# Patient Record
Sex: FEMALE | Race: WHITE | NOT HISPANIC OR LATINO | Employment: OTHER | URBAN - METROPOLITAN AREA
[De-identification: names, ages, dates, MRNs, and addresses within clinical notes are randomized per-mention and may not be internally consistent; named-entity substitution may affect disease eponyms.]

---

## 2017-02-06 ENCOUNTER — ALLSCRIPTS OFFICE VISIT (OUTPATIENT)
Dept: OTHER | Facility: OTHER | Age: 67
End: 2017-02-06

## 2017-02-16 ENCOUNTER — ALLSCRIPTS OFFICE VISIT (OUTPATIENT)
Dept: OTHER | Facility: OTHER | Age: 67
End: 2017-02-16

## 2017-02-22 ENCOUNTER — ALLSCRIPTS OFFICE VISIT (OUTPATIENT)
Dept: OTHER | Facility: OTHER | Age: 67
End: 2017-02-22

## 2017-03-03 ENCOUNTER — ALLSCRIPTS OFFICE VISIT (OUTPATIENT)
Dept: OTHER | Facility: OTHER | Age: 67
End: 2017-03-03

## 2017-03-03 DIAGNOSIS — M79.609 PAIN IN EXTREMITY: ICD-10-CM

## 2017-03-03 DIAGNOSIS — M25.512 PAIN IN LEFT SHOULDER: ICD-10-CM

## 2017-03-11 ENCOUNTER — TRANSCRIBE ORDERS (OUTPATIENT)
Dept: ADMINISTRATIVE | Facility: HOSPITAL | Age: 67
End: 2017-03-11

## 2017-03-11 ENCOUNTER — HOSPITAL ENCOUNTER (OUTPATIENT)
Dept: RADIOLOGY | Facility: HOSPITAL | Age: 67
Discharge: HOME | End: 2017-03-11
Payer: COMMERCIAL

## 2017-03-11 DIAGNOSIS — M25.512 PAIN IN LEFT SHOULDER: ICD-10-CM

## 2017-03-11 PROCEDURE — 73030 X-RAY EXAM OF SHOULDER: CPT

## 2017-03-13 ENCOUNTER — GENERIC CONVERSION - ENCOUNTER (OUTPATIENT)
Dept: OTHER | Facility: OTHER | Age: 67
End: 2017-03-13

## 2017-03-29 ENCOUNTER — ALLSCRIPTS OFFICE VISIT (OUTPATIENT)
Dept: OTHER | Facility: OTHER | Age: 67
End: 2017-03-29

## 2017-03-31 ENCOUNTER — LAB CONVERSION - ENCOUNTER (OUTPATIENT)
Dept: OTHER | Facility: OTHER | Age: 67
End: 2017-03-31

## 2017-03-31 LAB
ANTI-NUCLEAR ANTIBODY (ANA) (HISTORICAL): NEGATIVE
CYCLIC CITRULLINATED PEPTIDE ANTIBODY (HISTORICAL): <16 UNITS
ERYTHROCYTE SEDIMENTATION RATE (HISTORICAL): 2 MM/H
INTERPRETATION (HISTORICAL): NORMAL
LYME 18 KD IGG (HISTORICAL): ABNORMAL
LYME 23 KD IGG (HISTORICAL): ABNORMAL
LYME 23 KD IGM (HISTORICAL): ABNORMAL
LYME 28 KD IGG (HISTORICAL): ABNORMAL
LYME 30 KD IGG (HISTORICAL): ABNORMAL
LYME 39 KD IGG (HISTORICAL): ABNORMAL
LYME 39 KD IGM (HISTORICAL): ABNORMAL
LYME 41 KD IGG (HISTORICAL): REACTIVE
LYME 41 KD IGM (HISTORICAL): ABNORMAL
LYME 45 KD IGG (HISTORICAL): ABNORMAL
LYME 58 KD IGG (HISTORICAL): ABNORMAL
LYME 66 KD IGG (HISTORICAL): ABNORMAL
LYME 93 KD IGG (HISTORICAL): ABNORMAL
LYME IGG (HISTORICAL): NEGATIVE
LYME IGM (HISTORICAL): NEGATIVE
RHEUMATOID FACTOR (HISTORICAL): 9 IU/ML

## 2017-04-07 ENCOUNTER — HOSPITAL ENCOUNTER (OUTPATIENT)
Dept: RADIOLOGY | Age: 67
Discharge: HOME | End: 2017-04-07
Payer: COMMERCIAL

## 2017-04-07 ENCOUNTER — GENERIC CONVERSION - ENCOUNTER (OUTPATIENT)
Dept: OTHER | Facility: OTHER | Age: 67
End: 2017-04-07

## 2017-04-07 DIAGNOSIS — N95.9 MENOPAUSAL AND PERIMENOPAUSAL DISORDER: ICD-10-CM

## 2017-04-07 PROCEDURE — 77080 DXA BONE DENSITY AXIAL: CPT

## 2017-04-12 ENCOUNTER — ALLSCRIPTS OFFICE VISIT (OUTPATIENT)
Dept: OTHER | Facility: OTHER | Age: 67
End: 2017-04-12

## 2017-04-24 ENCOUNTER — GENERIC CONVERSION - ENCOUNTER (OUTPATIENT)
Dept: OTHER | Facility: OTHER | Age: 67
End: 2017-04-24

## 2017-06-05 ENCOUNTER — ALLSCRIPTS OFFICE VISIT (OUTPATIENT)
Dept: OTHER | Facility: OTHER | Age: 67
End: 2017-06-05

## 2017-06-15 ENCOUNTER — ALLSCRIPTS OFFICE VISIT (OUTPATIENT)
Dept: OTHER | Facility: OTHER | Age: 67
End: 2017-06-15

## 2017-06-15 ENCOUNTER — HOSPITAL ENCOUNTER (OUTPATIENT)
Dept: RADIOLOGY | Facility: HOSPITAL | Age: 67
Discharge: HOME | End: 2017-06-15
Attending: ORTHOPAEDIC SURGERY
Payer: OTHER MISCELLANEOUS

## 2017-06-15 DIAGNOSIS — M25.579 PAIN IN ANKLE: ICD-10-CM

## 2017-06-15 PROCEDURE — 73610 X-RAY EXAM OF ANKLE: CPT

## 2017-06-19 ENCOUNTER — GENERIC CONVERSION - ENCOUNTER (OUTPATIENT)
Dept: OTHER | Facility: OTHER | Age: 67
End: 2017-06-19

## 2017-09-08 ENCOUNTER — APPOINTMENT (OUTPATIENT)
Dept: LAB | Facility: CLINIC | Age: 67
End: 2017-09-08
Payer: COMMERCIAL

## 2017-09-08 ENCOUNTER — TRANSCRIBE ORDERS (OUTPATIENT)
Dept: LAB | Facility: CLINIC | Age: 67
End: 2017-09-08

## 2017-09-08 ENCOUNTER — OFFICE VISIT (OUTPATIENT)
Dept: LAB | Facility: CLINIC | Age: 67
End: 2017-09-08
Payer: OTHER MISCELLANEOUS

## 2017-09-08 DIAGNOSIS — M25.572 PAIN IN JOINT, ANKLE AND FOOT, LEFT: ICD-10-CM

## 2017-09-08 DIAGNOSIS — M25.579 PAIN IN ANKLE: ICD-10-CM

## 2017-09-08 DIAGNOSIS — M25.572 PAIN IN JOINT, ANKLE AND FOOT, LEFT: Primary | ICD-10-CM

## 2017-09-08 LAB
ANION GAP SERPL CALCULATED.3IONS-SCNC: 6 MMOL/L (ref 4–13)
ATRIAL RATE: 59 BPM
BUN SERPL-MCNC: 20 MG/DL (ref 5–25)
CALCIUM SERPL-MCNC: 9.5 MG/DL (ref 8.3–10.1)
CHLORIDE SERPL-SCNC: 104 MMOL/L (ref 100–108)
CO2 SERPL-SCNC: 29 MMOL/L (ref 21–32)
CREAT SERPL-MCNC: 0.9 MG/DL (ref 0.6–1.3)
GFR SERPL CREATININE-BSD FRML MDRD: 66 ML/MIN/1.73SQ M
GLUCOSE P FAST SERPL-MCNC: 109 MG/DL (ref 65–99)
P AXIS: 68 DEGREES
POTASSIUM SERPL-SCNC: 5.1 MMOL/L (ref 3.5–5.3)
PR INTERVAL: 134 MS
QRS AXIS: 38 DEGREES
QRSD INTERVAL: 88 MS
QT INTERVAL: 406 MS
QTC INTERVAL: 401 MS
SODIUM SERPL-SCNC: 139 MMOL/L (ref 136–145)
T WAVE AXIS: 23 DEGREES
VENTRICULAR RATE: 59 BPM

## 2017-09-08 PROCEDURE — 93005 ELECTROCARDIOGRAM TRACING: CPT

## 2017-09-08 PROCEDURE — 80048 BASIC METABOLIC PNL TOTAL CA: CPT

## 2017-09-08 PROCEDURE — 36415 COLL VENOUS BLD VENIPUNCTURE: CPT

## 2017-09-11 RX ORDER — DOXYCYCLINE HYCLATE 20 MG
20 TABLET ORAL 2 TIMES DAILY
COMMUNITY

## 2017-09-11 RX ORDER — METOPROLOL TARTRATE 50 MG/1
50 TABLET, FILM COATED ORAL DAILY
COMMUNITY
End: 2018-12-02 | Stop reason: SDUPTHER

## 2017-09-11 RX ORDER — LEVOTHYROXINE SODIUM 137 UG/1
137 TABLET ORAL DAILY
COMMUNITY
End: 2018-11-20 | Stop reason: SDUPTHER

## 2017-09-11 RX ORDER — NAPROXEN 500 MG/1
500 TABLET ORAL DAILY
COMMUNITY
End: 2019-04-27 | Stop reason: SDUPTHER

## 2017-09-11 RX ORDER — IBANDRONATE SODIUM 150 MG/1
150 TABLET, FILM COATED ORAL
COMMUNITY
End: 2018-12-02 | Stop reason: SDUPTHER

## 2017-09-11 RX ORDER — LORATADINE 10 MG/1
10 TABLET ORAL DAILY PRN
COMMUNITY

## 2017-09-18 ENCOUNTER — ANESTHESIA EVENT (OUTPATIENT)
Dept: PERIOP | Facility: HOSPITAL | Age: 67
End: 2017-09-18
Payer: OTHER MISCELLANEOUS

## 2017-09-18 ENCOUNTER — HOSPITAL ENCOUNTER (OUTPATIENT)
Facility: HOSPITAL | Age: 67
Setting detail: OUTPATIENT SURGERY
Discharge: HOME | End: 2017-09-18
Attending: ORTHOPAEDIC SURGERY | Admitting: ORTHOPAEDIC SURGERY
Payer: OTHER MISCELLANEOUS

## 2017-09-18 ENCOUNTER — HOSPITAL ENCOUNTER (OUTPATIENT)
Dept: RADIOLOGY | Facility: HOSPITAL | Age: 67
Setting detail: OUTPATIENT SURGERY
Discharge: HOME | End: 2017-09-18
Payer: OTHER MISCELLANEOUS

## 2017-09-18 ENCOUNTER — ANESTHESIA (OUTPATIENT)
Dept: PERIOP | Facility: HOSPITAL | Age: 67
End: 2017-09-18
Payer: OTHER MISCELLANEOUS

## 2017-09-18 VITALS
TEMPERATURE: 97.3 F | HEIGHT: 63 IN | HEART RATE: 68 BPM | DIASTOLIC BLOOD PRESSURE: 62 MMHG | SYSTOLIC BLOOD PRESSURE: 136 MMHG | RESPIRATION RATE: 16 BRPM | OXYGEN SATURATION: 100 % | BODY MASS INDEX: 41.64 KG/M2 | WEIGHT: 235 LBS

## 2017-09-18 DIAGNOSIS — T84.84XA PAIN DUE TO INTERNAL ORTHOPEDIC PROSTHETIC DEVICES, IMPLANTS AND GRAFTS, INITIAL ENCOUNTER (HCC): ICD-10-CM

## 2017-09-18 PROCEDURE — 73600 X-RAY EXAM OF ANKLE: CPT

## 2017-09-18 RX ORDER — PROPOFOL 10 MG/ML
INJECTION, EMULSION INTRAVENOUS AS NEEDED
Status: DISCONTINUED | OUTPATIENT
Start: 2017-09-18 | End: 2017-09-18 | Stop reason: SURG

## 2017-09-18 RX ORDER — OXYCODONE HYDROCHLORIDE 5 MG/1
5 TABLET ORAL EVERY 4 HOURS PRN
Qty: 30 TABLET | Refills: 0 | Status: SHIPPED | OUTPATIENT
Start: 2017-09-18 | End: 2017-09-28

## 2017-09-18 RX ORDER — LIDOCAINE HYDROCHLORIDE 10 MG/ML
INJECTION, SOLUTION EPIDURAL; INFILTRATION; INTRACAUDAL; PERINEURAL AS NEEDED
Status: DISCONTINUED | OUTPATIENT
Start: 2017-09-18 | End: 2017-09-18 | Stop reason: SURG

## 2017-09-18 RX ORDER — SODIUM CHLORIDE, SODIUM LACTATE, POTASSIUM CHLORIDE, CALCIUM CHLORIDE 600; 310; 30; 20 MG/100ML; MG/100ML; MG/100ML; MG/100ML
20 INJECTION, SOLUTION INTRAVENOUS CONTINUOUS
Status: DISCONTINUED | OUTPATIENT
Start: 2017-09-18 | End: 2017-09-18 | Stop reason: HOSPADM

## 2017-09-18 RX ORDER — FENTANYL CITRATE/PF 50 MCG/ML
50 SYRINGE (ML) INJECTION
Status: DISCONTINUED | OUTPATIENT
Start: 2017-09-18 | End: 2017-09-18 | Stop reason: HOSPADM

## 2017-09-18 RX ORDER — MAGNESIUM HYDROXIDE 1200 MG/15ML
LIQUID ORAL AS NEEDED
Status: DISCONTINUED | OUTPATIENT
Start: 2017-09-18 | End: 2017-09-18 | Stop reason: HOSPADM

## 2017-09-18 RX ORDER — FENTANYL CITRATE 50 UG/ML
INJECTION, SOLUTION INTRAMUSCULAR; INTRAVENOUS AS NEEDED
Status: DISCONTINUED | OUTPATIENT
Start: 2017-09-18 | End: 2017-09-18 | Stop reason: SURG

## 2017-09-18 RX ORDER — TRIAMCINOLONE ACETONIDE 55 UG/1
2 SPRAY, METERED NASAL DAILY
COMMUNITY
End: 2018-05-07 | Stop reason: SDUPTHER

## 2017-09-18 RX ORDER — SODIUM CHLORIDE, SODIUM LACTATE, POTASSIUM CHLORIDE, CALCIUM CHLORIDE 600; 310; 30; 20 MG/100ML; MG/100ML; MG/100ML; MG/100ML
75 INJECTION, SOLUTION INTRAVENOUS CONTINUOUS
Status: DISCONTINUED | OUTPATIENT
Start: 2017-09-18 | End: 2017-09-18 | Stop reason: HOSPADM

## 2017-09-18 RX ORDER — BUPIVACAINE HYDROCHLORIDE 2.5 MG/ML
INJECTION, SOLUTION INFILTRATION; PERINEURAL AS NEEDED
Status: DISCONTINUED | OUTPATIENT
Start: 2017-09-18 | End: 2017-09-18 | Stop reason: HOSPADM

## 2017-09-18 RX ORDER — ONDANSETRON 2 MG/ML
4 INJECTION INTRAMUSCULAR; INTRAVENOUS ONCE AS NEEDED
Status: DISCONTINUED | OUTPATIENT
Start: 2017-09-18 | End: 2017-09-18 | Stop reason: HOSPADM

## 2017-09-18 RX ORDER — CEFAZOLIN SODIUM 1 G/3ML
INJECTION, POWDER, FOR SOLUTION INTRAMUSCULAR; INTRAVENOUS AS NEEDED
Status: DISCONTINUED | OUTPATIENT
Start: 2017-09-18 | End: 2017-09-18 | Stop reason: SURG

## 2017-09-18 RX ORDER — EPHEDRINE SULFATE 50 MG/ML
INJECTION, SOLUTION INTRAVENOUS AS NEEDED
Status: DISCONTINUED | OUTPATIENT
Start: 2017-09-18 | End: 2017-09-18 | Stop reason: SURG

## 2017-09-18 RX ORDER — ONDANSETRON 2 MG/ML
INJECTION INTRAMUSCULAR; INTRAVENOUS AS NEEDED
Status: DISCONTINUED | OUTPATIENT
Start: 2017-09-18 | End: 2017-09-18 | Stop reason: SURG

## 2017-09-18 RX ADMIN — DEXAMETHASONE SODIUM PHOSPHATE 5 MG: 10 INJECTION INTRAMUSCULAR; INTRAVENOUS at 11:20

## 2017-09-18 RX ADMIN — ONDANSETRON 4 MG: 2 INJECTION INTRAMUSCULAR; INTRAVENOUS at 11:46

## 2017-09-18 RX ADMIN — FENTANYL CITRATE 50 MCG: 50 INJECTION INTRAMUSCULAR; INTRAVENOUS at 12:24

## 2017-09-18 RX ADMIN — SODIUM CHLORIDE, SODIUM LACTATE, POTASSIUM CHLORIDE, AND CALCIUM CHLORIDE 20 ML/HR: .6; .31; .03; .02 INJECTION, SOLUTION INTRAVENOUS at 09:11

## 2017-09-18 RX ADMIN — FENTANYL CITRATE 25 MCG: 50 INJECTION, SOLUTION INTRAMUSCULAR; INTRAVENOUS at 11:47

## 2017-09-18 RX ADMIN — CEFAZOLIN SODIUM 1000 MG: 1 SOLUTION INTRAVENOUS at 11:18

## 2017-09-18 RX ADMIN — SODIUM CHLORIDE, SODIUM LACTATE, POTASSIUM CHLORIDE, AND CALCIUM CHLORIDE: .6; .31; .03; .02 INJECTION, SOLUTION INTRAVENOUS at 12:07

## 2017-09-18 RX ADMIN — LIDOCAINE HYDROCHLORIDE 50 MG: 10 INJECTION, SOLUTION EPIDURAL; INFILTRATION; INTRACAUDAL; PERINEURAL at 11:13

## 2017-09-18 RX ADMIN — FENTANYL CITRATE 25 MCG: 50 INJECTION, SOLUTION INTRAMUSCULAR; INTRAVENOUS at 11:36

## 2017-09-18 RX ADMIN — FENTANYL CITRATE 50 MCG: 50 INJECTION INTRAMUSCULAR; INTRAVENOUS at 12:17

## 2017-09-18 RX ADMIN — FENTANYL CITRATE 50 MCG: 50 INJECTION, SOLUTION INTRAMUSCULAR; INTRAVENOUS at 11:13

## 2017-09-18 RX ADMIN — EPHEDRINE SULFATE 15 MG: 50 INJECTION, SOLUTION INTRAMUSCULAR; INTRAVENOUS; SUBCUTANEOUS at 11:49

## 2017-09-18 RX ADMIN — EPHEDRINE SULFATE 10 MG: 50 INJECTION, SOLUTION INTRAMUSCULAR; INTRAVENOUS; SUBCUTANEOUS at 11:40

## 2017-09-18 RX ADMIN — EPHEDRINE SULFATE 10 MG: 50 INJECTION, SOLUTION INTRAMUSCULAR; INTRAVENOUS; SUBCUTANEOUS at 11:37

## 2017-09-18 RX ADMIN — CEFAZOLIN 1000 MG: 1 INJECTION, POWDER, FOR SOLUTION INTRAVENOUS at 11:18

## 2017-09-18 RX ADMIN — PROPOFOL 300 MG: 10 INJECTION, EMULSION INTRAVENOUS at 11:13

## 2017-09-18 RX ADMIN — HYDROMORPHONE HYDROCHLORIDE 0.5 MG: 1 INJECTION, SOLUTION INTRAMUSCULAR; INTRAVENOUS; SUBCUTANEOUS at 12:06

## 2017-10-03 ENCOUNTER — GENERIC CONVERSION - ENCOUNTER (OUTPATIENT)
Dept: OTHER | Facility: OTHER | Age: 67
End: 2017-10-03

## 2017-10-17 ENCOUNTER — ALLSCRIPTS OFFICE VISIT (OUTPATIENT)
Dept: OTHER | Facility: OTHER | Age: 67
End: 2017-10-17

## 2017-10-27 ENCOUNTER — HOSPITAL ENCOUNTER (OUTPATIENT)
Dept: MAMMOGRAPHY | Facility: CLINIC | Age: 67
Discharge: HOME | End: 2017-10-27
Payer: COMMERCIAL

## 2017-10-27 DIAGNOSIS — Z12.31 ENCOUNTER FOR SCREENING MAMMOGRAM FOR MALIGNANT NEOPLASM OF BREAST: ICD-10-CM

## 2017-10-27 PROCEDURE — G0202 SCR MAMMO BI INCL CAD: HCPCS

## 2017-11-07 ENCOUNTER — GENERIC CONVERSION - ENCOUNTER (OUTPATIENT)
Dept: OTHER | Facility: OTHER | Age: 67
End: 2017-11-07

## 2017-11-14 ENCOUNTER — GENERIC CONVERSION - ENCOUNTER (OUTPATIENT)
Dept: OTHER | Facility: OTHER | Age: 67
End: 2017-11-14

## 2017-12-07 ENCOUNTER — GENERIC CONVERSION - ENCOUNTER (OUTPATIENT)
Dept: OTHER | Facility: OTHER | Age: 67
End: 2017-12-07

## 2017-12-07 DIAGNOSIS — E03.9 HYPOTHYROIDISM: ICD-10-CM

## 2017-12-21 ENCOUNTER — GENERIC CONVERSION - ENCOUNTER (OUTPATIENT)
Dept: OTHER | Facility: OTHER | Age: 67
End: 2017-12-21

## 2017-12-21 ENCOUNTER — LAB CONVERSION - ENCOUNTER (OUTPATIENT)
Dept: OTHER | Facility: OTHER | Age: 67
End: 2017-12-21

## 2017-12-21 LAB
A/G RATIO (HISTORICAL): 1.5 (CALC) (ref 1–2.5)
ALBUMIN SERPL BCP-MCNC: 4 G/DL (ref 3.6–5.1)
ALP SERPL-CCNC: 74 U/L (ref 33–130)
ALT SERPL W P-5'-P-CCNC: 10 U/L (ref 6–29)
AST SERPL W P-5'-P-CCNC: 13 U/L (ref 10–35)
BILIRUB SERPL-MCNC: 0.6 MG/DL (ref 0.2–1.2)
BUN SERPL-MCNC: 20 MG/DL (ref 7–25)
BUN/CREA RATIO (HISTORICAL): NORMAL (CALC) (ref 6–22)
CALCIUM SERPL-MCNC: 9.6 MG/DL (ref 8.6–10.4)
CHLORIDE SERPL-SCNC: 103 MMOL/L (ref 98–110)
CHOLEST SERPL-MCNC: 229 MG/DL
CHOLEST/HDLC SERPL: 2.8 (CALC)
CO2 SERPL-SCNC: 27 MMOL/L (ref 20–31)
CREAT SERPL-MCNC: 0.91 MG/DL (ref 0.5–0.99)
DEPRECATED RDW RBC AUTO: 12.6 % (ref 11–15)
EGFR AFRICAN AMERICAN (HISTORICAL): 76 ML/MIN/1.73M2
EGFR-AMERICAN CALC (HISTORICAL): 65 ML/MIN/1.73M2
GAMMA GLOBULIN (HISTORICAL): 2.6 G/DL (CALC) (ref 1.9–3.7)
GLUCOSE (HISTORICAL): 99 MG/DL (ref 65–99)
HCT VFR BLD AUTO: 38.6 % (ref 35–45)
HDLC SERPL-MCNC: 83 MG/DL
HGB BLD-MCNC: 12.5 G/DL (ref 11.7–15.5)
LDL CHOLESTEROL (HISTORICAL): 127 MG/DL (CALC)
MCH RBC QN AUTO: 30.6 PG (ref 27–33)
MCHC RBC AUTO-ENTMCNC: 32.4 G/DL (ref 32–36)
MCV RBC AUTO: 94.4 FL (ref 80–100)
NON-HDL-CHOL (CHOL-HDL) (HISTORICAL): 146 MG/DL (CALC)
PLATELET # BLD AUTO: 235 THOUSAND/UL (ref 140–400)
PMV BLD AUTO: 10.3 FL (ref 7.5–12.5)
POTASSIUM SERPL-SCNC: 4.2 MMOL/L (ref 3.5–5.3)
RBC # BLD AUTO: 4.09 MILLION/UL (ref 3.8–5.1)
SODIUM SERPL-SCNC: 139 MMOL/L (ref 135–146)
TOTAL PROTEIN (HISTORICAL): 6.6 G/DL (ref 6.1–8.1)
TRIGL SERPL-MCNC: 86 MG/DL
TSH SERPL DL<=0.05 MIU/L-ACNC: 0.97 MIU/L (ref 0.4–4.5)
WBC # BLD AUTO: 5.6 THOUSAND/UL (ref 3.8–10.8)

## 2018-01-09 ENCOUNTER — GENERIC CONVERSION - ENCOUNTER (OUTPATIENT)
Dept: OTHER | Facility: OTHER | Age: 68
End: 2018-01-09

## 2018-01-10 NOTE — PROGRESS NOTES
Chief Complaint  Nurse visit for flu vaccine      Active Problems    1  Allergic rhinitis (477 9) (J30 9)   2  Ankle pain (719 47) (M25 579)   3  Encounter for routine gynecological examination (V72 31) (Z01 419)   4  Encounter for screening mammogram for malignant neoplasm of breast (V76 12)   (Z12 31)   5  Esophageal reflux (530 81) (K21 9)   6  Foot Pain (Soft Tissue) (729 5)   7  Heel pain, chronic, left (729 5,338 29) (M79 672,G89 29)   8  Hyperlipidemia (272 4) (E78 5)   9  Hypertension (401 9) (I10)   10  Hypothyroidism (244 9) (E03 9)   11  Insomnia (780 52) (G47 00)   12  Mammogram abnormal (793 80) (R92 8)   13  Need for prophylactic vaccination and inoculation against influenza (V04 81) (Z23)   14  Osteopenia (733 90) (M85 80)   15  Rosacea (695 3) (L71 9)   16  Screening for colorectal cancer (V76 51) (Z12 11,Z12 12)   17  Screening for HPV (human papillomavirus) (V73 81) (Z11 51)    Current Meds   1  Azelastine HCl - 0 1 % Nasal Solution; TWO SPRAY EACH NOSTRIL EVERY 12 HOURS   AS NEEDED; Therapy: 65RMJ0687 to 35 67 15)  Requested for: 52CGN2184; Last   Rx:31Jan2014 Ordered   2  Caltrate 600 TABS; Take 1 tablet daily; Therapy: (06-15990652) to Recorded   3  Claritin 10 MG Oral Tablet; take 1 tablet daily as needed; Therapy: (06-15990652) to Recorded   4  [employer] Glucosamine-Chondroitin CAPS; TAKE 1 CAPSULE Daily; Therapy: (06-15990652) to Recorded   5  Doxycycline Hyclate 20 MG Oral Tablet; Take 1 tablet twice daily Recorded   6  Finacea 15 % External Gel; APPLY SPARINGLY AND MASSAGE IN WELL TO   AFFECTED AREA(S) TWICE DAILY; Therapy: (06-15990652) to Recorded   7  Levothyroxine Sodium 150 MCG Oral Tablet; take 1 tablet every day; Therapy: 19UOJ4382 to (Marlyn Puri)  Requested for: 75DSM3912; Last   TH:25SXL3983 Ordered   8  Metamucil CAPS; Therapy: (06-15990652) to Recorded   9   Metoprolol Tartrate 50 MG Oral Tablet; take 1 tablet every day;   Therapy: 55QIU7983 to (Evaluate:17Mar2017)  Requested for: 22Mar2016; Last   Rx:22Mar2016 Ordered   10  Metrogel 1 % External Gel; Therapy: 53OYB0638 to Recorded   11  Naproxen 500 MG Oral Tablet; TAKE 1 TABLET EVERY 12 HOURS as needed Recorded   12  Naproxen 500 MG Oral Tablet; TAKE 1 TABLET Twice daily with food; Therapy: 24KZZ1928 to (Evaluate:05Jun2016)  Requested for: 71WGA2703; Last    Rx:23Ucn0155 Ordered   13  Nasacort AQ 55 MCG/ACT AERO; USE 2 SPRAYS IN EACH NOSTRIL EVERY DAY; Therapy: 59JGD4374 to (Pérez Manuel)  Requested for: 38PBL7117; Last    Rx:02Oct2014 Ordered   14  Triamcinolone Acetonide 55 MCG/ACT INHA; USE 2 SPRAYS IN EACH NOSTRIL    EVERY DAY; Therapy: 47MGO7251 to (Neoma Bandar)  Requested for: 13DAR5933; Last    Rx:21Jan2014 Ordered    Allergies    1  No Known Drug Allergies    Plan  Need for prophylactic vaccination and inoculation against influenza    · Fluzone High-Dose 0 5 ML Intramuscular Suspension Prefilled Syringe    Future Appointments    Date/Time Provider Specialty Site   36/01/3957 81:07 PM MONTANA Alvarez   Family Medicine [address] [place] [address]   10/11/2016 01:40 PM Herberth Vidal [facility] Obstetrics/Gynecology [facility] OB     Signatures   Electronically signed by : Marthe Krabbe, M D ; Oct  3 3394 12:33PM EST                       (Author)

## 2018-01-11 NOTE — RESULT NOTES
Verified Results  (1) COMPREHENSIVE METABOLIC PANEL 06VZE4181 50:53KJ Angie Ugalde     Test Name Result Flag Reference   GLUCOSE 98 mg/dL  65-99   Fasting reference interval   UREA NITROGEN (BUN) 19 mg/dL  7-25   CREATININE 0 84 mg/dL  0 50-0 99   For patients >52years of age, the reference limit  for Creatinine is approximately 13% higher for people  identified as -American  eGFR NON-AFR  AMERICAN 72 mL/min/1 73m2  > OR = 60   eGFR AFRICAN AMERICAN 84 mL/min/1 73m2  > OR = 60   BUN/CREATININE RATIO   7-30   NOT APPLICABLE (calc)   SODIUM 140 mmol/L  135-146   POTASSIUM 4 2 mmol/L  3 5-5 3   CHLORIDE 105 mmol/L     CARBON DIOXIDE 28 mmol/L  20-31   CALCIUM 9 4 mg/dL  8 6-10 4   PROTEIN, TOTAL 6 4 g/dL  6 1-8 1   ALBUMIN 3 9 g/dL  3 6-5 1   GLOBULIN 2 5 g/dL (calc)  1 9-3 7   ALBUMIN/GLOBULIN RATIO 1 6 (calc)  1 0-2 5   BILIRUBIN, TOTAL 0 5 mg/dL  0 2-1 2   ALKALINE PHOSPHATASE 78 U/L     AST 14 U/L  10-35   ALT 11 U/L  6-29     (1) LIPID PANEL, FASTING 01UNA4799 93:33HT Rosalino Purvis     Test Name Result Flag Reference   CHOLESTEROL, TOTAL 220 mg/dL H 125-200   HDL CHOLESTEROL 76 mg/dL  > OR = 46   TRIGLICERIDES 83 mg/dL  <307   LDL-CHOLESTEROL 127 mg/dL (calc)  <130   Desirable range <100 mg/dL for patients with CHD or  diabetes and <70 mg/dL for diabetic patients with  known heart disease  CHOL/HDLC RATIO 2 9 (calc)  < OR = 5 0   NON HDL CHOLESTEROL 144 mg/dL (calc)     Target for non-HDL cholesterol is 30 mg/dL higher than   LDL cholesterol target       (Q) TSH, 3RD GENERATION 95QLH4355 62:52OB Rosalino Purvis   REPORT COMMENT:  FASTING:YES     Test Name Result Flag Reference   TSH 0 19 mIU/L L 0 40-4 50

## 2018-01-11 NOTE — RESULT NOTES
Message   some small arhtritis of shoulder  call if sx's persist after 2 weeks on meds     Verified Results  * XR SHOULDER 2+ VIEW LEFT [id number] 57:26EG [facility] Order Number: [id number]     Test Name Result Flag Reference   XR SHOULDER 2+ VW LEFT (Report)     LEFT SHOULDER     INDICATION: Left shoulder pain  COMPARISON: None     VIEWS: 3     IMAGES: 3     FINDINGS:     There is no acute fracture or dislocation  There is osteoarthritis of the left acromioclavicular joint with joint space narrowing and marginal hypertrophic spurring  Glenohumeral joint is unremarkable     No lytic or blastic lesions are seen  Soft tissues are unremarkable  No radiopaque foreign bodies are seen       IMPRESSION:       1  No acute osseous abnormality of the left shoulder   2   Osteoarthritis of the left acromioclavicular joint       Workstation performed: [id number]     Signed by:   Marcus Pabon MD   3/12/17

## 2018-01-12 VITALS
HEART RATE: 86 BPM | SYSTOLIC BLOOD PRESSURE: 142 MMHG | RESPIRATION RATE: 12 BRPM | DIASTOLIC BLOOD PRESSURE: 78 MMHG | BODY MASS INDEX: 40.46 KG/M2 | TEMPERATURE: 97.3 F | HEIGHT: 64 IN | WEIGHT: 237 LBS

## 2018-01-12 VITALS
HEART RATE: 84 BPM | BODY MASS INDEX: 41.15 KG/M2 | SYSTOLIC BLOOD PRESSURE: 144 MMHG | TEMPERATURE: 97.5 F | WEIGHT: 236 LBS | RESPIRATION RATE: 14 BRPM | DIASTOLIC BLOOD PRESSURE: 74 MMHG

## 2018-01-12 VITALS
SYSTOLIC BLOOD PRESSURE: 149 MMHG | WEIGHT: 235.13 LBS | BODY MASS INDEX: 40.14 KG/M2 | HEIGHT: 64 IN | HEART RATE: 69 BPM | DIASTOLIC BLOOD PRESSURE: 82 MMHG

## 2018-01-13 VITALS
SYSTOLIC BLOOD PRESSURE: 144 MMHG | TEMPERATURE: 97.8 F | HEART RATE: 68 BPM | RESPIRATION RATE: 16 BRPM | DIASTOLIC BLOOD PRESSURE: 76 MMHG | HEIGHT: 64 IN | BODY MASS INDEX: 39.63 KG/M2 | WEIGHT: 232.13 LBS

## 2018-01-13 VITALS — DIASTOLIC BLOOD PRESSURE: 68 MMHG | BODY MASS INDEX: 40.97 KG/M2 | WEIGHT: 235 LBS | SYSTOLIC BLOOD PRESSURE: 120 MMHG

## 2018-01-13 VITALS
HEIGHT: 64 IN | SYSTOLIC BLOOD PRESSURE: 140 MMHG | TEMPERATURE: 97.4 F | BODY MASS INDEX: 40.46 KG/M2 | DIASTOLIC BLOOD PRESSURE: 82 MMHG | HEART RATE: 78 BPM | WEIGHT: 237 LBS | RESPIRATION RATE: 14 BRPM

## 2018-01-13 VITALS
WEIGHT: 237 LBS | HEIGHT: 64 IN | TEMPERATURE: 97.9 F | DIASTOLIC BLOOD PRESSURE: 92 MMHG | SYSTOLIC BLOOD PRESSURE: 142 MMHG | BODY MASS INDEX: 40.46 KG/M2 | RESPIRATION RATE: 16 BRPM | HEART RATE: 78 BPM

## 2018-01-13 VITALS
RESPIRATION RATE: 14 BRPM | HEIGHT: 64 IN | WEIGHT: 236 LBS | TEMPERATURE: 97.5 F | SYSTOLIC BLOOD PRESSURE: 142 MMHG | DIASTOLIC BLOOD PRESSURE: 76 MMHG | HEART RATE: 82 BPM | BODY MASS INDEX: 40.29 KG/M2

## 2018-01-13 NOTE — MISCELLANEOUS
Message   Recorded as Task   Date: 04/07/2017 02:24 PM, Created By: Kashif Miller   Task Name: Go to Result   Assigned To: Charisse Lopez   Regarding Patient: Vida Stover, Status: Active   Comment:    Kam Clayton - 07 Apr 2017 2:24 PM     TASK CREATED  DEXA shows slight worsening, now in the osteoporosis category  She should consider treatment for this  She can either see her PCP or me or rheumatology to discuss  Darby Kemp - 07 Apr 2017 2:28 PM     TASK EDITED   Darek Weller - 07 Apr 2017 2:33 PM     TASK EDITED  spoke with pt, she decided to have kam treat her osteoporosis    apt scheduled        Active Problems    1  Acute URI (465 9) (J06 9)   2  Allergic rhinitis (477 9) (J30 9)   3  Ankle pain (719 47) (M25 579)   4  Encounter for gynecological examination without abnormal finding (V72 31) (Z01 419)   5  Encounter for screening mammogram for malignant neoplasm of breast (V76 12)   (Z12 31)   6  Esophageal reflux (530 81) (K21 9)   7  Extremity pain (729 5) (M79 609)   8  Heel pain, chronic, left (729 5,338 29) (M79 672,G89 29)   9  Hyperlipidemia (272 4) (E78 5)   10  Hypertension (401 9) (I10)   11  Hypothyroidism (244 9) (E03 9)   12  Insomnia (780 52) (G47 00)   13  Left shoulder pain (719 41) (M25 512)   14  Mammogram abnormal (793 80) (R92 8)   15  Need for pneumococcal vaccine (V03 82) (Z23)   16  Need for prophylactic vaccination and inoculation against influenza (V04 81) (Z23)   17  Osteopenia (733 90) (M85 80)   18  Pharyngitis (462) (J02 9)   19  Postmenopausal disorder (627 9) (N95 9)   20  Rosacea (695 3) (L71 9)   21  Screening for colorectal cancer (V76 51) (Z12 11,Z12 12)   22  Screening for HPV (human papillomavirus) (V73 81) (Z11 51)   23  Sinusitis (473 9) (J32 9)    Current Meds   1  Caltrate 600+D TABS; Take 2 tablets daily; Therapy: (Recorded:28Sum8636) to Recorded   2  Claritin 10 MG Oral Tablet; take 1 tablet daily as needed;    Therapy: (06-02769997) to Recorded   3  [employer]S Glucosamine-Chondroitin CAPS; TAKE 1 CAPSULE Daily; Therapy: ([phone]) to Recorded   4  Doxycycline Hyclate 20 MG Oral Tablet; Take 1 tablet twice daily; Therapy: (Ayana Stoner) to Recorded   5  Finacea 15 % External Gel; APPLY SPARINGLY AND MASSAGE IN WELL TO   AFFECTED AREA(S) TWICE DAILY; Therapy: ([phone]) to Recorded   6  Levothyroxine Sodium 137 MCG Oral Tablet (Synthroid); TAKE ONE (1) TABLET(S)   DAILY; Therapy: 68PZD5282 to (Last Rx:80Fee2234)  Requested for: 01FRW9500 Ordered   7  Metamucil CAPS; Take 4 caps twice a day; Therapy: (Ayana Stoner) to Recorded   8  Methocarbamol 500 MG Oral Tablet; one po q hs;   Therapy: 30HDD1129 to (Last Rx:29Mar2017)  Requested for: 65DKZ4408 Ordered   9  Metoprolol Tartrate 50 MG Oral Tablet; take 1 tablet every day; Therapy: 14WWX7661 to (Yojana Crow)  Requested for: 76QQX1411; Last   Rx:13Mar2017 Ordered   10  Metrogel 1 % External Gel (MetroNIDAZOLE); Therapy: 04MKY0717 to Recorded   11  Naproxen 500 MG Oral Tablet; TAKE 1 TABLET Twice daily with food; Therapy: 25RCO1950 to (Evaluate:79Mmh7072)  Requested for: 22Nov2016; Last    Rx:22Nov2016 Ordered   12  Nasacort AQ 55 MCG/ACT AERO; USE 2 SPRAYS IN EACH NOSTRIL EVERY DAY; Therapy: 69PMW0283 to (Yojana Crow)  Requested for: 96CDS3485; Last    Rx:02Oct2014 Ordered    Allergies    1   No Known Drug Allergies    Signatures   Electronically signed by : Renae Mari, ; Apr 7 2017  2:33PM EST                       (Author)

## 2018-01-13 NOTE — PROGRESS NOTES
Preliminary Nursing Report                Patient Information    Initial Encounter Entry Date:   2017 1:02 PM EST (Automated Transmission Automated Transmission)       Last Modified:   {Renée Brooks}              Legal Name: Yeimi Segal Number:        YOB: 1950        Age (years): 79        Gender: F        Body Mass Index (BMI): 42 kg/m2        Height: 63 in  Weight: 235 lbs (107 kgs)           [facility]   [facility] [id number] [address]              Phone: [phone]   (consent to leave messages)        Email:        Ethnicity: Decline to State        Judaism:        Marital Status:        Preferred Language: English        Race: Other Race                    Patient Insurance Information        Primary Insurance Information Carrier Name: {Primary  CarrierName}           Carrier Address:   {Primary  CarrierAddress}              Carrier Phone: {Primary  CarrierPhone}          Group Number: {Primary  GroupNumber}          Policy Number: {Primary  PolicyNumber}          Insured Name: {Primary  InsuredName}          Insured : {Primary  InsuredDOB}          Relationship to Insured: {Primary  RelationshiptoInsured}           Secondary Insurance Information Carrier Name: {Secondary  CarrierName}           Carrier Address:   {Secondary  CarrierAddress}              Carrier Phone: {Secondary  CarrierPhone}          Group Number: {Secondary  GroupNumber}          Policy Number: {Secondary  PolicyNumber}          Insured Name: {Secondary  InsuredName}          Insured : {Secondary  InsuredDOB}          Relationship to Insured: {Secondary  RelationshiptoInsured}                       Health Profile   Booking #:   Jake Class #: [id number]               DOS: 2017    Surgery : REMOVAL OF SUPPORT IMPLANT      Add'l Procedures/Notes:     Surgery Risk: Intermediate          Precautions     BMI                   Allergies    No Known Drug Allergies Medications    Caltrate 600+D TABS       Claritin 10 MG Oral Tablet       CVS Glucosamine-Chondroitin CAPS       Doxycycline Hyclate 20 MG Oral Tablet       Finacea 15 % External Gel       Ibandronate Sodium 150 MG Oral Tablet       Levothyroxine Sodium 137 MCG Oral Tablet       Metamucil CAPS       Methocarbamol 500 MG Oral Tablet       Metoprolol Tartrate 50 MG Oral Tablet       Metrogel 1 % External Gel       Naproxen 500 MG Oral Tablet       Nasacort AQ 55 MCG/ACT AERO               Conditions    Allergic rhinitis       Ankle pain       Encounter for gynecological examination without abnormal finding       Encounter for screening mammogram for malignant neoplasm of breast       Esophageal reflux       Extremity pain       Heel pain, chronic, left       Hyperlipidemia       Hypertension       Hypothyroidism       Insomnia       Left shoulder pain       Need for pneumococcal vaccine       Need for prophylactic vaccination and inoculation against influenza       Osteopenia       Painful orthopaedic hardware       Postmenopausal disorder       Postmenopausal osteoporosis       Rosacea               Family History    None             Surgical History    None             Social History    Currently sexually active       Denies Never Drank Alcohol       Denies Uses drugs daily       Exercises moderately 3 or more times a week              Never A Smoker                               Patient Instructions       Medical Procedure Risk  NPO Instructions   The day before surgery it is recommended to have a light dinner at your usual time and you are allowed a light snack early in the evening  Do not eat anything heavy or eat a big meal after 7pm  Do not eat or drink anything after midnight prior to your surgery  If you are supposed to take any of your medications, do so with a sip of water   Failure to follow these instructions can lead to an increased risk of lung complications and may result in a delay or cancellation of your procedure  If you have any questions, contact your institution for further instructions  No candy, no gum, no mints, no chewing tobacco          Metoprolol Tartrate 50 MG Oral Tablet  Medication Instruction (Beta Blocker) 3, 2, c, 4, 6, 5  Please continue to take this medication on your normal schedule  If this is an oral medication and you take in the morning, you may do so with a sip of water  Naproxen 500 MG Oral Tablet  Medication Instruction (NSAID - Pain Medication) 61  Please stop ibuprofen, naproxen and other non-steroidal anti-inflammatory drugs (NSAIDS) for 24 hrs before surgery  Esophageal reflux  Medication Instruction (Reflux Disease)   Please continue to take this medication on your normal schedule  If this is an oral medication and you take in the morning, you may do so with a sip of water  Levothyroxine Sodium 137 MCG Oral Tablet  Medication Instruction (Thyroxine - Synthroid)   Please continue to take this medication on your normal schedule  If this is an oral medication and you take in the morning, you may do so with a sip of water  Testing Considerations       ? Basic Metabolic Panel (BMP) t  If test was completed and normal within last six months, repeat test is not necessary  Triggered by: Hypertension         ? Electrocardiogram (ECG) t  Patient does not need new test if normal ECG is present within the last six months and no change in clinical condition  Triggered by: Hypertension, Age or Facility Rec               Consultations       No recommendations for this classification  Miscellaneous Questions         Question: Are you able to walk up a flight of stairs, walk up a hill or do heavy housework WITHOUT having chest pain or shortness of breath? Answer: YES                   Allergies/Conditions/Medications Not Found        The following were not recognized by our system when generating the recommendations  Please consider if this would impact any preoperative protocols  ? Ankle pain       ? Currently sexually active       ? Denies Never Drank Alcohol       ? Encounter for screening mammogram for malignant neoplasm of breast       ? Exercises moderately 3 or more times a week       ?        ? Never A Smoker       ? Caltrate 600+D TABS       ? CVS Glucosamine-Chondroitin CAPS       ? Finacea 15 % External Gel       ? Ibandronate Sodium 150 MG Oral Tablet       ? Metamucil CAPS                  Appointment Information         Date:    09/18/2017        Location:    [facility]        Address:           Directions:                      Footnotes revision 14      ?? Denotes a free-text entry  Legal Disclaimer: Any and all recommendations and services provided herein are designed to assist in the preoperative care of the patient  Nothing contained herein is designed to replace, eliminate or alleviate the responsibility of the attending physician to supervise and determine the patient?s preoperative care and course of treatment  Failure to provide complete, accurate information may negatively impact the system?s ability to recommend the proper preoperative protocol  THE ATTENDING PHYSICIAN IS RESPONSIBLE TO REVIEW THE SUGGESTED PREOPERATIVE PROTOCOLS/COURSE OF TREATMENT AND PRESCRIBE THE FINAL COURSE OF PREOPERATIVE TREATMENT IN CONSULTATION WITH THE PATIENT  THE ePREOP SYSTEM AND ITS MATERIALS ARE PROVIDED ? AS IS? WITHOUT WARRANTY OF ANY KIND, EXPRESS OR IMPLIED, INCLUDING, BUT NOT LIMITED TO, WARRANTIES OF PERFORMANCE OR MERCHANTABILITY OR FITNESS FOR A PARTICULAR PURPOSE  PATIENT AND PHYSICIANS HEREBY AGREE THAT THEIR USE OF THE MATERIALS AND RESOURCES ACT AS A CONSENT TO RELEASE AND WAIVE ePREOP FROM ANY AND ALL CLAIMS OF WARRANTY, TORT OR CONTRACT LAW OF ANY KIND  Electronically signed by:Saúl MALONE    Jun 22 2017  5:05PM EST

## 2018-01-15 NOTE — PROGRESS NOTES
Chief Complaint  Patient is here High Dose Flu Vaccine      Active Problems    1  Aftercare following surgery (V58 89) (Z48 89)   2  Allergic rhinitis (477 9) (J30 9)   3  Ankle pain (719 47) (M25 579)   4  Encounter for gynecological examination without abnormal finding (V72 31) (Z01 419)   5  Encounter for screening mammogram for malignant neoplasm of breast (V76 12)   (Z12 31)   6  Esophageal reflux (530 81) (K21 9)   7  Extremity pain (729 5) (M79 609)   8  Heel pain, chronic, left (729 5,338 29) (M79 672,G89 29)   9  Hyperlipidemia (272 4) (E78 5)   10  Hypertension (401 9) (I10)   11  Hypothyroidism (244 9) (E03 9)   12  Insomnia (780 52) (G47 00)   13  Left shoulder pain (719 41) (M25 512)   14  Need for pneumococcal vaccine (V03 82) (Z23)   15  Need for prophylactic vaccination and inoculation against influenza (V04 81) (Z23)   16  Osteopenia (733 90) (M85 80)   17  Painful orthopaedic hardware (996 78) (T84 84XA)   18  Postmenopausal disorder (627 9) (N95 9)   19  Postmenopausal osteoporosis (733 01) (M81 0)   20  Rosacea (695 3) (L71 9)    Current Meds   1  Caltrate 600+D TABS; Take 2 tablets daily; Therapy: (Recorded:67Pvx2480) to Recorded   2  Claritin 10 MG Oral Tablet; take 1 tablet daily as needed; Therapy: (06-15990652) to Recorded   3  CVS Glucosamine-Chondroitin CAPS; TAKE 1 CAPSULE Daily; Therapy: (06-15990652) to Recorded   4  Doxycycline Hyclate 20 MG Oral Tablet; Take 1 tablet twice daily; Therapy: (Kimberly Carpio) to Recorded   5  Finacea 15 % External Gel; APPLY SPARINGLY AND MASSAGE IN WELL TO   AFFECTED AREA(S) TWICE DAILY; Therapy: (06-15990652) to Recorded   6  Ibandronate Sodium 150 MG Oral Tablet; TAKE 1 TABLET ONCE MONTHLY; Therapy: 24Apr2017 to (Evaluate:19Apr2018)  Requested for: 24Apr2017; Last   Rx:24Apr2017 Ordered   7  Levothyroxine Sodium 137 MCG Oral Tablet; take 1 tablet every day;    Therapy: 25MGF4087 to (Evaluate:13Nov2017)  Requested for: 48PWJ1170; Last   Rx:47Hvl2142 Ordered   8  Metamucil CAPS; Take 4 caps twice a day; Therapy: (Milly Alba) to Recorded   9  Methocarbamol 500 MG Oral Tablet; TAKE 1 TABLET AT BEDTIME; Therapy: 93JAG5344 to (Evaluate:24Zed2606)  Requested for: 01THX0423; Last   Rx:76Gme7310 Ordered   10  Metoprolol Tartrate 50 MG Oral Tablet; take 1 tablet every day; Therapy: 86UDA9510 to (Jessica Ianting)  Requested for: 00XPG7796; Last    Rx:95Hyc3416 Ordered   11  Metrogel 1 % External Gel; APPLY SPARINGLY TO AFFECTED AREA(S) ONCE DAILY; Therapy: 94MLE2206 to (Evaluate:26Ffn4987) Recorded   12  Naproxen 500 MG Oral Tablet; TAKE 1 TABLET Twice daily with food; Therapy: 66JVN3149 to (Evaluate:39Gvv4128)  Requested for: 22Nov2016; Last    Rx:22Nov2016 Ordered   13  Nasacort AQ 55 MCG/ACT AERO; USE 2 SPRAYS IN EACH NOSTRIL EVERY DAY; Therapy: 77HLM3868 to (Doe Wendy)  Requested for: 80DST9721; Last    Rx:14Csi8028 Ordered    Allergies    1  No Known Drug Allergies    Vitals  Signs    Temperature: 96 6 F    Plan  Need for prophylactic vaccination and inoculation against influenza    · Fluzone High-Dose 0 5 ML Intramuscular Suspension Prefilled Syringe    Future Appointments    Date/Time Provider Specialty Site   95/10/4766 69:53 PM MONTANA Pearson  [facility]   11/14/2017 10:15 AM MONTANA Rich   Orthopedic Surgery [facility] 1 Maya Denney   11[facility]07/2017 01:00 [facility]     Signatures   Electronically signed by : MONTANA Dorantes ; Oct 17 0158  3:06PM EST                       (Author)

## 2018-01-16 NOTE — MISCELLANEOUS
Message   Recorded as Task   Date: 04/12/2017 12:04 PM, Created By: Martin King   Task Name: Follow Up   Assigned To: Martin King   Regarding Patient: Ferdinand Daigle, Status: In Progress   Comment:    Latha Clayton - 12 Apr 2017 12:04 PM     TASK CREATED  can you please order Prolia for this pt? Thanks! Shanna Todd - 12 Apr 2017 4:31 PM     TASK REASSIGNED: Previously Assigned To Shanna Todd, please order  Thank you   Ronda Blanca - 14 Apr 2017 3:19 PM     TASK EDITED  PT needs pre cert thru [facility]  I called to get benefits pt will still be responsible for 300 00 it my go thru specialty pharmacy  LM at [address] for a phone call back [phone]    There fax # [phone]   Ronda Blanca - 18 Apr 2017 1:35 PM     TASK EDITED  Talked to [facility] nurse at medical management she gave me fax # of [phone] all clinical were faxed today  Ronda Blanca - 18 Apr 2017 1:36 PM     TASK IN PROGRESS   Ronda Blanca - 18 Apr 2017 3:40 PM     TASK REPLIED TO: Previously Assigned To Clement De La Cruz from [facility] called this pt's prolia is denied by medical Doctor and pharmacy due to she has to take oral meds and just because she is on the synthriod there is no contradiction and the pt will just have to space her meds  No prolia is allowed  Latha Clayton - 20 Apr 2017 12:23 PM     TASK REPLIED TO: Previously Assigned To Latha Clayton  I spoke to pt  She says she has a Medicare card as well as a [employer] card, which is a secondary, not a replacement plan  We do not have her Medicare card on file, which she will get to us  When we receive it, are we able to check with Medicare itself? I dont' really know how this works, but I thought that Medicare did cover Prolia  Sorry and thanks! Ronda Blanca - 21 Apr 2017 7:57 AM     TASK EDITED  medicare follows [facility] this will not be paid   Medicare is pt's secondary insurance [employer] is primary   Latha Clayton - 24 Apr 2017 12:04 PM     TASK REPLIED TO: Previously Assigned To Latha Clayton  Left message for pt TCB with choice of tx   Latha Clayton - 24 Apr 2017 1:08 PM     TASK REPLIED TO: Previously Assigned To Latha Clayton  Pt will try Boniva - once monthly - and take Synthroid on that day at bedtime  Rx [facility]         Signatures   Electronically signed by : GETACHEW Limon;  Apr 24 2017  3:15PM EST                       (Author)

## 2018-01-22 VITALS
HEIGHT: 63 IN | DIASTOLIC BLOOD PRESSURE: 78 MMHG | WEIGHT: 236 LBS | BODY MASS INDEX: 41.82 KG/M2 | SYSTOLIC BLOOD PRESSURE: 120 MMHG

## 2018-01-22 VITALS
HEIGHT: 63 IN | SYSTOLIC BLOOD PRESSURE: 145 MMHG | HEART RATE: 65 BPM | WEIGHT: 237.38 LBS | BODY MASS INDEX: 42.06 KG/M2 | DIASTOLIC BLOOD PRESSURE: 85 MMHG

## 2018-01-22 VITALS
WEIGHT: 231.25 LBS | SYSTOLIC BLOOD PRESSURE: 145 MMHG | HEIGHT: 64 IN | BODY MASS INDEX: 39.48 KG/M2 | DIASTOLIC BLOOD PRESSURE: 82 MMHG | HEART RATE: 71 BPM

## 2018-01-22 VITALS — TEMPERATURE: 96.6 F

## 2018-01-24 VITALS
HEART RATE: 68 BPM | DIASTOLIC BLOOD PRESSURE: 82 MMHG | BODY MASS INDEX: 41.33 KG/M2 | HEIGHT: 63 IN | WEIGHT: 233.25 LBS | SYSTOLIC BLOOD PRESSURE: 128 MMHG | RESPIRATION RATE: 16 BRPM | TEMPERATURE: 96.9 F

## 2018-02-12 RX ORDER — METRONIDAZOLE 10 MG/G
1 GEL TOPICAL DAILY
COMMUNITY
Start: 2014-10-27

## 2018-02-12 RX ORDER — METHOCARBAMOL 500 MG/1
1 TABLET, FILM COATED ORAL
COMMUNITY
Start: 2017-03-03 | End: 2018-05-07 | Stop reason: ALTCHOICE

## 2018-02-12 RX ORDER — ATORVASTATIN CALCIUM 10 MG/1
1 TABLET, FILM COATED ORAL DAILY
COMMUNITY
Start: 2018-01-15 | End: 2018-05-01 | Stop reason: SDUPTHER

## 2018-02-12 RX ORDER — AZELASTINE HCL 205.5 UG/1
SPRAY NASAL
COMMUNITY
Start: 2018-01-09 | End: 2018-05-07 | Stop reason: ALTCHOICE

## 2018-02-12 RX ORDER — AZELAIC ACID 0.15 G/G
GEL TOPICAL
COMMUNITY

## 2018-02-12 RX ORDER — AMOXICILLIN AND CLAVULANATE POTASSIUM 875; 125 MG/1; MG/1
TABLET, FILM COATED ORAL
Refills: 0 | COMMUNITY
Start: 2018-01-01 | End: 2018-05-07 | Stop reason: ALTCHOICE

## 2018-02-12 RX ORDER — LEVOFLOXACIN 500 MG/1
1 TABLET, FILM COATED ORAL DAILY
COMMUNITY
Start: 2018-01-09 | End: 2018-05-07 | Stop reason: ALTCHOICE

## 2018-02-12 RX ORDER — METHYLPREDNISOLONE 4 MG/1
TABLET ORAL
Refills: 0 | COMMUNITY
Start: 2018-01-01 | End: 2018-05-07 | Stop reason: ALTCHOICE

## 2018-02-13 ENCOUNTER — OFFICE VISIT (OUTPATIENT)
Dept: OBGYN CLINIC | Facility: HOSPITAL | Age: 68
End: 2018-02-13
Payer: OTHER MISCELLANEOUS

## 2018-02-13 VITALS
WEIGHT: 241.4 LBS | BODY MASS INDEX: 41.21 KG/M2 | HEART RATE: 69 BPM | HEIGHT: 64 IN | SYSTOLIC BLOOD PRESSURE: 156 MMHG | DIASTOLIC BLOOD PRESSURE: 85 MMHG

## 2018-02-13 DIAGNOSIS — M19.072 DJD (DEGENERATIVE JOINT DISEASE), ANKLE AND FOOT, LEFT: ICD-10-CM

## 2018-02-13 DIAGNOSIS — M25.572 PAIN, JOINT, ANKLE AND FOOT, LEFT: Primary | ICD-10-CM

## 2018-02-13 PROCEDURE — 20605 DRAIN/INJ JOINT/BURSA W/O US: CPT | Performed by: ORTHOPAEDIC SURGERY

## 2018-02-13 PROCEDURE — 99213 OFFICE O/P EST LOW 20 MIN: CPT | Performed by: ORTHOPAEDIC SURGERY

## 2018-02-13 RX ORDER — BETAMETHASONE SODIUM PHOSPHATE AND BETAMETHASONE ACETATE 3; 3 MG/ML; MG/ML
6 INJECTION, SUSPENSION INTRA-ARTICULAR; INTRALESIONAL; INTRAMUSCULAR; SOFT TISSUE
Status: COMPLETED | OUTPATIENT
Start: 2018-02-13 | End: 2018-02-13

## 2018-02-13 RX ORDER — BUPIVACAINE HYDROCHLORIDE 2.5 MG/ML
1 INJECTION, SOLUTION INFILTRATION; PERINEURAL
Status: COMPLETED | OUTPATIENT
Start: 2018-02-13 | End: 2018-02-13

## 2018-02-13 RX ORDER — LIDOCAINE HYDROCHLORIDE 10 MG/ML
1 INJECTION, SOLUTION INFILTRATION; PERINEURAL
Status: COMPLETED | OUTPATIENT
Start: 2018-02-13 | End: 2018-02-13

## 2018-02-13 RX ADMIN — BETAMETHASONE SODIUM PHOSPHATE AND BETAMETHASONE ACETATE 6 MG: 3; 3 INJECTION, SUSPENSION INTRA-ARTICULAR; INTRALESIONAL; INTRAMUSCULAR; SOFT TISSUE at 11:10

## 2018-02-13 RX ADMIN — BUPIVACAINE HYDROCHLORIDE 1 ML: 2.5 INJECTION, SOLUTION INFILTRATION; PERINEURAL at 11:10

## 2018-02-13 RX ADMIN — LIDOCAINE HYDROCHLORIDE 1 ML: 10 INJECTION, SOLUTION INFILTRATION; PERINEURAL at 11:10

## 2018-02-13 NOTE — PROGRESS NOTES
68 y o female presents for evaluation of pain and swelling left ankle  Several months back, she underwent hardware removal and describes very little symptoms of the lateral aspect the ankle  Now she has anterior, medial, posterior ankle pain and stiffness  She has increased pain with increased activities  She has had injection of corticosteroid to the ankle joint the past which resulted in significant relief of very similar symptoms  Review of Systems  Review of systems negative unless otherwise specified in HPI    Past Medical History  Past Medical History:   Diagnosis Date    Allergic rhinitis     Disease of thyroid gland     GERD (gastroesophageal reflux disease)     Hyperlipidemia     Hypertension     Rosacea        Past Surgical History  Past Surgical History:   Procedure Laterality Date    ANKLE SURGERY      COLONOSCOPY      CA REMOVAL DEEP IMPLANT Left 9/18/2017    Procedure: REMOVAL HARDWARE ANKLE (1 plate, 8 screws );  Surgeon: Marian Nicole MD;  [facility];  Service: Orthopedics    THYROIDECTOMY      TUBAL LIGATION         Current Medications  Current Outpatient Prescriptions on File Prior to Visit   Medication Sig Dispense Refill    doxycycline (PERIOSTAT) 20 MG tablet Take 20 mg by mouth 2 (two) times a day      GLUCOSAMINE-CHONDROITIN PO Take by mouth      ibandronate (BONIVA) 150 MG tablet Take 150 mg by mouth every 30 (thirty) days Next dose due 10  2 17      levothyroxine 137 mcg tablet Take 137 mcg by mouth daily      loratadine (CLARITIN) 10 mg tablet Take 10 mg by mouth daily      metoprolol tartrate (LOPRESSOR) 50 mg tablet Take 25 mg by mouth daily      naproxen (NAPROSYN) 500 mg tablet Take 250 mg by mouth daily      Triamcinolone Acetonide 55 MCG/ACT AERO 2 Act into each nostril daily      Calcium Carbonate-Vitamin D (CALTRATE 600+D PO) Take by mouth      psyllium (METAMUCIL) 0 52 g capsule Take 0 52 g by mouth daily       No current facility-administered medications on file prior to visit  Recent Labs [facility])  @LABALLVALUEIP(HCT,HGB,WBC,PT,INR,ESR,CRP,GLUCOSE,HGBA1C)@      Physical exam  Gait pattern has modest antalgia  Breathing is nonlabored  Left knee is not effused  Left calf is nonswollen nontender  Left ankle is a healed lateral incision  There is bony enlargement anteriorly, this bony enlargement anterolaterally, this bony enlargement along the medial ankle  There is restriction of tibiotalar, but preserved subtalar motion  There is no palpable warmth of the synovium to the ankle, there is no ankle effusion  Toes are warm, sensate, mobile    Imaging  Review of previous x-rays of the ankle show ankle arthritis in the tibiotalar articulation    Procedure  And injection of corticosteroid is performed for the left ankle and is documented below    Medium joint arthrocentesis  Date/Time: 2/13/2018 11:10 AM  Consent given by: patient  Supporting Documentation  Indications: pain   Procedure Details  Location: ankle -   Preparation: Patient was prepped and draped in the usual sterile fashion  Needle size: 22 G  Approach: anterolateral  Medications administered: 1 mL bupivacaine 0 25 %; 1 mL lidocaine 1 %; 6 mg betamethasone acetate-betamethasone sodium phosphate 6 (3-3) mg/mL    Patient tolerance: patient tolerated the procedure well with no immediate complications  Dressing:  Sterile dressing applied          Assessment/Plan:   76 y  o female who presents for evaluation of pain and dysfunction left ankle  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

## 2018-02-13 NOTE — PATIENT INSTRUCTIONS
He received an injection of corticosteroid year left ankle joint today    If there is pain later today, liberal use of an ice pack should be beneficial

## 2018-05-01 DIAGNOSIS — E78.5 HYPERLIPIDEMIA, UNSPECIFIED HYPERLIPIDEMIA TYPE: Primary | ICD-10-CM

## 2018-05-01 RX ORDER — ATORVASTATIN CALCIUM 10 MG/1
10 TABLET, FILM COATED ORAL DAILY
Qty: 90 TABLET | Refills: 1 | Status: SHIPPED | OUTPATIENT
Start: 2018-05-01 | End: 2018-12-22 | Stop reason: SDUPTHER

## 2018-05-07 ENCOUNTER — OFFICE VISIT (OUTPATIENT)
Dept: FAMILY MEDICINE CLINIC | Facility: CLINIC | Age: 68
End: 2018-05-07
Payer: COMMERCIAL

## 2018-05-07 VITALS
HEIGHT: 64 IN | WEIGHT: 239.6 LBS | RESPIRATION RATE: 16 BRPM | HEART RATE: 68 BPM | DIASTOLIC BLOOD PRESSURE: 86 MMHG | BODY MASS INDEX: 40.9 KG/M2 | SYSTOLIC BLOOD PRESSURE: 144 MMHG | TEMPERATURE: 97.1 F

## 2018-05-07 DIAGNOSIS — H61.23 BILATERAL IMPACTED CERUMEN: Primary | ICD-10-CM

## 2018-05-07 PROCEDURE — 99212 OFFICE O/P EST SF 10 MIN: CPT | Performed by: FAMILY MEDICINE

## 2018-05-07 PROCEDURE — 69210 REMOVE IMPACTED EAR WAX UNI: CPT | Performed by: FAMILY MEDICINE

## 2018-05-07 NOTE — PROGRESS NOTES
FAMILY PRACTICE OFFICE VISIT       NAME: Nakita Stephenson  AGE: 76 y o  SEX: female       : 1950        MRN: [id number]    DATE: 2018  TIME: 5:12 PM    Assessment and Plan     Problem List Items Addressed This Visit     Bilateral impacted cerumen - Primary          Patient was instructed to use over-the-counter Debrox ear wax removal   She will use 5 drops twice daily to left ear for 5 days  Patient will call if symptoms persist   There are no Patient Instructions on file for this visit  Chief Complaint     Chief Complaint   Patient presents with    Earache     Pt is here w/ c/o right eaer feeling blocked since taking a shower last night        History of Present Illness     Patient denies any Q-tip use  She denies any recent illness  Symptoms became worse after taking a shower this morning  Review of Systems   Review of Systems   Constitutional: Negative  HENT:        As per HPI   Respiratory: Negative  Cardiovascular: Negative  Active Problem List     Patient Active Problem List   Diagnosis    Pain, joint, ankle and foot, left    DJD (degenerative joint disease), ankle and foot, left    Bilateral impacted cerumen       Past Medical History:  Past Medical History:   Diagnosis Date    Allergic rhinitis     Disease of thyroid gland     GERD (gastroesophageal reflux disease)     Hyperlipidemia     Hypertension     Rosacea        Past Surgical History:  Past Surgical History:   Procedure Laterality Date    ANKLE SURGERY      COLONOSCOPY      OK REMOVAL DEEP IMPLANT Left 2017    Procedure: REMOVAL HARDWARE ANKLE (1 plate, 8 screws );  Surgeon: Christy Fairchild MD;  [facility];  Service: Orthopedics    THYROIDECTOMY      TUBAL LIGATION         Family History:  History reviewed  No pertinent family history      Social History:  Social History     Social History    Marital status: /Civil Union     Spouse name: N/A    Number of children: N/A  Years of education: N/A     Occupational History    Not on file  Social History Main Topics    Smoking status: Never Smoker    Smokeless tobacco: Never Used    Alcohol use No    Drug use: No    Sexual activity: Not on file     Other Topics Concern    Not on file     Social History Narrative    No narrative on file       Objective     Vitals:    05/07/18 1425   BP: 144/86   Pulse: 68   Resp: 16   Temp: (!) 97 1 °F (36 2 °C)     Wt Readings from Last 3 Encounters:   05/07/18 109 kg (239 lb 9 6 oz)   02/13/18 109 kg (241 lb 6 4 oz)   01/09/18 106 kg (233 lb 4 oz)         Physical Exam   Constitutional: No distress  HENT:   Patient with bilateral wax impaction of ear canals     Ear cerumen removal  Date/Time: 5/7/2018 5:10 PM  Performed by: Marga Gómez  Authorized by: Marga Gómez     Consent:     Consent obtained:  Verbal  Procedure details:     Location:  R ear and L ear    Procedure type: irrigation    Post-procedure details:     Hearing quality:  Improved    Patient tolerance of procedure: Tolerated well, no immediate complications  Comments:      Large amount of wax removed from her right ear canal   Tympanic membrane within normal limits  I was able to remove some ear wax from left canal but not completely        Pertinent Laboratory/Diagnostic Studies:  Lab Results   Component Value Date    GLUCOSE 99 10/22/2014    BUN 20 12/20/2017    CREATININE 0 91 12/20/2017    CALCIUM 9 6 12/20/2017     12/20/2017    K 4 2 12/20/2017    CO2 27 12/20/2017     12/20/2017     Lab Results   Component Value Date    ALT 10 12/20/2017    AST 13 12/20/2017    ALKPHOS 74 12/20/2017    BILITOT 0 6 12/20/2017       Lab Results   Component Value Date    WBC 5 6 12/20/2017    HGB 12 5 12/20/2017    HCT 38 6 12/20/2017    MCV 94 4 12/20/2017     12/20/2017       No results found for: TSH    Lab Results   Component Value Date    CHOL 229 (H) 12/20/2017     Lab Results   Component Value Date    TRIG 86 12/20/2017     Lab Results   Component Value Date    HDL 83 12/20/2017     Lab Results   Component Value Date    LDLCALC 123 (H) 10/22/2014     No results found for: HGBA1C    Results for orders placed or performed in visit on 12/21/17   CBC AND PLATELET (HISTORICAL)   Result Value Ref Range    WBC 5 6 3 8 - 10 8 Thousand/uL    RBC 4 09 3 80 - 5 10 Million/uL    Hemoglobin 12 5 11 7 - 15 5 g/dL    Hematocrit 38 6 35 0 - 45 0 %    MCV 94 4 80 0 - 100 0 fL    MCH 30 6 27 0 - 33 0 pg    MCHC 32 4 32 0 - 36 0 g/dL    RDW 12 6 11 0 - 15 0 %    Platelets 381 601 - 809 Thousand/uL    MPV 10 3 7 5 - 12 5 fL   TSH, 3RD GENERATION (HISTORICAL)   Result Value Ref Range    TSH 3RD GENERATON 0 97 0 40 - 4 50 mIU/L       Orders Placed This Encounter   Procedures    Ear cerumen removal       ALLERGIES:  No Known Allergies    Current Medications     Current Outpatient Prescriptions   Medication Sig Dispense Refill    atorvastatin (LIPITOR) 10 mg tablet Take 1 tablet (10 mg total) by mouth daily 90 tablet 1    Azelaic Acid (FINACEA) 15 % cream Apply topically daily at bedtime        Calcium Carb-Cholecalciferol (CALTRATE 600+D) 600-800 MG-UNIT TABS Take 2 tablets by mouth daily      doxycycline (PERIOSTAT) 20 MG tablet Take 20 mg by mouth 2 (two) times a day      GLUCOSAMINE-CHONDROITIN PO Take by mouth      ibandronate (BONIVA) 150 MG tablet Take 150 mg by mouth every 30 (thirty) days Next dose due 10  2 17      levothyroxine 137 mcg tablet Take 137 mcg by mouth daily      loratadine (CLARITIN) 10 mg tablet Take 10 mg by mouth daily      metoprolol tartrate (LOPRESSOR) 50 mg tablet Take 50 mg by mouth daily        metroNIDAZOLE (METROGEL) 1 % gel Apply topically daily      naproxen (NAPROSYN) 500 mg tablet Take 500 mg by mouth daily        psyllium (METAMUCIL) 0 52 g capsule Take 0 52 g by mouth daily      Triamcinolone Acetonide (NASACORT ALLERGY 24HR NA) 2 sprays into each nostril daily       No current facility-administered medications for this visit            Health Maintenance     Health Maintenance   Topic Date Due    Hepatitis C Screening  1950    Depression Screening PHQ-9  01/23/1962    DTaP,Tdap,and Td Vaccines (1 - Tdap) 01/23/1971    COLONOSCOPY  08/13/2014    Fall Risk  01/23/2015    Urinary Incontinence Screening  01/23/2015    PNEUMOCOCCAL POLYSACCHARIDE VACCINE AGE 65 AND OVER  01/23/2015    GLAUCOMA SCREENING 67+ YR  01/23/2017    INFLUENZA VACCINE  09/01/2018     Immunization History   Administered Date(s) Administered    Influenza Quadrivalent Preservative Free 3 years and older IM 09/26/2014    Influenza Split High Dose Preservative Free IM 10/10/2015, 10/03/2016, 10/17/2017    Influenza TIV (IM) 10/11/2012, 10/17/2013    Pneumococcal Conjugate 13-Valent 99/49/4144       Alex Cuenca MD

## 2018-05-14 ENCOUNTER — OFFICE VISIT (OUTPATIENT)
Dept: FAMILY MEDICINE CLINIC | Facility: CLINIC | Age: 68
End: 2018-05-14
Payer: COMMERCIAL

## 2018-05-14 VITALS
HEIGHT: 64 IN | DIASTOLIC BLOOD PRESSURE: 78 MMHG | RESPIRATION RATE: 16 BRPM | WEIGHT: 235.4 LBS | HEART RATE: 72 BPM | SYSTOLIC BLOOD PRESSURE: 140 MMHG | TEMPERATURE: 97.5 F | BODY MASS INDEX: 40.19 KG/M2

## 2018-05-14 DIAGNOSIS — H60.502 ACUTE OTITIS EXTERNA OF LEFT EAR, UNSPECIFIED TYPE: Primary | ICD-10-CM

## 2018-05-14 PROCEDURE — 99213 OFFICE O/P EST LOW 20 MIN: CPT | Performed by: FAMILY MEDICINE

## 2018-05-14 PROCEDURE — 69210 REMOVE IMPACTED EAR WAX UNI: CPT | Performed by: FAMILY MEDICINE

## 2018-05-14 RX ORDER — NEOMYCIN SULFATE, POLYMYXIN B SULFATE AND HYDROCORTISONE 10; 3.5; 1 MG/ML; MG/ML; [USP'U]/ML
SUSPENSION/ DROPS AURICULAR (OTIC)
Qty: 10 ML | Refills: 0 | Status: SHIPPED | OUTPATIENT
Start: 2018-05-14 | End: 2018-10-30

## 2018-05-14 NOTE — ASSESSMENT & PLAN NOTE
Otitis externa  After removal of much wax from left ear canal patient was placed on Cortisporin otic suspension drops to use 5 drops b i d  for 5 days    If hearing loss does not improve patient will follow up with [facility] for further evaluation

## 2018-05-14 NOTE — PROGRESS NOTES
FAMILY PRACTICE OFFICE VISIT       NAME: Francheska Eric  AGE: 76 y o  SEX: female       : 1950        MRN: [id number]    DATE: 2018  TIME: 2:45 PM    Assessment and Plan     Problem List Items Addressed This Visit     Acute otitis externa of left ear - Primary     Otitis externa  After removal of much wax from left ear canal patient was placed on Cortisporin otic suspension drops to use 5 drops b i d  for 5 days  If hearing loss does not improve patient will follow up with [facility] for further evaluation         Relevant Medications    neomycin-polymyxin-hydrocortisone (CORTISPORIN) 0 35%-10,000 units/mL-1% otic suspension            There are no Patient Instructions on file for this visit  Chief Complaint     Chief Complaint   Patient presents with    Cerumen Impaction     Ear [place]       History of Present Illness     Patient continued to have decrease hearing in left ear after using over-the-counter Debrox for 5 days  She denies any significant ear pain        Review of Systems   Review of Systems   Constitutional: Negative      HENT:        As per HPI       Active Problem List     Patient Active Problem List   Diagnosis    Pain, joint, ankle and foot, left    DJD (degenerative joint disease), ankle and foot, left    Bilateral impacted cerumen    Acute otitis externa of left ear       Past Medical History:  Past Medical History:   Diagnosis Date    Allergic rhinitis     Disease of thyroid gland     GERD (gastroesophageal reflux disease)     Hyperlipidemia     Hypertension     Rosacea        Past Surgical History:  Past Surgical History:   Procedure Laterality Date    ANKLE SURGERY      COLONOSCOPY      [place] REMOVAL DEEP IMPLANT Left 2017    Procedure: REMOVAL HARDWARE ANKLE (1 plate, 8 screws );  Surgeon: Robina Alamo MD;  [facility];  Service: Orthopedics    THYROIDECTOMY      TUBAL LIGATION         Family History:  No family history on file  Social History:  Social History     Social History    Marital status: /Civil Union     Spouse name: N/A    Number of children: N/A    Years of education: N/A     Occupational History    Not on file  Social History Main Topics    Smoking status: Never Smoker    Smokeless tobacco: Never Used    Alcohol use No    Drug use: No    Sexual activity: Not on file     Other Topics Concern    Not on file     Social History Narrative    No narrative on file       Objective     Vitals:    05/14/18 1337   BP: 140/78   Pulse: 72   Resp: 16   Temp: 97 5 °F (36 4 °C)     Wt Readings from Last 3 Encounters:   05/14/18 107 kg (235 lb 6 4 oz)   05/07/18 109 kg (239 lb 9 6 oz)   02/13/18 109 kg (241 lb 6 4 oz)       Physical Exam   HENT:   Patient with wax impaction left ear canal   Ear cerumen removal  Date/Time: 5/14/2018 2:42 PM  Performed by: Jackelyn Garcia  Authorized by: Jackelyn Garcia     Procedure details:     Location:  L ear    Procedure type: curette      Procedure type comment:  Curette and irrigation was used to remove wax  Post-procedure details:     Hearing quality:  Improved    Patient tolerance of procedure: Tolerated well, no immediate complications  Comments:      Left ear canal appeared to have some mild irritation against walls  Tympanic membrane appeared dull gray  She was given prescription to use Cortisporin otic drops 5 drops b i d  for 5 days    If symptoms persist patient will follow up with [facility] for further evaluation      Pertinent Laboratory/Diagnostic Studies:  Lab Results   Component Value Date    GLUCOSE 99 10/22/2014    BUN 20 12/20/2017    CREATININE 0 91 12/20/2017    CALCIUM 9 6 12/20/2017     12/20/2017    K 4 2 12/20/2017    CO2 27 12/20/2017     12/20/2017     Lab Results   Component Value Date    ALT 10 12/20/2017    AST 13 12/20/2017    ALKPHOS 74 12/20/2017    BILITOT 0 6 12/20/2017       Lab Results   Component Value Date    WBC 5 6 12/20/2017    HGB 12 5 12/20/2017    HCT 38 6 12/20/2017    MCV 94 4 12/20/2017     12/20/2017       No results found for: TSH    Lab Results   Component Value Date    CHOL 229 (H) 12/20/2017     Lab Results   Component Value Date    TRIG 86 12/20/2017     Lab Results   Component Value Date    HDL 83 12/20/2017     Lab Results   Component Value Date    LDLCALC 123 (H) 10/22/2014     No results found for: HGBA1C    Results for orders placed or performed in visit on 12/21/17   CBC AND PLATELET (HISTORICAL)   Result Value Ref Range    WBC 5 6 3 8 - 10 8 Thousand/uL    RBC 4 09 3 80 - 5 10 Million/uL    Hemoglobin 12 5 11 7 - 15 5 g/dL    Hematocrit 38 6 35 0 - 45 0 %    MCV 94 4 80 0 - 100 0 fL    MCH 30 6 27 0 - 33 0 pg    MCHC 32 4 32 0 - 36 0 g/dL    RDW 12 6 11 0 - 15 0 %    Platelets 702 272 - 825 Thousand/uL    MPV 10 3 7 5 - 12 5 fL   TSH, 3RD GENERATION (HISTORICAL)   Result Value Ref Range    TSH 3RD GENERATON 0 97 0 40 - 4 50 mIU/L       Orders Placed This Encounter   Procedures    Ear cerumen removal       ALLERGIES:  No Known Allergies    Current Medications     Current Outpatient Prescriptions   Medication Sig Dispense Refill    atorvastatin (LIPITOR) 10 mg tablet Take 1 tablet (10 mg total) by mouth daily 90 tablet 1    Azelaic Acid (FINACEA) 15 % cream Apply topically daily at bedtime        Calcium Carb-Cholecalciferol (CALTRATE 600+D) 600-800 MG-UNIT TABS Take 2 tablets by mouth daily      doxycycline (PERIOSTAT) 20 MG tablet Take 20 mg by mouth 2 (two) times a day      GLUCOSAMINE-CHONDROITIN PO Take by mouth      ibandronate (BONIVA) 150 MG tablet Take 150 mg by mouth every 30 (thirty) days Next dose due 10  2 17      levothyroxine 137 mcg tablet Take 137 mcg by mouth daily      loratadine (CLARITIN) 10 mg tablet Take 10 mg by mouth daily      metoprolol tartrate (LOPRESSOR) 50 mg tablet Take 50 mg by mouth daily        metroNIDAZOLE (METROGEL) 1 % gel Apply topically daily      naproxen (NAPROSYN) 500 mg tablet Take 500 mg by mouth daily        psyllium (METAMUCIL) 0 52 g capsule Take 0 52 g by mouth daily      Triamcinolone Acetonide (NASACORT ALLERGY 24HR NA) 2 sprays into each nostril daily      neomycin-polymyxin-hydrocortisone (CORTISPORIN) 0 35%-10,000 units/mL-1% otic suspension 5 drops to Left ear twice daily for 5 days 10 mL 0     No current facility-administered medications for this visit            Health Maintenance     Health Maintenance   Topic Date Due    Hepatitis C Screening  1950    Depression Screening PHQ-9  01/23/1962    DTaP,Tdap,and Td Vaccines (1 - Tdap) 01/23/1971    COLONOSCOPY  08/13/2014    Fall Risk  01/23/2015    Urinary Incontinence Screening  01/23/2015    PNEUMOCOCCAL POLYSACCHARIDE VACCINE AGE 65 AND OVER  01/23/2015    GLAUCOMA SCREENING 67+ YR  01/23/2017    INFLUENZA VACCINE  09/01/2018     Immunization History   Administered Date(s) Administered    Influenza 09/26/2014, 10/10/2015, 10/03/2016, 10/17/2017    Influenza Quadrivalent Preservative Free 3 years and older IM 09/26/2014    Influenza Split High Dose Preservative Free IM 10/10/2015, 10/03/2016, 10/17/2017    Influenza TIV (IM) 10/11/2012, 10/17/2013    Pneumococcal Conjugate 13-Valent 57/03/1681       Kristy Edwards MD

## 2018-05-30 ENCOUNTER — OFFICE VISIT (OUTPATIENT)
Dept: OBGYN CLINIC | Facility: HOSPITAL | Age: 68
End: 2018-05-30
Payer: OTHER MISCELLANEOUS

## 2018-05-30 VITALS
BODY MASS INDEX: 40.27 KG/M2 | DIASTOLIC BLOOD PRESSURE: 80 MMHG | HEART RATE: 61 BPM | HEIGHT: 64 IN | SYSTOLIC BLOOD PRESSURE: 133 MMHG | WEIGHT: 235.89 LBS

## 2018-05-30 DIAGNOSIS — M25.572 CHRONIC PAIN OF LEFT ANKLE: ICD-10-CM

## 2018-05-30 DIAGNOSIS — M19.172 POST-TRAUMATIC OSTEOARTHRITIS OF LEFT ANKLE: Primary | ICD-10-CM

## 2018-05-30 DIAGNOSIS — G89.29 CHRONIC PAIN OF LEFT ANKLE: ICD-10-CM

## 2018-05-30 PROCEDURE — 99213 OFFICE O/P EST LOW 20 MIN: CPT | Performed by: ORTHOPAEDIC SURGERY

## 2018-05-30 PROCEDURE — 20605 DRAIN/INJ JOINT/BURSA W/O US: CPT | Performed by: ORTHOPAEDIC SURGERY

## 2018-05-30 RX ORDER — BETAMETHASONE SODIUM PHOSPHATE AND BETAMETHASONE ACETATE 3; 3 MG/ML; MG/ML
6 INJECTION, SUSPENSION INTRA-ARTICULAR; INTRALESIONAL; INTRAMUSCULAR; SOFT TISSUE
Status: COMPLETED | OUTPATIENT
Start: 2018-05-30 | End: 2018-05-30

## 2018-05-30 RX ORDER — BUPIVACAINE HYDROCHLORIDE 2.5 MG/ML
1 INJECTION, SOLUTION EPIDURAL; INFILTRATION; INTRACAUDAL
Status: COMPLETED | OUTPATIENT
Start: 2018-05-30 | End: 2018-05-30

## 2018-05-30 RX ORDER — LIDOCAINE HYDROCHLORIDE 10 MG/ML
1 INJECTION, SOLUTION INFILTRATION; PERINEURAL
Status: COMPLETED | OUTPATIENT
Start: 2018-05-30 | End: 2018-05-30

## 2018-05-30 RX ADMIN — BUPIVACAINE HYDROCHLORIDE 1 ML: 2.5 INJECTION, SOLUTION EPIDURAL; INFILTRATION; INTRACAUDAL at 13:52

## 2018-05-30 RX ADMIN — LIDOCAINE HYDROCHLORIDE 1 ML: 10 INJECTION, SOLUTION INFILTRATION; PERINEURAL at 13:52

## 2018-05-30 RX ADMIN — BETAMETHASONE SODIUM PHOSPHATE AND BETAMETHASONE ACETATE 6 MG: 3; 3 INJECTION, SUSPENSION INTRA-ARTICULAR; INTRALESIONAL; INTRAMUSCULAR; SOFT TISSUE at 13:52

## 2018-05-30 NOTE — PROGRESS NOTES
76 y o female returns today for re-evaluation of her left ankle due to pain  She has post-traumatic OA s/p removal of hardware (Sept '17) s/p ORIF  She was getting fairly decent relief with intermittent ankle joint cortisone injections however her last injection 2/13/18 failed to provide as adequate relief as she would have liked  Her lateral pain has improved from her hardware removal and now has residual global ankle pain  She takes naprosyn daily and is unsure if it helps  Review of Systems  Review of systems negative unless otherwise specified in HPI    Past Medical History  Past Medical History:   Diagnosis Date    Allergic rhinitis     Disease of thyroid gland     Diverticulosis     GERD (gastroesophageal reflux disease)     Hyperlipidemia     Hypertension     Rosacea     Thyroid nodule     non-toxic, single        Past Surgical History  Past Surgical History:   Procedure Laterality Date    ANKLE SURGERY Left     Fracture tx    COLONOSCOPY      fiberoptic    OTHER SURGICAL HISTORY      thyroid biopsy core needle     [place] REMOVAL DEEP IMPLANT Left 9/18/2017    Procedure: REMOVAL HARDWARE ANKLE (1 plate, 8 screws );  Surgeon: Ger Butler MD;  [facility];  Service: Orthopedics    THYROIDECTOMY      Total     TUBAL LIGATION         Current Medications  Current Outpatient Prescriptions on File Prior to Visit   Medication Sig Dispense Refill    atorvastatin (LIPITOR) 10 mg tablet Take 1 tablet (10 mg total) by mouth daily 90 tablet 1    Azelaic Acid (FINACEA) 15 % cream Apply topically daily at bedtime        Calcium Carb-Cholecalciferol (CALTRATE 600+D) 600-800 MG-UNIT TABS Take 2 tablets by mouth daily      doxycycline (PERIOSTAT) 20 MG tablet Take 20 mg by mouth 2 (two) times a day      GLUCOSAMINE-CHONDROITIN PO Take by mouth      ibandronate (BONIVA) 150 MG tablet Take 150 mg by mouth every 30 (thirty) days Next dose due 10  2 17      levothyroxine 137 mcg tablet Take 137 mcg by mouth daily      loratadine (CLARITIN) 10 mg tablet Take 10 mg by mouth daily      metoprolol tartrate (LOPRESSOR) 50 mg tablet Take 50 mg by mouth daily        metroNIDAZOLE (METROGEL) 1 % gel Apply topically daily      naproxen (NAPROSYN) 500 mg tablet Take 500 mg by mouth daily        neomycin-polymyxin-hydrocortisone (CORTISPORIN) 0 35%-10,000 units/mL-1% otic suspension 5 drops to Left ear twice daily for 5 days 10 mL 0    psyllium (METAMUCIL) 0 52 g capsule Take 0 52 g by mouth daily      Triamcinolone Acetonide (NASACORT ALLERGY 24HR NA) 2 sprays into each nostril daily       No current facility-administered medications on file prior to visit  Recent Labs [facility])    0  Lab Value Date/Time   HCT 38 6 12/20/2017 0920   HGB 12 5 12/20/2017 0920   WBC 5 6 12/20/2017 0920   ESR 2 03/29/2017 1109   GLUCOSE 99 10/22/2014 0938         Physical exam  · General: Awake, Alert, Oriented  · Eyes: Pupils equal, round and reactive to light  · Heart: regular rate and rhythm  · Lungs: No audible wheezing  · Abdomen: soft    Left Ankle:  Skin intact, moderate swelling, trace effusion  Well healed incisions  Good STLT  Calf is soft and non-tender without signs of DVT  Bony enlargement medial, lateral and anteriorly  Restricted ROM at her tibiotalar joint slightly to neutral  Good subtalar motion  Good strength  NVID      Imaging  None indicated    Procedure  Medium joint arthrocentesis  Date/Time: 5/30/2018 1:52 PM  Consent given by: patient  Site marked: site marked  Supporting Documentation  Indications: pain   Procedure Details  Location: ankle - L ankle  Preparation: Patient was prepped and draped in the usual sterile fashion  Needle size: 22 G  Ultrasound guidance: no  Medications administered: 1 mL bupivacaine (PF) 0 25 %; 1 mL lidocaine 1 %; 6 mg betamethasone acetate-betamethasone sodium phosphate 6 (3-3) mg/mL              Assessment/Plan:   76 y  o female with chronic left ankle pain and post-traumatic OA  Hx of left ankle ORIF with removal of hardware     Quality of life decision to pursue further surgical intervention of possible ankle replacement  Recommend she sees Dr Jose Johnson at [place] [id number]  (please make sure she has all records available to take with her for that appointment)  PRN with Dr Samantha Russell

## 2018-05-31 ENCOUNTER — TELEPHONE (OUTPATIENT)
Dept: OBGYN CLINIC | Facility: CLINIC | Age: 68
End: 2018-05-31

## 2018-07-18 ENCOUNTER — TELEPHONE (OUTPATIENT)
Dept: OBGYN CLINIC | Facility: HOSPITAL | Age: 68
End: 2018-07-18

## 2018-07-18 NOTE — TELEPHONE ENCOUNTER
Drew Price,      Patient states that fax # on medical records request that she signed in office is incorrect  Please resend all of her records to Attn: Ginny Thomas Fax# [phone]  Thank you

## 2018-07-21 NOTE — TELEPHONE ENCOUNTER
I re-submitted her medical records request and updated the fax number on 7/21/18 - faxed to [facility] 7/21/18  Scanned confirmation sheet and updated release to DEAN, so it can be seen in patient chart  * please note: [facility]'s website shows that they have received her previous request and have processed it, so patient should contact [facility] directly for further status inquiries   Their telephone # is [phone] option 1

## 2018-09-19 ENCOUNTER — TRANSCRIBE ORDERS (OUTPATIENT)
Dept: ADMINISTRATIVE | Facility: HOSPITAL | Age: 68
End: 2018-09-19

## 2018-09-19 DIAGNOSIS — Z12.39 BREAST SCREENING: Primary | ICD-10-CM

## 2018-09-27 ENCOUNTER — OFFICE VISIT (OUTPATIENT)
Dept: FAMILY MEDICINE CLINIC | Facility: CLINIC | Age: 68
End: 2018-09-27
Payer: COMMERCIAL

## 2018-09-27 VITALS
WEIGHT: 238.6 LBS | HEART RATE: 68 BPM | BODY MASS INDEX: 42.28 KG/M2 | SYSTOLIC BLOOD PRESSURE: 132 MMHG | RESPIRATION RATE: 16 BRPM | HEIGHT: 63 IN | TEMPERATURE: 96.5 F | DIASTOLIC BLOOD PRESSURE: 80 MMHG

## 2018-09-27 DIAGNOSIS — E78.5 HYPERLIPIDEMIA, UNSPECIFIED HYPERLIPIDEMIA TYPE: ICD-10-CM

## 2018-09-27 DIAGNOSIS — I10 ESSENTIAL HYPERTENSION: ICD-10-CM

## 2018-09-27 DIAGNOSIS — E03.9 HYPOTHYROIDISM, UNSPECIFIED TYPE: ICD-10-CM

## 2018-09-27 DIAGNOSIS — Z12.31 ENCOUNTER FOR SCREENING MAMMOGRAM FOR MALIGNANT NEOPLASM OF BREAST: Primary | ICD-10-CM

## 2018-09-27 DIAGNOSIS — Z00.00 PERIODIC HEALTH ASSESSMENT, GENERAL SCREENING, ADULT: ICD-10-CM

## 2018-09-27 DIAGNOSIS — Z23 NEED FOR INFLUENZA VACCINATION: ICD-10-CM

## 2018-09-27 DIAGNOSIS — Z23 NEED FOR PNEUMOCOCCAL VACCINATION: Primary | ICD-10-CM

## 2018-09-27 PROCEDURE — G0008 ADMIN INFLUENZA VIRUS VAC: HCPCS

## 2018-09-27 PROCEDURE — G0009 ADMIN PNEUMOCOCCAL VACCINE: HCPCS

## 2018-09-27 PROCEDURE — 3075F SYST BP GE 130 - 139MM HG: CPT | Performed by: FAMILY MEDICINE

## 2018-09-27 PROCEDURE — 3079F DIAST BP 80-89 MM HG: CPT | Performed by: FAMILY MEDICINE

## 2018-09-27 PROCEDURE — 90662 IIV NO PRSV INCREASED AG IM: CPT

## 2018-09-27 PROCEDURE — 99214 OFFICE O/P EST MOD 30 MIN: CPT | Performed by: FAMILY MEDICINE

## 2018-09-27 PROCEDURE — 90732 PPSV23 VACC 2 YRS+ SUBQ/IM: CPT

## 2018-09-27 PROCEDURE — 3008F BODY MASS INDEX DOCD: CPT | Performed by: FAMILY MEDICINE

## 2018-09-27 PROCEDURE — 1101F PT FALLS ASSESS-DOCD LE1/YR: CPT | Performed by: FAMILY MEDICINE

## 2018-09-27 NOTE — ASSESSMENT & PLAN NOTE
Hypertension  Patient's blood pressure is stable at this time and she will continue with current regimen of medications    She will obtain blood work as ordered

## 2018-10-03 ENCOUNTER — TELEPHONE (OUTPATIENT)
Dept: FAMILY MEDICINE CLINIC | Facility: CLINIC | Age: 68
End: 2018-10-03

## 2018-10-03 LAB
ALBUMIN SERPL-MCNC: 3.9 G/DL (ref 3.6–5.1)
ALBUMIN/GLOB SERPL: 1.3 (CALC) (ref 1–2.5)
ALP SERPL-CCNC: 68 U/L (ref 33–130)
ALT SERPL-CCNC: 12 U/L (ref 6–29)
AST SERPL-CCNC: 15 U/L (ref 10–35)
BASOPHILS # BLD AUTO: 30 CELLS/UL (ref 0–200)
BASOPHILS NFR BLD AUTO: 0.8 %
BILIRUB SERPL-MCNC: 0.6 MG/DL (ref 0.2–1.2)
BUN SERPL-MCNC: 19 MG/DL (ref 7–25)
BUN/CREAT SERPL: NORMAL (CALC) (ref 6–22)
CALCIUM SERPL-MCNC: 9.5 MG/DL (ref 8.6–10.4)
CHLORIDE SERPL-SCNC: 102 MMOL/L (ref 98–110)
CHOLEST SERPL-MCNC: 163 MG/DL
CHOLEST/HDLC SERPL: 2 (CALC)
CO2 SERPL-SCNC: 29 MMOL/L (ref 20–32)
CREAT SERPL-MCNC: 0.93 MG/DL (ref 0.5–0.99)
EOSINOPHIL # BLD AUTO: 141 CELLS/UL (ref 15–500)
EOSINOPHIL NFR BLD AUTO: 3.8 %
ERYTHROCYTE [DISTWIDTH] IN BLOOD BY AUTOMATED COUNT: 12.3 % (ref 11–15)
GLOBULIN SER CALC-MCNC: 3 G/DL (CALC) (ref 1.9–3.7)
GLUCOSE SERPL-MCNC: 99 MG/DL (ref 65–99)
HCT VFR BLD AUTO: 37.6 % (ref 35–45)
HDLC SERPL-MCNC: 80 MG/DL
HGB BLD-MCNC: 12.3 G/DL (ref 11.7–15.5)
LDLC SERPL CALC-MCNC: 66 MG/DL (CALC)
LYMPHOCYTES # BLD AUTO: 1332 CELLS/UL (ref 850–3900)
LYMPHOCYTES NFR BLD AUTO: 36 %
MCH RBC QN AUTO: 31.5 PG (ref 27–33)
MCHC RBC AUTO-ENTMCNC: 32.7 G/DL (ref 32–36)
MCV RBC AUTO: 96.4 FL (ref 80–100)
MONOCYTES # BLD AUTO: 300 CELLS/UL (ref 200–950)
MONOCYTES NFR BLD AUTO: 8.1 %
NEUTROPHILS # BLD AUTO: 1898 CELLS/UL (ref 1500–7800)
NEUTROPHILS NFR BLD AUTO: 51.3 %
NONHDLC SERPL-MCNC: 83 MG/DL (CALC)
PLATELET # BLD AUTO: 223 THOUSAND/UL (ref 140–400)
PMV BLD REES-ECKER: 10.5 FL (ref 7.5–12.5)
POTASSIUM SERPL-SCNC: 4.2 MMOL/L (ref 3.5–5.3)
PROT SERPL-MCNC: 6.9 G/DL (ref 6.1–8.1)
RBC # BLD AUTO: 3.9 MILLION/UL (ref 3.8–5.1)
SL AMB EGFR AFRICAN AMERICAN: 73 ML/MIN/1.73M2
SL AMB EGFR NON AFRICAN AMERICAN: 63 ML/MIN/1.73M2
SODIUM SERPL-SCNC: 138 MMOL/L (ref 135–146)
TRIGL SERPL-MCNC: 83 MG/DL
TSH SERPL-ACNC: 1.82 MIU/L (ref 0.4–4.5)
WBC # BLD AUTO: 3.7 THOUSAND/UL (ref 3.8–10.8)

## 2018-10-03 NOTE — TELEPHONE ENCOUNTER
----- Message from Bryce Lin MD sent at 23/1/8568 12:48 PM EDT -----  Cholesterol much improved from 229 down to 163     Continue with current regimen of medications

## 2018-10-05 ENCOUNTER — TRANSCRIBE ORDERS (OUTPATIENT)
Dept: ADMINISTRATIVE | Facility: HOSPITAL | Age: 68
End: 2018-10-05

## 2018-10-05 DIAGNOSIS — M19.172 POST-TRAUMATIC ARTHRITIS OF ANKLE, LEFT: ICD-10-CM

## 2018-10-05 DIAGNOSIS — M25.572 LEFT ANKLE PAIN, UNSPECIFIED CHRONICITY: Primary | ICD-10-CM

## 2018-10-09 ENCOUNTER — HOSPITAL ENCOUNTER (OUTPATIENT)
Dept: RADIOLOGY | Facility: HOSPITAL | Age: 68
Discharge: HOME | End: 2018-10-09
Attending: ORTHOPAEDIC SURGERY
Payer: OTHER MISCELLANEOUS

## 2018-10-09 DIAGNOSIS — M25.572 LEFT ANKLE PAIN, UNSPECIFIED CHRONICITY: ICD-10-CM

## 2018-10-09 DIAGNOSIS — M19.172 POST-TRAUMATIC ARTHRITIS OF ANKLE, LEFT: ICD-10-CM

## 2018-10-09 PROCEDURE — 73721 MRI JNT OF LWR EXTRE W/O DYE: CPT

## 2018-10-30 ENCOUNTER — HOSPITAL ENCOUNTER (OUTPATIENT)
Dept: MAMMOGRAPHY | Facility: CLINIC | Age: 68
Discharge: HOME | End: 2018-10-30
Payer: COMMERCIAL

## 2018-10-30 ENCOUNTER — OFFICE VISIT (OUTPATIENT)
Dept: FAMILY MEDICINE CLINIC | Facility: CLINIC | Age: 68
End: 2018-10-30
Payer: COMMERCIAL

## 2018-10-30 VITALS
HEIGHT: 63 IN | DIASTOLIC BLOOD PRESSURE: 80 MMHG | SYSTOLIC BLOOD PRESSURE: 130 MMHG | BODY MASS INDEX: 42.91 KG/M2 | HEART RATE: 68 BPM | RESPIRATION RATE: 16 BRPM | TEMPERATURE: 96.8 F | WEIGHT: 242.2 LBS

## 2018-10-30 DIAGNOSIS — Z12.39 BREAST SCREENING: ICD-10-CM

## 2018-10-30 DIAGNOSIS — M79.604 PAIN OF RIGHT LOWER EXTREMITY: Primary | ICD-10-CM

## 2018-10-30 PROCEDURE — 77067 SCR MAMMO BI INCL CAD: CPT

## 2018-10-30 PROCEDURE — 99213 OFFICE O/P EST LOW 20 MIN: CPT | Performed by: FAMILY MEDICINE

## 2018-10-30 RX ORDER — METHOCARBAMOL 500 MG/1
500 TABLET, FILM COATED ORAL 4 TIMES DAILY
Qty: 30 TABLET | Refills: 3 | Status: SHIPPED | OUTPATIENT
Start: 2018-10-30 | End: 2018-11-15 | Stop reason: ALTCHOICE

## 2018-10-31 ENCOUNTER — TELEPHONE (OUTPATIENT)
Dept: FAMILY MEDICINE CLINIC | Facility: CLINIC | Age: 68
End: 2018-10-31

## 2018-10-31 DIAGNOSIS — M79.604 PAIN OF RIGHT LOWER EXTREMITY: Primary | ICD-10-CM

## 2018-10-31 RX ORDER — TIZANIDINE HYDROCHLORIDE 2 MG/1
2 CAPSULE, GELATIN COATED ORAL 2 TIMES DAILY PRN
Qty: 30 CAPSULE | Refills: 1 | Status: SHIPPED | OUTPATIENT
Start: 2018-10-31 | End: 2018-11-29 | Stop reason: SDUPTHER

## 2018-10-31 NOTE — PROGRESS NOTES
FAMILY PRACTICE OFFICE VISIT       NAME: Lisa Mendoza  AGE: 76 y o  SEX: female       : 1950        MRN: [id number]    DATE: 10/30/2018  TIME: 8:08 PM    Assessment and Plan     Problem List Items Addressed This Visit     Pain of right lower extremity - Primary    Relevant Medications    methocarbamol (ROBAXIN) 500 mg tablet          Patient given a refill on her Robaxin to use 500 mg 1 t i d  p r n  [place] She may also increase her naproxen to 500 mg 1 b i d  with food  She may place heat to the affected area of her thigh 2-3 times daily  Patient will call if symptoms persist without improvement into next week  There are no Patient Instructions on file for this visit  Chief Complaint     Chief Complaint   Patient presents with    Leg Pain     Pt is here w/ c/o right leg pain times 3 weeks        History of Present Illness     Patient states she had been going to the gym 2-3 days a week on a regular basis due for developing exacerbation of left ankle pain  She was seen by orthopedist who stated patient has significant arthritis of ankle  MRI was negative for stress fracture  Patient is awaiting a custom-made ankle brace to assist with her discomfort  Initially she was placed in a Cam walker which the patient used for over a week  Unfortunately this caused patient to have an unsteady gait the which she feels exacerbated the muscles in her right thigh area  Although the patient no longer wearing her Cam walker she has significant discomfort with certain movements of right thigh with walking or certain intern external rotations  Patient use some leftover Robaxin that she had at home which appeared to help with some of her symptoms  Review of Systems   Review of Systems   Constitutional: Negative  Respiratory: Negative  Cardiovascular: Negative  Gastrointestinal: Negative  Genitourinary: Negative      Musculoskeletal:        As per HPI       Active Problem List     Patient Active Problem List   Diagnosis    Pain, joint, ankle and foot, left    DJD (degenerative joint disease), ankle and foot, left    Bilateral impacted cerumen    Acute otitis externa of left ear    Allergic rhinitis    Esophageal reflux    Hyperlipidemia    Hypertension    Hypothyroidism    Rosacea    Pain of right lower extremity       Past Medical History:  Past Medical History:   Diagnosis Date    Allergic rhinitis     Disease of thyroid gland     Diverticulosis     GERD (gastroesophageal reflux disease)     Hyperlipidemia     Hypertension     Rosacea     Thyroid nodule     non-toxic, single        Past Surgical History:  Past Surgical History:   Procedure Laterality Date    ANKLE SURGERY Left     Fracture tx    COLONOSCOPY      fiberoptic    OTHER SURGICAL HISTORY      thyroid biopsy core needle     [place] REMOVAL DEEP IMPLANT Left 9/18/2017    Procedure: REMOVAL HARDWARE ANKLE (1 plate, 8 screws );  Surgeon: Jorge Bautista MD;  Location: [facility];  Service: Orthopedics    THYROIDECTOMY      Total     TUBAL LIGATION         Family History:  Family History   Problem Relation Age of Onset   [place] COPD Mother     Diabetes Mother     Heart disease Mother     Hypertension Mother     Bone cancer Father     Colon cancer Family        Social History:  Social History     Social History    Marital status: /Civil Union     Spouse name: N/A    Number of children: N/A    Years of education: N/A     Occupational History    Not on file       Social History Main Topics    Smoking status: Never Smoker    Smokeless tobacco: Never Used    Alcohol use No      Comment: Never drank alcohol denied    Drug use: No    Sexual activity: Yes     Other Topics Concern    Not on file     Social History Narrative    Exercies moderately 3 or more times a week        Objective     Vitals:    10/30/18 1331   BP: 130/80   Pulse: 68   Resp: 16   Temp: (!) 96 8 °F (36 °C)     Wt Readings from Last 3 Encounters: 10/30/18 110 kg (242 lb 3 2 oz)   10/09/18 108 kg (238 lb)   09/27/18 108 kg (238 lb 9 6 oz)       Physical Exam   Constitutional: No distress  Musculoskeletal:   Normal range of motion of right hip however internal rotation caused increase adductor muscle tenderness  Muscle strength 5/5  Patient also describes anterior thigh discomfort with standing     Negative straight leg raising  Skin: No rash noted         Pertinent Laboratory/Diagnostic Studies:  Lab Results   Component Value Date    GLUCOSE 99 10/22/2014    BUN 19 10/02/2018    CREATININE 0 91 12/20/2017    CALCIUM 9 5 10/02/2018     12/20/2017    K 4 2 10/02/2018    CO2 29 10/02/2018     10/02/2018     Lab Results   Component Value Date    ALT 10 12/20/2017    AST 13 12/20/2017    ALKPHOS 68 10/02/2018    BILITOT 0 6 12/20/2017       Lab Results   Component Value Date    WBC 3 7 (L) 10/02/2018    HGB 12 3 10/02/2018    HCT 37 6 10/02/2018    MCV 96 4 10/02/2018     10/02/2018       Lab Results   Component Value Date    TSH 1 82 10/02/2018       Lab Results   Component Value Date    CHOL 229 (H) 12/20/2017     Lab Results   Component Value Date    TRIG 83 10/02/2018     Lab Results   Component Value Date    HDL 80 10/02/2018     Lab Results   Component Value Date    LDLCALC 123 (H) 10/22/2014     No results found for: HGBA1C    Results for orders placed or performed in visit on 10/02/18   Lipid panel   Result Value Ref Range    Total Cholesterol 163 <200 mg/dL    HDL 80 >50 mg/dL    Triglycerides 83 <150 mg/dL    LDL Direct 66 mg/dL (calc)    Chol HDLC Ratio 2 0 <5 0 (calc)    Non-HDL Cholesterol 83 <130 mg/dL (calc)   Comprehensive metabolic panel   Result Value Ref Range    Glucose 99 65 - 99 mg/dL    BUN 19 7 - 25 mg/dL    Creatinine 0 93 0 50 - 0 99 mg/dL    eGFR Non  63 > OR = 60 mL/min/1 73m2    SL AMB EGFR  73 > OR = 60 mL/min/1 73m2    SL AMB BUN/CREATININE RATIO NOT APPLICABLE 6 - 22 (calc) Sodium 138 135 - 146 mmol/L    Potassium 4 2 3 5 - 5 3 mmol/L    Chloride 102 98 - 110 mmol/L    CO2 29 20 - 32 mmol/L    SL AMB CALCIUM 9 5 8 6 - 10 4 mg/dL    SL AMB PROTEIN, TOTAL 6 9 6 1 - 8 1 g/dL    Albumin 3 9 3 6 - 5 1 g/dL    Globulin 3 0 1 9 - 3 7 g/dL (calc)    SL AMB ALBUMIN/GLOBULIN RATIO 1 3 1 0 - 2 5 (calc)    TOTAL BILIRUBIN 0 6 0 2 - 1 2 mg/dL    Alkaline Phosphatase 68 33 - 130 U/L    SL AMB AST 15 10 - 35 U/L    SL AMB ALT 12 6 - 29 U/L   CBC and differential   Result Value Ref Range    White Blood Cell Count 3 7 (L) 3 8 - 10 8 Thousand/uL    Red Blood Cell Count 3 90 3 80 - 5 10 Million/uL    Hemoglobin 12 3 11 7 - 15 5 g/dL    HCT 37 6 35 0 - 45 0 %    MCV 96 4 80 0 - 100 0 fL    MCH 31 5 27 0 - 33 0 pg    MCHC 32 7 32 0 - 36 0 g/dL    RDW 12 3 11 0 - 15 0 %    Platelet Count 611 526 - 400 Thousand/uL    SL AMB MPV 10 5 7 5 - 12 5 fL    Neutrophils (Absolute) 1,898 1,500 - 7,800 cells/uL    Lymphocytes (Absolute) 1,332 850 - 3,900 cells/uL    Monocytes (Absolute) 300 200 - 950 cells/uL    Eosinophils (Absolute) 141 15 - 500 cells/uL    Basophils (Absolute) 30 0 - 200 cells/uL    Neutrophils 51 3 %    Lymphocytes 36 0 %    Monocytes 8 1 %    Eosinophils 3 8 %    Basophils Relative 0 8 %   TSH, 3rd generation   Result Value Ref Range    TSH 1 82 0 40 - 4 50 mIU/L       No orders of the defined types were placed in this encounter        ALLERGIES:  No Known Allergies    Current Medications     Current Outpatient Prescriptions   Medication Sig Dispense Refill    atorvastatin (LIPITOR) 10 mg tablet Take 1 tablet (10 mg total) by mouth daily 90 tablet 1    Azelaic Acid (FINACEA) 15 % cream Apply topically daily at bedtime        Calcium Carb-Cholecalciferol (CALTRATE 600+D) 600-800 MG-UNIT TABS Take 2 tablets by mouth daily      doxycycline (PERIOSTAT) 20 MG tablet Take 20 mg by mouth 2 (two) times a day      GLUCOSAMINE-CHONDROITIN PO Take by mouth      ibandronate (BONIVA) 150 MG tablet Take 150 mg by mouth every 30 (thirty) days Next dose due 10  2 17      levothyroxine 137 mcg tablet Take 137 mcg by mouth daily      loratadine (CLARITIN) 10 mg tablet Take 10 mg by mouth daily      metoprolol tartrate (LOPRESSOR) 50 mg tablet Take 50 mg by mouth daily        metroNIDAZOLE (METROGEL) 1 % gel Apply topically daily      naproxen (NAPROSYN) 500 mg tablet Take 500 mg by mouth daily        psyllium (METAMUCIL) 0 52 g capsule Take 0 52 g by mouth daily      Triamcinolone Acetonide (NASACORT ALLERGY 24HR NA) 2 sprays into each nostril as needed        methocarbamol (ROBAXIN) 500 mg tablet Take 1 tablet (500 mg total) by mouth 4 (four) times a day 30 tablet 3     No current facility-administered medications for this visit            Health Maintenance     Health Maintenance   Topic Date Due    DTaP,Tdap,and Td Vaccines (1 - Tdap) 01/23/1971    CRC Screening: Colonoscopy  08/13/2014    Fall Risk  09/27/2019    Depression Screening PHQ  09/27/2019    Urinary Incontinence Screening  09/27/2019    INFLUENZA VACCINE  Completed    Pneumococcal PPSV23/PCV13 65+ Years / Low and Medium Risk  Completed     Immunization History   Administered Date(s) Administered    Influenza 09/26/2014, 10/10/2015, 10/03/2016, 10/17/2017    Influenza Quadrivalent Preservative Free 3 years and older IM 09/26/2014    Influenza Split High Dose Preservative Free IM 10/10/2015, 10/03/2016, 10/17/2017    Influenza TIV (IM) 10/11/2012, 10/17/2013    Influenza, high dose seasonal 0 5 mL 09/27/2018    Pneumococcal Conjugate 13-Valent 12/05/2016    Pneumococcal Polysaccharide PPV23 40/68/4982       Shirley Melendrez MD

## 2018-11-08 ENCOUNTER — ANNUAL EXAM (OUTPATIENT)
Dept: OBGYN CLINIC | Facility: CLINIC | Age: 68
End: 2018-11-08
Payer: COMMERCIAL

## 2018-11-08 VITALS
HEIGHT: 63 IN | WEIGHT: 243 LBS | DIASTOLIC BLOOD PRESSURE: 82 MMHG | BODY MASS INDEX: 43.05 KG/M2 | SYSTOLIC BLOOD PRESSURE: 152 MMHG

## 2018-11-08 DIAGNOSIS — N95.1 POSTMENOPAUSAL DISORDER: Primary | ICD-10-CM

## 2018-11-08 DIAGNOSIS — Z01.419 ENCOUNTER FOR GYNECOLOGICAL EXAMINATION WITHOUT ABNORMAL FINDING: ICD-10-CM

## 2018-11-08 DIAGNOSIS — Z12.31 ENCOUNTER FOR SCREENING MAMMOGRAM FOR MALIGNANT NEOPLASM OF BREAST: ICD-10-CM

## 2018-11-08 PROCEDURE — G0101 CA SCREEN;PELVIC/BREAST EXAM: HCPCS | Performed by: PHYSICIAN ASSISTANT

## 2018-11-08 NOTE — PROGRESS NOTES
Charma Flank   1950    CC:  Yearly exam    S:  76 y o  female here for yearly exam  She is postmenopausal and has had no vaginal bleeding  She denies vaginal discharge, itching, odor or dryness  She is not sexually active  She notes worsening in her uterine prolapse and now feels her cervix at her introitus  She now requests surgical repair  She is aware that surgical repair is available via hysterectomy and likely this can be completed through laparoscopic assisted vaginal hysterectomy       Last Pap 10/10/14 neg/neg  Last Mammo 10/30/18 neg  Last DEXA 10/2015 osteoporosis  Last Colonoscopy 18 years ago       Current Outpatient Prescriptions:     atorvastatin (LIPITOR) 10 mg tablet, Take 1 tablet (10 mg total) by mouth daily, Disp: 90 tablet, Rfl: 1    Azelaic Acid (FINACEA) 15 % cream, Apply topically daily at bedtime  , Disp: , Rfl:     Calcium Carb-Cholecalciferol (CALTRATE 600+D) 600-800 MG-UNIT TABS, Take 2 tablets by mouth daily, Disp: , Rfl:     doxycycline (PERIOSTAT) 20 MG tablet, Take 20 mg by mouth 2 (two) times a day, Disp: , Rfl:     GLUCOSAMINE-CHONDROITIN PO, Take by mouth, Disp: , Rfl:     ibandronate (BONIVA) 150 MG tablet, Take 150 mg by mouth every 30 (thirty) days Next dose due 10 2 17, Disp: , Rfl:     levothyroxine 137 mcg tablet, Take 137 mcg by mouth daily, Disp: , Rfl:     loratadine (CLARITIN) 10 mg tablet, Take 10 mg by mouth daily, Disp: , Rfl:     methocarbamol (ROBAXIN) 500 mg tablet, Take 1 tablet (500 mg total) by mouth 4 (four) times a day, Disp: 30 tablet, Rfl: 3    metoprolol tartrate (LOPRESSOR) 50 mg tablet, Take 50 mg by mouth daily  , Disp: , Rfl:     metroNIDAZOLE (METROGEL) 1 % gel, Apply topically daily, Disp: , Rfl:     naproxen (NAPROSYN) 500 mg tablet, Take 500 mg by mouth daily  , Disp: , Rfl:     psyllium (METAMUCIL) 0 52 g capsule, Take 0 52 g by mouth daily, Disp: , Rfl:     TiZANidine (ZANAFLEX) 2 MG capsule, Take 1 capsule (2 mg total) by mouth 2 (two) times a day as needed for muscle spasms, Disp: 30 capsule, Rfl: 1    Triamcinolone Acetonide (NASACORT ALLERGY 24HR NA), 2 sprays into each nostril as needed  , Disp: , Rfl:   Social History     Social History    Marital status: /Civil Union     Spouse name: N/A    Number of children: N/A    Years of education: N/A     Occupational History    Not on file  Social History Main Topics    Smoking status: Never Smoker    Smokeless tobacco: Never Used    Alcohol use No      Comment: Never drank alcohol denied    Drug use: No    Sexual activity: No     Other Topics Concern    Not on file     Social History Narrative    Exercies moderately 3 or more times a week      Family History   Problem Relation Age of Onset    COPD Mother     Diabetes Mother     Heart disease Mother     Hypertension Mother     Bone cancer Father     Colon cancer Family      Past Medical History:   Diagnosis Date    Allergic rhinitis     Disease of thyroid gland     Diverticulosis     GERD (gastroesophageal reflux disease)     Hyperlipidemia     Hypertension     Rosacea     Thyroid nodule     non-toxic, single         O:  Blood pressure 152/82, height 5' 3" (1 6 m), weight 110 kg (243 lb)  Patient appears well and is not in distress  Neck is supple without masses  Breasts are symmetrical without mass, tenderness, nipple discharge, skin changes or adenopathy  Abdomen is soft and nontender without masses  External genitals are normal without lesions or rashes  Vagina is normal without discharge or bleeding  Cervix is is at the introitus  Uterus is normal, mobile, nontender without palpable mass  Adnexa are normal, nontender, without palpable mass  A:  Yearly exam  Uterine prolapse  P:   She will return for hysterectomy consult   Mammo slip, DEXA slip given   Pap no longer indicated   RTO one year for yearly exam or sooner as needed

## 2018-11-15 ENCOUNTER — OFFICE VISIT (OUTPATIENT)
Dept: FAMILY MEDICINE CLINIC | Facility: CLINIC | Age: 68
End: 2018-11-15
Payer: COMMERCIAL

## 2018-11-15 VITALS
DIASTOLIC BLOOD PRESSURE: 80 MMHG | HEIGHT: 62 IN | TEMPERATURE: 97.9 F | HEART RATE: 64 BPM | WEIGHT: 243.2 LBS | RESPIRATION RATE: 16 BRPM | SYSTOLIC BLOOD PRESSURE: 142 MMHG | BODY MASS INDEX: 44.75 KG/M2

## 2018-11-15 DIAGNOSIS — M79.604 PAIN OF RIGHT LOWER EXTREMITY: ICD-10-CM

## 2018-11-15 PROCEDURE — 99213 OFFICE O/P EST LOW 20 MIN: CPT | Performed by: FAMILY MEDICINE

## 2018-11-15 PROCEDURE — 4040F PNEUMOC VAC/ADMIN/RCVD: CPT | Performed by: FAMILY MEDICINE

## 2018-11-15 PROCEDURE — 3008F BODY MASS INDEX DOCD: CPT | Performed by: FAMILY MEDICINE

## 2018-11-15 RX ORDER — PREDNISONE 20 MG/1
TABLET ORAL
Qty: 8 TABLET | Refills: 0 | Status: SHIPPED | OUTPATIENT
Start: 2018-11-15 | End: 2018-12-20 | Stop reason: ALTCHOICE

## 2018-11-15 NOTE — ASSESSMENT & PLAN NOTE
Right leg pain  Patient was instructed to discontinue naproxen temporarily and use prednisone tapering dose consisting of 40 mg x2 days, 20 mg x2 days, 10 mg x4 days  She may continue with tizanidine muscle relaxer  If symptoms do not improve significantly by next week she was given referral to have physical therapy consultation

## 2018-11-15 NOTE — PROGRESS NOTES
FAMILY PRACTICE OFFICE VISIT       NAME: Kamran Parnell  AGE: 76 y o  SEX: female       : 1950        MRN: [id number]    DATE: 11/15/2018  TIME: 12:34 PM    Assessment and Plan     Problem List Items Addressed This Visit     Pain of right lower extremity     Right leg pain  Patient was instructed to discontinue naproxen temporarily and use prednisone tapering dose consisting of 40 mg x2 days, 20 mg x2 days, 10 mg x4 days  She may continue with tizanidine muscle relaxer  If symptoms do not improve significantly by next week she was given referral to have physical therapy consultation  Relevant Medications    predniSONE 20 mg tablet    Other Relevant Orders    Ambulatory referral to Physical Therapy            There are no Patient Instructions on file for this visit  Chief Complaint     Chief Complaint   Patient presents with    Muscle Pain     Patient is here for right upper groin area x 3-4 weeks  History of Present Illness     Patient has been on naproxen twice daily as well as her tizanidine without significant relief in symptoms of her right thigh muscles drain  Symptoms are worse with the walking or with extended periods of inactivity  Review of Systems   Review of Systems   Constitutional: Negative  Respiratory: Negative  Cardiovascular: Negative  Gastrointestinal: Negative      Musculoskeletal:        As per HPI       Active Problem List     Patient Active Problem List   Diagnosis    Pain, joint, ankle and foot, left    DJD (degenerative joint disease), ankle and foot, left    Bilateral impacted cerumen    Acute otitis externa of left ear    Allergic rhinitis    Esophageal reflux    Hyperlipidemia    Hypertension    Hypothyroidism    Rosacea    Pain of right lower extremity       Past Medical History:  Past Medical History:   Diagnosis Date    Allergic rhinitis     Disease of thyroid gland     Diverticulosis     GERD (gastroesophageal reflux disease)     Hyperlipidemia     Hypertension     Rosacea     Thyroid nodule     non-toxic, single        Past Surgical History:  Past Surgical History:   Procedure Laterality Date    ANKLE SURGERY Left     Fracture tx    COLONOSCOPY      fiberoptic    OTHER SURGICAL HISTORY      thyroid biopsy core needle     [place] REMOVAL DEEP IMPLANT Left 9/18/2017    Procedure: REMOVAL HARDWARE ANKLE (1 plate, 8 screws );  Surgeon: Aayush To MD;  Location: [facility];  Service: Orthopedics    THYROIDECTOMY      Total     TUBAL LIGATION         Family History:  Family History   Problem Relation Age of Onset    COPD Mother     Diabetes Mother     Heart disease Mother     Hypertension Mother     Bone cancer Father     Colon cancer Family        Social History:  Social History     Social History    Marital status: /Civil Union     Spouse name: N/A    Number of children: N/A    Years of education: N/A     Occupational History    Not on file  Social History Main Topics    Smoking status: Never Smoker    Smokeless tobacco: Never Used    Alcohol use No      Comment: Never drank alcohol denied    Drug use: No    Sexual activity: No     Other Topics Concern    Not on file     Social History Narrative    Exercies moderately 3 or more times a week        Objective     Vitals:    11/15/18 1001   BP: 142/80   Pulse: 64   Resp: 16   Temp: 97 9 °F (36 6 °C)     Wt Readings from Last 3 Encounters:   11/15/18 110 kg (243 lb 3 2 oz)   11/08/18 110 kg (243 lb)   10/30/18 110 kg (242 lb 3 2 oz)       Physical Exam   Constitutional: No distress  Musculoskeletal:   Patient with increase tenderness of right leg adductor muscle of her quadriceps  Symptoms are worse with internal and external rotation  Negative straight leg raising    Muscle strength 5/5 lower extremity       Pertinent Laboratory/Diagnostic Studies:  Lab Results   Component Value Date    GLUCOSE 99 10/22/2014    BUN 19 10/02/2018 CREATININE 0 91 12/20/2017    CALCIUM 9 5 10/02/2018     12/20/2017    K 4 2 10/02/2018    CO2 29 10/02/2018     10/02/2018     Lab Results   Component Value Date    ALT 10 12/20/2017    AST 13 12/20/2017    ALKPHOS 68 10/02/2018    BILITOT 0 6 12/20/2017       Lab Results   Component Value Date    WBC 3 7 (L) 10/02/2018    HGB 12 3 10/02/2018    HCT 37 6 10/02/2018    MCV 96 4 10/02/2018     10/02/2018       Lab Results   Component Value Date    TSH 1 82 10/02/2018       Lab Results   Component Value Date    CHOL 229 (H) 12/20/2017     Lab Results   Component Value Date    TRIG 83 10/02/2018     Lab Results   Component Value Date    HDL 80 10/02/2018     Lab Results   Component Value Date    LDLCALC 123 (H) 10/22/2014     No results found for: HGBA1C    Results for orders placed or performed in visit on 10/02/18   Lipid panel   Result Value Ref Range    Total Cholesterol 163 <200 mg/dL    HDL 80 >50 mg/dL    Triglycerides 83 <150 mg/dL    LDL Direct 66 mg/dL (calc)    Chol HDLC Ratio 2 0 <5 0 (calc)    Non-HDL Cholesterol 83 <130 mg/dL (calc)   Comprehensive metabolic panel   Result Value Ref Range    Glucose, Random 99 65 - 99 mg/dL    BUN 19 7 - 25 mg/dL    Creatinine 0 93 0 50 - 0 99 mg/dL    eGFR Non  63 > OR = 60 mL/min/1 73m2    SL AMB EGFR  73 > OR = 60 mL/min/1 73m2    SL AMB BUN/CREATININE RATIO NOT APPLICABLE 6 - 22 (calc)    Sodium 138 135 - 146 mmol/L    Potassium 4 2 3 5 - 5 3 mmol/L    Chloride 102 98 - 110 mmol/L    CO2 29 20 - 32 mmol/L    SL AMB CALCIUM 9 5 8 6 - 10 4 mg/dL    SL AMB PROTEIN, TOTAL 6 9 6 1 - 8 1 g/dL    Albumin 3 9 3 6 - 5 1 g/dL    Globulin 3 0 1 9 - 3 7 g/dL (calc)    Albumin/Globulin Ratio 1 3 1 0 - 2 5 (calc)    TOTAL BILIRUBIN 0 6 0 2 - 1 2 mg/dL    Alkaline Phosphatase 68 33 - 130 U/L    SL AMB AST 15 10 - 35 U/L    SL AMB ALT 12 6 - 29 U/L   CBC and differential   Result Value Ref Range    White Blood Cell Count 3 7 (L) 3 8 - 10 8 Thousand/uL    Red Blood Cell Count 3 90 3 80 - 5 10 Million/uL    Hemoglobin 12 3 11 7 - 15 5 g/dL    HCT 37 6 35 0 - 45 0 %    MCV 96 4 80 0 - 100 0 fL    MCH 31 5 27 0 - 33 0 pg    MCHC 32 7 32 0 - 36 0 g/dL    RDW 12 3 11 0 - 15 0 %    Platelet Count 676 493 - 400 Thousand/uL    SL AMB MPV 10 5 7 5 - 12 5 fL    Neutrophils (Absolute) 1,898 1,500 - 7,800 cells/uL    Lymphocytes (Absolute) 1,332 850 - 3,900 cells/uL    Monocytes (Absolute) 300 200 - 950 cells/uL    Eosinophils (Absolute) 141 15 - 500 cells/uL    Basophils ABS 30 0 - 200 cells/uL    Neutrophils 51 3 %    Lymphocytes 36 0 %    Monocytes 8 1 %    Eosinophils 3 8 %    Basophils PCT 0 8 %   TSH, 3rd generation   Result Value Ref Range    TSH 1 82 0 40 - 4 50 mIU/L       Orders Placed This Encounter   Procedures    Ambulatory referral to Physical Therapy       ALLERGIES:  No Known Allergies    Current Medications     Current Outpatient Prescriptions   Medication Sig Dispense Refill    atorvastatin (LIPITOR) 10 mg tablet Take 1 tablet (10 mg total) by mouth daily 90 tablet 1    Azelaic Acid (FINACEA) 15 % cream Apply topically daily at bedtime        Calcium Carb-Cholecalciferol (CALTRATE 600+D) 600-800 MG-UNIT TABS Take 2 tablets by mouth daily      doxycycline (PERIOSTAT) 20 MG tablet Take 20 mg by mouth 2 (two) times a day      GLUCOSAMINE-CHONDROITIN PO Take by mouth      ibandronate (BONIVA) 150 MG tablet Take 150 mg by mouth every 30 (thirty) days Next dose due 10  2 17      levothyroxine 137 mcg tablet Take 137 mcg by mouth daily      loratadine (CLARITIN) 10 mg tablet Take 10 mg by mouth daily      metoprolol tartrate (LOPRESSOR) 50 mg tablet Take 50 mg by mouth daily        metroNIDAZOLE (METROGEL) 1 % gel Apply topically daily      naproxen (NAPROSYN) 500 mg tablet Take 500 mg by mouth daily        psyllium (METAMUCIL) 0 52 g capsule Take 0 52 g by mouth daily      TiZANidine (ZANAFLEX) 2 MG capsule Take 1 capsule (2 mg total) by mouth 2 (two) times a day as needed for muscle spasms 30 capsule 1    Triamcinolone Acetonide (NASACORT ALLERGY 24HR NA) 2 sprays into each nostril as needed        predniSONE 20 mg tablet 2 tabs X 2 days, 1 tab X 2 days, 1/2 X 4 days 8 tablet 0     No current facility-administered medications for this visit            Health Maintenance     Health Maintenance   Topic Date Due    Hepatitis C Screening  1950    DTaP,Tdap,and Td Vaccines (1 - Tdap) 01/23/1971    CRC Screening: Colonoscopy  08/13/2014    Fall Risk  09/27/2019    Depression Screening PHQ  09/27/2019    Urinary Incontinence Screening  09/27/2019    INFLUENZA VACCINE  Completed    Pneumococcal PPSV23/PCV13 65+ Years / Low and Medium Risk  Completed     Immunization History   Administered Date(s) Administered    Influenza 09/26/2014, 10/10/2015, 10/03/2016, 10/17/2017    Influenza Quadrivalent Preservative Free 3 years and older IM 09/26/2014    Influenza Split High Dose Preservative Free IM 10/10/2015, 10/03/2016, 10/17/2017    Influenza TIV (IM) 10/11/2012, 10/17/2013    Influenza, high dose seasonal 0 5 mL 09/27/2018    Pneumococcal Conjugate 13-Valent 12/05/2016    Pneumococcal Polysaccharide PPV23 56/78/3789       Krysta Butcher MD

## 2018-11-20 DIAGNOSIS — E03.9 HYPOTHYROIDISM, UNSPECIFIED TYPE: Primary | ICD-10-CM

## 2018-11-20 RX ORDER — LEVOTHYROXINE SODIUM 137 UG/1
TABLET ORAL
Qty: 90 TABLET | Refills: 3 | Status: SHIPPED | OUTPATIENT
Start: 2018-11-20 | End: 2019-11-27 | Stop reason: SDUPTHER

## 2018-11-27 ENCOUNTER — EVALUATION (OUTPATIENT)
Dept: PHYSICAL THERAPY | Facility: REHABILITATION | Age: 68
End: 2018-11-27
Payer: COMMERCIAL

## 2018-11-27 DIAGNOSIS — M25.551 RIGHT HIP PAIN: Primary | ICD-10-CM

## 2018-11-27 DIAGNOSIS — M79.604 PAIN OF RIGHT LOWER EXTREMITY: ICD-10-CM

## 2018-11-27 PROCEDURE — G8982 BODY POS GOAL STATUS: HCPCS | Performed by: PHYSICAL THERAPIST

## 2018-11-27 PROCEDURE — 97110 THERAPEUTIC EXERCISES: CPT | Performed by: PHYSICAL THERAPIST

## 2018-11-27 PROCEDURE — G8981 BODY POS CURRENT STATUS: HCPCS | Performed by: PHYSICAL THERAPIST

## 2018-11-27 PROCEDURE — 97161 PT EVAL LOW COMPLEX 20 MIN: CPT | Performed by: PHYSICAL THERAPIST

## 2018-11-27 NOTE — PROGRESS NOTES
PT Evaluation     Today's date: 2018  Patient name: Janna Green  : 1950  MRN: [id number]  Referring provider: Yeison Childers MD  Dx:   Encounter Diagnosis     ICD-10-CM    1  Right hip pain M25 551    2  Pain of right lower extremity M79 604 Ambulatory referral to Physical Therapy       Start Time:   Stop Time: 09  Total time in clinic (min): 58 minutes    Assessment  Impairments: abnormal gait, abnormal or restricted ROM, abnormal movement, activity intolerance, impaired balance, impaired physical strength, lacks appropriate home exercise program, pain with function and weight-bearing intolerance    Symptom irritability: highBarriers to therapy: Janna Green is a 76 y o  female who presents with pain, decreased strength, decreased ROM, decreased joint mobility and ambulatory dysfunction  Due to these impairments, Patient has difficulty performing a/iadls, recreational activities and engaging in social activities  Patient's clinical presentation is consistent with their referring diagnosis of RLE hip pain  Patient would benefit from skilled physical therapy to address their aforementioned impairments, improve their level of function and to improve their overall quality of life  Understanding of Dx/Px/POC: excellent  Goals  Short Term:  Pt will report decreased levels of pain by at least 2 subjective ratings in 4 weeks  Pt will demonstrate increased weight bearing on RLE demonstrated through improved gait pattern  Long Term:   Pt will be independent in their HEP in 8 weeks  Pt will demonstrate improved FOTO, >60   Pt will return to recreational activities at the gym  Pt will ambulate community distances with out limitation from her RLE  Pt will perform elevation with 1 HR and normalized step over step pattern           Plan  Patient would benefit from: PT eval and skilled physical therapy  Planned modality interventions: low level laser therapy  Planned therapy interventions: therapeutic training, therapeutic exercise, therapeutic activities, stretching, strengthening, patient education, neuromuscular re-education, manual therapy, joint mobilization, home exercise program, functional ROM exercises, flexibility, balance/weight bearing training, balance and gait training  Frequency: 2x week  Duration in weeks: 12  Treatment plan discussed with: patient        Subjective Evaluation    History of Present Illness  Date of onset: 10/8/2018  Mechanism of injury: Pt started having pain in her medial thigh with activity  Her pain levels progressed and she saw her PCP a week later  Pt was put on medication for muscle spasms and inflammation  Pt states pain has improved a little but continues to have pain with movement and performing ADL'S  Not a recurrent problem   Quality of life: fair    Pain  Current pain rating: 3  At best pain ratin  At worst pain ratin  Quality: dull ache  Relieving factors: rest and change in position  Aggravating factors: walking  Progression: improved    Social Support  Steps to enter house: yes  Stairs in house: yes   Lives with: spouse    Employment status: not working    Diagnostic Tests  No diagnostic tests performed  Treatments  Previous treatment: medication  Patient Goals  Patient goals for therapy: decreased pain, improved balance, increased motion, increased strength, independence with ADLs/IADLs and return to sport/leisure activities          Objective     Palpation     Right Tenderness of the adductor longus and adductor dalton  Trigger point to adductor dalton       Neurological Testing     Sensation     Hip   Left Hip   Intact: light touch    Right Hip   Intact: light touch    Reflexes   Left   Patellar (L4): trace (1+)    Right   Patellar (L4): normal (2+)    Passive Range of Motion   Left Hip   Normal passive range of motion    Right Hip   Flexion: 67 degrees   Abduction: 52 degrees   External rotation (90/90): 24 degrees with pain  Internal rotation (90/90): 18 degrees     Strength/Myotome Testing     Left Hip   Planes of Motion   Flexion: 4+  Extension: 4  Abduction: 4+  Adduction: 5  External rotation: 4+  Internal rotation: 4    Right Hip   Planes of Motion   Flexion: 4-  Extension: 4  Abduction: 3+  Adduction: 3+  External rotation: 4-  Internal rotation: 3+    Additional Strength Details  Pain with Adduction and IR /ER     Ambulation     Ambulation: Level Surfaces   Ambulation with assistive device: independent    Additional Level Surfaces Ambulation Details  Ambulation with SPC  100' greater than that pain increases in both LLE ankle and medial thigh  Ambulation: Stairs   Ascend stairs: contact guard assist  Pattern: non-reciprocal  Railings: two rails  Descend stairs: contact guard assist  Pattern: non-reciprocal  Railings: two rails  Curbs: requires assist    Functional Assessment   Squat   Unable to perform  and pain       Single Leg Stance - Eyes Open   Left  Trial 1: 2 seconds  Trial 2: 1 seconds  Trial 3: 2 seconds  Average: 1 67 seconds     Right  Trial 1: 0 seconds  Trial 2: 1 seconds  Trial 3: 0 seconds  Average: 0 33 seconds       Flowsheet Rows      Most Recent Value   PT/OT G-Codes   Current Score  45   Projected Score  60   FOTO information reviewed  Yes   Assessment Type  Evaluation   G code set  Changing & Maintaining Body Position   Changing and Maintaining Body Position Current Status ()  CK   Changing and Maintaining Body Position Goal Status ()  CK          Precautions: HTN, fear of falls     Daily Treatment Diary     Manual                                                                                   Exercise Diary                                                                                                                                                                                                                                                                                      Modalities

## 2018-11-28 ENCOUNTER — TELEPHONE (OUTPATIENT)
Dept: FAMILY MEDICINE CLINIC | Facility: CLINIC | Age: 68
End: 2018-11-28

## 2018-11-28 NOTE — TELEPHONE ENCOUNTER
RE: TiZANidine (ZANAFLEX) 2 MG capsule     Patient started PT but will running out of medication & wants to know if you can do a refill of this medication for her  She states she should have PT for about 1 month  Please call to advise         [facility]

## 2018-11-29 ENCOUNTER — OFFICE VISIT (OUTPATIENT)
Dept: PHYSICAL THERAPY | Facility: REHABILITATION | Age: 68
End: 2018-11-29
Payer: COMMERCIAL

## 2018-11-29 DIAGNOSIS — M79.604 PAIN OF RIGHT LOWER EXTREMITY: Primary | ICD-10-CM

## 2018-11-29 DIAGNOSIS — M25.551 RIGHT HIP PAIN: ICD-10-CM

## 2018-11-29 DIAGNOSIS — M79.604 PAIN OF RIGHT LOWER EXTREMITY: ICD-10-CM

## 2018-11-29 PROCEDURE — 97140 MANUAL THERAPY 1/> REGIONS: CPT | Performed by: PHYSICAL THERAPIST

## 2018-11-29 PROCEDURE — 97110 THERAPEUTIC EXERCISES: CPT | Performed by: PHYSICAL THERAPIST

## 2018-11-29 RX ORDER — TIZANIDINE HYDROCHLORIDE 2 MG/1
2 CAPSULE, GELATIN COATED ORAL 2 TIMES DAILY PRN
Qty: 60 CAPSULE | Refills: 0 | Status: SHIPPED | OUTPATIENT
Start: 2018-11-29 | End: 2018-12-20 | Stop reason: ALTCHOICE

## 2018-11-29 NOTE — PROGRESS NOTES
Daily Note     Today's date: 2018  Patient name: Christa Tiwari  : 1950  MRN: [id number]  Referring provider: Olga Mello MD  Dx:   Encounter Diagnosis     ICD-10-CM    1  Pain of right lower extremity M79 604    2  Right hip pain M25 551        Start Time: 945  Stop Time: 1032  Total time in clinic (min): 47 minutes    Subjective: I did fine with my HEP but the standing one was hard to do with my ankle  Objective: See treatment diary below      Assessment: Tolerated treatment well  Patient would benefit from continued PT      Plan: Continue per plan of care  Progress treatment as tolerated           Precautions: HTN, fear of falls     Daily Treatment Diary     Manual              Myofascial release adductor  LB            STM adductor longus/dalton  LB            TPR adductor longus dalton  LB                         Laser adductor /hamstring 6 min ea 14 cole 6 min ea                Exercise Diary             Bike  nv 8           Hip addictor stretch long sit  4x 30" 4x30"           Standing WS adductor stretch  10x 10" 10x 10"                                                                                                                                                                                                                                            Modalities

## 2018-12-02 DIAGNOSIS — I10 ESSENTIAL HYPERTENSION: Primary | ICD-10-CM

## 2018-12-02 RX ORDER — IBANDRONATE SODIUM 150 MG/1
TABLET, FILM COATED ORAL
Qty: 3 TABLET | Refills: 3 | Status: SHIPPED | OUTPATIENT
Start: 2018-12-02 | End: 2019-06-12

## 2018-12-02 RX ORDER — METOPROLOL TARTRATE 50 MG/1
TABLET, FILM COATED ORAL
Qty: 90 TABLET | Refills: 3 | Status: SHIPPED | OUTPATIENT
Start: 2018-12-02 | End: 2019-11-30 | Stop reason: SDUPTHER

## 2018-12-03 ENCOUNTER — OFFICE VISIT (OUTPATIENT)
Dept: PHYSICAL THERAPY | Facility: REHABILITATION | Age: 68
End: 2018-12-03
Payer: COMMERCIAL

## 2018-12-03 DIAGNOSIS — M25.551 RIGHT HIP PAIN: ICD-10-CM

## 2018-12-03 DIAGNOSIS — M79.604 PAIN OF RIGHT LOWER EXTREMITY: Primary | ICD-10-CM

## 2018-12-03 PROCEDURE — 97110 THERAPEUTIC EXERCISES: CPT

## 2018-12-03 PROCEDURE — 97140 MANUAL THERAPY 1/> REGIONS: CPT

## 2018-12-03 NOTE — PROGRESS NOTES
Daily Note     Today's date: 12/3/2018  Patient name: Tiana Bui  : 1950  MRN: [id number]  Referring provider: Cali Cedillo MD  Dx:   Encounter Diagnosis     ICD-10-CM    1  Pain of right lower extremity M79 604    2  Right hip pain M25 551        Start Time: 1145  Stop Time: 1230  Total time in clinic (min): 45 minutes    Subjective: Pt reports that she had relief with manuals performed last session that lasted 24 hours  Pt notes compliance with HEP  Objective: See treatment diary below      Assessment: Tolerated treatment well  Patient presents to PT session with SPC  Patient had bruising and tenderness to touch along R ADDuctors, gentle STM performed this session  Patient would benefit from continued PT to decrease pain  Plan: Continue per plan of care  Progress treatment as tolerated           Precautions: HTN, fear of falls     Daily Treatment Diary     Manual  11/29 12/3           Myofascial release adductor  LB            STM adductor longus/dalton  LB LK           TPR adductor longus dalton  LB                         Laser adductor /hamstring 6 min ea 14 cole 6 min ea LK  12'                Exercise Diary  11/27 11/29 12/3          Bike  nv 8 8' lvl  2          Hip addictor stretch long sit  4x 30" 4x30" home          Standing WS adductor stretch  10x 10" 10x 10" home          Seated wobble board   A/p m/l   SL  20x                                                                                                                                                                                                                              Modalities

## 2018-12-06 ENCOUNTER — OFFICE VISIT (OUTPATIENT)
Dept: PHYSICAL THERAPY | Facility: REHABILITATION | Age: 68
End: 2018-12-06
Payer: COMMERCIAL

## 2018-12-06 DIAGNOSIS — M79.604 PAIN OF RIGHT LOWER EXTREMITY: Primary | ICD-10-CM

## 2018-12-06 DIAGNOSIS — M25.551 RIGHT HIP PAIN: ICD-10-CM

## 2018-12-06 PROCEDURE — G8982 BODY POS GOAL STATUS: HCPCS | Performed by: PHYSICAL THERAPIST

## 2018-12-06 PROCEDURE — G8981 BODY POS CURRENT STATUS: HCPCS | Performed by: PHYSICAL THERAPIST

## 2018-12-06 PROCEDURE — 97140 MANUAL THERAPY 1/> REGIONS: CPT | Performed by: PHYSICAL THERAPIST

## 2018-12-06 PROCEDURE — 97110 THERAPEUTIC EXERCISES: CPT | Performed by: PHYSICAL THERAPIST

## 2018-12-06 NOTE — PROGRESS NOTES
Daily Note     Today's date: 2018  Patient name: Maynor Beaver  : 1950  MRN: [id number]  Referring provider: Meghan Molina MD  Dx:   Encounter Diagnosis     ICD-10-CM    1  Pain of right lower extremity M79 604    2  Right hip pain M25 551        Start Time: 944  Stop Time: 1038  Total time in clinic (min): 54 minutes    Subjective: I continue to feel better still there but less pain  Objective: See treatment diary below      Assessment: Tolerated treatment well  Reviewed alternative way to perform adductor stretch  Patient demonstrated fatigue post treatment, exhibited good technique with therapeutic exercises and would benefit from continued PT      Plan: Continue per plan of care  Progress treatment as tolerated           Precautions: HTN, fear of falls     Daily Treatment Diary     Manual  11/29 12/3 12/06          Myofascial release adductor  LB  LB          STM adductor longus/dalton  LB LK LB          TPR adductor longus dalton  LB  LB                       Laser adductor /hamstring 6 min ea 14 cole 6 min ea LK  12'  LB 12' total 15 cole               Exercise Diary  11/27 11/29 12/3 12/06         Bike  nv 8 8' lvl  2 8 lv 2         Hip addictor stretch long sit  4x 30" 4x30" home natalya elyine WB on elbows 3x30"         Standing WS adductor stretch  10x 10" 10x 10" home          Seated wobble board   A/p m/l   SL  20x standing 10 stablization   10x A/P 50% ROM                                                                                                                                                                                                                              Modalities

## 2018-12-10 ENCOUNTER — OFFICE VISIT (OUTPATIENT)
Dept: PHYSICAL THERAPY | Facility: REHABILITATION | Age: 68
End: 2018-12-10
Payer: COMMERCIAL

## 2018-12-10 DIAGNOSIS — M79.604 PAIN OF RIGHT LOWER EXTREMITY: Primary | ICD-10-CM

## 2018-12-10 DIAGNOSIS — M25.551 RIGHT HIP PAIN: ICD-10-CM

## 2018-12-10 PROCEDURE — 97110 THERAPEUTIC EXERCISES: CPT | Performed by: PHYSICAL THERAPIST

## 2018-12-10 PROCEDURE — 97140 MANUAL THERAPY 1/> REGIONS: CPT | Performed by: PHYSICAL THERAPIST

## 2018-12-10 NOTE — PROGRESS NOTES
Daily Note     Today's date: 12/10/2018  Patient name: Tiana Bui  : 1950  MRN: [id number]  Referring provider: Cali Cedillo MD  Dx:   Encounter Diagnosis     ICD-10-CM    1  Pain of right lower extremity M79 604    2  Right hip pain M25 551        Start Time: 856  Stop Time: 09  Total time in clinic (min): 46 minutes    Subjective: It continues to do better  I am tolerating do more around the house  Objective: See treatment diary below      Assessment: Tolerated treatment well  Muscle tone improves after laser and STM  Patient exhibited good technique with therapeutic exercises and would benefit from continued PT      Plan: Continue per plan of care  Progress treatment as tolerated          Precautions: HTN, fear of falls     Daily Treatment Diary     Manual  11/29 12/3 12/06 12/10         Myofascial release adductor  LB  LB LB         STM adductor longus/dalton  LB LK LB LB         TPR adductor longus dalton  LB  LB LB                      Laser adductor /hamstring 6 min ea 14 cole 6 min ea LK  12'  LB 12' total 15 cole  LB 15 cole 12'             Exercise Diary  11/27 11/29 12/3 12/06 12/10         Bike  nv 8 8' lvl  2 8 lv 2 8 min        Hip addictor stretch long sit  4x 30" 4x30" home natalya elyine WB on elbows 3x30"         Standing WS adductor stretch  10x 10" 10x 10" home  2x10        Seated wobble board   A/p m/l   SL  20x standing 10 stablization   10x A/P 50% ROM  seated 2x10         Hip sliders     ABD 2x15  1/2 clock 2x10         Seated groin stretch      3x30"                                                                                                                                                                                                  Modalities

## 2018-12-13 ENCOUNTER — OFFICE VISIT (OUTPATIENT)
Dept: PHYSICAL THERAPY | Facility: REHABILITATION | Age: 68
End: 2018-12-13
Payer: COMMERCIAL

## 2018-12-13 DIAGNOSIS — M79.604 PAIN OF RIGHT LOWER EXTREMITY: Primary | ICD-10-CM

## 2018-12-13 DIAGNOSIS — M25.551 RIGHT HIP PAIN: ICD-10-CM

## 2018-12-13 PROCEDURE — 97140 MANUAL THERAPY 1/> REGIONS: CPT | Performed by: PHYSICAL THERAPIST

## 2018-12-13 PROCEDURE — 97112 NEUROMUSCULAR REEDUCATION: CPT | Performed by: PHYSICAL THERAPIST

## 2018-12-13 PROCEDURE — 97110 THERAPEUTIC EXERCISES: CPT | Performed by: PHYSICAL THERAPIST

## 2018-12-13 NOTE — PROGRESS NOTES
Daily Note     Today's date: 2018  Patient name: Hamzah Marin  : 1950  MRN: [id number]  Referring provider: Dinesh Macias MD  Dx:   Encounter Diagnosis     ICD-10-CM    1  Pain of right lower extremity M79 604    2  Right hip pain M25 551                   Subjective: I am continuing to do better 2-3/10 at worst       Objective: See treatment diary below      Assessment: Tolerated treatment well  Patient exhibited good technique with therapeutic exercises and would benefit from continued PT      Plan: Continue per plan of care  Progress treatment as tolerated           Precautions: HTN, fear of falls     Daily Treatment Diary     Manual  11/29 12/3 12/06 12/10 12/13        Myofascial release adductor  LB  LB LB LB        STM adductor longus/dalton  LB LK LB LB LB        TPR adductor longus dalton  LB  LB LB LB                     Laser adductor /hamstring 6 min ea 14 cole 6 min ea LK  12'  LB 12' total 15 cole  LB 15 cole 12' LB 15 cole 12 '            Exercise Diary  11/27 11/29 12/3 12/06 12/10  12/13       Bike  nv 8 8' lvl  2 8 lv 2 8 min 8 min         Hip addctor stretch long sit  4x 30" 4x30" home natalya elyine WB on elbows 3x30"         Standing WS adductor stretch  10x 10" 10x 10" home  2x10 10x 10"       Seated wobble board   A/p m/l   SL  20x standing 10 stablization   10x A/P 50% ROM  seated 2x10  [place] board 2x10   1/2 foam Df/pf  Inv/EV        Hip sliders     ABD 2x15  1/2 clock 2x10  3x15 abd  1/2 clock 3x15       Seated groin stretch      3x30"        T band abd step       3x10        T band add       3x10                                                                                                                                                                        Modalities

## 2018-12-18 ENCOUNTER — OFFICE VISIT (OUTPATIENT)
Dept: PHYSICAL THERAPY | Facility: REHABILITATION | Age: 68
End: 2018-12-18
Payer: COMMERCIAL

## 2018-12-18 DIAGNOSIS — M25.551 RIGHT HIP PAIN: ICD-10-CM

## 2018-12-18 DIAGNOSIS — M79.604 PAIN OF RIGHT LOWER EXTREMITY: Primary | ICD-10-CM

## 2018-12-18 PROCEDURE — 97140 MANUAL THERAPY 1/> REGIONS: CPT | Performed by: PHYSICAL THERAPIST

## 2018-12-18 PROCEDURE — 97110 THERAPEUTIC EXERCISES: CPT | Performed by: PHYSICAL THERAPIST

## 2018-12-18 PROCEDURE — 97112 NEUROMUSCULAR REEDUCATION: CPT | Performed by: PHYSICAL THERAPIST

## 2018-12-18 NOTE — PROGRESS NOTES
Daily Note     Today's date: 2018  Patient name: Alem Be  : 1950  MRN: [id number]  Referring provider: Candida Payton MD  Dx:   Encounter Diagnosis     ICD-10-CM    1  Pain of right lower extremity M79 604    2  Right hip pain M25 551        Start Time: 1115  Stop Time: 1158  Total time in clinic (min): 43 minutes    Subjective: I am doing well 2/10 pain levels  Objective: See treatment diary below      Assessment: Tolerated treatment well  Pt demonstrating improved ambulation with decreased pain levels  Patient demonstrated fatigue post treatment, exhibited good technique with therapeutic exercises and would benefit from continued PT      Plan: Continue per plan of care  Progress treatment as tolerated           Precautions: HTN, fear of falls     Daily Treatment Diary     Manual  11/29 12/3 12/06 12/10 12/13 12/18       Myofascial release adductor  LB  LB LB LB LB       STM adductor longus/dalton  LB LK LB LB LB LB       TPR adductor longus dalton  LB  LB LB LB                     Laser adductor /hamstring 6 min ea 14 cole 6 min ea LK  12'  LB 12' total 15 cole  LB 15 cole 12' LB 15 cole 12 ' 15 cole   8 min           Exercise Diary  11/27 11/29 12/3 12/06 12/10  12/13 12/18      Bike  nv 8 8' lvl  2 8 lv 2 8 min 8 min   8'      Hip addctor stretch long sit  4x 30" 4x30" home natalya elyine WB on elbows 3x30"         Standing WS adductor stretch  10x 10" 10x 10" home  2x10 10x 10" 5x 10"      Seated wobble board   A/p m/l   SL  20x standing 10 stablization   10x A/P 50% ROM  seated 2x10  [place] board 2x10   1/2 foam Df/pf  Inv/EV  stading 3x10 AP/EV/INV       Hip sliders     ABD 2x15  1/2 clock 2x10  3x15 abd  1/2 clock 3x15       Seated groin stretch      3x30"  3x30       T band abd step       3x10        T band add       3x10        Side lunge w push off RLE        2x10       Mod Tandem stance        5x 10" Mi A       Ballet squats        2x10 glut squeeze Modalities

## 2018-12-20 ENCOUNTER — TELEPHONE (OUTPATIENT)
Dept: OBGYN CLINIC | Facility: CLINIC | Age: 68
End: 2018-12-20

## 2018-12-20 ENCOUNTER — OFFICE VISIT (OUTPATIENT)
Dept: OBGYN CLINIC | Facility: CLINIC | Age: 68
End: 2018-12-20
Payer: COMMERCIAL

## 2018-12-20 VITALS — DIASTOLIC BLOOD PRESSURE: 72 MMHG | SYSTOLIC BLOOD PRESSURE: 126 MMHG | BODY MASS INDEX: 43.53 KG/M2 | WEIGHT: 238 LBS

## 2018-12-20 DIAGNOSIS — N81.10 VAGINAL PROLAPSE: Primary | ICD-10-CM

## 2018-12-20 DIAGNOSIS — N81.9 FEMALE GENITAL PROLAPSE, UNSPECIFIED TYPE: Primary | ICD-10-CM

## 2018-12-20 PROCEDURE — 99214 OFFICE O/P EST MOD 30 MIN: CPT | Performed by: OBSTETRICS & GYNECOLOGY

## 2018-12-20 NOTE — PROGRESS NOTES
Anibal Glass  1950    S:  76 y o  female here for a consultation regarding her uterine prolapse, and to consider hysterectomy  She has felt a "bump" when she cleans, but is otherwise not having symptoms  She has no pressure or discomfort  She has some urinary urgency - which she associates with her ankle injury  She has no other leaking, and no difficulty in emptying her bladder  She has no bowel concerns  She is not sexually active at this time, and does not have plans on becoming       x 2, largest 9 5lbs      Past Medical History:   Diagnosis Date    Allergic rhinitis     Disease of thyroid gland     Diverticulosis     GERD (gastroesophageal reflux disease)     Hyperlipidemia     Hypertension     Rosacea     Thyroid nodule     non-toxic, single      Family History   Problem Relation Age of Onset    COPD Mother     Diabetes Mother     Heart disease Mother     Hypertension Mother     Bone cancer Father     Colon cancer Family      Social History     Social History    Marital status: /Civil Union     Spouse name: N/A    Number of children: N/A    Years of education: N/A     Social History Main Topics    Smoking status: Never Smoker    Smokeless tobacco: Never Used    Alcohol use No      Comment: Never drank alcohol denied    Drug use: No    Sexual activity: No     Other Topics Concern    None     Social History Narrative    Exercies moderately 3 or more times a week        O:  /72 (BP Location: Left arm, Patient Position: Sitting, Cuff Size: Large)   Wt 108 kg (238 lb)   LMP  (LMP Unknown)   BMI 43 53 kg/m²   She appears well and is in no distress  Abdomen is soft and nontender  External genitals are normal without lesions or rashes  Vagina + uterovaginal prolapse to introitus, + grade 1-2 rectocele, mild cystocele, loss rugae/atrophy  Cervix is normal, no lesions or discharge  Uterus is nontender, no masses  Adnexa are nontender, no pelvic masses appreciated    A/P:  Pelvic Organ Prolapse/Pelvic Floor Dysfunction  - Discussed options for therapy with patient  - Discussed vaginal hysterectomy (removal of uterus & cervix) - with joshua culdoplasty  We also discussed that this would not guarantee complete resolution of her prolpase with her cystocele/rectocele, or future vaginal prolapse  - Offered consultation with urogynecology to discuss complete surgical intervention, she is NOT interested in this currently, she would not desire mesh, and she is aware that this is not a guarantee of lifetime results  - Discussed medical intervention with a pessary, use of estrace cream, with pessary cleaning q 3 months  She is most interested in a pessary at this time, but will sleep on this decision and report back to us  I spent 30 minutes with patient, >50% counseling

## 2018-12-20 NOTE — TELEPHONE ENCOUNTER
Patient saw Dr Cornel Sweeney this morning and discussed getting a pessary  The patient checked with her insurance company and was told she would need to pay a $200 co-pay every 3 months for maintaince of the pessary and the patient cannot afford this  Patient would like to know what other options she has and would also like to avoid surgery

## 2018-12-21 ENCOUNTER — OFFICE VISIT (OUTPATIENT)
Dept: PHYSICAL THERAPY | Facility: REHABILITATION | Age: 68
End: 2018-12-21
Payer: COMMERCIAL

## 2018-12-21 DIAGNOSIS — M79.604 PAIN OF RIGHT LOWER EXTREMITY: Primary | ICD-10-CM

## 2018-12-21 DIAGNOSIS — M25.551 RIGHT HIP PAIN: ICD-10-CM

## 2018-12-21 PROCEDURE — 97110 THERAPEUTIC EXERCISES: CPT | Performed by: PHYSICAL THERAPIST

## 2018-12-21 PROCEDURE — 97140 MANUAL THERAPY 1/> REGIONS: CPT | Performed by: PHYSICAL THERAPIST

## 2018-12-21 PROCEDURE — 97112 NEUROMUSCULAR REEDUCATION: CPT | Performed by: PHYSICAL THERAPIST

## 2018-12-21 NOTE — PROGRESS NOTES
Daily Note     Today's date: 2018  Patient name: Aarti Shepherd  : 1950  MRN: [id number]  Referring provider: Cristiana Guerrero MD  Dx:   Encounter Diagnosis     ICD-10-CM    1  Pain of right lower extremity M79 604    2  Right hip pain M25 551        Start Time: 1115  Stop Time: 1208  Total time in clinic (min): 53 minutes    Subjective: I continue to feel better  I have returned to the gym but just doing upper body exercises  Objective: See treatment diary below      Assessment: Tolerated treatment well  Patient demonstrated fatigue post treatment, exhibited good technique with therapeutic exercises and would benefit from continued PT      Plan: Continue per plan of care  Progress treatment as tolerated           Precautions: HTN, fear of falls     Daily Treatment Diary     Manual  11/29 12/3 12/06 12/10 12/13 12/18 12/21      Myofascial release adductor  LB  LB LB LB LB LB      STM adductor longus/dalton  LB LK LB LB LB LB LB      TPR adductor longus dalton  LB  LB LB LB  LB                   Laser adductor /hamstring 6 min ea 14 cole 6 min ea LK  12'  LB 12' total 15 cole  LB 15 cole 12' LB 15 cole 12 ' 15 cole   8 min 14 5 8 min          Exercise Diary  11/27 11/29 12/3 12/06 12/10  12/13 12/18 12/21     Bike  nv 8 8' lvl  2 8 lv 2 8 min 8 min   8' 8'     Hip addctor stretch long sit  4x 30" 4x30" home natalya elyine WB on elbows 3x30"         Standing WS adductor stretch  10x 10" 10x 10" home  2x10 10x 10" 5x 10"      Seated wobble board   A/p m/l   SL  20x standing 10 stablization   10x A/P 50% ROM  seated 2x10  [place] board 2x10   1/2 foam Df/pf  Inv/EV  stading 3x10 AP/EV/INV       Hip sliders     ABD 2x15  1/2 clock 2x10  3x15 abd  1/2 clock 3x15  2x10     Seated groin stretch      3x30"  3x30  3x30"     T band abd step       3x10   3x10      T band add       3x10        Side lunge w push off RLE        2x10       Mod Tandem stance        5x 10" Mi A  5x 10"     Ballet squats 2x10 glut squeeze       SLR adductor         3x10      Bridge with ball squeeze        3x10                                                                                                     Modalities

## 2018-12-22 DIAGNOSIS — E78.5 HYPERLIPIDEMIA, UNSPECIFIED HYPERLIPIDEMIA TYPE: ICD-10-CM

## 2018-12-24 ENCOUNTER — OFFICE VISIT (OUTPATIENT)
Dept: PHYSICAL THERAPY | Facility: REHABILITATION | Age: 68
End: 2018-12-24
Payer: COMMERCIAL

## 2018-12-24 DIAGNOSIS — M79.604 PAIN OF RIGHT LOWER EXTREMITY: Primary | ICD-10-CM

## 2018-12-24 DIAGNOSIS — M25.551 RIGHT HIP PAIN: ICD-10-CM

## 2018-12-24 PROCEDURE — 97110 THERAPEUTIC EXERCISES: CPT | Performed by: PHYSICAL THERAPIST

## 2018-12-24 PROCEDURE — 97140 MANUAL THERAPY 1/> REGIONS: CPT | Performed by: PHYSICAL THERAPIST

## 2018-12-24 PROCEDURE — 97112 NEUROMUSCULAR REEDUCATION: CPT | Performed by: PHYSICAL THERAPIST

## 2018-12-24 RX ORDER — ATORVASTATIN CALCIUM 10 MG/1
TABLET, FILM COATED ORAL
Qty: 90 TABLET | Refills: 1 | Status: SHIPPED | OUTPATIENT
Start: 2018-12-24 | End: 2019-06-04 | Stop reason: SDUPTHER

## 2018-12-24 NOTE — TELEPHONE ENCOUNTER
Spoke with patient  Prefers consult for Dr Richy Forrest  Does not feel comfortable with handling pessary  Referral placed  Phone number provided  Patient will call if she does not hear back from them in 1 week

## 2018-12-24 NOTE — PROGRESS NOTES
Daily Note     Today's date: 2018  Patient name: Christa Tiwari  : 1950  MRN: [id number]  Referring provider: Olga Mello MD  Dx:   Encounter Diagnosis     ICD-10-CM    1  Pain of right lower extremity M79 604    2  Right hip pain M25 551        Start Time: 1115  Stop Time: 1200  Total time in clinic (min): 45 minutes    Subjective: I am doing well still 2/10 pain but continues to improve  Objective: See treatment diary below      Assessment: Tolerated treatment well  Patient demonstrated fatigue post treatment, exhibited good technique with therapeutic exercises and would benefit from continued PT      Plan: Continue per plan of care  Progress treatment as tolerated           Precautions: HTN, fear of falls     Daily Treatment Diary     Manual  11/29 12/3 12/06 12/10 12/13 12/18 12/21 12/24     Myofascial release adductor  LB  LB LB LB LB LB LB     STM adductor longus/dalton  LB LK LB LB LB LB LB LB     TPR adductor longus dalton  LB  LB LB LB  LB LB                  Laser adductor /hamstring 6 min ea 14 cole 6 min ea LK  12'  LB 12' total 15 cole  LB 15 cole 12' LB 15 cole 12 ' 15 cole   8 min 14 5 8 min 14 5 cole 8min           Exercise Diary  11/27 11/29 12/3 12/06 12/10  12/13 12/18 12/21 12/24    Bike  nv 8 8' lvl  2 8 lv 2 8 min 8 min   8' 8' 8'    Hip addctor stretch long sit  4x 30" 4x30" home natalya elyine WB on elbows 3x30"     3x 20"    Standing WS adductor stretch  10x 10" 10x 10" home  2x10 10x 10" 5x 10"  5x 15"    Seated wobble board   A/p m/l   SL  20x standing 10 stablization   10x A/P 50% ROM  seated 2x10  [place] board 2x10   1/2 foam Df/pf  Inv/EV  stading 3x10 AP/EV/INV       Hip sliders     ABD 2x15  1/2 clock 2x10  3x15 abd  1/2 clock 3x15  2x10     Seated groin stretch      3x30"  3x30  3x30" 3x30"    T band abd step       3x10   3x10      T band add       3x10        Side lunge w push off RLE        2x10       Mod Tandem stance        5x 10" Mi A  5x 10" Ballet squats        2x10 glut squeeze   2x10     SLR adductor         3x10  3x10     Bridge with ball squeeze        3x10  3x15     Walking hip 1/2 clocks dynamic adductors         5 laps                                                                                       Modalities

## 2018-12-24 NOTE — TELEPHONE ENCOUNTER
She could try just using estrace cream to help keep tissue healthy, but there are not a lot of other options besides surgery if she desires intervention  Could consider referral to Dr Elizabeth Ortiz office to have consultation there if she desires  She could also try pessary to see if she could remove on her own, to skip the need for office maintenance appointments

## 2018-12-27 ENCOUNTER — APPOINTMENT (OUTPATIENT)
Dept: PHYSICAL THERAPY | Facility: REHABILITATION | Age: 68
End: 2018-12-27
Payer: COMMERCIAL

## 2019-01-02 ENCOUNTER — APPOINTMENT (OUTPATIENT)
Dept: PHYSICAL THERAPY | Facility: REHABILITATION | Age: 69
End: 2019-01-02
Payer: COMMERCIAL

## 2019-01-04 ENCOUNTER — TELEPHONE (OUTPATIENT)
Dept: FAMILY MEDICINE CLINIC | Facility: CLINIC | Age: 69
End: 2019-01-04

## 2019-01-04 NOTE — TELEPHONE ENCOUNTER
Patient called stating she has a lipoma on her R leg and would like to know who she should contact for this  She stated it is starting to bother her  Please contact patient at [phone]

## 2019-01-08 ENCOUNTER — EVALUATION (OUTPATIENT)
Dept: PHYSICAL THERAPY | Facility: REHABILITATION | Age: 69
End: 2019-01-08
Payer: COMMERCIAL

## 2019-01-08 DIAGNOSIS — M79.604 PAIN OF RIGHT LOWER EXTREMITY: Primary | ICD-10-CM

## 2019-01-08 DIAGNOSIS — M25.551 RIGHT HIP PAIN: ICD-10-CM

## 2019-01-08 PROCEDURE — 97112 NEUROMUSCULAR REEDUCATION: CPT | Performed by: PHYSICAL THERAPIST

## 2019-01-08 PROCEDURE — 97140 MANUAL THERAPY 1/> REGIONS: CPT | Performed by: PHYSICAL THERAPIST

## 2019-01-08 PROCEDURE — 97110 THERAPEUTIC EXERCISES: CPT | Performed by: PHYSICAL THERAPIST

## 2019-01-08 NOTE — PROGRESS NOTES
PT Discharge    Today's date: 2019  Patient name: Hugo Ahn  : 1950  MRN: [id number]  Referring provider: Rashaun Schaeffer MD  Dx:   Encounter Diagnosis     ICD-10-CM    1  Pain of right lower extremity M79 604    2  Right hip pain M25 551                   Assessment  Assessment details: Pt has been attending outpatient PT for __10__  Pt has made steady progress in PT and has met all impairment and functional goals at this time  Pt is independent with HEP  Pt is D/C from PT to HEP continue to progress towards personal goals  Thank you! Goals  Goals  Short Term: All achieved   Pt will report decreased levels of pain by at least 2 subjective ratings in 4 weeks  Pt will demonstrate increased weight bearing on RLE demonstrated through improved gait pattern  Long Term:  ALL achieved   Pt will be independent in their HEP in 8 weeks  Pt will demonstrate improved FOTO, >60   Pt will return to recreational activities at the gym  Pt will ambulate community distances with out limitation from her RLE  Pt will perform elevation with 1 HR and normalized step over step pattern  Plan  Plan details: DC to HEP        Subjective Evaluation    Pain  Current pain ratin  At best pain ratin  At worst pain ratin          Objective     Palpation     Right Tenderness of the adductor longus and adductor dalton  Trigger point to adductor dalton       Neurological Testing     Sensation     Hip   Left Hip   Intact: light touch    Right Hip   Intact: light touch    Reflexes   Left   Patellar (L4): trace (1+)    Right   Patellar (L4): normal (2+)    Passive Range of Motion   Left Hip   Normal passive range of motion    Right Hip   Flexion: 67 degrees   Abduction: 52 degrees   External rotation (90/90): 24 degrees with pain  Internal rotation (90/90): 18 degrees     Strength/Myotome Testing     Left Hip   Planes of Motion   Flexion: 4+  Extension: 4  Abduction: 4+  Adduction: 5  External rotation: 4+  Internal rotation: 4    Right Hip   Planes of Motion   Flexion: 4-  Extension: 4  Abduction: 3+  Adduction: 3+  External rotation: 4-  Internal rotation: 3+    Additional Strength Details  Pain with Adduction and IR /ER     Ambulation     Ambulation: Level Surfaces   Ambulation with assistive device: independent    Additional Level Surfaces Ambulation Details  Ambulation with SPC  100' greater than that pain increases in both LLE ankle and medial thigh  Ambulation: Stairs   Ascend stairs: contact guard assist  Pattern: non-reciprocal  Railings: two rails  Descend stairs: contact guard assist  Pattern: non-reciprocal  Railings: two rails  Curbs: requires assist    Functional Assessment   Squat   Unable to perform  and pain       Single Leg Stance - Eyes Open   Left  Trial 1: 2 seconds  Trial 2: 1 seconds  Trial 3: 2 seconds  Average: 1 67 seconds     Right  Trial 1: 0 seconds  Trial 2: 1 seconds  Trial 3: 0 seconds  Average: 0 33 seconds           Precautions: HTN, fear of falls     Daily Treatment Diary     Manual  11/29 12/3 12/06 12/10 12/13 12/18 12/21 12/24 1/08    Myofascial release adductor  LB  LB LB LB LB LB LB     STM adductor longus/dalton  LB LK LB LB LB LB LB LB     TPR adductor longus dalton  LB  LB LB LB  LB LB                  Laser adductor /hamstring 6 min ea 14 cole 6 min ea LK  12'  LB 12' total 15 cole  LB 15 cole 12' LB 15 cole 12 ' 15 cole   8 min 14 5 8 min 14 5 cole 8min           Exercise Diary  11/27 11/29 12/3 12/06 12/10  12/13 12/18 12/21 12/24 1/08   Bike  nv 8 8' lvl  2 8 lv 2 8 min 8 min   8' 8' 8'    Hip addctor stretch long sit  4x 30" 4x30" home natalya elyine WB on elbows 3x30"     3x 20"    Standing WS adductor stretch  10x 10" 10x 10" home  2x10 10x 10" 5x 10"  5x 15"    Seated wobble board   A/p m/l   SL  20x standing 10 stablization   10x A/P 50% ROM  seated 2x10  [place] board 2x10   1/2 foam Df/pf  Inv/EV  stading 3x10 AP/EV/INV       Hip sliders     ABD 2x15  1/2 clock 2x10  3x15 abd  1/2 clock 3x15  2x10     Seated groin stretch      3x30"  3x30  3x30" 3x30"    T band abd step       3x10   3x10      T band add       3x10        Side lunge w push off RLE        2x10       Mod Tandem stance        5x 10" Mi A  5x 10"     Ballet squats        2x10 glut squeeze   2x10     SLR adductor         3x10  3x10     Bridge with ball squeeze        3x10  3x15     Walking hip 1/2 clocks dynamic adductors         5 laps                                                                                       Modalities

## 2019-01-22 PROCEDURE — 88304 TISSUE EXAM BY PATHOLOGIST: CPT | Performed by: PATHOLOGY

## 2019-01-23 ENCOUNTER — LAB REQUISITION (OUTPATIENT)
Dept: LAB | Facility: HOSPITAL | Age: 69
End: 2019-01-23
Payer: COMMERCIAL

## 2019-01-23 DIAGNOSIS — D17.1 BENIGN LIPOMATOUS NEOPLASM OF SKIN AND SUBCUTANEOUS TISSUE OF TRUNK: ICD-10-CM

## 2019-02-25 ENCOUNTER — TELEPHONE (OUTPATIENT)
Dept: FAMILY MEDICINE CLINIC | Facility: CLINIC | Age: 69
End: 2019-02-25

## 2019-02-25 DIAGNOSIS — J30.9 ALLERGIC RHINITIS, UNSPECIFIED SEASONALITY, UNSPECIFIED TRIGGER: Primary | ICD-10-CM

## 2019-02-25 RX ORDER — AZELASTINE 1 MG/ML
1 SPRAY, METERED NASAL 2 TIMES DAILY
Qty: 1 BOTTLE | Refills: 5 | Status: SHIPPED | OUTPATIENT
Start: 2019-02-25 | End: 2020-05-04

## 2019-02-25 NOTE — TELEPHONE ENCOUNTER
Patient states she hasn't used this in a couple of years astiline? ? (nasal spray) Would like to know if Dr Carolyn Connors can call this in for her  She is suppose to use it with the Nasacort but she hasn't but she needs it now  Please call to advise           [facility]

## 2019-02-27 ENCOUNTER — OFFICE VISIT (OUTPATIENT)
Dept: FAMILY MEDICINE CLINIC | Facility: CLINIC | Age: 69
End: 2019-02-27
Payer: COMMERCIAL

## 2019-02-27 VITALS
HEIGHT: 62 IN | SYSTOLIC BLOOD PRESSURE: 140 MMHG | TEMPERATURE: 98.1 F | BODY MASS INDEX: 45.38 KG/M2 | DIASTOLIC BLOOD PRESSURE: 70 MMHG | RESPIRATION RATE: 16 BRPM | HEART RATE: 66 BPM | WEIGHT: 246.6 LBS

## 2019-02-27 DIAGNOSIS — J06.9 UPPER RESPIRATORY TRACT INFECTION, UNSPECIFIED TYPE: ICD-10-CM

## 2019-02-27 DIAGNOSIS — D72.819 LEUKOPENIA, UNSPECIFIED TYPE: ICD-10-CM

## 2019-02-27 DIAGNOSIS — J32.9 SINUSITIS, UNSPECIFIED CHRONICITY, UNSPECIFIED LOCATION: Primary | ICD-10-CM

## 2019-02-27 DIAGNOSIS — I10 ESSENTIAL HYPERTENSION: ICD-10-CM

## 2019-02-27 DIAGNOSIS — E03.9 HYPOTHYROIDISM, UNSPECIFIED TYPE: ICD-10-CM

## 2019-02-27 DIAGNOSIS — Z11.59 NEED FOR HEPATITIS C SCREENING TEST: ICD-10-CM

## 2019-02-27 DIAGNOSIS — R22.9 LUMP OF SKIN: ICD-10-CM

## 2019-02-27 PROCEDURE — 1160F RVW MEDS BY RX/DR IN RCRD: CPT | Performed by: FAMILY MEDICINE

## 2019-02-27 PROCEDURE — 3008F BODY MASS INDEX DOCD: CPT | Performed by: FAMILY MEDICINE

## 2019-02-27 PROCEDURE — 99214 OFFICE O/P EST MOD 30 MIN: CPT | Performed by: FAMILY MEDICINE

## 2019-02-27 RX ORDER — AMOXICILLIN AND CLAVULANATE POTASSIUM 875; 125 MG/1; MG/1
1 TABLET, FILM COATED ORAL EVERY 12 HOURS SCHEDULED
Qty: 14 TABLET | Refills: 0 | Status: SHIPPED | OUTPATIENT
Start: 2019-02-27 | End: 2019-03-06

## 2019-02-27 NOTE — PROGRESS NOTES
FAMILY PRACTICE OFFICE VISIT       NAME: Yana Ford  AGE: 71 y o  SEX: female       : 1950        MRN: [id number]    DATE: 3/2/2019  TIME: 5:33 PM    Assessment and Plan     Problem List Items Addressed This Visit        Endocrine    Hypothyroidism    Relevant Orders    TSH, 3rd generation with Free T4 reflex       Cardiovascular and Mediastinum    Hypertension    Relevant Orders    Comprehensive metabolic panel      Other Visit Diagnoses     Sinusitis, unspecified chronicity, unspecified location    -  Primary    Relevant Medications    amoxicillin-clavulanate (AUGMENTIN) 875-125 mg per tablet    Upper respiratory tract infection, unspecified type        Relevant Medications    amoxicillin-clavulanate (AUGMENTIN) 875-125 mg per tablet    Lump of skin        Relevant Orders    US superficial lump (non extremity)    Need for hepatitis C screening test        Relevant Orders    Hepatitis C antibody    Leukopenia, unspecified type        Relevant Orders    CBC and differential        27-year-old female presents with symptoms that appear consistent with sinusitis and upper respiratory infection  Will start patient on Augmentin  Continue with symptomatic treatment and supportive measures including adequate hydration  Return parameters discussed  Regarding skin lump over left shoulder, left neck region:  Likely lipoma  Will obtain ultrasound to characterize further  Patient is agreeable with this plan  There are no Patient Instructions on file for this visit  I have spent 25 minutes with Patient  today in which greater than 50% of this time was spent in counseling/coordination of care regarding Diagnostic results, Prognosis, Risks and benefits of tx options, Intructions for management, Patient and family education, Importance of tx compliance, Risk factor reductions and Impressions              Chief Complaint     Chief Complaint   Patient presents with    Sinus Problem     Patient is here due to sinus pressure/drainage x 1 -2 weeks  History of Present Illness     HPI  63-year-old female presents with a 1 week h/o runny nose, sinus pressure, nasal congestion, cough, PND, ear pressure  Exposed to sick contacts  Started to take mucinex dm  Has been using nasocort  Started astelin yesterday    Has a lump over left shoulder egion   non tender  H/o lipoma    Review of Systems   Review of Systems   Constitutional: Negative for chills and fever  HENT: Positive for congestion, postnasal drip, rhinorrhea and sinus pressure  Respiratory: Positive for cough      Skin:        Skin lump behind left shoulder       Active Problem List     Patient Active Problem List   Diagnosis    Pain, joint, ankle and foot, left    DJD (degenerative joint disease), ankle and foot, left    Bilateral impacted cerumen    Acute otitis externa of left ear    Allergic rhinitis    Esophageal reflux    Hyperlipidemia    Hypertension    Hypothyroidism    Rosacea    Pain of right lower extremity    Female genital prolapse       Past Medical History:  Past Medical History:   Diagnosis Date    Allergic rhinitis     Disease of thyroid gland     Diverticulosis     GERD (gastroesophageal reflux disease)     Hyperlipidemia     Hypertension     Rosacea     Thyroid nodule     non-toxic, single        Past Surgical History:  Past Surgical History:   Procedure Laterality Date    ANKLE SURGERY Left     Fracture tx    COLONOSCOPY      fiberoptic    OTHER SURGICAL HISTORY      thyroid biopsy core needle     DC REMOVAL DEEP IMPLANT Left 9/18/2017    Procedure: REMOVAL HARDWARE ANKLE (1 plate, 8 screws );  Surgeon: Ethel Vargas MD;  Location: [facility];  Service: Orthopedics    THYROIDECTOMY      Total     TUBAL LIGATION         Family History:  Family History   Problem Relation Age of Onset    COPD Mother     Diabetes Mother     Heart disease Mother     Hypertension Mother     Bone cancer Father     Colon cancer Family        Social History:  Social History     Socioeconomic History    Marital status: /Civil Union     Spouse name: Not on file    Number of children: Not on file    Years of education: Not on file    Highest education level: Not on file   Occupational History    Not on file   Social Needs    Financial resource strain: Not on file    Food insecurity:     Worry: Not on file     Inability: Not on file    Transportation needs:     Medical: Not on file     Non-medical: Not on file   Tobacco Use    Smoking status: Never Smoker    Smokeless tobacco: Never Used   Substance and Sexual Activity    Alcohol use: No     Comment: Never drank alcohol denied    Drug use: No    Sexual activity: Never   Lifestyle    Physical activity:     Days per week: Not on file     Minutes per session: Not on file    Stress: Not on file   Relationships    Social connections:     Talks on phone: Not on file     Gets together: Not on file     Attends [employer] service: Not on file     Active member of club or organization: Not on file     Attends meetings of clubs or organizations: Not on file     Relationship status: Not on file    Intimate partner violence:     Fear of current or ex partner: Not on file     Emotionally abused: Not on file     Physically abused: Not on file     Forced sexual activity: Not on file   Other Topics Concern    Not on file   Social History Narrative    Exercies moderately 3 or more times a week      I have reviewed the patient's medical history in detail; there are no changes to the history as noted in the electronic medical record  Objective     Vitals:    02/27/19 1405   BP: 140/70   Pulse: 66   Resp: 16   Temp: 98 1 °F (36 7 °C)     Wt Readings from Last 3 Encounters:   02/27/19 112 kg (246 lb 9 6 oz)   12/20/18 108 kg (238 lb)   11/15/18 110 kg (243 lb 3 2 oz)       Physical Exam   Constitutional: She appears well-developed and well-nourished     HENT:   Head: Normocephalic and atraumatic  Mouth/Throat: Oropharynx is clear and moist    TMs intact and clear  Nares with edema noted  Posterior pharynx with drainage and erythema noted  Eyes: Pupils are equal, round, and reactive to light  Conjunctivae and EOM are normal    Neck: Normal range of motion  Neck supple  Cardiovascular: Normal rate, regular rhythm and normal heart sounds  Pulmonary/Chest: Effort normal and breath sounds normal    Musculoskeletal: Normal range of motion  Lymphadenopathy:     She has no cervical adenopathy  Skin:   Small lump over left shoulder near left neck that is nontender   Nursing note and vitals reviewed        Pertinent Laboratory/Diagnostic Studies:  Lab Results   Component Value Date    GLUCOSE 99 10/22/2014    BUN 19 10/02/2018    CREATININE 0 91 12/20/2017    CALCIUM 9 5 10/02/2018     12/20/2017    K 4 2 10/02/2018    CO2 29 10/02/2018     10/02/2018     Lab Results   Component Value Date    ALT 12 10/02/2018    AST 15 10/02/2018    ALKPHOS 68 10/02/2018    BILITOT 0 6 12/20/2017       Lab Results   Component Value Date    WBC 3 7 (L) 10/02/2018    HGB 12 3 10/02/2018    HCT 37 6 10/02/2018    MCV 96 4 10/02/2018     10/02/2018       Lab Results   Component Value Date    TSH 1 82 10/02/2018       Lab Results   Component Value Date    CHOL 229 (H) 12/20/2017     Lab Results   Component Value Date    TRIG 83 10/02/2018     Lab Results   Component Value Date    HDL 80 10/02/2018     Lab Results   Component Value Date    LDLCALC 123 (H) 10/22/2014     No results found for: HGBA1C    Results for orders placed or performed in visit on 01/22/19   Tissue Exam   Result Value Ref Range    Case Report       Surgical Pathology Report                         Case: [id number]                                   Authorizing Provider:  Angelina Gaffney MD            Collected:           01/22/2019                   Pathologist:           Sharon Hays MD               Received: 01/23/2019 1111              Specimen:    Soft Tissue, Lipoma, Right thigh                                                           Final Diagnosis       A  Soft tissue, right thigh, excision:  - Benign, partially encapsulated mature adipose tissue, consistent with lipoma  Additional Information       Interpretation performed at [facility], [address] [facility] [address] [facility]    All controls performed with the immunohistochemical stains reported above reacted appropriately  These tests were developed and their performance characteristics determined by [facility] or [facility]  They may not be cleared or approved by the [place]  Food and Drug Administration  The FDA has determined that such clearance or approval is not necessary  These tests are used for clinical purposes  They should not be regarded as investigational or for research  This laboratory has been approved by [facility], designated as a high-complexity laboratory and is qualified to perform these tests  Gross Description        A  The specimen is received in formalin, labeled with the patient's name and hospital number, and is designated "right thigh "  The specimen consists of a single rubbery roughly ovoid fibrofatty tissue fragment measuring 4 2 x 3 2 x 2 8 cm in greatest dimension  The tissue fragment is entirely inked blue  Cut sections reveals grossly unremarkable fibrofatty tissue  Representative sections  Four cassettes  Note: The estimated total formalin fixation time based upon information provided by the submitting clinician and the standard processing schedule is less than 72 hours         Liao Gallop         Orders Placed This Encounter   Procedures    US superficial lump (non extremity)    CBC and differential    Comprehensive metabolic panel    TSH, 3rd generation with Free T4 reflex    Hepatitis C antibody       ALLERGIES:  No Known Allergies    Current Medications     Current Outpatient Medications   Medication Sig Dispense Refill    atorvastatin (LIPITOR) 10 mg tablet TAKE ONE TABLET BY MOUTH EVERY DAY 90 tablet 1    Azelaic Acid (FINACEA) 15 % cream Apply topically daily at bedtime        azelastine (ASTELIN) 0 1 % nasal spray 1 spray into each nostril 2 (two) times a day Use in each nostril as directed 1 Bottle 5    Calcium Carb-Cholecalciferol (CALTRATE 600+D) 600-800 MG-UNIT TABS Take 2 tablets by mouth daily      doxycycline (PERIOSTAT) 20 MG tablet Take 20 mg by mouth 2 (two) times a day      GLUCOSAMINE-CHONDROITIN PO Take by mouth      ibandronate (BONIVA) 150 MG tablet TAKE ONE TABLET BY MOUTH ONCE MONTHLY 3 tablet 3    levothyroxine 137 mcg tablet TAKE ONE TABLET BY MOUTH EVERY DAY 90 tablet 3    loratadine (CLARITIN) 10 mg tablet Take 10 mg by mouth daily      metoprolol tartrate (LOPRESSOR) 50 mg tablet TAKE ONE TABLET BY MOUTH EVERY DAY 90 tablet 3    metroNIDAZOLE (METROGEL) 1 % gel Apply topically daily      naproxen (NAPROSYN) 500 mg tablet Take 500 mg by mouth daily        psyllium (METAMUCIL) 0 52 g capsule Take 0 52 g by mouth daily      Triamcinolone Acetonide (NASACORT ALLERGY 24HR NA) 2 sprays into each nostril as needed        amoxicillin-clavulanate (AUGMENTIN) 875-125 mg per tablet Take 1 tablet by mouth every 12 (twelve) hours for 7 days 14 tablet 0     No current facility-administered medications for this visit            Health Maintenance     Health Maintenance   Topic Date Due    Hepatitis C Screening  1950    BMI: Followup Plan  01/23/1968    DTaP,Tdap,and Td Vaccines (1 - Tdap) 01/23/1971    CRC Screening: Colonoscopy  08/13/2014    PT PLAN OF CARE  01/05/2019    Urinary Incontinence Screening  09/27/2019    Fall Risk  11/27/2019    Depression Screening PHQ  11/27/2019    BMI: Adult  02/27/2020    INFLUENZA VACCINE  Completed    Pneumococcal PPSV23/PCV13 65+ Years / Low and Medium Risk  Completed    HEPATITIS B VACCINES Aged [employer] History   Administered Date(s) Administered    INFLUENZA 09/26/2014, 10/10/2015, 10/03/2016, 10/17/2017    Influenza Quadrivalent Preservative Free 3 years and older IM 09/26/2014    Influenza Split High Dose Preservative Free IM 10/10/2015, 10/03/2016, 10/17/2017    Influenza TIV (IM) 10/11/2012, 10/17/2013    Influenza, high dose seasonal 0 5 mL 09/27/2018    Pneumococcal Conjugate 13-Valent 12/05/2016    Pneumococcal Polysaccharide PPV23 09/27/2018       Norris Escudero MD

## 2019-03-13 ENCOUNTER — HOSPITAL ENCOUNTER (OUTPATIENT)
Dept: ULTRASOUND IMAGING | Facility: HOSPITAL | Age: 69
Discharge: HOME | End: 2019-03-13
Payer: COMMERCIAL

## 2019-03-13 DIAGNOSIS — R22.9 LUMP OF SKIN: ICD-10-CM

## 2019-03-13 PROCEDURE — 76705 ECHO EXAM OF ABDOMEN: CPT

## 2019-03-15 ENCOUNTER — TELEPHONE (OUTPATIENT)
Dept: FAMILY MEDICINE CLINIC | Facility: CLINIC | Age: 69
End: 2019-03-15

## 2019-03-15 NOTE — TELEPHONE ENCOUNTER
----- Message from Martina Gray MD sent at 3/14/2019 11:01 PM EDT -----  Can you please let patient know that her ultrasound did not show any abnormalities including masses or cyst or enlarged lymph nodes  If she should feel a lump that is enlarging or anything else that is concerning, she should let us know    Thank you

## 2019-03-15 NOTE — RESULT ENCOUNTER NOTE
Can you please let patient know that her ultrasound did not show any abnormalities including masses or cyst or enlarged lymph nodes  If she should feel a lump that is enlarging or anything else that is concerning, she should let us know    Thank you

## 2019-03-27 ENCOUNTER — OFFICE VISIT (OUTPATIENT)
Dept: FAMILY MEDICINE CLINIC | Facility: CLINIC | Age: 69
End: 2019-03-27
Payer: COMMERCIAL

## 2019-03-27 VITALS
HEIGHT: 62 IN | DIASTOLIC BLOOD PRESSURE: 88 MMHG | RESPIRATION RATE: 16 BRPM | BODY MASS INDEX: 44.94 KG/M2 | WEIGHT: 244.2 LBS | HEART RATE: 68 BPM | TEMPERATURE: 98.3 F | SYSTOLIC BLOOD PRESSURE: 146 MMHG

## 2019-03-27 DIAGNOSIS — I10 ESSENTIAL HYPERTENSION: ICD-10-CM

## 2019-03-27 DIAGNOSIS — E78.5 HYPERLIPIDEMIA, UNSPECIFIED HYPERLIPIDEMIA TYPE: Primary | ICD-10-CM

## 2019-03-27 DIAGNOSIS — E03.9 HYPOTHYROIDISM, UNSPECIFIED TYPE: ICD-10-CM

## 2019-03-27 PROCEDURE — 1170F FXNL STATUS ASSESSED: CPT | Performed by: FAMILY MEDICINE

## 2019-03-27 PROCEDURE — 99214 OFFICE O/P EST MOD 30 MIN: CPT | Performed by: FAMILY MEDICINE

## 2019-03-27 PROCEDURE — 3008F BODY MASS INDEX DOCD: CPT | Performed by: FAMILY MEDICINE

## 2019-03-27 PROCEDURE — 1125F AMNT PAIN NOTED PAIN PRSNT: CPT | Performed by: FAMILY MEDICINE

## 2019-03-27 PROCEDURE — G0439 PPPS, SUBSEQ VISIT: HCPCS | Performed by: FAMILY MEDICINE

## 2019-03-27 PROCEDURE — 1036F TOBACCO NON-USER: CPT | Performed by: FAMILY MEDICINE

## 2019-03-27 PROCEDURE — 1160F RVW MEDS BY RX/DR IN RCRD: CPT | Performed by: FAMILY MEDICINE

## 2019-03-27 NOTE — ASSESSMENT & PLAN NOTE
Hypertension  Patient blood pressure is stable at this time she will continue with current regimen of medications    She will continue check blood pressures on her home blood pressure device and call if it maintains above 150/90

## 2019-03-27 NOTE — PROGRESS NOTES
FAMILY PRACTICE OFFICE VISIT       NAME: Vidya Wallace  AGE: 71 y o  SEX: female       : 1950        MRN: [id number]    DATE: 3/27/2019  TIME: 11:53 AM    Assessment and Plan     Problem List Items Addressed This Visit        Endocrine    Hypothyroidism     Hypothyroidism  Patient will have TSH blood work and continue with current dose of levothyroxine            Cardiovascular and Mediastinum    Hypertension     Hypertension  Patient blood pressure is stable at this time she will continue with current regimen of medications  She will continue check blood pressures on her home blood pressure device and call if it maintains above 150/90            Other    Hyperlipidemia - Primary     Hyperlipidemia  Patient will check lipid panel blood work and continue with current dose of statin therapy         Relevant Orders    Lipid panel            There are no Patient Instructions on file for this visit  Chief Complaint     Chief Complaint   Patient presents with    Medicare Wellness Visit    Follow-up       History of Present Illness     Patient denies any recent illness  She is due for her routine blood test   She has been seen her gynecologist for uterine prolapse and more than likely will be having hysterectomy with pelvic lift surgery in the next few months  Patient will be performing Cologuard testing next week      Review of Systems   Review of Systems   Constitutional: Negative  HENT: Negative  Respiratory: Negative  Cardiovascular: Negative  Gastrointestinal: Negative  Genitourinary:        As per HPI  Patient does have some urinary incontinence   Musculoskeletal:        Chronic left ankle pain from prior fracture   Skin: Negative  Neurological: Negative  Psychiatric/Behavioral: Negative          Active Problem List     Patient Active Problem List   Diagnosis    Pain, joint, ankle and foot, left    DJD (degenerative joint disease), ankle and foot, left    Bilateral impacted cerumen    Acute otitis externa of left ear    Allergic rhinitis    Esophageal reflux    Hyperlipidemia    Hypertension    Hypothyroidism    Rosacea    Pain of right lower extremity    Female genital prolapse       Past Medical History:  Past Medical History:   Diagnosis Date    Allergic rhinitis     Disease of thyroid gland     Diverticulosis     GERD (gastroesophageal reflux disease)     Hyperlipidemia     Hypertension     Rosacea     Thyroid nodule     non-toxic, single        Past Surgical History:  Past Surgical History:   Procedure Laterality Date    ANKLE SURGERY Left     Fracture tx    COLONOSCOPY      fiberoptic    OTHER SURGICAL HISTORY      thyroid biopsy core needle     MA REMOVAL DEEP IMPLANT Left 9/18/2017    Procedure: REMOVAL HARDWARE ANKLE (1 plate, 8 screws );  Surgeon: Johanna Elliott MD;  Location: [facility];  Service: Orthopedics    THYROIDECTOMY      Total     TUBAL LIGATION         Family History:  Family History   Problem Relation Age of Onset   Jennifer Dai COPD Mother     Diabetes Mother     Heart disease Mother     Hypertension Mother     Bone cancer Father     Colon cancer Family        Social History:  Social History     Socioeconomic History    Marital status: /Civil Union     Spouse name: Not on file    Number of children: Not on file    Years of education: Not on file    Highest education level: Not on file   Occupational History    Not on file   Social Needs    Financial resource strain: Not on file    Food insecurity:     Worry: Not on file     Inability: Not on file    Transportation needs:     Medical: Not on file     Non-medical: Not on file   Tobacco Use    Smoking status: Never Smoker    Smokeless tobacco: Never Used   Substance and Sexual Activity    Alcohol use: No     Comment: Never drank alcohol denied    Drug use: No    Sexual activity: Never   Lifestyle    Physical activity:     Days per week: Not on file Minutes per session: Not on file    Stress: Not on file   Relationships    Social connections:     Talks on phone: Not on file     Gets together: Not on file     Attends [employer] service: Not on file     Active member of club or organization: Not on file     Attends meetings of clubs or organizations: Not on file     Relationship status: Not on file    Intimate partner violence:     Fear of current or ex partner: Not on file     Emotionally abused: Not on file     Physically abused: Not on file     Forced sexual activity: Not on file   Other Topics Concern    Not on file   Social History Narrative    Exercies moderately 3 or more times a week        Objective     Vitals:    03/27/19 1103   BP: 146/88   Pulse: 68   Resp: 16   Temp: 98 3 °F (36 8 °C)     Wt Readings from Last 3 Encounters:   03/27/19 111 kg (244 lb 3 2 oz)   02/27/19 112 kg (246 lb 9 6 oz)   12/20/18 108 kg (238 lb)       Physical Exam   Constitutional: She is oriented to person, place, and time  No distress  HENT:   Right Ear: External ear normal    Left Ear: External ear normal    Mouth/Throat: Oropharynx is clear and moist  No oropharyngeal exudate  Tympanic membranes within normal limits bilaterally   Eyes: Pupils are equal, round, and reactive to light  Conjunctivae and EOM are normal  No scleral icterus  Neck:   No carotid bruits   Cardiovascular: Normal heart sounds  Regular rate and rhythm with no murmurs   Pulmonary/Chest: Breath sounds normal    Lungs are clear to auscultation without wheezes,rales, or rhonchi   Abdominal:   Abdomen is soft, nontender with positive bowel sounds  There is no rebound or guarding  No masses palpated   Musculoskeletal: She exhibits no edema  Lymphadenopathy:     She has no cervical adenopathy  Neurological: She is alert and oriented to person, place, and time  No cranial nerve deficit  Psychiatric: She has a normal mood and affect   Her behavior is normal  Judgment and thought content normal  Pertinent Laboratory/Diagnostic Studies:  Lab Results   Component Value Date    GLUCOSE 99 10/22/2014    BUN 19 10/02/2018    CREATININE 0 91 12/20/2017    CALCIUM 9 5 10/02/2018     12/20/2017    K 4 2 10/02/2018    CO2 29 10/02/2018     10/02/2018     Lab Results   Component Value Date    ALT 12 10/02/2018    AST 15 10/02/2018    ALKPHOS 68 10/02/2018    BILITOT 0 6 12/20/2017       Lab Results   Component Value Date    WBC 3 7 (L) 10/02/2018    HGB 12 3 10/02/2018    HCT 37 6 10/02/2018    MCV 96 4 10/02/2018     10/02/2018       Lab Results   Component Value Date    TSH 1 82 10/02/2018       Lab Results   Component Value Date    CHOL 229 (H) 12/20/2017     Lab Results   Component Value Date    TRIG 83 10/02/2018     Lab Results   Component Value Date    HDL 80 10/02/2018     Lab Results   Component Value Date    LDLCALC 123 (H) 10/22/2014     No results found for: HGBA1C    Results for orders placed or performed in visit on 01/22/19   Tissue Exam   Result Value Ref Range    Case Report       Surgical Pathology Report                         Case: [id number]                                   Authorizing Provider:  Zachery Camacho MD            Collected:           01/22/2019                   Pathologist:           Porsha Tavarez MD               Received:            01/23/2019 1111              Specimen:    Soft Tissue, Lipoma, Right thigh                                                           Final Diagnosis       A  Soft tissue, right thigh, excision:  - Benign, partially encapsulated mature adipose tissue, consistent with lipoma  Additional Information       Interpretation performed at [facility], [address] [place] [address]    All controls performed with the immunohistochemical stains reported above reacted appropriately  These tests were developed and their performance characteristics determined by [facility] or [address] [place] [address]   They may not be cleared or approved by the [place]  Food and Drug Administration  The FDA has determined that such clearance or approval is not necessary  These tests are used for clinical purposes  They should not be regarded as investigational or for research  This laboratory has been approved by [place] 88, designated as a high-complexity laboratory and is qualified to perform these tests  Gross Description        A  The specimen is received in formalin, labeled with the patient's name and hospital number, and is designated "right thigh "  The specimen consists of a single rubbery roughly ovoid fibrofatty tissue fragment measuring 4 2 x 3 2 x 2 8 cm in greatest dimension  The tissue fragment is entirely inked blue  Cut sections reveals grossly unremarkable fibrofatty tissue  Representative sections  Four cassettes  Note: The estimated total formalin fixation time based upon information provided by the submitting clinician and the standard processing schedule is less than 72 hours         Yuliya Chol         Orders Placed This Encounter   Procedures    Lipid panel       ALLERGIES:  No Known Allergies    Current Medications     Current Outpatient Medications   Medication Sig Dispense Refill    atorvastatin (LIPITOR) 10 mg tablet TAKE ONE TABLET BY MOUTH EVERY DAY 90 tablet 1    Azelaic Acid (FINACEA) 15 % cream Apply topically daily at bedtime        azelastine (ASTELIN) 0 1 % nasal spray 1 spray into each nostril 2 (two) times a day Use in each nostril as directed 1 Bottle 5    Calcium Carb-Cholecalciferol (CALTRATE 600+D) 600-800 MG-UNIT TABS Take 2 tablets by mouth daily      doxycycline (PERIOSTAT) 20 MG tablet Take 20 mg by mouth 2 (two) times a day      GLUCOSAMINE-CHONDROITIN PO Take by mouth      ibandronate (BONIVA) 150 MG tablet TAKE ONE TABLET BY MOUTH ONCE MONTHLY 3 tablet 3    levothyroxine 137 mcg tablet TAKE ONE TABLET BY MOUTH EVERY DAY 90 tablet 3    loratadine (CLARITIN) 10 mg tablet Take 10 mg by mouth daily      metoprolol tartrate (LOPRESSOR) 50 mg tablet TAKE ONE TABLET BY MOUTH EVERY DAY 90 tablet 3    metroNIDAZOLE (METROGEL) 1 % gel Apply topically daily      naproxen (NAPROSYN) 500 mg tablet Take 500 mg by mouth daily        psyllium (METAMUCIL) 0 52 g capsule Take 0 52 g by mouth daily      Triamcinolone Acetonide (NASACORT ALLERGY 24HR NA) 2 sprays into each nostril as needed         No current facility-administered medications for this visit            Health Maintenance     Health Maintenance   Topic Date Due    Hepatitis C Screening  1950    BMI: Followup Plan  01/23/1968    CRC Screening: Colonoscopy  08/13/2014    PT PLAN OF CARE  01/05/2019    DTaP,Tdap,and Td Vaccines (1 - Tdap) 03/26/2020 (Originally 1/23/1971)    Urinary Incontinence Screening  09/27/2019    MAMMOGRAM  10/30/2019    Fall Risk  11/27/2019    Depression Screening PHQ  11/27/2019    BMI: Adult  02/27/2020    INFLUENZA VACCINE  Completed    Pneumococcal PPSV23/PCV13 65+ Years / Low and Medium Risk  Completed    HEPATITIS B VACCINES  Aged Out     Immunization History   Administered Date(s) Administered    INFLUENZA 09/26/2014, 10/10/2015, 10/03/2016, 10/17/2017    Influenza Quadrivalent Preservative Free 3 years and older IM 09/26/2014    Influenza Split High Dose Preservative Free IM 10/10/2015, 10/03/2016, 10/17/2017    Influenza TIV (IM) 10/11/2012, 10/17/2013    Influenza, high dose seasonal 0 5 mL 09/27/2018    Pneumococcal Conjugate 13-Valent 12/05/2016    Pneumococcal Polysaccharide PPV23 95/76/6284       Bladimir King MD

## 2019-03-27 NOTE — PROGRESS NOTES
Assessment and Plan:    Problem List Items Addressed This Visit        Endocrine    Hypothyroidism     Hypothyroidism  Patient will have TSH blood work and continue with current dose of levothyroxine            Cardiovascular and Mediastinum    Hypertension     Hypertension  Patient blood pressure is stable at this time she will continue with current regimen of medications  She will continue check blood pressures on her home blood pressure device and call if it maintains above 150/90            Other    Hyperlipidemia - Primary     Hyperlipidemia  Patient will check lipid panel blood work and continue with current dose of statin therapy         Relevant Orders    Lipid panel        Health Maintenance Due   Topic Date Due    Hepatitis C Screening  1950    BMI: Followup Plan  01/23/1968    CRC Screening: Colonoscopy  08/13/2014    PT PLAN OF CARE  01/05/2019         HPI:  Huog Ahn is a 71 y o  female here for her Subsequent Wellness Visit      Patient Active Problem List   Diagnosis    Pain, joint, ankle and foot, left    DJD (degenerative joint disease), ankle and foot, left    Bilateral impacted cerumen    Acute otitis externa of left ear    Allergic rhinitis    Esophageal reflux    Hyperlipidemia    Hypertension    Hypothyroidism    Rosacea    Pain of right lower extremity    Female genital prolapse     Past Medical History:   Diagnosis Date    Allergic rhinitis     Disease of thyroid gland     Diverticulosis     GERD (gastroesophageal reflux disease)     Hyperlipidemia     Hypertension     Rosacea     Thyroid nodule     non-toxic, single      Past Surgical History:   Procedure Laterality Date    ANKLE SURGERY Left     Fracture tx    COLONOSCOPY      fiberoptic    OTHER SURGICAL HISTORY      thyroid biopsy core needle     MA REMOVAL DEEP IMPLANT Left 9/18/2017    Procedure: REMOVAL HARDWARE ANKLE (1 plate, 8 screws );  Surgeon: Ethel Vargas MD;  Location: [facility] OR;  Service: Orthopedics    THYROIDECTOMY      Total     TUBAL LIGATION       Family History   Problem Relation Age of Onset    COPD Mother     Diabetes Mother     Heart disease Mother     Hypertension Mother     Bone cancer Father     Colon cancer Family      Social History     Tobacco Use   Smoking Status Never Smoker   Smokeless Tobacco Never Used     Social History     Substance and Sexual Activity   Alcohol Use No    Comment: Never drank alcohol denied      Social History     Substance and Sexual Activity   Drug Use No       Current Outpatient Medications   Medication Sig Dispense Refill    atorvastatin (LIPITOR) 10 mg tablet TAKE ONE TABLET BY MOUTH EVERY DAY 90 tablet 1    Azelaic Acid (FINACEA) 15 % cream Apply topically daily at bedtime        azelastine (ASTELIN) 0 1 % nasal spray 1 spray into each nostril 2 (two) times a day Use in each nostril as directed 1 Bottle 5    Calcium Carb-Cholecalciferol (CALTRATE 600+D) 600-800 MG-UNIT TABS Take 2 tablets by mouth daily      doxycycline (PERIOSTAT) 20 MG tablet Take 20 mg by mouth 2 (two) times a day      GLUCOSAMINE-CHONDROITIN PO Take by mouth      ibandronate (BONIVA) 150 MG tablet TAKE ONE TABLET BY MOUTH ONCE MONTHLY 3 tablet 3    levothyroxine 137 mcg tablet TAKE ONE TABLET BY MOUTH EVERY DAY 90 tablet 3    loratadine (CLARITIN) 10 mg tablet Take 10 mg by mouth daily      metoprolol tartrate (LOPRESSOR) 50 mg tablet TAKE ONE TABLET BY MOUTH EVERY DAY 90 tablet 3    metroNIDAZOLE (METROGEL) 1 % gel Apply topically daily      naproxen (NAPROSYN) 500 mg tablet Take 500 mg by mouth daily        psyllium (METAMUCIL) 0 52 g capsule Take 0 52 g by mouth daily      Triamcinolone Acetonide (NASACORT ALLERGY 24HR NA) 2 sprays into each nostril as needed         No current facility-administered medications for this visit        No Known Allergies  Immunization History   Administered Date(s) Administered    INFLUENZA 09/26/2014, 10/10/2015, 10/03/2016, 10/17/2017    Influenza Quadrivalent Preservative Free 3 years and older IM 09/26/2014    Influenza Split High Dose Preservative Free IM 10/10/2015, 10/03/2016, 10/17/2017    Influenza TIV (IM) 10/11/2012, 10/17/2013    Influenza, high dose seasonal 0 5 mL 09/27/2018    Pneumococcal Conjugate 13-Valent 12/05/2016    Pneumococcal Polysaccharide PPV23 09/27/2018       Patient Care Team:  Omar Bradford MD as PCP - General  Omar Bradford MD    Medicare Screening Tests and Risk Assessments:  Valentino Sloan is here for her Subsequent Wellness visit  Last Medicare Wellness visit information reviewed, patient interviewed and updates made to the record today  Health Risk Assessment:  Patient rates overall health as good  Patient feels that their physical health rating is Same  Eyesight was rated as Slightly worse  Hearing was rated as Same  Patient feels that their emotional and mental health rating is Same  Pain experienced by patient in the last 7 days has been Some  Patient's pain rating has been 5/10  Emotional/Mental Health:  Patient has been feeling nervous/anxious  PHQ-9 Depression Screening:    Frequency of the following problems over the past two weeks:      1  Little interest or pleasure in doing things: 0 - not at all      2  Feeling down, depressed, or hopeless: 0 - not at all  PHQ-2 Score: 0          Broken Bones/Falls: Fall Risk Assessment:    In the past year, patient has experienced: No history of falling in past year          Bladder/Bowel:  Patient has leaked urine accidently in the last six months  Patient reports no loss of bowel control  Immunizations:  Patient has had a flu vaccination within the last year  Patient has received a pneumonia shot  Patient has not received a shingles shot  Patient has not received tetanus/diphtheria shot  Home Safety:  Patient has trouble with stairs inside or outside of their home     Patient currently reports that there are no safety hazards present in home, working smoke alarms, working carbon monoxide detectors  Preventative Screenings:   Breast cancer screening performed, 10/30/2018  colon cancer screen completed, 8/13/2004  cholesterol screen completed, 10/2/2018  glaucoma eye exam completed, (Additional Comments: Pt reports having an eye exam 2010)    Nutrition:  Current diet: Regular and No Added Salt with servings of the following:    Medications:  Patient is currently taking over-the-counter supplements  List of OTC medications includes: See updated med list  Patient is able to manage medications  Lifestyle Choices:  Patient reports no tobacco use  Patient has not smoked or used tobacco in the past   Patient reports no alcohol use  Patient drives a vehicle  Patient wears seat belt  Current level of exercise of physical activity described by patient as: Gym 2x a week  Activities of Daily Living:  Can get out of bed by his or her self, able to dress self, able to make own meals, able to do own shopping, able to bathe self, can do own laundry/housekeeping, can manage own money, pay bills and track expenses    Previous Hospitalizations:  No hospitalization or ED visit in past 12 months        Advanced Directives:  Patient has decided on a power of   Patient has spoken to designated power of   Patient has completed advanced directive          Preventative Screening/Counseling:      Cardiovascular:      General: Screening Current          Diabetes:      General: Screening Current          Colorectal Cancer:      Due for studies: Colonoscopy - Low Risk      Comments: Patient will be performing Cologuard testing next week        Breast Cancer:      General: Screening Current          Cervical Cancer:      General: Screening Current          Advanced Directives:   Patient has living will for healthcare,     Immunizations:      Influenza: Risks & Benefits Discussed and Influenza UTD This Year Pneumococcal: Risks & Benefits Discussed and Lifetime Vaccine Completed      Shingrix: Risks & Benefits Discussed  Additional Comments: Patient will inquire as to insurance coverage for Shingrix

## 2019-04-23 ENCOUNTER — HOSPITAL ENCOUNTER (OUTPATIENT)
Dept: RADIOLOGY | Age: 69
Discharge: HOME | End: 2019-04-23
Payer: COMMERCIAL

## 2019-04-23 DIAGNOSIS — N95.1 POSTMENOPAUSAL DISORDER: ICD-10-CM

## 2019-04-23 PROCEDURE — 77080 DXA BONE DENSITY AXIAL: CPT

## 2019-04-24 ENCOUNTER — TELEPHONE (OUTPATIENT)
Dept: OBGYN CLINIC | Facility: CLINIC | Age: 69
End: 2019-04-24

## 2019-04-24 DIAGNOSIS — M81.0 OSTEOPOROSIS, UNSPECIFIED OSTEOPOROSIS TYPE, UNSPECIFIED PATHOLOGICAL FRACTURE PRESENCE: Primary | ICD-10-CM

## 2019-04-27 DIAGNOSIS — M25.572 CHRONIC PAIN OF LEFT ANKLE: Primary | ICD-10-CM

## 2019-04-27 DIAGNOSIS — G89.29 CHRONIC PAIN OF LEFT ANKLE: Primary | ICD-10-CM

## 2019-04-29 RX ORDER — NAPROXEN 500 MG/1
TABLET ORAL
Qty: 180 TABLET | Refills: 3 | Status: SHIPPED | OUTPATIENT
Start: 2019-04-29 | End: 2020-07-06

## 2019-05-08 ENCOUNTER — TELEPHONE (OUTPATIENT)
Dept: FAMILY MEDICINE CLINIC | Facility: CLINIC | Age: 69
End: 2019-05-08

## 2019-05-08 LAB
ALBUMIN SERPL-MCNC: 4 G/DL (ref 3.6–5.1)
ALBUMIN/GLOB SERPL: 1.5 (CALC) (ref 1–2.5)
ALP SERPL-CCNC: 71 U/L (ref 33–130)
ALT SERPL-CCNC: 13 U/L (ref 6–29)
AST SERPL-CCNC: 15 U/L (ref 10–35)
BASOPHILS # BLD AUTO: 39 CELLS/UL (ref 0–200)
BASOPHILS NFR BLD AUTO: 1 %
BILIRUB SERPL-MCNC: 0.6 MG/DL (ref 0.2–1.2)
BUN SERPL-MCNC: 20 MG/DL (ref 7–25)
BUN/CREAT SERPL: NORMAL (CALC) (ref 6–22)
CALCIUM SERPL-MCNC: 9.7 MG/DL (ref 8.6–10.4)
CHLORIDE SERPL-SCNC: 104 MMOL/L (ref 98–110)
CHOLEST SERPL-MCNC: 153 MG/DL
CHOLEST/HDLC SERPL: 2.1 (CALC)
CO2 SERPL-SCNC: 28 MMOL/L (ref 20–32)
CREAT SERPL-MCNC: 0.95 MG/DL (ref 0.5–0.99)
EOSINOPHIL # BLD AUTO: 140 CELLS/UL (ref 15–500)
EOSINOPHIL NFR BLD AUTO: 3.6 %
ERYTHROCYTE [DISTWIDTH] IN BLOOD BY AUTOMATED COUNT: 12.8 % (ref 11–15)
GLOBULIN SER CALC-MCNC: 2.6 G/DL (CALC) (ref 1.9–3.7)
GLUCOSE SERPL-MCNC: 97 MG/DL (ref 65–99)
HCT VFR BLD AUTO: 37.6 % (ref 35–45)
HCV AB S/CO SERPL IA: 0.01
HCV AB SERPL QL IA: NORMAL
HDLC SERPL-MCNC: 72 MG/DL
HGB BLD-MCNC: 12.4 G/DL (ref 11.7–15.5)
LDLC SERPL CALC-MCNC: 65 MG/DL (CALC)
LYMPHOCYTES # BLD AUTO: 1123 CELLS/UL (ref 850–3900)
LYMPHOCYTES NFR BLD AUTO: 28.8 %
MCH RBC QN AUTO: 31.1 PG (ref 27–33)
MCHC RBC AUTO-ENTMCNC: 33 G/DL (ref 32–36)
MCV RBC AUTO: 94.2 FL (ref 80–100)
MONOCYTES # BLD AUTO: 367 CELLS/UL (ref 200–950)
MONOCYTES NFR BLD AUTO: 9.4 %
NEUTROPHILS # BLD AUTO: 2231 CELLS/UL (ref 1500–7800)
NEUTROPHILS NFR BLD AUTO: 57.2 %
NONHDLC SERPL-MCNC: 81 MG/DL (CALC)
PLATELET # BLD AUTO: 234 THOUSAND/UL (ref 140–400)
PMV BLD REES-ECKER: 10.5 FL (ref 7.5–12.5)
POTASSIUM SERPL-SCNC: 4.2 MMOL/L (ref 3.5–5.3)
PROT SERPL-MCNC: 6.6 G/DL (ref 6.1–8.1)
RBC # BLD AUTO: 3.99 MILLION/UL (ref 3.8–5.1)
SL AMB EGFR AFRICAN AMERICAN: 71 ML/MIN/1.73M2
SL AMB EGFR NON AFRICAN AMERICAN: 61 ML/MIN/1.73M2
SODIUM SERPL-SCNC: 139 MMOL/L (ref 135–146)
TRIGL SERPL-MCNC: 75 MG/DL
TSH SERPL-ACNC: 1.22 MIU/L (ref 0.4–4.5)
WBC # BLD AUTO: 3.9 THOUSAND/UL (ref 3.8–10.8)

## 2019-05-28 ENCOUNTER — TELEPHONE (OUTPATIENT)
Dept: FAMILY MEDICINE CLINIC | Facility: CLINIC | Age: 69
End: 2019-05-28

## 2019-05-30 ENCOUNTER — CLINICAL SUPPORT (OUTPATIENT)
Dept: FAMILY MEDICINE CLINIC | Facility: CLINIC | Age: 69
End: 2019-05-30
Payer: COMMERCIAL

## 2019-05-30 DIAGNOSIS — M81.0 OSTEOPOROSIS, UNSPECIFIED OSTEOPOROSIS TYPE, UNSPECIFIED PATHOLOGICAL FRACTURE PRESENCE: Primary | ICD-10-CM

## 2019-05-30 PROCEDURE — 96372 THER/PROPH/DIAG INJ SC/IM: CPT | Performed by: FAMILY MEDICINE

## 2019-06-04 ENCOUNTER — OFFICE VISIT (OUTPATIENT)
Dept: FAMILY MEDICINE CLINIC | Facility: CLINIC | Age: 69
End: 2019-06-04
Payer: COMMERCIAL

## 2019-06-04 VITALS
SYSTOLIC BLOOD PRESSURE: 142 MMHG | RESPIRATION RATE: 16 BRPM | TEMPERATURE: 97.7 F | OXYGEN SATURATION: 99 % | HEIGHT: 62 IN | BODY MASS INDEX: 44.21 KG/M2 | HEART RATE: 61 BPM | WEIGHT: 240.25 LBS | DIASTOLIC BLOOD PRESSURE: 78 MMHG

## 2019-06-04 DIAGNOSIS — E78.5 HYPERLIPIDEMIA, UNSPECIFIED HYPERLIPIDEMIA TYPE: ICD-10-CM

## 2019-06-04 DIAGNOSIS — Z01.818 PREOPERATIVE EVALUATION OF A MEDICAL CONDITION TO RULE OUT SURGICAL CONTRAINDICATIONS (TAR REQUIRED): Primary | ICD-10-CM

## 2019-06-04 LAB
SL AMB  POCT GLUCOSE, UA: NORMAL
SL AMB LEUKOCYTE ESTERASE,UA: NORMAL
SL AMB POCT BILIRUBIN,UA: NORMAL
SL AMB POCT BLOOD,UA: NORMAL
SL AMB POCT CLARITY,UA: CLEAR
SL AMB POCT COLOR,UA: YELLOW
SL AMB POCT KETONES,UA: NORMAL
SL AMB POCT NITRITE,UA: NORMAL
SL AMB POCT PH,UA: 5
SL AMB POCT SPECIFIC GRAVITY,UA: 1010
SL AMB POCT URINE PROTEIN: NORMAL
SL AMB POCT UROBILINOGEN: 0.2

## 2019-06-04 PROCEDURE — 99213 OFFICE O/P EST LOW 20 MIN: CPT | Performed by: FAMILY MEDICINE

## 2019-06-04 PROCEDURE — 1160F RVW MEDS BY RX/DR IN RCRD: CPT | Performed by: FAMILY MEDICINE

## 2019-06-04 PROCEDURE — 3008F BODY MASS INDEX DOCD: CPT | Performed by: FAMILY MEDICINE

## 2019-06-04 PROCEDURE — 81003 URINALYSIS AUTO W/O SCOPE: CPT | Performed by: FAMILY MEDICINE

## 2019-06-04 RX ORDER — ATORVASTATIN CALCIUM 10 MG/1
TABLET, FILM COATED ORAL
Qty: 90 TABLET | Refills: 1 | Status: SHIPPED | OUTPATIENT
Start: 2019-06-04 | End: 2019-11-30 | Stop reason: SDUPTHER

## 2019-06-14 ENCOUNTER — ANESTHESIA EVENT (OUTPATIENT)
Dept: PERIOP | Facility: HOSPITAL | Age: 69
End: 2019-06-14
Payer: COMMERCIAL

## 2019-06-17 ENCOUNTER — HOSPITAL ENCOUNTER (OUTPATIENT)
Facility: HOSPITAL | Age: 69
Setting detail: OUTPATIENT SURGERY
Discharge: HOME | End: 2019-06-17
Attending: OBSTETRICS & GYNECOLOGY | Admitting: OBSTETRICS & GYNECOLOGY
Payer: COMMERCIAL

## 2019-06-17 ENCOUNTER — ANESTHESIA (OUTPATIENT)
Dept: PERIOP | Facility: HOSPITAL | Age: 69
End: 2019-06-17
Payer: COMMERCIAL

## 2019-06-17 VITALS
WEIGHT: 240 LBS | BODY MASS INDEX: 40.97 KG/M2 | HEART RATE: 77 BPM | DIASTOLIC BLOOD PRESSURE: 60 MMHG | SYSTOLIC BLOOD PRESSURE: 130 MMHG | OXYGEN SATURATION: 94 % | TEMPERATURE: 98.2 F | RESPIRATION RATE: 14 BRPM | HEIGHT: 64 IN

## 2019-06-17 DIAGNOSIS — N81.9 FEMALE GENITAL PROLAPSE, UNSPECIFIED TYPE: Primary | ICD-10-CM

## 2019-06-17 DEVICE — THE NEUGUIDE™ IS A SINGLE USE TRANS-VAGINAL DEVICE FOR THE REPAIR OF PELVIC ORGAN PROLAPSE (POP) BY ANCHORING SUTURES TO LIGAMENTS OF THE PELVIC FLOOR.
Type: IMPLANTABLE DEVICE | Site: VAGINA | Status: FUNCTIONAL
Brand: NEUGUIDE™

## 2019-06-17 RX ORDER — FENTANYL CITRATE/PF 50 MCG/ML
50 SYRINGE (ML) INJECTION
Status: DISCONTINUED | OUTPATIENT
Start: 2019-06-17 | End: 2019-06-17 | Stop reason: HOSPADM

## 2019-06-17 RX ORDER — SODIUM CHLORIDE 9 MG/ML
125 INJECTION, SOLUTION INTRAVENOUS CONTINUOUS
Status: DISCONTINUED | OUTPATIENT
Start: 2019-06-17 | End: 2019-06-17 | Stop reason: HOSPADM

## 2019-06-17 RX ORDER — TRAMADOL HYDROCHLORIDE 50 MG/1
50 TABLET ORAL EVERY 6 HOURS PRN
Status: DISCONTINUED | OUTPATIENT
Start: 2019-06-17 | End: 2019-06-17 | Stop reason: HOSPADM

## 2019-06-17 RX ORDER — ONDANSETRON 2 MG/ML
4 INJECTION INTRAMUSCULAR; INTRAVENOUS ONCE AS NEEDED
Status: DISCONTINUED | OUTPATIENT
Start: 2019-06-17 | End: 2019-06-17 | Stop reason: HOSPADM

## 2019-06-17 RX ORDER — HYDROMORPHONE HCL/PF 1 MG/ML
0.5 SYRINGE (ML) INJECTION
Status: DISCONTINUED | OUTPATIENT
Start: 2019-06-17 | End: 2019-06-17 | Stop reason: HOSPADM

## 2019-06-17 RX ORDER — MAGNESIUM HYDROXIDE 1200 MG/15ML
LIQUID ORAL AS NEEDED
Status: DISCONTINUED | OUTPATIENT
Start: 2019-06-17 | End: 2019-06-17 | Stop reason: HOSPADM

## 2019-06-17 RX ORDER — ONDANSETRON 2 MG/ML
INJECTION INTRAMUSCULAR; INTRAVENOUS AS NEEDED
Status: DISCONTINUED | OUTPATIENT
Start: 2019-06-17 | End: 2019-06-17 | Stop reason: SURG

## 2019-06-17 RX ORDER — OXYCODONE HYDROCHLORIDE AND ACETAMINOPHEN 5; 325 MG/1; MG/1
1 TABLET ORAL EVERY 4 HOURS PRN
Status: DISCONTINUED | OUTPATIENT
Start: 2019-06-17 | End: 2019-06-17 | Stop reason: HOSPADM

## 2019-06-17 RX ORDER — MIDAZOLAM HYDROCHLORIDE 1 MG/ML
INJECTION INTRAMUSCULAR; INTRAVENOUS AS NEEDED
Status: DISCONTINUED | OUTPATIENT
Start: 2019-06-17 | End: 2019-06-17 | Stop reason: SURG

## 2019-06-17 RX ORDER — KETOROLAC TROMETHAMINE 30 MG/ML
INJECTION, SOLUTION INTRAMUSCULAR; INTRAVENOUS AS NEEDED
Status: DISCONTINUED | OUTPATIENT
Start: 2019-06-17 | End: 2019-06-17 | Stop reason: SURG

## 2019-06-17 RX ORDER — PROPOFOL 10 MG/ML
INJECTION, EMULSION INTRAVENOUS AS NEEDED
Status: DISCONTINUED | OUTPATIENT
Start: 2019-06-17 | End: 2019-06-17 | Stop reason: SURG

## 2019-06-17 RX ORDER — ONDANSETRON 2 MG/ML
4 INJECTION INTRAMUSCULAR; INTRAVENOUS EVERY 6 HOURS PRN
Status: DISCONTINUED | OUTPATIENT
Start: 2019-06-17 | End: 2019-06-17 | Stop reason: HOSPADM

## 2019-06-17 RX ORDER — MEPERIDINE HYDROCHLORIDE 50 MG/ML
12.5 INJECTION INTRAMUSCULAR; INTRAVENOUS; SUBCUTANEOUS ONCE AS NEEDED
Status: DISCONTINUED | OUTPATIENT
Start: 2019-06-17 | End: 2019-06-17 | Stop reason: HOSPADM

## 2019-06-17 RX ORDER — FENTANYL CITRATE 50 UG/ML
INJECTION, SOLUTION INTRAMUSCULAR; INTRAVENOUS AS NEEDED
Status: DISCONTINUED | OUTPATIENT
Start: 2019-06-17 | End: 2019-06-17 | Stop reason: SURG

## 2019-06-17 RX ORDER — ACETAMINOPHEN 325 MG/1
650 TABLET ORAL EVERY 6 HOURS PRN
Status: DISCONTINUED | OUTPATIENT
Start: 2019-06-17 | End: 2019-06-17 | Stop reason: HOSPADM

## 2019-06-17 RX ORDER — CEFAZOLIN SODIUM 2 G/50ML
2000 SOLUTION INTRAVENOUS
Status: DISCONTINUED | OUTPATIENT
Start: 2019-06-17 | End: 2019-06-17 | Stop reason: HOSPADM

## 2019-06-17 RX ORDER — DEXAMETHASONE SODIUM PHOSPHATE 4 MG/ML
INJECTION, SOLUTION INTRA-ARTICULAR; INTRALESIONAL; INTRAMUSCULAR; INTRAVENOUS; SOFT TISSUE AS NEEDED
Status: DISCONTINUED | OUTPATIENT
Start: 2019-06-17 | End: 2019-06-17 | Stop reason: SURG

## 2019-06-17 RX ORDER — FUROSEMIDE 10 MG/ML
INJECTION INTRAMUSCULAR; INTRAVENOUS AS NEEDED
Status: DISCONTINUED | OUTPATIENT
Start: 2019-06-17 | End: 2019-06-17 | Stop reason: SURG

## 2019-06-17 RX ORDER — ACETAMINOPHEN 325 MG/1
650 TABLET ORAL EVERY 6 HOURS PRN
Qty: 30 TABLET | Refills: 0 | Status: CANCELLED
Start: 2019-06-17

## 2019-06-17 RX ADMIN — FENTANYL CITRATE 25 MCG: 50 INJECTION, SOLUTION INTRAMUSCULAR; INTRAVENOUS at 10:19

## 2019-06-17 RX ADMIN — DEXAMETHASONE SODIUM PHOSPHATE 8 MG: 4 INJECTION, SOLUTION INTRAMUSCULAR; INTRAVENOUS at 10:05

## 2019-06-17 RX ADMIN — ONDANSETRON HYDROCHLORIDE 4 MG: 2 INJECTION, SOLUTION INTRAVENOUS at 10:05

## 2019-06-17 RX ADMIN — SODIUM CHLORIDE 125 ML/HR: 0.9 INJECTION, SOLUTION INTRAVENOUS at 08:35

## 2019-06-17 RX ADMIN — LIDOCAINE HYDROCHLORIDE 100 MG: 20 INJECTION, SOLUTION INTRAVENOUS at 09:32

## 2019-06-17 RX ADMIN — PROPOFOL 200 MG: 10 INJECTION, EMULSION INTRAVENOUS at 09:32

## 2019-06-17 RX ADMIN — KETOROLAC TROMETHAMINE 15 MG: 30 INJECTION, SOLUTION INTRAMUSCULAR at 10:51

## 2019-06-17 RX ADMIN — PROPOFOL 50 MG: 10 INJECTION, EMULSION INTRAVENOUS at 10:18

## 2019-06-17 RX ADMIN — FENTANYL CITRATE 50 MCG: 50 INJECTION, SOLUTION INTRAMUSCULAR; INTRAVENOUS at 10:41

## 2019-06-17 RX ADMIN — FUROSEMIDE 10 MG: 10 INJECTION, SOLUTION INTRAMUSCULAR; INTRAVENOUS at 10:47

## 2019-06-17 RX ADMIN — CEFAZOLIN SODIUM 2000 MG: 2 SOLUTION INTRAVENOUS at 09:35

## 2019-06-17 RX ADMIN — FENTANYL CITRATE 25 MCG: 50 INJECTION, SOLUTION INTRAMUSCULAR; INTRAVENOUS at 10:59

## 2019-06-17 RX ADMIN — MIDAZOLAM 2 MG: 1 INJECTION INTRAMUSCULAR; INTRAVENOUS at 09:22

## 2019-06-17 RX ADMIN — TRAMADOL HYDROCHLORIDE 50 MG: 50 TABLET, COATED ORAL at 13:06

## 2019-06-17 RX ADMIN — FENTANYL CITRATE 100 MCG: 50 INJECTION, SOLUTION INTRAMUSCULAR; INTRAVENOUS at 09:25

## 2019-06-17 RX ADMIN — SODIUM CHLORIDE: 0.9 INJECTION, SOLUTION INTRAVENOUS at 10:40

## 2019-09-30 ENCOUNTER — OFFICE VISIT (OUTPATIENT)
Dept: FAMILY MEDICINE CLINIC | Facility: CLINIC | Age: 69
End: 2019-09-30
Payer: COMMERCIAL

## 2019-09-30 VITALS
DIASTOLIC BLOOD PRESSURE: 80 MMHG | OXYGEN SATURATION: 97 % | WEIGHT: 243 LBS | TEMPERATURE: 97.7 F | HEART RATE: 60 BPM | HEIGHT: 64 IN | RESPIRATION RATE: 16 BRPM | SYSTOLIC BLOOD PRESSURE: 130 MMHG | BODY MASS INDEX: 41.48 KG/M2

## 2019-09-30 DIAGNOSIS — E78.5 HYPERLIPIDEMIA, UNSPECIFIED HYPERLIPIDEMIA TYPE: ICD-10-CM

## 2019-09-30 DIAGNOSIS — I10 ESSENTIAL HYPERTENSION: ICD-10-CM

## 2019-09-30 DIAGNOSIS — Z23 FLU VACCINE NEED: Primary | ICD-10-CM

## 2019-09-30 DIAGNOSIS — E03.9 HYPOTHYROIDISM, UNSPECIFIED TYPE: ICD-10-CM

## 2019-09-30 PROBLEM — Z01.818 PREOPERATIVE EVALUATION OF A MEDICAL CONDITION TO RULE OUT SURGICAL CONTRAINDICATIONS (TAR REQUIRED): Status: RESOLVED | Noted: 2019-06-04 | Resolved: 2019-09-30

## 2019-09-30 PROBLEM — M85.80 OSTEOPENIA: Status: ACTIVE | Noted: 2019-05-21

## 2019-09-30 PROBLEM — S82.899A FRACTURE OF ANKLE: Status: ACTIVE | Noted: 2019-05-21

## 2019-09-30 PROBLEM — H61.23 BILATERAL IMPACTED CERUMEN: Status: RESOLVED | Noted: 2018-05-07 | Resolved: 2019-09-30

## 2019-09-30 PROBLEM — H60.502 ACUTE OTITIS EXTERNA OF LEFT EAR: Status: RESOLVED | Noted: 2018-05-14 | Resolved: 2019-09-30

## 2019-09-30 PROBLEM — I83.90 VARICOSE VEINS OF LOWER EXTREMITY: Status: ACTIVE | Noted: 2019-05-21

## 2019-09-30 PROCEDURE — 90662 IIV NO PRSV INCREASED AG IM: CPT | Performed by: FAMILY MEDICINE

## 2019-09-30 PROCEDURE — G0008 ADMIN INFLUENZA VIRUS VAC: HCPCS | Performed by: FAMILY MEDICINE

## 2019-09-30 PROCEDURE — 3075F SYST BP GE 130 - 139MM HG: CPT | Performed by: FAMILY MEDICINE

## 2019-09-30 PROCEDURE — 99214 OFFICE O/P EST MOD 30 MIN: CPT | Performed by: FAMILY MEDICINE

## 2019-09-30 PROCEDURE — 3008F BODY MASS INDEX DOCD: CPT | Performed by: FAMILY MEDICINE

## 2019-09-30 PROCEDURE — 3079F DIAST BP 80-89 MM HG: CPT | Performed by: FAMILY MEDICINE

## 2019-09-30 NOTE — ASSESSMENT & PLAN NOTE
Hypertension  Patient blood pressure is stable at this time she will continue with current regimen of medications

## 2019-09-30 NOTE — ASSESSMENT & PLAN NOTE
Hypothyroidism    TSH blood work is stable at this time she will continue with current dose of levothyroxine

## 2019-09-30 NOTE — PROGRESS NOTES
FAMILY PRACTICE OFFICE VISIT       NAME: Krystal Nelson  AGE: 71 y o  SEX: female       : 1950        MRN: [id number]    DATE: 2019  TIME: 12:14 PM    Assessment and Plan     Problem List Items Addressed This Visit        Endocrine    Hypothyroidism - Primary     Hypothyroidism  TSH blood work is stable at this time she will continue with current dose of levothyroxine            Cardiovascular and Mediastinum    Hypertension     Hypertension  Patient blood pressure is stable at this time she will continue with current regimen of medications  Other    Hyperlipidemia     Hyperlipidemia  Patient lipid panel stable on current dose of statin therapy             Patient received her annual flu vaccine today  Chief Complaint     Chief Complaint   Patient presents with    Follow-up     6 month  requesting flu shot        History of Present Illness     Patient denies any recent illness  She has recovered well from her previous pelvic floor surgery by her urogynecologist in 2019  She is starting to go back to the gym to do some light weightlifting and walking on a treadmill  She has been unable to lose weight however she is limited to walking slowly on a treadmill for approximately 10 minute intervals due to her chronic left ankle pain after suffering a fracture in   I reviewed her most recent blood test results  Review of Systems   Review of Systems   Constitutional: Negative  HENT: Negative  Respiratory: Negative  Cardiovascular: Negative  Gastrointestinal: Negative  Genitourinary: Negative  Musculoskeletal:        As per HPI   Neurological: Negative  Psychiatric/Behavioral: Negative          Active Problem List     Patient Active Problem List   Diagnosis    Pain, joint, ankle and foot, left    DJD (degenerative joint disease), ankle and foot, left    Allergic rhinitis    Esophageal reflux    Hyperlipidemia    Hypertension    Hypothyroidism  Rosacea    Pain of right lower extremity    Female genital prolapse    Fracture of ankle    Osteopenia    Varicose veins of lower extremity       Past Medical History:  Past Medical History:   Diagnosis Date    Allergic rhinitis     Arthritis     Cataract     brock    Disease of thyroid gland     had total thyroidectomy    Diverticulosis     Hyperlipidemia     Hypertension     Mild acid reflux     Osteoporosis     Plantar fasciitis     b/l  l worse uses orthotics in shoes    Rosacea     Use of cane as ambulatory aid     Uterovaginal prolapse        Past Surgical History:  Past Surgical History:   Procedure Laterality Date    ANKLE SURGERY Left     Fracture / hardware removed    COLONOSCOPY      fiberoptic    COLPORRHAPHY N/A 6/17/2019    Procedure: POSTERIOR COLPORRHAPHY;  Surgeon: Annmarie Valdez MD;  [facility];  Service: UroGynecology           OTHER SURGICAL HISTORY      thyroid biopsy core needle     AL REMOVAL DEEP IMPLANT Left 9/18/2017    Procedure: REMOVAL HARDWARE ANKLE (1 plate, 8 screws );  Surgeon: Marva Allen MD;  [facility];  Service: Orthopedics    AL REPAIR OF PERINEUM,NON OBSTETRICAL N/A 6/17/2019    Procedure: Ricka Province;  Surgeon: Annmarie Valdez MD;  [facility];  Service: UroGynecology           AL REVAGINAL PROLAPSE,SACROSP LIG N/A 6/17/2019    Procedure: VE COLPOSUSPENSION (neugide);   Surgeon: Annmarie Valdez MD;  [facility];  Service: UroGynecology           THYROIDECTOMY      Total     TUBAL LIGATION         Family History:  Family History   Problem Relation Age of Onset    COPD Mother     Diabetes Mother     Heart disease Mother     Hypertension Mother     Bone cancer Father     Colon cancer Family        Social History:  Social History     Socioeconomic History    Marital status: /Civil Union     Spouse name: Not on file    Number of children: Not on file    Years of education: Not on file   Seng Highest education level: Not on file   Occupational History    Not on file   Social Needs    Financial resource strain: Not on file    Food insecurity:     Worry: Not on file     Inability: Not on file    Transportation needs:     Medical: Not on file     Non-medical: Not on file   Tobacco Use    Smoking status: Never Smoker    Smokeless tobacco: Never Used   Substance and Sexual Activity    Alcohol use: No     Comment: Never drank alcohol denied    Drug use: No    Sexual activity: Never   Lifestyle    Physical activity:     Days per week: Not on file     Minutes per session: Not on file    Stress: Not on file   Relationships    Social connections:     Talks on phone: Not on file     Gets together: Not on file     Attends Christianity service: Not on file     Active member of club or organization: Not on file     Attends meetings of clubs or organizations: Not on file     Relationship status: Not on file    Intimate partner violence:     Fear of current or ex partner: Not on file     Emotionally abused: Not on file     Physically abused: Not on file     Forced sexual activity: Not on file   Other Topics Concern    Not on file   Social History Narrative    Exercies moderately 3 or more times a week        Objective     Vitals:    09/30/19 1143   BP: 130/80   Pulse: 60   Resp: 16   Temp: 97 7 °F (36 5 °C)   SpO2: 97%     Wt Readings from Last 3 Encounters:   09/30/19 110 kg (243 lb)   06/17/19 109 kg (240 lb)   06/04/19 109 kg (240 lb 4 oz)       Physical Exam   Constitutional: She is oriented to person, place, and time  No distress  HENT:   Right Ear: External ear normal    Left Ear: External ear normal    Tympanic membranes within normal limits bilaterally   Cardiovascular: Normal heart sounds     Regular rate and rhythm with no murmurs   Pulmonary/Chest: Breath sounds normal    Lungs are clear to auscultation without wheezes,rales, or rhonchi   Abdominal:   Abdomen is soft, nontender with positive bowel sounds  There is no rebound or guarding  No masses palpated   Musculoskeletal: She exhibits no edema  Lymphadenopathy:     She has no cervical adenopathy  Neurological: She is alert and oriented to person, place, and time  Psychiatric: She has a normal mood and affect  Her behavior is normal  Judgment and thought content normal    BMI Counseling: Body mass index is 41 71 kg/m²  The BMI is above normal  Nutrition recommendations include reducing portion sizes and decreasing overall calorie intake  Exercise recommendations include exercising 3-5 times per week      Pertinent Laboratory/Diagnostic Studies:  Lab Results   Component Value Date    GLUCOSE 99 10/22/2014    BUN 20 05/07/2019    CREATININE 0 95 05/07/2019    CALCIUM 9 7 05/07/2019     12/20/2017    K 4 2 05/07/2019    CO2 28 05/07/2019     05/07/2019     Lab Results   Component Value Date    ALT 13 05/07/2019    AST 15 05/07/2019    ALKPHOS 71 05/07/2019    BILITOT 0 6 12/20/2017       Lab Results   Component Value Date    WBC 3 9 05/07/2019    HGB 12 4 05/07/2019    HCT 37 6 05/07/2019    MCV 94 2 05/07/2019     05/07/2019       Lab Results   Component Value Date    TSH 1 82 10/02/2018       Lab Results   Component Value Date    CHOL 229 (H) 12/20/2017     Lab Results   Component Value Date    TRIG 75 05/07/2019     Lab Results   Component Value Date    HDL 72 05/07/2019     Lab Results   Component Value Date    LDLCALC 123 (H) 10/22/2014     No results found for: HGBA1C    Results for orders placed or performed in visit on 06/04/19   POCT urine dip auto non-scope   Result Value Ref Range     COLOR,UA yellow     CLARITY,UA clear     SPECIFIC GRAVITY,UA 1,010      PH,UA 5 0     LEUKOCYTE ESTERASE,UA neg     NITRITE,UA neg     GLUCOSE, UA neg     KETONES,UA neg     BILIRUBIN,UA neg     BLOOD,UA neg     POCT URINE PROTEIN neg     SL AMB POCT UROBILINOGEN 0 2        No orders of the defined types were placed in this encounter  ALLERGIES:  No Known Allergies    Current Medications     Current Outpatient Medications   Medication Sig Dispense Refill    atorvastatin (LIPITOR) 10 mg tablet TAKE ONE TABLET BY MOUTH EVERY DAY 90 tablet 1    Azelaic Acid (FINACEA) 15 % cream Apply topically daily at bedtime        azelastine (ASTELIN) 0 1 % nasal spray 1 spray into each nostril 2 (two) times a day Use in each nostril as directed 1 Bottle 5    Calcium Carb-Cholecalciferol (CALTRATE 600+D) 600-800 MG-UNIT TABS Take 2 tablets by mouth daily      Denosumab (PROLIA SC) Inject under the skin 2x year       doxycycline (PERIOSTAT) 20 MG tablet Take 20 mg by mouth 2 (two) times a day      GLUCOSAMINE-CHONDROITIN PO Take by mouth 2 tabs am / 1 tab pm      levothyroxine 137 mcg tablet TAKE ONE TABLET BY MOUTH EVERY DAY 90 tablet 3    loratadine (CLARITIN) 10 mg tablet Take 10 mg by mouth daily as needed       metoprolol tartrate (LOPRESSOR) 50 mg tablet TAKE ONE TABLET BY MOUTH EVERY DAY 90 tablet 3    metroNIDAZOLE (METROGEL) 1 % gel Apply 1 application topically daily       naproxen (NAPROSYN) 500 mg tablet TAKE ONE TABLET BY MOUTH TWICE A DAY WITH FOOD (Patient taking differently: TAKE ONE TABLET BY MOUTH daily WITH FOOD- pt stopped 6/10) 180 tablet 3    psyllium (METAMUCIL) 0 52 g capsule Take 0 52 g by mouth 2 (two) times a day       Triamcinolone Acetonide (NASACORT ALLERGY 24HR NA) 2 sprays into each nostril as needed         No current facility-administered medications for this visit            Health Maintenance     Health Maintenance   Topic Date Due    BMI: Followup Plan  01/23/1968    CRC Screening: Colonoscopy  08/13/2014    PT PLAN OF CARE  01/05/2019    INFLUENZA VACCINE  07/01/2019    DTaP,Tdap,and Td Vaccines (1 - Tdap) 03/26/2020 (Originally 1/23/1971)    Fall Risk  03/27/2020    Depression Screening PHQ  03/27/2020    Urinary Incontinence Screening  03/27/2020    Medicare Annual Wellness Visit (AWV) 03/27/2020    BMI: Adult  06/17/2020    MAMMOGRAM  10/30/2020    Hepatitis C Screening  Completed    Pneumococcal Vaccine: 65+ Years  Completed    Pneumococcal Vaccine: Pediatrics (0 to 5 Years) and At-Risk Patients (6 to 59 Years)  Aged Out    HEPATITIS B VACCINES  Aged [employer] History   Administered Date(s) Administered    INFLUENZA 09/26/2014, 10/10/2015, 10/03/2016, 10/17/2017    Influenza Quadrivalent Preservative Free 3 years and older IM 09/26/2014    Influenza Split High Dose Preservative Free IM 10/10/2015, 10/03/2016, 10/17/2017    Influenza TIV (IM) 10/11/2012, 10/17/2013    Influenza, high dose seasonal 0 5 mL 09/27/2018    Pneumococcal Conjugate 13-Valent 12/05/2016    Pneumococcal Polysaccharide PPV23 51/65/8909       Juliane Epps MD

## 2019-10-31 NOTE — TELEPHONE ENCOUNTER
Patient called and stated that [facility] does not have Robaxin in stock right now can you please send something else over to the pharmacy?   Please call to advise no

## 2019-11-27 DIAGNOSIS — E03.9 HYPOTHYROIDISM, UNSPECIFIED TYPE: ICD-10-CM

## 2019-11-27 RX ORDER — LEVOTHYROXINE SODIUM 137 UG/1
TABLET ORAL
Qty: 90 TABLET | Refills: 3 | Status: SHIPPED | OUTPATIENT
Start: 2019-11-27 | End: 2020-10-24

## 2019-11-30 DIAGNOSIS — E78.5 HYPERLIPIDEMIA, UNSPECIFIED HYPERLIPIDEMIA TYPE: ICD-10-CM

## 2019-11-30 DIAGNOSIS — I10 ESSENTIAL HYPERTENSION: ICD-10-CM

## 2019-12-01 RX ORDER — ATORVASTATIN CALCIUM 10 MG/1
TABLET, FILM COATED ORAL
Qty: 90 TABLET | Refills: 1 | Status: SHIPPED | OUTPATIENT
Start: 2019-12-01 | End: 2020-05-08

## 2019-12-01 RX ORDER — METOPROLOL TARTRATE 50 MG/1
TABLET, FILM COATED ORAL
Qty: 90 TABLET | Refills: 3 | Status: SHIPPED | OUTPATIENT
Start: 2019-12-01 | End: 2020-11-24

## 2019-12-02 ENCOUNTER — CLINICAL SUPPORT (OUTPATIENT)
Dept: FAMILY MEDICINE CLINIC | Facility: CLINIC | Age: 69
End: 2019-12-02
Payer: COMMERCIAL

## 2019-12-02 DIAGNOSIS — M81.0 OSTEOPOROSIS, UNSPECIFIED OSTEOPOROSIS TYPE, UNSPECIFIED PATHOLOGICAL FRACTURE PRESENCE: Primary | ICD-10-CM

## 2019-12-02 PROCEDURE — 96372 THER/PROPH/DIAG INJ SC/IM: CPT | Performed by: FAMILY MEDICINE

## 2020-01-02 DIAGNOSIS — J06.9 UPPER RESPIRATORY TRACT INFECTION, UNSPECIFIED TYPE: Primary | ICD-10-CM

## 2020-01-02 RX ORDER — AZITHROMYCIN 250 MG/1
TABLET, FILM COATED ORAL
Qty: 6 TABLET | Refills: 0 | Status: SHIPPED | OUTPATIENT
Start: 2020-01-02 | End: 2020-01-07

## 2020-01-10 ENCOUNTER — TELEPHONE (OUTPATIENT)
Dept: FAMILY MEDICINE CLINIC | Facility: CLINIC | Age: 70
End: 2020-01-10

## 2020-01-10 NOTE — TELEPHONE ENCOUNTER
Patient called and stated that they are done with the antibiotic and they are taking mucinex every day  Nose is blocked, nose runs a lot and pressure in head and sinuses  Feels weird and a little foggy  Is there something else that she can take to take this away?   Please call to advise

## 2020-03-06 ENCOUNTER — TRANSCRIBE ORDERS (OUTPATIENT)
Dept: ADMINISTRATIVE | Facility: HOSPITAL | Age: 70
End: 2020-03-06

## 2020-03-06 DIAGNOSIS — Z12.31 VISIT FOR SCREENING MAMMOGRAM: Primary | ICD-10-CM

## 2020-03-30 ENCOUNTER — OFFICE VISIT (OUTPATIENT)
Dept: FAMILY MEDICINE CLINIC | Facility: CLINIC | Age: 70
End: 2020-03-30
Payer: COMMERCIAL

## 2020-03-30 DIAGNOSIS — E78.5 HYPERLIPIDEMIA, UNSPECIFIED HYPERLIPIDEMIA TYPE: Primary | ICD-10-CM

## 2020-03-30 DIAGNOSIS — E03.9 HYPOTHYROIDISM, UNSPECIFIED TYPE: ICD-10-CM

## 2020-03-30 DIAGNOSIS — I10 ESSENTIAL HYPERTENSION: ICD-10-CM

## 2020-03-30 PROCEDURE — G2012 BRIEF CHECK IN BY MD/QHP: HCPCS | Performed by: FAMILY MEDICINE

## 2020-03-30 NOTE — ASSESSMENT & PLAN NOTE
Hypertension  Patient blood pressure is stable at this time she will continue with current regimen of medications  She will obtain blood work as ordered

## 2020-03-30 NOTE — PROGRESS NOTES
Virtual Brief Visit    Problem List Items Addressed This Visit        Endocrine    Hypothyroidism     Hypothyroidism  Patient will have TSH blood work and continue with current dose of levothyroxine            Cardiovascular and Mediastinum    Hypertension     Hypertension  Patient blood pressure is stable at this time she will continue with current regimen of medications  She will obtain blood work as ordered  Relevant Orders    CBC    Comprehensive metabolic panel    Lipid panel    TSH, 3rd generation       Other    Hyperlipidemia - Primary     Hyperlipidemia  Patient will have lipid panel blood work and continue with current dose of statin therapy         Relevant Orders    CBC    Comprehensive metabolic panel    Lipid panel    TSH, 3rd generation        I discussed with patient need for colon cancer screening test   Patient is reluctant to have colonoscopy due to poor painful prior experience with colonoscopy  She will consider other possible choices for colon cancer screening later this year        Reason for visit is for follow-up of chronic medical conditions    Encounter provider Guera Travis MD    Provider located at [address] [place] [address]      Recent Visits  No visits were found meeting these conditions  Showing recent visits within past 7 days and meeting all other requirements     Today's Visits  Date Type Provider Dept   03/30/20 Office Visit Guera Travis MD [id number] today's visits and meeting all other requirements     Future Appointments  No visits were found meeting these conditions  Showing future appointments within next 150 days and meeting all other requirements        After connecting through telephone, the patient was identified by name and date of birth  Judylucas Lundy was informed that this is a telemedicine visit and that the visit is being conducted through telephone    My office door was closed  No one else was in the room  She acknowledged consent and understanding of privacy and security of the video platform  The patient has agreed to participate and understands they can discontinue the visit at any time  Patient is aware this is a billable service  Michelle Burk is a 79 y o  female who states she has been in her usual state of health  She denies any recent illness  She does try to continue walking as a form of exercise  Prior to gymnasium being closed she had been going 3 times a week to walk on a treadmill for 1/2 hour  She does take her medicines on a regular basis and currently does not see any other specialist   Her last blood work was approximately 1 year ago  Past Medical History:   Diagnosis Date    Allergic rhinitis     Arthritis     Cataract     brock    Disease of thyroid gland     had total thyroidectomy    Diverticulosis     Hyperlipidemia     Hypertension     Mild acid reflux     Osteoporosis     Plantar fasciitis     b/l  l worse uses orthotics in shoes    Rosacea     Use of cane as ambulatory aid     Uterovaginal prolapse        Past Surgical History:   Procedure Laterality Date    ANKLE SURGERY Left     Fracture / hardware removed    COLONOSCOPY      fiberoptic    COLPORRHAPHY N/A 6/17/2019    Procedure: POSTERIOR COLPORRHAPHY;  Surgeon: Meggan Fitzgerald MD;  [facility];  Service: UroGynecology           OTHER SURGICAL HISTORY      thyroid biopsy core needle     PA REMOVAL DEEP IMPLANT Left 9/18/2017    Procedure: REMOVAL HARDWARE ANKLE (1 plate, 8 screws );  Surgeon: Melissa Denise MD;  [facility];  Service: Orthopedics    PA REPAIR OF PERINEUM,NON OBSTETRICAL N/A 6/17/2019    Procedure: Corie Robertu;  Surgeon: Meggan Fitzgerald MD;  [facility];  Service: UroGynecology           PA Fredy Cullen LIG N/A 6/17/2019    Procedure: VE COLPOSUSPENSION (neugide);   Surgeon: Linda Armijo Bobbi Sanchez MD;  Location: [facility];  Service: UroGynecology           THYROIDECTOMY      Total     TUBAL LIGATION         Current Outpatient Medications   Medication Sig Dispense Refill    atorvastatin (LIPITOR) 10 mg tablet TAKE ONE TABLET BY MOUTH EVERY DAY 90 tablet 1    Azelaic Acid (FINACEA) 15 % cream Apply topically daily at bedtime        azelastine (ASTELIN) 0 1 % nasal spray 1 spray into each nostril 2 (two) times a day Use in each nostril as directed 1 Bottle 5    Calcium Carb-Cholecalciferol (CALTRATE 600+D) 600-800 MG-UNIT TABS Take 2 tablets by mouth daily      Denosumab (PROLIA SC) Inject under the skin 2x year       doxycycline (PERIOSTAT) 20 MG tablet Take 20 mg by mouth 2 (two) times a day      GLUCOSAMINE-CHONDROITIN PO Take by mouth 2 tabs am / 1 tab pm      levothyroxine 137 mcg tablet TAKE ONE TABLET BY MOUTH EVERY DAY 90 tablet 3    loratadine (CLARITIN) 10 mg tablet Take 10 mg by mouth daily as needed       metoprolol tartrate (LOPRESSOR) 50 mg tablet TAKE ONE TABLET BY MOUTH EVERY DAY 90 tablet 3    metroNIDAZOLE (METROGEL) 1 % gel Apply 1 application topically daily       naproxen (NAPROSYN) 500 mg tablet TAKE ONE TABLET BY MOUTH TWICE A DAY WITH FOOD (Patient taking differently: TAKE ONE TABLET BY MOUTH daily WITH FOOD- pt stopped 6/10) 180 tablet 3    psyllium (METAMUCIL) 0 52 g capsule Take 0 52 g by mouth 2 (two) times a day       Triamcinolone Acetonide (NASACORT ALLERGY 24HR NA) 2 sprays into each nostril as needed         No current facility-administered medications for this visit  No Known Allergies    Review of Systems   Constitutional: Negative  HENT: Negative  Respiratory: Negative  Cardiovascular: Negative  Gastrointestinal: Negative  Genitourinary: Negative  Musculoskeletal:        Chronic intermittent left ankle pain from prior fracture  Neurological: Negative  Psychiatric/Behavioral: Negative        Home /78  Weight: 238 johnathan   Temp: 97 8    I spent 15 minutes with the patient during this visit

## 2020-05-04 ENCOUNTER — TELEMEDICINE (OUTPATIENT)
Dept: FAMILY MEDICINE CLINIC | Facility: CLINIC | Age: 70
End: 2020-05-04
Payer: COMMERCIAL

## 2020-05-04 VITALS
HEIGHT: 64 IN | SYSTOLIC BLOOD PRESSURE: 133 MMHG | WEIGHT: 243 LBS | DIASTOLIC BLOOD PRESSURE: 72 MMHG | TEMPERATURE: 97.6 F | BODY MASS INDEX: 41.48 KG/M2

## 2020-05-04 DIAGNOSIS — M79.672 PAIN OF LEFT HEEL: Primary | ICD-10-CM

## 2020-05-04 PROCEDURE — 3078F DIAST BP <80 MM HG: CPT | Performed by: FAMILY MEDICINE

## 2020-05-04 PROCEDURE — 1160F RVW MEDS BY RX/DR IN RCRD: CPT | Performed by: FAMILY MEDICINE

## 2020-05-04 PROCEDURE — 99214 OFFICE O/P EST MOD 30 MIN: CPT | Performed by: FAMILY MEDICINE

## 2020-05-04 PROCEDURE — 3008F BODY MASS INDEX DOCD: CPT | Performed by: FAMILY MEDICINE

## 2020-05-04 PROCEDURE — 3075F SYST BP GE 130 - 139MM HG: CPT | Performed by: FAMILY MEDICINE

## 2020-05-05 ENCOUNTER — HOSPITAL ENCOUNTER (OUTPATIENT)
Dept: RADIOLOGY | Facility: HOSPITAL | Age: 70
Discharge: HOME | End: 2020-05-05
Payer: COMMERCIAL

## 2020-05-05 DIAGNOSIS — M79.672 PAIN OF LEFT HEEL: ICD-10-CM

## 2020-05-05 DIAGNOSIS — M72.2 PLANTAR FASCIITIS: Primary | ICD-10-CM

## 2020-05-05 PROCEDURE — 73630 X-RAY EXAM OF FOOT: CPT

## 2020-05-08 DIAGNOSIS — E78.5 HYPERLIPIDEMIA, UNSPECIFIED HYPERLIPIDEMIA TYPE: ICD-10-CM

## 2020-05-08 RX ORDER — ATORVASTATIN CALCIUM 10 MG/1
TABLET, FILM COATED ORAL
Qty: 90 TABLET | Refills: 1 | Status: SHIPPED | OUTPATIENT
Start: 2020-05-08 | End: 2020-06-05

## 2020-05-11 ENCOUNTER — EVALUATION (OUTPATIENT)
Dept: PHYSICAL THERAPY | Facility: REHABILITATION | Age: 70
End: 2020-05-11
Payer: COMMERCIAL

## 2020-05-11 DIAGNOSIS — M79.672 CHRONIC FOOT PAIN, LEFT: Primary | ICD-10-CM

## 2020-05-11 DIAGNOSIS — M72.2 PLANTAR FASCIITIS: ICD-10-CM

## 2020-05-11 DIAGNOSIS — G89.29 CHRONIC FOOT PAIN, LEFT: Primary | ICD-10-CM

## 2020-05-11 PROCEDURE — 97162 PT EVAL MOD COMPLEX 30 MIN: CPT | Performed by: PHYSICAL THERAPIST

## 2020-05-11 PROCEDURE — 97140 MANUAL THERAPY 1/> REGIONS: CPT | Performed by: PHYSICAL THERAPIST

## 2020-05-13 ENCOUNTER — OFFICE VISIT (OUTPATIENT)
Dept: PHYSICAL THERAPY | Facility: REHABILITATION | Age: 70
End: 2020-05-13
Payer: COMMERCIAL

## 2020-05-13 DIAGNOSIS — G89.29 CHRONIC FOOT PAIN, LEFT: Primary | ICD-10-CM

## 2020-05-13 DIAGNOSIS — M79.672 CHRONIC FOOT PAIN, LEFT: Primary | ICD-10-CM

## 2020-05-13 DIAGNOSIS — M72.2 PLANTAR FASCIITIS: ICD-10-CM

## 2020-05-13 PROCEDURE — 97110 THERAPEUTIC EXERCISES: CPT | Performed by: PHYSICAL THERAPIST

## 2020-05-13 PROCEDURE — 97140 MANUAL THERAPY 1/> REGIONS: CPT | Performed by: PHYSICAL THERAPIST

## 2020-05-18 ENCOUNTER — OFFICE VISIT (OUTPATIENT)
Dept: PHYSICAL THERAPY | Facility: REHABILITATION | Age: 70
End: 2020-05-18
Payer: COMMERCIAL

## 2020-05-18 DIAGNOSIS — M79.672 CHRONIC FOOT PAIN, LEFT: Primary | ICD-10-CM

## 2020-05-18 DIAGNOSIS — G89.29 CHRONIC FOOT PAIN, LEFT: Primary | ICD-10-CM

## 2020-05-18 DIAGNOSIS — M72.2 PLANTAR FASCIITIS: ICD-10-CM

## 2020-05-18 PROCEDURE — 97110 THERAPEUTIC EXERCISES: CPT | Performed by: PHYSICAL THERAPIST

## 2020-05-18 PROCEDURE — 97140 MANUAL THERAPY 1/> REGIONS: CPT | Performed by: PHYSICAL THERAPIST

## 2020-05-20 ENCOUNTER — OFFICE VISIT (OUTPATIENT)
Dept: PHYSICAL THERAPY | Facility: REHABILITATION | Age: 70
End: 2020-05-20
Payer: COMMERCIAL

## 2020-05-20 DIAGNOSIS — M79.672 CHRONIC FOOT PAIN, LEFT: Primary | ICD-10-CM

## 2020-05-20 DIAGNOSIS — M72.2 PLANTAR FASCIITIS: ICD-10-CM

## 2020-05-20 DIAGNOSIS — G89.29 CHRONIC FOOT PAIN, LEFT: Primary | ICD-10-CM

## 2020-05-20 PROCEDURE — 97112 NEUROMUSCULAR REEDUCATION: CPT | Performed by: PHYSICAL THERAPIST

## 2020-05-20 PROCEDURE — 97110 THERAPEUTIC EXERCISES: CPT | Performed by: PHYSICAL THERAPIST

## 2020-05-20 PROCEDURE — 97140 MANUAL THERAPY 1/> REGIONS: CPT | Performed by: PHYSICAL THERAPIST

## 2020-05-27 ENCOUNTER — OFFICE VISIT (OUTPATIENT)
Dept: PHYSICAL THERAPY | Facility: REHABILITATION | Age: 70
End: 2020-05-27
Payer: COMMERCIAL

## 2020-05-27 DIAGNOSIS — G89.29 CHRONIC FOOT PAIN, LEFT: Primary | ICD-10-CM

## 2020-05-27 DIAGNOSIS — M72.2 PLANTAR FASCIITIS: ICD-10-CM

## 2020-05-27 DIAGNOSIS — M79.672 CHRONIC FOOT PAIN, LEFT: Primary | ICD-10-CM

## 2020-05-27 PROCEDURE — 97140 MANUAL THERAPY 1/> REGIONS: CPT | Performed by: PHYSICAL THERAPIST

## 2020-05-27 PROCEDURE — 97110 THERAPEUTIC EXERCISES: CPT | Performed by: PHYSICAL THERAPIST

## 2020-05-29 ENCOUNTER — OFFICE VISIT (OUTPATIENT)
Dept: PHYSICAL THERAPY | Facility: REHABILITATION | Age: 70
End: 2020-05-29
Payer: COMMERCIAL

## 2020-05-29 DIAGNOSIS — G89.29 CHRONIC FOOT PAIN, LEFT: Primary | ICD-10-CM

## 2020-05-29 DIAGNOSIS — M79.672 CHRONIC FOOT PAIN, LEFT: Primary | ICD-10-CM

## 2020-05-29 DIAGNOSIS — M72.2 PLANTAR FASCIITIS: ICD-10-CM

## 2020-05-29 PROCEDURE — 97112 NEUROMUSCULAR REEDUCATION: CPT | Performed by: PHYSICAL THERAPIST

## 2020-05-29 PROCEDURE — 97110 THERAPEUTIC EXERCISES: CPT | Performed by: PHYSICAL THERAPIST

## 2020-05-29 PROCEDURE — 97140 MANUAL THERAPY 1/> REGIONS: CPT | Performed by: PHYSICAL THERAPIST

## 2020-06-01 ENCOUNTER — OFFICE VISIT (OUTPATIENT)
Dept: PHYSICAL THERAPY | Facility: REHABILITATION | Age: 70
End: 2020-06-01
Payer: COMMERCIAL

## 2020-06-01 DIAGNOSIS — M72.2 PLANTAR FASCIITIS: ICD-10-CM

## 2020-06-01 DIAGNOSIS — G89.29 CHRONIC FOOT PAIN, LEFT: Primary | ICD-10-CM

## 2020-06-01 DIAGNOSIS — M79.672 CHRONIC FOOT PAIN, LEFT: Primary | ICD-10-CM

## 2020-06-01 PROCEDURE — 97140 MANUAL THERAPY 1/> REGIONS: CPT | Performed by: PHYSICAL THERAPIST

## 2020-06-01 PROCEDURE — 97110 THERAPEUTIC EXERCISES: CPT | Performed by: PHYSICAL THERAPIST

## 2020-06-01 PROCEDURE — 97112 NEUROMUSCULAR REEDUCATION: CPT | Performed by: PHYSICAL THERAPIST

## 2020-06-03 ENCOUNTER — OFFICE VISIT (OUTPATIENT)
Dept: PHYSICAL THERAPY | Facility: REHABILITATION | Age: 70
End: 2020-06-03
Payer: COMMERCIAL

## 2020-06-03 DIAGNOSIS — M72.2 PLANTAR FASCIITIS: ICD-10-CM

## 2020-06-03 DIAGNOSIS — G89.29 CHRONIC FOOT PAIN, LEFT: Primary | ICD-10-CM

## 2020-06-03 DIAGNOSIS — M79.672 CHRONIC FOOT PAIN, LEFT: Primary | ICD-10-CM

## 2020-06-03 PROCEDURE — 97110 THERAPEUTIC EXERCISES: CPT | Performed by: PHYSICAL THERAPIST

## 2020-06-03 PROCEDURE — 97140 MANUAL THERAPY 1/> REGIONS: CPT | Performed by: PHYSICAL THERAPIST

## 2020-06-03 PROCEDURE — 97112 NEUROMUSCULAR REEDUCATION: CPT | Performed by: PHYSICAL THERAPIST

## 2020-06-05 DIAGNOSIS — E78.5 HYPERLIPIDEMIA, UNSPECIFIED HYPERLIPIDEMIA TYPE: ICD-10-CM

## 2020-06-05 RX ORDER — ATORVASTATIN CALCIUM 10 MG/1
TABLET, FILM COATED ORAL
Qty: 90 TABLET | Refills: 1 | Status: SHIPPED | OUTPATIENT
Start: 2020-06-05 | End: 2021-06-07

## 2020-06-08 ENCOUNTER — OFFICE VISIT (OUTPATIENT)
Dept: PHYSICAL THERAPY | Facility: REHABILITATION | Age: 70
End: 2020-06-08
Payer: COMMERCIAL

## 2020-06-08 DIAGNOSIS — G89.29 CHRONIC FOOT PAIN, LEFT: Primary | ICD-10-CM

## 2020-06-08 DIAGNOSIS — M72.2 PLANTAR FASCIITIS: ICD-10-CM

## 2020-06-08 DIAGNOSIS — M79.672 CHRONIC FOOT PAIN, LEFT: Primary | ICD-10-CM

## 2020-06-08 PROCEDURE — 97140 MANUAL THERAPY 1/> REGIONS: CPT | Performed by: PHYSICAL THERAPIST

## 2020-06-08 PROCEDURE — 97110 THERAPEUTIC EXERCISES: CPT | Performed by: PHYSICAL THERAPIST

## 2020-06-08 PROCEDURE — 97112 NEUROMUSCULAR REEDUCATION: CPT | Performed by: PHYSICAL THERAPIST

## 2020-06-10 ENCOUNTER — OFFICE VISIT (OUTPATIENT)
Dept: PHYSICAL THERAPY | Facility: REHABILITATION | Age: 70
End: 2020-06-10
Payer: COMMERCIAL

## 2020-06-10 DIAGNOSIS — M72.2 PLANTAR FASCIITIS: ICD-10-CM

## 2020-06-10 DIAGNOSIS — G89.29 CHRONIC FOOT PAIN, LEFT: Primary | ICD-10-CM

## 2020-06-10 DIAGNOSIS — M79.672 CHRONIC FOOT PAIN, LEFT: Primary | ICD-10-CM

## 2020-06-10 PROCEDURE — 97140 MANUAL THERAPY 1/> REGIONS: CPT | Performed by: PHYSICAL THERAPIST

## 2020-06-10 PROCEDURE — 97110 THERAPEUTIC EXERCISES: CPT | Performed by: PHYSICAL THERAPIST

## 2020-06-10 PROCEDURE — 97112 NEUROMUSCULAR REEDUCATION: CPT | Performed by: PHYSICAL THERAPIST

## 2020-06-15 ENCOUNTER — OFFICE VISIT (OUTPATIENT)
Dept: PHYSICAL THERAPY | Facility: REHABILITATION | Age: 70
End: 2020-06-15
Payer: COMMERCIAL

## 2020-06-15 DIAGNOSIS — M79.672 CHRONIC FOOT PAIN, LEFT: Primary | ICD-10-CM

## 2020-06-15 DIAGNOSIS — M72.2 PLANTAR FASCIITIS: ICD-10-CM

## 2020-06-15 DIAGNOSIS — G89.29 CHRONIC FOOT PAIN, LEFT: Primary | ICD-10-CM

## 2020-06-15 PROCEDURE — 97110 THERAPEUTIC EXERCISES: CPT | Performed by: PHYSICAL THERAPIST

## 2020-06-15 PROCEDURE — 97112 NEUROMUSCULAR REEDUCATION: CPT | Performed by: PHYSICAL THERAPIST

## 2020-06-15 PROCEDURE — 97140 MANUAL THERAPY 1/> REGIONS: CPT | Performed by: PHYSICAL THERAPIST

## 2020-06-17 ENCOUNTER — OFFICE VISIT (OUTPATIENT)
Dept: PHYSICAL THERAPY | Facility: REHABILITATION | Age: 70
End: 2020-06-17
Payer: COMMERCIAL

## 2020-06-17 DIAGNOSIS — G89.29 CHRONIC FOOT PAIN, LEFT: Primary | ICD-10-CM

## 2020-06-17 DIAGNOSIS — M79.672 CHRONIC FOOT PAIN, LEFT: Primary | ICD-10-CM

## 2020-06-17 DIAGNOSIS — M72.2 PLANTAR FASCIITIS: ICD-10-CM

## 2020-06-17 PROCEDURE — 97140 MANUAL THERAPY 1/> REGIONS: CPT | Performed by: PHYSICAL THERAPIST

## 2020-06-17 PROCEDURE — 97530 THERAPEUTIC ACTIVITIES: CPT | Performed by: PHYSICAL THERAPIST

## 2020-07-06 DIAGNOSIS — M25.572 CHRONIC PAIN OF LEFT ANKLE: ICD-10-CM

## 2020-07-06 DIAGNOSIS — G89.29 CHRONIC PAIN OF LEFT ANKLE: ICD-10-CM

## 2020-07-06 RX ORDER — NAPROXEN 500 MG/1
TABLET ORAL
Qty: 180 TABLET | Refills: 3 | Status: SHIPPED | OUTPATIENT
Start: 2020-07-06 | End: 2021-07-08

## 2020-09-18 RX ORDER — IBANDRONATE SODIUM 150 MG/1
TABLET, FILM COATED ORAL
COMMUNITY
Start: 2017-04-24 | End: 2021-01-04 | Stop reason: ALTCHOICE

## 2020-09-18 RX ORDER — TRIAMCINOLONE ACETONIDE 55 UG/1
SPRAY, METERED NASAL
COMMUNITY
Start: 2014-10-02 | End: 2021-04-01

## 2020-09-18 RX ORDER — METHOCARBAMOL 500 MG/1
TABLET, FILM COATED ORAL
COMMUNITY
Start: 2017-05-23 | End: 2020-09-21 | Stop reason: SDUPTHER

## 2020-09-18 RX ORDER — SODIUM PHOSPHATE,MONO-DIBASIC 19G-7G/118
ENEMA (ML) RECTAL
COMMUNITY

## 2020-09-21 ENCOUNTER — OFFICE VISIT (OUTPATIENT)
Dept: FAMILY MEDICINE CLINIC | Facility: CLINIC | Age: 70
End: 2020-09-21
Payer: COMMERCIAL

## 2020-09-21 VITALS
TEMPERATURE: 97.6 F | BODY MASS INDEX: 42.47 KG/M2 | HEIGHT: 64 IN | DIASTOLIC BLOOD PRESSURE: 90 MMHG | WEIGHT: 248.8 LBS | HEART RATE: 72 BPM | SYSTOLIC BLOOD PRESSURE: 140 MMHG | RESPIRATION RATE: 16 BRPM | OXYGEN SATURATION: 97 %

## 2020-09-21 DIAGNOSIS — M54.50 ACUTE MIDLINE LOW BACK PAIN WITHOUT SCIATICA: ICD-10-CM

## 2020-09-21 DIAGNOSIS — E78.5 HYPERLIPIDEMIA, UNSPECIFIED HYPERLIPIDEMIA TYPE: ICD-10-CM

## 2020-09-21 DIAGNOSIS — I10 ESSENTIAL HYPERTENSION: ICD-10-CM

## 2020-09-21 DIAGNOSIS — Z23 NEED FOR INFLUENZA VACCINATION: Primary | ICD-10-CM

## 2020-09-21 DIAGNOSIS — E03.9 HYPOTHYROIDISM, UNSPECIFIED TYPE: ICD-10-CM

## 2020-09-21 PROCEDURE — 99214 OFFICE O/P EST MOD 30 MIN: CPT | Performed by: FAMILY MEDICINE

## 2020-09-21 PROCEDURE — 3725F SCREEN DEPRESSION PERFORMED: CPT | Performed by: FAMILY MEDICINE

## 2020-09-21 PROCEDURE — G0439 PPPS, SUBSEQ VISIT: HCPCS | Performed by: FAMILY MEDICINE

## 2020-09-21 PROCEDURE — 1170F FXNL STATUS ASSESSED: CPT | Performed by: FAMILY MEDICINE

## 2020-09-21 PROCEDURE — G0008 ADMIN INFLUENZA VIRUS VAC: HCPCS

## 2020-09-21 PROCEDURE — 1160F RVW MEDS BY RX/DR IN RCRD: CPT | Performed by: FAMILY MEDICINE

## 2020-09-21 PROCEDURE — 90662 IIV NO PRSV INCREASED AG IM: CPT

## 2020-09-21 PROCEDURE — 3080F DIAST BP >= 90 MM HG: CPT | Performed by: FAMILY MEDICINE

## 2020-09-21 PROCEDURE — 3077F SYST BP >= 140 MM HG: CPT | Performed by: FAMILY MEDICINE

## 2020-09-21 PROCEDURE — 1125F AMNT PAIN NOTED PAIN PRSNT: CPT | Performed by: FAMILY MEDICINE

## 2020-09-21 PROCEDURE — 1036F TOBACCO NON-USER: CPT | Performed by: FAMILY MEDICINE

## 2020-09-21 RX ORDER — METHOCARBAMOL 500 MG/1
500 TABLET, FILM COATED ORAL 3 TIMES DAILY
Qty: 30 TABLET | Refills: 1 | Status: SHIPPED | OUTPATIENT
Start: 2020-09-21 | End: 2020-10-13

## 2020-09-21 NOTE — PROGRESS NOTES
Assessment and Plan:     Problem List Items Addressed This Visit     None           Preventive health issues were discussed with patient, and age appropriate screening tests were ordered as noted in patient's After Visit Summary  Personalized health advice and appropriate referrals for health education or preventive services given if needed, as noted in patient's After Visit Summary       History of Present Illness:     Patient presents for Medicare Annual Wellness visit    Patient Care Team:  Judy Tran MD as PCP - Amie Nance MD as PCP - PCP-[facility] ([facility])  Mable Ragland PA-C (Obstetrics and Gynecology)     Problem List:     Patient Active Problem List   Diagnosis    Pain, joint, ankle and foot, left    DJD (degenerative joint disease), ankle and foot, left    Allergic rhinitis    Esophageal reflux    Hyperlipidemia    Hypertension    Hypothyroidism    Rosacea    Pain of right lower extremity    Female genital prolapse    Fracture of ankle    Osteopenia    Varicose veins of lower extremity    Pain of left heel      Past Medical and Surgical History:     Past Medical History:   Diagnosis Date    Allergic rhinitis     Arthritis     Cataract     brock    Disease of thyroid gland     had total thyroidectomy    Diverticulosis     Hyperlipidemia     Hypertension     Mild acid reflux     Osteoporosis     Plantar fasciitis     b/l  l worse uses orthotics in shoes    Rosacea     Use of cane as ambulatory aid     Uterovaginal prolapse      Past Surgical History:   Procedure Laterality Date    ANKLE SURGERY Left     Fracture / hardware removed    COLONOSCOPY      fiberoptic    COLPORRHAPHY N/A 6/17/2019    Procedure: POSTERIOR COLPORRHAPHY;  Surgeon: Elham Santana MD;  [facility];  Service: UroGynecology           OTHER SURGICAL HISTORY      thyroid biopsy core needle     OH REMOVAL DEEP IMPLANT Left 9/18/2017    Procedure: REMOVAL HARDWARE ANKLE (1 plate, 8 screws );  Surgeon: Anais Arevalo MD;  [facility];  Service: Orthopedics    [address] N/A 6/17/2019    Procedure: Kavin Crowe;  Surgeon: Cari Valerio MD;  [facility];  Service: UroGynecology           [place] J[facility] Enciso SHERRI N/A 6/17/2019    Procedure: VE COLPOSUSPENSION (neugide);   Surgeon: Cari Valerio MD;  [facility];  Service: UroGynecology           THYROIDECTOMY      Total     TUBAL LIGATION        Family History:     Family History   Problem Relation Age of Onset    COPD Mother     Diabetes Mother     Heart disease Mother     Hypertension Mother     Bone cancer Father     Colon cancer Family       Social History:     E-Cigarette/Vaping    E-Cigarette Use Never User      E-Cigarette/Vaping Substances    Nicotine No     THC No     CBD No     Flavoring No     Other No     Unknown No      Social History     Socioeconomic History    Marital status: /Civil Union     Spouse name: None    Number of children: None    Years of education: None    Highest education level: None   Occupational History    None   Social Needs    Financial resource strain: None    Food insecurity     Worry: None     Inability: None    Transportation needs     Medical: None     Non-medical: None   Tobacco Use    Smoking status: Never Smoker    Smokeless tobacco: Never Used   Substance and Sexual Activity    Alcohol use: No     Comment: Never drank alcohol denied    Drug use: No    Sexual activity: Never   Lifestyle    Physical activity     Days per week: None     Minutes per session: None    Stress: None   Relationships    Social connections     Talks on phone: None     Gets together: None     Attends [employer] service: None     Active member of club or organization: None     Attends meetings of clubs or organizations: None     Relationship status: None    Intimate partner violence     Fear of current or ex partner: None Emotionally abused: None     Physically abused: None     Forced sexual activity: None   Other Topics Concern    None   Social History Narrative    Exercies moderately 3 or more times a week       Medications and Allergies:     Current Outpatient Medications   Medication Sig Dispense Refill    atorvastatin (LIPITOR) 10 mg tablet TAKE ONE TABLET BY MOUTH EVERY DAY 90 tablet 1    Azelaic Acid (FINACEA) 15 % cream Apply topically daily at bedtime        Calcium Carb-Cholecalciferol (CALTRATE 600+D) 600-800 MG-UNIT TABS Take 2 tablets by mouth daily      Denosumab (PROLIA SC) Inject under the skin 2x year       doxycycline (PERIOSTAT) 20 MG tablet Take 20 mg by mouth 2 (two) times a day      Glucosamine-Chondroitin 500-400 MG CAPS Take by mouth      levothyroxine 137 mcg tablet TAKE ONE TABLET BY MOUTH EVERY DAY 90 tablet 3    metoprolol tartrate (LOPRESSOR) 50 mg tablet TAKE ONE TABLET BY MOUTH EVERY DAY 90 tablet 3    metroNIDAZOLE (METROGEL) 1 % gel Apply 1 application topically daily       naproxen (NAPROSYN) 500 mg tablet TAKE ONE TABLET BY MOUTH TWICE A DAY WITH FOOD 180 tablet 3    psyllium (METAMUCIL) 0 52 g capsule Take 0 52 g by mouth 2 (two) times a day       Triamcinolone Acetonide (NASACORT ALLERGY 24HR NA) 2 sprays into each nostril as needed        ibandronate (BONIVA) 150 MG tablet Take by mouth      loratadine (CLARITIN) 10 mg tablet Take 10 mg by mouth daily as needed       methocarbamol (ROBAXIN) 500 mg tablet Take by mouth      Triamcinolone Acetonide 55 MCG/ACT AERO into each nostril       No current facility-administered medications for this visit        No Known Allergies   Immunizations:     Immunization History   Administered Date(s) Administered    Fluzone Split Quad 0 5 mL 09/26/2014    INFLUENZA 09/26/2014, 10/10/2015, 10/03/2016, 10/17/2017    Influenza Quadrivalent Preservative Free 3 years and older IM 09/26/2014    Influenza Split High Dose Preservative Free IM 10/10/2015, 10/03/2016, 10/17/2017    Influenza TIV (IM) 10/11/2012, 10/17/2013    Influenza, high dose seasonal 0 7 mL 09/27/2018, 09/30/2019    Pneumococcal Conjugate 13-Valent 12/05/2016    Pneumococcal Polysaccharide PPV23 09/27/2018      Health Maintenance:         Topic Date Due    MAMMOGRAM  10/30/2020    Hepatitis C Screening  Completed         Topic Date Due    DTaP,Tdap,and Td Vaccines (1 - Tdap) 01/23/1971    Influenza Vaccine  07/01/2020      Medicare Health Risk Assessment:     /90 (BP Location: Left arm, Patient Position: Sitting, Cuff Size: Large)   Pulse 72   Temp 97 6 °F (36 4 °C) (Temporal)   Resp 16   Ht 5' 4" (1 626 m)   Wt 113 kg (248 lb 12 8 oz)   LMP  (LMP Unknown)   SpO2 97%   BMI 42 71 kg/m²          Health Risk Assessment:   Patient rates overall health as good  Patient feels that their physical health rating is same  Eyesight was rated as same  Hearing was rated as same  Patient feels that their emotional and mental health rating is same  Pain experienced in the last 7 days has been some  Patient's pain rating has been 6/10  Depression Screening:   PHQ-2 Score: 0      Fall Risk Screening: In the past year, patient has experienced: no history of falling in past year      Urinary Incontinence Screening:   Patient has not leaked urine accidently in the last six months  Home Safety:  Patient does not have trouble with stairs inside or outside of their home  Patient has working smoke alarms and has working carbon monoxide detector  Home safety hazards include: none  Nutrition:   Current diet is Regular  Medications:   Patient is currently taking over-the-counter supplements  OTC medications include: see medication list  Patient is able to manage medications       Activities of Daily Living (ADLs)/Instrumental Activities of Daily Living (IADLs):   Walk and transfer into and out of bed and chair?: Yes  Dress and groom yourself?: Yes    Bathe or shower yourself?: Yes    Feed yourself? Yes  Do your laundry/housekeeping?: Yes  Manage your money, pay your bills and track your expenses?: Yes  Make your own meals?: Yes    Do your own shopping?: Yes    Previous Hospitalizations:   Any hospitalizations or ED visits within the last 12 months?: No      Advance Care Planning:   Living will: Yes    Durable POA for healthcare:  Yes    Advanced directive: Yes      PREVENTIVE SCREENINGS      Cardiovascular Screening:    General: Screening Not Indicated, History Lipid Disorder and Risks and Benefits Discussed    Due for: Lipid Panel      Diabetes Screening:     General: Risks and Benefits Discussed    Due for: Blood Glucose      Colorectal Cancer Screening:     General: Patient Declines      Breast Cancer Screening:     General: Screening Current      Cervical Cancer Screening:    General: Screening Not Indicated      Osteoporosis Screening:    General: Screening Not Indicated and History Osteoporosis      Lung Cancer Screening:     General: Screening Not Indicated      Hepatitis C Screening:    General: Screening Current      Preventive Screening Comments: Patient received her annual flu vaccine today      Mary Carmen Leggett MD

## 2020-09-21 NOTE — PATIENT INSTRUCTIONS
Medicare Preventive Visit Patient Instructions  Thank you for completing your Welcome to Medicare Visit or Medicare Annual Wellness Visit today  Your next wellness visit will be due in one year (9/21/2021)  The screening/preventive services that you may require over the next 5-10 years are detailed below  Some tests may not apply to you based off risk factors and/or age  Screening tests ordered at today's visit but not completed yet may show as past due  Also, please note that scanned in results may not display below  Preventive Screenings:  Service Recommendations Previous Testing/Comments   Colorectal Cancer Screening  * Colonoscopy    * Fecal Occult Blood Test (FOBT)/Fecal Immunochemical Test (FIT)  * Fecal DNA/Cologuard Test  * Flexible Sigmoidoscopy Age: 54-65 years old   Colonoscopy: every 10 years (may be performed more frequently if at higher risk)  OR  FOBT/FIT: every 1 year  OR  Cologuard: every 3 years  OR  Sigmoidoscopy: every 5 years  Screening may be recommended earlier than age 48 if at higher risk for colorectal cancer  Also, an individualized decision between you and your healthcare provider will decide whether screening between the ages of 74-80 would be appropriate  Colonoscopy: 08/13/2004  FOBT/FIT: Not on file  Cologuard: Not on file  Sigmoidoscopy: Not on file         Breast Cancer Screening Age: 36 years old  Frequency: every 1-2 years  Not required if history of left and right mastectomy Mammogram: 10/30/2018    Screening Current   Cervical Cancer Screening Between the ages of 21-29, pap smear recommended once every 3 years  Between the ages of 33-67, can perform pap smear with HPV co-testing every 5 years     Recommendations may differ for women with a history of total hysterectomy, cervical cancer, or abnormal pap smears in past  Pap Smear: 11/08/2018    Screening Not Indicated   Hepatitis C Screening Once for adults born between 1945 and 1965  More frequently in patients at high risk for Hepatitis C Hep C Antibody: 05/07/2019    Screening Current   Diabetes Screening 1-2 times per year if you're at risk for diabetes or have pre-diabetes Fasting glucose: 109 mg/dL   A1C: No results in last 5 years       Cholesterol Screening Once every 5 years if you don't have a lipid disorder  May order more often based on risk factors  Lipid panel: 05/07/2019    Screening Not Indicated  History Lipid Disorder     Other Preventive Screenings Covered by Medicare:  1  Abdominal Aortic Aneurysm (AAA) Screening: covered once if your at risk  You're considered to be at risk if you have a family history of AAA  2  Lung Cancer Screening: covers low dose CT scan once per year if you meet all of the following conditions: (1) Age 50-69; (2) No signs or symptoms of lung cancer; (3) Current smoker or have quit smoking within the last 15 years; (4) You have a tobacco smoking history of at least 30 pack years (packs per day multiplied by number of years you smoked); (5) You get a written order from a healthcare provider  3  Glaucoma Screening: covered annually if you're considered high risk: (1) You have diabetes OR (2) Family history of glaucoma OR (3)  aged 48 and older OR (3)  American aged 72 and older  3  Osteoporosis Screening: covered every 2 years if you meet one of the following conditions: (1) You're estrogen deficient and at risk for osteoporosis based off medical history and other findings; (2) Have a vertebral abnormality; (3) On glucocorticoid therapy for more than 3 months; (4) Have primary hyperparathyroidism; (5) On osteoporosis medications and need to assess response to drug therapy  · Last bone density test (DXA Scan): 04/23/2019   5  HIV Screening: covered annually if you're between the age of 15-65  Also covered annually if you are younger than 13 and older than 72 with risk factors for HIV infection   For pregnant patients, it is covered up to 3 times per pregnancy  Immunizations:  Immunization Recommendations   Influenza Vaccine Annual influenza vaccination during flu season is recommended for all persons aged >= 6 months who do not have contraindications   Pneumococcal Vaccine (Prevnar and Pneumovax)  * Prevnar = PCV13  * Pneumovax = PPSV23   Adults 25-60 years old: 1-3 doses may be recommended based on certain risk factors  Adults 72 years old: Prevnar (PCV13) vaccine recommended followed by Pneumovax (PPSV23) vaccine  If already received PPSV23 since turning 65, then PCV13 recommended at least one year after PPSV23 dose  Hepatitis B Vaccine 3 dose series if at intermediate or high risk (ex: diabetes, end stage renal disease, liver disease)   Tetanus (Td) Vaccine - COST NOT COVERED BY MEDICARE PART B Following completion of primary series, a booster dose should be given every 10 years to maintain immunity against tetanus  Td may also be given as tetanus wound prophylaxis  Tdap Vaccine - COST NOT COVERED BY MEDICARE PART B Recommended at least once for all adults  For pregnant patients, recommended with each pregnancy  Shingles Vaccine (Shingrix) - COST NOT COVERED BY MEDICARE PART B  2 shot series recommended in those aged 48 and above     Health Maintenance Due:      Topic Date Due    MAMMOGRAM  10/30/2020    Hepatitis C Screening  Completed     Immunizations Due:      Topic Date Due    DTaP,Tdap,and Td Vaccines (1 - Tdap) 01/23/1971    Influenza Vaccine  07/01/2020     Advance Directives   What are advance directives? Advance directives are legal documents that state your wishes and plans for medical care  These plans are made ahead of time in case you lose your ability to make decisions for yourself  Advance directives can apply to any medical decision, such as the treatments you want, and if you want to donate organs  What are the types of advance directives?   There are many types of advance directives, and each state has rules about how to use them  You may choose a combination of any of the following:  · Living will: This is a written record of the treatment you want  You can also choose which treatments you do not want, which to limit, and which to stop at a certain time  This includes surgery, medicine, IV fluid, and tube feedings  · Durable power of  for healthcare [facility]): This is a written record that states who you want to make healthcare choices for you when you are unable to make them for yourself  This person, called a proxy, is usually a family member or a friend  You may choose more than 1 proxy  · Do not resuscitate (DNR) order:  A DNR order is used in case your heart stops beating or you stop breathing  It is a request not to have certain forms of treatment, such as CPR  A DNR order may be included in other types of advance directives  · Medical directive: This covers the care that you want if you are in a coma, near death, or unable to make decisions for yourself  You can list the treatments you want for each condition  Treatment may include pain medicine, surgery, blood transfusions, dialysis, IV or tube feedings, and a ventilator (breathing machine)  · Values history: This document has questions about your views, beliefs, and how you feel and think about life  This information can help others choose the care that you would choose  Why are advance directives important? An advance directive helps you control your care  Although spoken wishes may be used, it is better to have your wishes written down  Spoken wishes can be misunderstood, or not followed  Treatments may be given even if you do not want them  An advance directive may make it easier for your family to make difficult choices about your care  Weight Management   Why it is important to manage your weight:  Being overweight increases your risk of health conditions such as heart disease, high blood pressure, type 2 diabetes, and certain types of cancer   It can also increase your risk for osteoarthritis, sleep apnea, and other respiratory problems  Aim for a slow, steady weight loss  Even a small amount of weight loss can lower your risk of health problems  How to lose weight safely:  A safe and healthy way to lose weight is to eat fewer calories and get regular exercise  You can lose up about 1 pound a week by decreasing the number of calories you eat by 500 calories each day  Healthy meal plan for weight management:  A healthy meal plan includes a variety of foods, contains fewer calories, and helps you stay healthy  A healthy meal plan includes the following:  · Eat whole-grain foods more often  A healthy meal plan should contain fiber  Fiber is the part of grains, fruits, and vegetables that is not broken down by your body  Whole-grain foods are healthy and provide extra fiber in your diet  Some examples of whole-grain foods are whole-wheat breads and pastas, oatmeal, brown rice, and bulgur  · Eat a variety of vegetables every day  Include dark, leafy greens such as spinach, kale, jenae greens, and mustard greens  Eat yellow and orange vegetables such as carrots, sweet potatoes, and winter squash  · Eat a variety of fruits every day  Choose fresh or canned fruit (canned in its own juice or light syrup) instead of juice  Fruit juice has very little or no fiber  · Eat low-fat dairy foods  Drink fat-free (skim) milk or 1% milk  Eat fat-free yogurt and low-fat cottage cheese  Try low-fat cheeses such as mozzarella and other reduced-fat cheeses  · Choose meat and other protein foods that are low in fat  Choose beans or other legumes such as split peas or lentils  Choose fish, skinless poultry (chicken or turkey), or lean cuts of red meat (beef or pork)  Before you cook meat or poultry, cut off any visible fat  · Use less fat and oil  Try baking foods instead of frying them   Add less fat, such as margarine, sour cream, regular salad dressing and mayonnaise to foods  Eat fewer high-fat foods  Some examples of high-fat foods include french fries, doughnuts, ice cream, and cakes  · Eat fewer sweets  Limit foods and drinks that are high in sugar  This includes candy, cookies, regular soda, and sweetened drinks  Exercise:  Exercise at least 30 minutes per day on most days of the week  Some examples of exercise include walking, biking, dancing, and swimming  You can also fit in more physical activity by taking the stairs instead of the elevator or parking farther away from stores  Ask your healthcare provider about the best exercise plan for you  © Copyright [employer] 2018 Information is for End User's use only and may not be sold, redistributed or otherwise used for commercial purposes   All illustrations and images included in CareNotes® are the copyrighted property of [employer]  or [address] [place] [address]

## 2020-09-21 NOTE — PROGRESS NOTES
FAMILY PRACTICE OFFICE VISIT       NAME: Kamran Parnell  AGE: 79 y o  SEX: female       : 1950        MRN: [id number]    DATE: 2020  TIME: 12:26 PM    Assessment and Plan     Problem List Items Addressed This Visit        Endocrine    Hypothyroidism     Hypothyroidism  Patient will check TSH blood work and continue with current dose of levothyroxine         Relevant Orders    CBC    Comprehensive metabolic panel    Lipid panel    TSH, 3rd generation       Cardiovascular and Mediastinum    Hypertension     Hypertension  Patient's blood pressure is borderline however she will continue to check her home blood pressures on her own device and call if blood pressures average above 140/90         Relevant Orders    CBC    Comprehensive metabolic panel    Lipid panel    TSH, 3rd generation       Other    Hyperlipidemia     Hyperlipidemia  Patient will obtain lipid panel blood work and continue with current dose of statin therapy         Relevant Orders    CBC    Comprehensive metabolic panel    Lipid panel    TSH, 3rd generation    Body mass index (BMI) 40 0-44 9, adult (HCC)    Acute midline low back pain without sciatica     Back pain  Patient was given a refill on her methocarbamol 500 mg to use 1 tablet t i d  P r n  Geovany Nelson Patient will call if she has no symptom improvement after 1st 1-2 weeks  She was advised to not perform any significant bending or heavy lifting that might exacerbate her current symptoms  Relevant Medications    methocarbamol (ROBAXIN) 500 mg tablet      Other Visit Diagnoses     Need for influenza vaccination    -  Primary    Relevant Orders    FLUZONE HIGH-DOSE: influenza vaccine, high-dose, preservative-free 0 7 mL (Completed)              Chief Complaint     Chief Complaint   Patient presents with    Medicare Wellness Visit       History of Present Illness     Patient in the office to review chronic medical conditions  She she is due for her routine blood work  She denies any recent illness  She has been unable to go to the gym and has gained a few lb during pandemic  She tries to stay active at home and will be obtaining a new exercise bicycle to start using  Patient does check her blood pressures on her own blood pressure device and states readings are usually in the 101H systolic over 51P diastolic    Patient reports unrelated episode of a lower back pain that she has experienced in the past   She requested a refill on her methocarbamol due to muscle spasms  She denies any radicular type of pain at this time      Review of Systems   Review of Systems   Constitutional: Negative  HENT: Negative  Respiratory: Negative  Cardiovascular: Negative  Gastrointestinal: Negative  Musculoskeletal:        Chronic arthralgias of degenerative joint disease as well as specifically her of left foot status post fracture several years ago   Neurological: Negative  Psychiatric/Behavioral: Negative          Active Problem List     Patient Active Problem List   Diagnosis    Pain, joint, ankle and foot, left    DJD (degenerative joint disease), ankle and foot, left    Allergic rhinitis    Esophageal reflux    Hyperlipidemia    Hypertension    Hypothyroidism    Rosacea    Pain of right lower extremity    Female genital prolapse    Fracture of ankle    Osteopenia    Varicose veins of lower extremity    Pain of left heel    Body mass index (BMI) 40 0-44 9, adult (HCC)    Acute midline low back pain without sciatica       Past Medical History:  Past Medical History:   Diagnosis Date    Allergic rhinitis     Arthritis     Cataract     brock    Disease of thyroid gland     had total thyroidectomy    Diverticulosis     Hyperlipidemia     Hypertension     Mild acid reflux     Osteoporosis     Plantar fasciitis     b/l  l worse uses orthotics in shoes    Rosacea     Use of cane as ambulatory aid     Uterovaginal prolapse        Past Surgical History:  Past Surgical History:   Procedure Laterality Date    ANKLE SURGERY Left     Fracture / hardware removed    COLONOSCOPY      fiberoptic    COLPORRHAPHY N/A 6/17/2019    Procedure: POSTERIOR COLPORRHAPHY;  Surgeon: Ezekiel Francis MD;  [facility];  Service: UroGynecology           OTHER SURGICAL HISTORY      thyroid biopsy core needle     SD REMOVAL DEEP IMPLANT Left 9/18/2017    Procedure: REMOVAL HARDWARE ANKLE (1 plate, 8 screws );  Surgeon: Caroline Snyder MD;  Location:  MAIN OR;  Service: Orthopedics    [address] [place] [address] N/A 6/17/2019    Procedure: Helyn Arm;  Surgeon: Ezekiel Francis MD;  [facility];  Service: UroGynecology           SD Silvino Bowels LIG N/A 6/17/2019    Procedure: VE COLPOSUSPENSION (neugide);   Surgeon: Ezekiel Francis MD;  [facility];  Service: UroGynecology           THYROIDECTOMY      Total     TUBAL LIGATION         Family History:  Family History   Problem Relation Age of Onset    COPD Mother     Diabetes Mother     Heart disease Mother     Hypertension Mother     Bone cancer Father     Colon cancer Family        Social History:  Social History     Socioeconomic History    Marital status: /Civil Union     Spouse name: Not on file    Number of children: Not on file    Years of education: Not on file    Highest education level: Not on file   Occupational History    Not on file   Social Needs    Financial resource strain: Not on file    Food insecurity     Worry: Not on file     Inability: Not on file   [employer] needs     Medical: Not on file     Non-medical: Not on file   Tobacco Use    Smoking status: Never Smoker    Smokeless tobacco: Never Used   Substance and Sexual Activity    Alcohol use: No     Comment: Never drank alcohol denied    Drug use: No    Sexual activity: Never   Lifestyle    Physical activity     Days per week: Not on file     Minutes per session: Not on file    Stress: Not on file   Relationships    Social connections     Talks on phone: Not on file     Gets together: Not on file     Attends Denominational service: Not on file     Active member of club or organization: Not on file     Attends meetings of clubs or organizations: Not on file     Relationship status: Not on file    Intimate partner violence     Fear of current or ex partner: Not on file     Emotionally abused: Not on file     Physically abused: Not on file     Forced sexual activity: Not on file   Other Topics Concern    Not on file   Social History Narrative    Exercies moderately 3 or more times a week        Objective     Vitals:    09/21/20 1157   BP: 140/90   Pulse:    Resp:    Temp:    SpO2:      Wt Readings from Last 3 Encounters:   09/21/20 113 kg (248 lb 12 8 oz)   05/04/20 110 kg (243 lb)   09/30/19 110 kg (243 lb)       Physical Exam  Constitutional:       General: She is not in acute distress  Appearance: Normal appearance  She is not ill-appearing  Cardiovascular:      Heart sounds: Normal heart sounds  Comments: Regular rate and rhythm with no murmurs  Pulmonary:      Effort: Pulmonary effort is normal       Comments: Lungs are clear to auscultation without wheezes,rales, or rhonchi  Musculoskeletal:      Comments: Chronic trace bilateral lower extremity edema    Muscle strength 5/5 lower extremities  Negative straight leg raising bilaterally  Neurological:      General: No focal deficit present  Mental Status: She is alert and oriented to person, place, and time  Mental status is at baseline  Psychiatric:         Mood and Affect: Mood normal          Behavior: Behavior normal          Thought Content:  Thought content normal          Judgment: Judgment normal          Pertinent Laboratory/Diagnostic Studies:  Lab Results   Component Value Date    GLUCOSE 99 10/22/2014    BUN 20 05/07/2019    CREATININE 0 95 05/07/2019    CALCIUM 9 7 05/07/2019     12/20/2017    K 4 2 05/07/2019    CO2 28 05/07/2019     05/07/2019     Lab Results   Component Value Date    ALT 13 05/07/2019    AST 15 05/07/2019    ALKPHOS 71 05/07/2019    BILITOT 0 6 12/20/2017       Lab Results   Component Value Date    WBC 3 9 05/07/2019    HGB 12 4 05/07/2019    HCT 37 6 05/07/2019    MCV 94 2 05/07/2019     05/07/2019       Lab Results   Component Value Date    TSH 1 82 10/02/2018       Lab Results   Component Value Date    CHOL 229 (H) 12/20/2017     Lab Results   Component Value Date    TRIG 75 05/07/2019     Lab Results   Component Value Date    HDL 72 05/07/2019     Lab Results   Component Value Date    LDLCALC 65 05/07/2019     No results found for: HGBA1C    Results for orders placed or performed in visit on 06/04/19   POCT urine dip auto non-scope   Result Value Ref Range     COLOR,UA yellow     CLARITY,UA clear     SPECIFIC GRAVITY,UA 1,010      PH,UA 5 0     LEUKOCYTE ESTERASE,UA neg     NITRITE,UA neg     GLUCOSE, UA neg     KETONES,UA neg     BILIRUBIN,UA neg     BLOOD,UA neg     POCT URINE PROTEIN neg     SL AMB POCT UROBILINOGEN 0 2        Orders Placed This Encounter   Procedures    FLUZONE HIGH-DOSE: influenza vaccine, high-dose, preservative-free 0 7 mL    CBC    Comprehensive metabolic panel    Lipid panel    TSH, 3rd generation       ALLERGIES:  No Known Allergies    Current Medications     Current Outpatient Medications   Medication Sig Dispense Refill    atorvastatin (LIPITOR) 10 mg tablet TAKE ONE TABLET BY MOUTH EVERY DAY 90 tablet 1    Azelaic Acid (FINACEA) 15 % cream Apply topically daily at bedtime        Calcium Carb-Cholecalciferol (CALTRATE 600+D) 600-800 MG-UNIT TABS Take 2 tablets by mouth daily      Denosumab (PROLIA SC) Inject under the skin 2x year       doxycycline (PERIOSTAT) 20 MG tablet Take 20 mg by mouth 2 (two) times a day      Glucosamine-Chondroitin 500-400 MG CAPS Take by mouth      levothyroxine 137 mcg tablet TAKE ONE TABLET BY MOUTH EVERY DAY 90 tablet 3    metoprolol tartrate (LOPRESSOR) 50 mg tablet TAKE ONE TABLET BY MOUTH EVERY DAY 90 tablet 3    metroNIDAZOLE (METROGEL) 1 % gel Apply 1 application topically daily       naproxen (NAPROSYN) 500 mg tablet TAKE ONE TABLET BY MOUTH TWICE A DAY WITH FOOD 180 tablet 3    psyllium (METAMUCIL) 0 52 g capsule Take 0 52 g by mouth 2 (two) times a day       Triamcinolone Acetonide (NASACORT ALLERGY 24HR NA) 2 sprays into each nostril as needed        ibandronate (BONIVA) 150 MG tablet Take by mouth      loratadine (CLARITIN) 10 mg tablet Take 10 mg by mouth daily as needed       methocarbamol (ROBAXIN) 500 mg tablet Take 1 tablet (500 mg total) by mouth 3 (three) times a day 30 tablet 1    Triamcinolone Acetonide 55 MCG/ACT AERO into each nostril       No current facility-administered medications for this visit            Health Maintenance     Health Maintenance   Topic Date Due    DTaP,Tdap,and Td Vaccines (1 - Tdap) 01/23/1971    Colorectal Cancer Screening  08/13/2014    Medicare Annual Wellness Visit (AWV)  03/27/2020    PT PLAN OF CARE  06/10/2020    BMI: Followup Plan  09/30/2020    MAMMOGRAM  10/30/2020    Fall Risk  09/21/2021    Depression Screening PHQ  09/21/2021    BMI: Adult  09/21/2021    Hepatitis C Screening  Completed    Influenza Vaccine  Completed    Pneumococcal Vaccine: 65+ Years  Completed    HIB Vaccine  Aged Out    Hepatitis B Vaccine  Aged Out    IPV Vaccine  Aged Out    Hepatitis A Vaccine  Aged Out    Meningococcal ACWY Vaccine  Aged Out    HPV Vaccine  Aged Out     Immunization History   Administered Date(s) Administered    Fluzone Split Quad 0 5 mL 09/26/2014    INFLUENZA 09/26/2014, 10/10/2015, 10/03/2016, 10/17/2017    Influenza Quadrivalent Preservative Free 3 years and older IM 09/26/2014    Influenza Split High Dose Preservative Free IM 10/10/2015, 10/03/2016, 10/17/2017    Influenza TIV (IM) 10/11/2012, 10/17/2013    Influenza, high dose seasonal 0 7 mL 09/27/2018, 09/30/2019, 09/21/2020    Pneumococcal Conjugate 13-Valent 12/05/2016    Pneumococcal Polysaccharide PPV23 81/49/1921       Annalisa Mendoza MD    I spent 25 minutes with this patient which greater than 50% was spent counseling

## 2020-09-21 NOTE — ASSESSMENT & PLAN NOTE
Hypertension    Patient's blood pressure is borderline however she will continue to check her home blood pressures on her own device and call if blood pressures average above 140/90

## 2020-09-21 NOTE — ASSESSMENT & PLAN NOTE
Back pain  Patient was given a refill on her methocarbamol 500 mg to use 1 tablet t i d  P r n  [place] [facility] Patient will call if she has no symptom improvement after 1st 1-2 weeks  She was advised to not perform any significant bending or heavy lifting that might exacerbate her current symptoms

## 2020-09-28 ENCOUNTER — LAB (OUTPATIENT)
Dept: LAB | Facility: CLINIC | Age: 70
End: 2020-09-28
Payer: COMMERCIAL

## 2020-09-28 DIAGNOSIS — I10 ESSENTIAL HYPERTENSION: ICD-10-CM

## 2020-09-28 DIAGNOSIS — E78.5 HYPERLIPIDEMIA, UNSPECIFIED HYPERLIPIDEMIA TYPE: ICD-10-CM

## 2020-09-28 DIAGNOSIS — E03.9 HYPOTHYROIDISM, UNSPECIFIED TYPE: ICD-10-CM

## 2020-09-28 LAB
ALBUMIN SERPL BCP-MCNC: 4 G/DL (ref 3.5–5)
ALP SERPL-CCNC: 58 U/L (ref 46–116)
ALT SERPL W P-5'-P-CCNC: 19 U/L (ref 12–78)
ANION GAP SERPL CALCULATED.3IONS-SCNC: 6 MMOL/L (ref 4–13)
AST SERPL W P-5'-P-CCNC: 14 U/L (ref 5–45)
BILIRUB SERPL-MCNC: 0.6 MG/DL (ref 0.2–1)
BUN SERPL-MCNC: 21 MG/DL (ref 5–25)
CALCIUM SERPL-MCNC: 10.1 MG/DL (ref 8.3–10.1)
CHLORIDE SERPL-SCNC: 106 MMOL/L (ref 100–108)
CHOLEST SERPL-MCNC: 189 MG/DL (ref 50–200)
CO2 SERPL-SCNC: 29 MMOL/L (ref 21–32)
CREAT SERPL-MCNC: 0.92 MG/DL (ref 0.6–1.3)
ERYTHROCYTE [DISTWIDTH] IN BLOOD BY AUTOMATED COUNT: 12.8 % (ref 11.6–15.1)
GFR SERPL CREATININE-BSD FRML MDRD: 63 ML/MIN/1.73SQ M
GLUCOSE P FAST SERPL-MCNC: 97 MG/DL (ref 65–99)
HCT VFR BLD AUTO: 40.2 % (ref 34.8–46.1)
HDLC SERPL-MCNC: 78 MG/DL
HGB BLD-MCNC: 12.6 G/DL (ref 11.5–15.4)
LDLC SERPL CALC-MCNC: 92 MG/DL (ref 0–100)
MCH RBC QN AUTO: 31.3 PG (ref 26.8–34.3)
MCHC RBC AUTO-ENTMCNC: 31.3 G/DL (ref 31.4–37.4)
MCV RBC AUTO: 100 FL (ref 82–98)
NONHDLC SERPL-MCNC: 111 MG/DL
PLATELET # BLD AUTO: 204 THOUSANDS/UL (ref 149–390)
PMV BLD AUTO: 9.7 FL (ref 8.9–12.7)
POTASSIUM SERPL-SCNC: 4.4 MMOL/L (ref 3.5–5.3)
PROT SERPL-MCNC: 6.9 G/DL (ref 6.4–8.2)
RBC # BLD AUTO: 4.02 MILLION/UL (ref 3.81–5.12)
SODIUM SERPL-SCNC: 141 MMOL/L (ref 136–145)
TRIGL SERPL-MCNC: 94 MG/DL
TSH SERPL DL<=0.05 MIU/L-ACNC: 1.13 UIU/ML (ref 0.36–3.74)
WBC # BLD AUTO: 4.41 THOUSAND/UL (ref 4.31–10.16)

## 2020-09-28 PROCEDURE — 36415 COLL VENOUS BLD VENIPUNCTURE: CPT

## 2020-09-28 PROCEDURE — 80061 LIPID PANEL: CPT

## 2020-09-28 PROCEDURE — 84443 ASSAY THYROID STIM HORMONE: CPT

## 2020-09-28 PROCEDURE — 80053 COMPREHEN METABOLIC PANEL: CPT

## 2020-09-28 PROCEDURE — 85027 COMPLETE CBC AUTOMATED: CPT

## 2020-09-29 ENCOUNTER — TELEPHONE (OUTPATIENT)
Dept: FAMILY MEDICINE CLINIC | Facility: CLINIC | Age: 70
End: 2020-09-29

## 2020-09-29 NOTE — TELEPHONE ENCOUNTER
----- Message from Annalisa Mendoza MD sent at 0/12/7409  6:57 AM EDT -----  All recent blood work stable for patient    Continue current regimen of medications

## 2020-09-30 ENCOUNTER — TELEPHONE (OUTPATIENT)
Dept: FAMILY MEDICINE CLINIC | Facility: CLINIC | Age: 70
End: 2020-09-30

## 2020-09-30 NOTE — TELEPHONE ENCOUNTER
Patient called stating she is having back pain and she is taking the Methocarbamol and it is not working for her  She is having a hard time sleeping due to the pain  She was wondering if you would prescribe something else for the pain  Patient uses [facility] in [facility], please advise patient at [phone]

## 2020-10-01 DIAGNOSIS — M54.50 ACUTE MIDLINE LOW BACK PAIN WITHOUT SCIATICA: Primary | ICD-10-CM

## 2020-10-01 RX ORDER — HYDROCODONE BITARTRATE AND ACETAMINOPHEN 5; 325 MG/1; MG/1
1 TABLET ORAL EVERY 8 HOURS PRN
Qty: 30 TABLET | Refills: 0 | Status: SHIPPED | OUTPATIENT
Start: 2020-10-01 | End: 2020-10-26 | Stop reason: SDUPTHER

## 2020-10-01 NOTE — TELEPHONE ENCOUNTER
Spoke with patient and she will try one of her husbands vicodin and get back to you to see if it works or not  No other questions at this time

## 2020-10-01 NOTE — TELEPHONE ENCOUNTER
Could she try one of her 's hydrocodone to see if it works and she can tolerate  If so I can send in a script for her

## 2020-10-09 ENCOUNTER — HOSPITAL ENCOUNTER (OUTPATIENT)
Dept: MAMMOGRAPHY | Facility: CLINIC | Age: 70
Discharge: HOME | End: 2020-10-09
Payer: COMMERCIAL

## 2020-10-09 VITALS — WEIGHT: 248 LBS | TEMPERATURE: 96.3 F | HEIGHT: 64 IN | BODY MASS INDEX: 42.34 KG/M2

## 2020-10-09 DIAGNOSIS — Z12.31 VISIT FOR SCREENING MAMMOGRAM: ICD-10-CM

## 2020-10-09 PROCEDURE — 77067 SCR MAMMO BI INCL CAD: CPT

## 2020-10-09 PROCEDURE — 77063 BREAST TOMOSYNTHESIS BI: CPT

## 2020-10-12 ENCOUNTER — TELEPHONE (OUTPATIENT)
Dept: OBGYN CLINIC | Facility: CLINIC | Age: 70
End: 2020-10-12

## 2020-10-13 DIAGNOSIS — M54.50 ACUTE MIDLINE LOW BACK PAIN WITHOUT SCIATICA: ICD-10-CM

## 2020-10-13 RX ORDER — METHOCARBAMOL 500 MG/1
TABLET, FILM COATED ORAL
Qty: 30 TABLET | Refills: 1 | Status: SHIPPED | OUTPATIENT
Start: 2020-10-13 | End: 2020-10-26 | Stop reason: SDUPTHER

## 2020-10-15 ENCOUNTER — TELEPHONE (OUTPATIENT)
Dept: OBGYN CLINIC | Facility: CLINIC | Age: 70
End: 2020-10-15

## 2020-10-16 ENCOUNTER — HOSPITAL ENCOUNTER (OUTPATIENT)
Dept: ULTRASOUND IMAGING | Facility: CLINIC | Age: 70
Discharge: HOME | End: 2020-10-16
Payer: COMMERCIAL

## 2020-10-16 ENCOUNTER — HOSPITAL ENCOUNTER (OUTPATIENT)
Dept: MAMMOGRAPHY | Facility: CLINIC | Age: 70
Discharge: HOME | End: 2020-10-16
Payer: COMMERCIAL

## 2020-10-16 VITALS — BODY MASS INDEX: 42.34 KG/M2 | TEMPERATURE: 94.7 F | HEIGHT: 64 IN | WEIGHT: 248 LBS

## 2020-10-16 DIAGNOSIS — R92.8 ABNORMAL MAMMOGRAM: ICD-10-CM

## 2020-10-16 PROCEDURE — 77065 DX MAMMO INCL CAD UNI: CPT

## 2020-10-16 PROCEDURE — G0279 TOMOSYNTHESIS, MAMMO: HCPCS

## 2020-10-19 ENCOUNTER — TELEPHONE (OUTPATIENT)
Dept: FAMILY MEDICINE CLINIC | Facility: CLINIC | Age: 70
End: 2020-10-19

## 2020-10-19 DIAGNOSIS — M54.50 ACUTE MIDLINE LOW BACK PAIN WITHOUT SCIATICA: Primary | ICD-10-CM

## 2020-10-20 ENCOUNTER — HOSPITAL ENCOUNTER (OUTPATIENT)
Dept: RADIOLOGY | Facility: HOSPITAL | Age: 70
Discharge: HOME | End: 2020-10-20
Payer: COMMERCIAL

## 2020-10-20 DIAGNOSIS — M54.50 ACUTE MIDLINE LOW BACK PAIN WITHOUT SCIATICA: ICD-10-CM

## 2020-10-20 PROCEDURE — 72110 X-RAY EXAM L-2 SPINE 4/>VWS: CPT

## 2020-10-24 DIAGNOSIS — E03.9 HYPOTHYROIDISM, UNSPECIFIED TYPE: ICD-10-CM

## 2020-10-24 RX ORDER — LEVOTHYROXINE SODIUM 137 UG/1
TABLET ORAL
Qty: 90 TABLET | Refills: 3 | Status: SHIPPED | OUTPATIENT
Start: 2020-10-24 | End: 2021-11-08

## 2020-10-26 ENCOUNTER — TELEPHONE (OUTPATIENT)
Dept: FAMILY MEDICINE CLINIC | Facility: CLINIC | Age: 70
End: 2020-10-26

## 2020-10-26 DIAGNOSIS — M54.50 ACUTE MIDLINE LOW BACK PAIN WITHOUT SCIATICA: ICD-10-CM

## 2020-10-26 RX ORDER — METHOCARBAMOL 500 MG/1
500 TABLET, FILM COATED ORAL 3 TIMES DAILY
Qty: 30 TABLET | Refills: 1 | Status: SHIPPED | OUTPATIENT
Start: 2020-10-26 | End: 2021-08-24 | Stop reason: SDUPTHER

## 2020-10-26 RX ORDER — METHOCARBAMOL 500 MG/1
500 TABLET, FILM COATED ORAL 3 TIMES DAILY
Qty: 90 TABLET | Refills: 0 | Status: CANCELLED | OUTPATIENT
Start: 2020-10-26

## 2020-10-26 RX ORDER — HYDROCODONE BITARTRATE AND ACETAMINOPHEN 5; 325 MG/1; MG/1
1 TABLET ORAL EVERY 8 HOURS PRN
Qty: 30 TABLET | Refills: 1 | Status: CANCELLED | OUTPATIENT
Start: 2020-10-26

## 2020-10-26 RX ORDER — HYDROCODONE BITARTRATE AND ACETAMINOPHEN 5; 325 MG/1; MG/1
1 TABLET ORAL EVERY 8 HOURS PRN
Qty: 30 TABLET | Refills: 0 | Status: SHIPPED | OUTPATIENT
Start: 2020-10-26 | End: 2021-01-04 | Stop reason: ALTCHOICE

## 2020-11-02 ENCOUNTER — OFFICE VISIT (OUTPATIENT)
Dept: OBGYN CLINIC | Facility: OTHER | Age: 70
End: 2020-11-02
Payer: COMMERCIAL

## 2020-11-02 VITALS
BODY MASS INDEX: 42.34 KG/M2 | WEIGHT: 248 LBS | TEMPERATURE: 97.4 F | DIASTOLIC BLOOD PRESSURE: 75 MMHG | SYSTOLIC BLOOD PRESSURE: 149 MMHG | HEART RATE: 60 BPM | HEIGHT: 64 IN

## 2020-11-02 DIAGNOSIS — M54.16 RADICULOPATHY, LUMBAR REGION: Primary | ICD-10-CM

## 2020-11-02 PROCEDURE — 99214 OFFICE O/P EST MOD 30 MIN: CPT | Performed by: ORTHOPAEDIC SURGERY

## 2020-11-02 PROCEDURE — 1036F TOBACCO NON-USER: CPT | Performed by: ORTHOPAEDIC SURGERY

## 2020-11-02 PROCEDURE — 1160F RVW MEDS BY RX/DR IN RCRD: CPT | Performed by: ORTHOPAEDIC SURGERY

## 2020-11-02 PROCEDURE — 3077F SYST BP >= 140 MM HG: CPT | Performed by: ORTHOPAEDIC SURGERY

## 2020-11-02 PROCEDURE — 3078F DIAST BP <80 MM HG: CPT | Performed by: ORTHOPAEDIC SURGERY

## 2020-11-02 RX ORDER — GABAPENTIN 300 MG/1
300 CAPSULE ORAL
Qty: 30 CAPSULE | Refills: 1 | Status: SHIPPED | OUTPATIENT
Start: 2020-11-02 | End: 2021-01-04

## 2020-11-04 ENCOUNTER — TELEPHONE (OUTPATIENT)
Dept: OBGYN CLINIC | Facility: MEDICAL CENTER | Age: 70
End: 2020-11-04

## 2020-11-11 ENCOUNTER — ANNUAL EXAM (OUTPATIENT)
Dept: OBGYN CLINIC | Facility: CLINIC | Age: 70
End: 2020-11-11
Payer: COMMERCIAL

## 2020-11-11 VITALS — WEIGHT: 254 LBS | BODY MASS INDEX: 43.36 KG/M2 | HEIGHT: 64 IN

## 2020-11-11 DIAGNOSIS — Z78.0 ASYMPTOMATIC POSTMENOPAUSAL ESTROGEN DEFICIENCY: ICD-10-CM

## 2020-11-11 DIAGNOSIS — M81.0 AGE-RELATED OSTEOPOROSIS WITHOUT CURRENT PATHOLOGICAL FRACTURE: ICD-10-CM

## 2020-11-11 DIAGNOSIS — Z87.39 HISTORY OF OSTEOPOROSIS: ICD-10-CM

## 2020-11-11 DIAGNOSIS — Z12.31 ENCOUNTER FOR SCREENING MAMMOGRAM FOR MALIGNANT NEOPLASM OF BREAST: ICD-10-CM

## 2020-11-11 DIAGNOSIS — Z01.419 ENCOUNTER FOR GYNECOLOGICAL EXAMINATION WITHOUT ABNORMAL FINDING: Primary | ICD-10-CM

## 2020-11-11 PROCEDURE — 3008F BODY MASS INDEX DOCD: CPT | Performed by: ORTHOPAEDIC SURGERY

## 2020-11-11 PROCEDURE — G0101 CA SCREEN;PELVIC/BREAST EXAM: HCPCS | Performed by: PHYSICIAN ASSISTANT

## 2020-11-24 DIAGNOSIS — I10 ESSENTIAL HYPERTENSION: ICD-10-CM

## 2020-11-24 RX ORDER — METOPROLOL TARTRATE 50 MG/1
TABLET, FILM COATED ORAL
Qty: 90 TABLET | Refills: 3 | Status: SHIPPED | OUTPATIENT
Start: 2020-11-24 | End: 2020-11-29

## 2020-11-25 ENCOUNTER — VBI (OUTPATIENT)
Dept: ADMINISTRATIVE | Facility: OTHER | Age: 70
End: 2020-11-25

## 2020-11-28 DIAGNOSIS — I10 ESSENTIAL HYPERTENSION: ICD-10-CM

## 2020-11-29 RX ORDER — METOPROLOL TARTRATE 50 MG/1
TABLET, FILM COATED ORAL
Qty: 90 TABLET | Refills: 3 | Status: SHIPPED | OUTPATIENT
Start: 2020-11-29 | End: 2020-12-03

## 2020-12-02 DIAGNOSIS — I10 ESSENTIAL HYPERTENSION: ICD-10-CM

## 2020-12-03 RX ORDER — METOPROLOL TARTRATE 50 MG/1
TABLET, FILM COATED ORAL
Qty: 90 TABLET | Refills: 3 | Status: SHIPPED | OUTPATIENT
Start: 2020-12-03 | End: 2021-11-12

## 2020-12-15 ENCOUNTER — CLINICAL SUPPORT (OUTPATIENT)
Dept: FAMILY MEDICINE CLINIC | Facility: CLINIC | Age: 70
End: 2020-12-15
Payer: COMMERCIAL

## 2020-12-15 VITALS — TEMPERATURE: 97 F

## 2020-12-15 DIAGNOSIS — M81.0 OSTEOPOROSIS, UNSPECIFIED OSTEOPOROSIS TYPE, UNSPECIFIED PATHOLOGICAL FRACTURE PRESENCE: Primary | ICD-10-CM

## 2020-12-15 PROCEDURE — 96372 THER/PROPH/DIAG INJ SC/IM: CPT | Performed by: FAMILY MEDICINE

## 2020-12-21 ENCOUNTER — TELEPHONE (OUTPATIENT)
Dept: OBGYN CLINIC | Facility: HOSPITAL | Age: 70
End: 2020-12-21

## 2021-01-04 ENCOUNTER — TELEMEDICINE (OUTPATIENT)
Dept: OBGYN CLINIC | Facility: OTHER | Age: 71
End: 2021-01-04
Payer: COMMERCIAL

## 2021-01-04 DIAGNOSIS — M54.16 RADICULOPATHY, LUMBAR REGION: Primary | ICD-10-CM

## 2021-01-04 PROCEDURE — 99213 OFFICE O/P EST LOW 20 MIN: CPT | Performed by: ORTHOPAEDIC SURGERY

## 2021-01-04 PROCEDURE — 1160F RVW MEDS BY RX/DR IN RCRD: CPT | Performed by: ORTHOPAEDIC SURGERY

## 2021-01-04 RX ORDER — GABAPENTIN 300 MG/1
300 CAPSULE ORAL
Qty: 90 CAPSULE | Refills: 0 | Status: SHIPPED | OUTPATIENT
Start: 2021-01-04 | End: 2021-04-01 | Stop reason: SDUPTHER

## 2021-01-04 NOTE — PROGRESS NOTES
Virtual Brief Visit    Assessment/Plan:    Problem List Items Addressed This Visit     None      Visit Diagnoses     Radiculopathy, lumbar region    -  Primary    Relevant Medications    gabapentin (NEURONTIN) 300 mg capsule        I explained my current clinical impression to Murali Ramsay  She has had good improvement for lower back pain with oral gabapentin  We discussed the importance of both aerobic as well as strength training exercises for general health and explained to her the importance of strength training for bone health  Since she has had good results with oral gabapentin, I will provide her a refill in this regard  Did explain to her that she may experience periodic flare up of her back pain in which case she could consider following up with her primary care physician in this regard or myself  Reason for visit is   Chief Complaint   Patient presents with    Virtual Brief Visit        Encounter provider Jose Emmanuel MD    Provider located at [address] [place] [address]  [address] [address]    Recent Visits  No visits were found meeting these conditions  Showing recent visits within past 7 days and meeting all other requirements     Today's Visits  Date Type Provider Dept   01/04/21 Telemedicine Jose Emmanuel  [facility] today's visits and meeting all other requirements     Future Appointments  No visits were found meeting these conditions  Showing future appointments within next 150 days and meeting all other requirements        After connecting through telephone, the patient was identified by name and date of birth  Claytonsally Maddie was informed that this is a telemedicine visit and that the visit is being conducted through telephone  My office door was closed  No one else was in the room  She acknowledged consent and understanding of privacy and security of the platform   The patient has agreed to participate and understands she can discontinue the visit at any time  Patient is aware this is a billable service  Michelle Llamas is a 79 y o  female for back pain follow-up  I spoke to Desiree Bui today for a follow-up of her low back pain  She was seen nearly 2 months ago for low back pain with radiation to the right gluteal area  Lumbar spine radiographs had revealed multilevel degenerative changes  She was suggested to do physical therapy as well as started on nighttime dose of oral gabapentin  Desiree Bui does mention not having done physical therapy  However, she reports that her low back pain has significantly improved after starting oral gabapentin and she denies any current radicular pain to her buttocks or the lower extremity  She also denied any tingling or numbness of her lower extremities and denies any new onset weakness of the lower extremities  She denies any systemic symptoms like fever or chills or new onset bowel or bladder control problems  She denies any new trauma         Past Medical History:   Diagnosis Date    Allergic rhinitis     Arthritis     Cataract     brock    Disease of thyroid gland     had total thyroidectomy    Diverticulosis     Hyperlipidemia     Hypertension     Mild acid reflux     Osteoporosis     Plantar fasciitis     b/l  l worse uses orthotics in shoes    Rosacea     Use of cane as ambulatory aid     Uterovaginal prolapse        Past Surgical History:   Procedure Laterality Date    ANKLE SURGERY Left     Fracture / hardware removed    COLONOSCOPY      fiberoptic    COLPORRHAPHY N/A 6/17/2019    Procedure: POSTERIOR COLPORRHAPHY;  Surgeon: Golden Goodell, MD;  Location[facility];  Service: UroGynecology           OTHER SURGICAL HISTORY      thyroid biopsy core needle     DE REMOVAL DEEP IMPLANT Left 9/18/2017    Procedure: REMOVAL HARDWARE ANKLE (1 plate, 8 screws );  Surgeon: You Melo MD;  Location: [facility] OR;  Service: Orthopedics    ND REPAIR OF PERINEUM,NON OBSTETRICAL N/A 6/17/2019    Procedure: Theo Meth;  Surgeon: Konrad Mena MD;  [facility];  Service: UroGynecology           ND Price Tanya LIG N/A 6/17/2019    Procedure: VE COLPOSUSPENSION (neugide);   Surgeon: Konrad Mena MD;  [facility];  Service: UroGynecology           THYROIDECTOMY      Total     TUBAL LIGATION         Current Outpatient Medications   Medication Sig Dispense Refill    atorvastatin (LIPITOR) 10 mg tablet TAKE ONE TABLET BY MOUTH EVERY DAY 90 tablet 1    Azelaic Acid (FINACEA) 15 % cream Apply topically daily at bedtime        Calcium Carb-Cholecalciferol (CALTRATE 600+D) 600-800 MG-UNIT TABS Take 2 tablets by mouth daily      Denosumab (PROLIA SC) Inject under the skin 2x year       doxycycline (PERIOSTAT) 20 MG tablet Take 20 mg by mouth 2 (two) times a day      gabapentin (NEURONTIN) 300 mg capsule Take 1 capsule (300 mg total) by mouth daily at bedtime 30 capsule 1    Glucosamine-Chondroitin 500-400 MG CAPS Take by mouth      levothyroxine 137 mcg tablet TAKE ONE TABLET BY MOUTH EVERY DAY 90 tablet 3    loratadine (CLARITIN) 10 mg tablet Take 10 mg by mouth daily as needed       methocarbamol (ROBAXIN) 500 mg tablet Take 1 tablet (500 mg total) by mouth 3 (three) times a day 30 tablet 1    metoprolol tartrate (LOPRESSOR) 50 mg tablet TAKE ONE TABLET BY MOUTH EVERY DAY 90 tablet 3    metroNIDAZOLE (METROGEL) 1 % gel Apply 1 application topically daily       naproxen (NAPROSYN) 500 mg tablet TAKE ONE TABLET BY MOUTH TWICE A DAY WITH FOOD 180 tablet 3    Nerve Stimulator (TENS Therapy Pain Relief) KEISHA 1 application by Does not apply route 2 (two) times a day as needed (For right-sided lower back pain) 1 Device 0    psyllium (METAMUCIL) 0 52 g capsule Take 0 52 g by mouth 2 (two) times a day       Triamcinolone Acetonide (NASACORT ALLERGY 24HR NA) 2 sprays into each nostril as needed        Triamcinolone Acetonide 55 MCG/ACT AERO into each nostril       No current facility-administered medications for this visit  No Known Allergies    Review of Systems   Constitutional: Negative  HENT: Negative  Eyes: Negative  Respiratory: Negative  Cardiovascular: Negative  Gastrointestinal: Negative  Endocrine: Negative  Genitourinary: Negative  Skin: Negative  Allergic/Immunologic: Negative  Neurological: Negative  Hematological: Negative  Psychiatric/Behavioral: Negative  There were no vitals filed for this visit  I spent 15 minutes directly with the patient during this visit    [address] [facility] acknowledges that she has consented to an online visit or consultation  She understands that the online visit is based solely on information provided by her, and that, in the absence of a face-to-face physical evaluation by the physician, the diagnosis she receives is both limited and provisional in terms of accuracy and completeness  This is not intended to replace a full medical face-to-face evaluation by the physician  Mohit Ordoñez understands and accepts these terms

## 2021-02-13 DIAGNOSIS — Z23 ENCOUNTER FOR IMMUNIZATION: ICD-10-CM

## 2021-02-24 ENCOUNTER — IMMUNIZATIONS (OUTPATIENT)
Dept: FAMILY MEDICINE CLINIC | Facility: HOSPITAL | Age: 71
End: 2021-02-24

## 2021-02-24 DIAGNOSIS — Z23 ENCOUNTER FOR IMMUNIZATION: Primary | ICD-10-CM

## 2021-02-24 PROCEDURE — 91300 SARS-COV-2 / COVID-19 MRNA VACCINE (PFIZER-BIONTECH) 30 MCG: CPT

## 2021-02-24 PROCEDURE — 0001A SARS-COV-2 / COVID-19 MRNA VACCINE (PFIZER-BIONTECH) 30 MCG: CPT

## 2021-03-15 ENCOUNTER — IMMUNIZATIONS (OUTPATIENT)
Dept: FAMILY MEDICINE CLINIC | Facility: HOSPITAL | Age: 71
End: 2021-03-15

## 2021-03-15 DIAGNOSIS — Z23 ENCOUNTER FOR IMMUNIZATION: Primary | ICD-10-CM

## 2021-03-15 PROCEDURE — 91300 SARS-COV-2 / COVID-19 MRNA VACCINE (PFIZER-BIONTECH) 30 MCG: CPT

## 2021-03-15 PROCEDURE — 0002A SARS-COV-2 / COVID-19 MRNA VACCINE (PFIZER-BIONTECH) 30 MCG: CPT

## 2021-04-01 ENCOUNTER — OFFICE VISIT (OUTPATIENT)
Dept: FAMILY MEDICINE CLINIC | Facility: CLINIC | Age: 71
End: 2021-04-01
Payer: COMMERCIAL

## 2021-04-01 VITALS
BODY MASS INDEX: 40.63 KG/M2 | RESPIRATION RATE: 16 BRPM | WEIGHT: 238 LBS | SYSTOLIC BLOOD PRESSURE: 140 MMHG | DIASTOLIC BLOOD PRESSURE: 70 MMHG | OXYGEN SATURATION: 100 % | TEMPERATURE: 98.4 F | HEIGHT: 64 IN | HEART RATE: 87 BPM

## 2021-04-01 DIAGNOSIS — E03.9 HYPOTHYROIDISM, UNSPECIFIED TYPE: Primary | ICD-10-CM

## 2021-04-01 DIAGNOSIS — M79.672 PAIN OF LEFT HEEL: ICD-10-CM

## 2021-04-01 DIAGNOSIS — M85.80 OSTEOPENIA, UNSPECIFIED LOCATION: ICD-10-CM

## 2021-04-01 DIAGNOSIS — M54.16 RADICULOPATHY, LUMBAR REGION: ICD-10-CM

## 2021-04-01 DIAGNOSIS — E78.5 HYPERLIPIDEMIA, UNSPECIFIED HYPERLIPIDEMIA TYPE: ICD-10-CM

## 2021-04-01 DIAGNOSIS — I10 ESSENTIAL HYPERTENSION: ICD-10-CM

## 2021-04-01 PROCEDURE — 99214 OFFICE O/P EST MOD 30 MIN: CPT | Performed by: FAMILY MEDICINE

## 2021-04-01 RX ORDER — GABAPENTIN 300 MG/1
300 CAPSULE ORAL 2 TIMES DAILY
Qty: 180 CAPSULE | Refills: 1 | Status: SHIPPED | OUTPATIENT
Start: 2021-04-01 | End: 2021-09-21

## 2021-04-01 NOTE — ASSESSMENT & PLAN NOTE
Left ankle pain  Patient was given prescription to increase her gabapentin to 300 milligrams 1 b i d   Patient will report progress

## 2021-04-01 NOTE — PROGRESS NOTES
FAMILY PRACTICE OFFICE VISIT       NAME: Yajaira Robb  AGE: 70 y o  SEX: female       : 1950        MRN: [id number]    DATE: 2021  TIME: 1:56 PM    Assessment and Plan     Problem List Items Addressed This Visit        Endocrine    Hypothyroidism - Primary       Hypothyroidism  Patient will check TSH blood work and continue with current dose of levothyroxine         Relevant Orders    CBC    Comprehensive metabolic panel       Cardiovascular and Mediastinum    Hypertension      Hypertension  Patient blood pressure is stable at this time she will continue with current regimen of medications         Relevant Orders    CBC    Comprehensive metabolic panel    Lipid panel       Musculoskeletal and Integument    Osteopenia      Osteopenia  Patient continues to use Prolia injections every 6 months  She is due for repeat DEXA scan at the end of April  Other    Hyperlipidemia      Hyperlipidemia  Patient will check lipid panel and continue with current dose of statin therapy         Pain of left heel       Left ankle pain  Patient was given prescription to increase her gabapentin to 300 milligrams 1 b i d   Patient will report progress  Other Visit Diagnoses     Radiculopathy, lumbar region        Relevant Medications    gabapentin (NEURONTIN) 300 mg capsule    Body mass index (BMI)40 0-44 9, adult [facility])                  Chief Complaint     Chief Complaint   Patient presents with    Follow-up     6 months follow        History of Present Illness      Patient in the office to review chronic medical condition  She denies any recent illness  She has received her COVID vaccination  She continues to do exercises on a stationary bicycle for all 1/2 hour daily as well as her stretching exercises for her left ankle  She was seen by Pain Management who placed her on gabapentin 300 milligrams at bedtime which she states has helped her back    Patient would like to increase medication to see if it helps her left ankle  She denies any side effects from medication  Review of Systems   Review of Systems   Constitutional: Negative  HENT: Negative  Respiratory: Negative  Cardiovascular: Negative  Gastrointestinal: Negative  Genitourinary: Negative  Musculoskeletal: Positive for arthralgias and gait problem  Psychiatric/Behavioral: Negative          Active Problem List     Patient Active Problem List   Diagnosis    Pain, joint, ankle and foot, left    DJD (degenerative joint disease), ankle and foot, left    Allergic rhinitis    Esophageal reflux    Hyperlipidemia    Hypertension    Hypothyroidism    Rosacea    Pain of right lower extremity    Female genital prolapse    Fracture of ankle    Osteopenia    Varicose veins of lower extremity    Pain of left heel    Body mass index (BMI) 40 0-44 9, adult    Acute midline low back pain without sciatica       Past Medical History:  Past Medical History:   Diagnosis Date    Allergic rhinitis     Arthritis     Cataract     brock    Disease of thyroid gland     had total thyroidectomy    Diverticulosis     Hyperlipidemia     Hypertension     Mild acid reflux     Osteoporosis     Plantar fasciitis     b/l  l worse uses orthotics in shoes    Rosacea     Use of cane as ambulatory aid     Uterovaginal prolapse        Past Surgical History:  Past Surgical History:   Procedure Laterality Date    ANKLE SURGERY Left     Fracture / hardware removed    COLONOSCOPY      fiberoptic    COLPORRHAPHY N/A 6/17/2019    Procedure: POSTERIOR COLPORRHAPHY;  Surgeon: Hazeline Jeans, MD;  Location: [facility];  Service: UroGynecology           OTHER SURGICAL HISTORY      thyroid biopsy core needle     OK REMOVAL DEEP IMPLANT Left 9/18/2017    Procedure: REMOVAL HARDWARE ANKLE (1 plate, 8 screws );  Surgeon: Pushpa Brown MD;  [facility];  Service: Orthopedics    OK REPAIR OF PERINEUM,NON OBSTETRICAL N/A 6/17/2019    Procedure: Jewel Rumble;  Surgeon: Elham Santana MD;  Location[facility];  Service: UroGynecology           [place] REVAGINAL PROLAPSE,SACROSP LIG N/A 6/17/2019    Procedure: VE COLPOSUSPENSION (neugide);   Surgeon: Elham Santana MD;  [facility];  Service: UroGynecology           THYROIDECTOMY      Total     TUBAL LIGATION         Family History:  Family History   Problem Relation Age of Onset    COPD Mother     Diabetes Mother     Heart disease Mother     Hypertension Mother     Bone cancer Father     Colon cancer Family     No Known Problems Daughter     No Known Problems Maternal Grandmother     No Known Problems Maternal Grandfather     No Known Problems Paternal Grandmother     No Known Problems Paternal Grandfather     No Known Problems Maternal Aunt     No Known Problems Maternal Aunt     No Known Problems Paternal Aunt        Social History:  Social History     Socioeconomic History    Marital status: /Civil Union     Spouse name: Not on file    Number of children: Not on file    Years of education: Not on file    Highest education level: Not on file   Occupational History    Not on file   Social Needs    Financial resource strain: Not on file    Food insecurity     Worry: Not on file     Inability: Not on file   [employer] needs     Medical: Not on file     Non-medical: Not on file   Tobacco Use    Smoking status: Never Smoker    Smokeless tobacco: Never Used   Substance and Sexual Activity    Alcohol use: No     Comment: Never drank alcohol denied    Drug use: No    Sexual activity: Never   Lifestyle    Physical activity     Days per week: Not on file     Minutes per session: Not on file    Stress: Not on file   Relationships    Social connections     Talks on phone: Not on file     Gets together: Not on file     Attends Christian service: Not on file     Active member of club or organization: Not on file     Attends meetings of clubs or organizations: Not on file     Relationship status: Not on file    Intimate partner violence     Fear of current or ex partner: Not on file     Emotionally abused: Not on file     Physically abused: Not on file     Forced sexual activity: Not on file   Other Topics Concern    Not on file   Social History Narrative    Exercies moderately 3 or more times a week        Objective     Vitals:    04/01/21 1255   BP: 140/70   Pulse: 87   Resp: 16   Temp: 98 4 °F (36 9 °C)   SpO2: 100%     Wt Readings from Last 3 Encounters:   04/01/21 108 kg (238 lb)   11/11/20 115 kg (254 lb)   11/02/20 112 kg (248 lb)       Physical Exam  Constitutional:       General: She is not in acute distress  Appearance: Normal appearance  She is not ill-appearing  HENT:      Head: Normocephalic and atraumatic  Eyes:      General:         Right eye: No discharge  Left eye: No discharge  Extraocular Movements: Extraocular movements intact  Conjunctiva/sclera: Conjunctivae normal       Pupils: Pupils are equal, round, and reactive to light  Cardiovascular:      Rate and Rhythm: Normal rate and regular rhythm  Heart sounds: Normal heart sounds  No murmur  Pulmonary:      Effort: Pulmonary effort is normal  No respiratory distress  Breath sounds: Normal breath sounds  No wheezing or rhonchi  Abdominal:      Comments: Abdomen is soft, nontender with positive bowel sounds  There is no rebound or guarding  No masses palpated   Musculoskeletal:      Right lower leg: No edema  Left lower leg: Edema present  Neurological:      General: No focal deficit present  Mental Status: She is alert and oriented to person, place, and time  Mental status is at baseline  Psychiatric:         Mood and Affect: Mood normal          Behavior: Behavior normal          Thought Content:  Thought content normal          Judgment: Judgment normal          Pertinent Laboratory/Diagnostic Studies:  Lab Results   Component Value Date    GLUCOSE 99 10/22/2014    BUN 21 09/28/2020    CREATININE 0 92 09/28/2020    CALCIUM 10 1 09/28/2020     12/20/2017    K 4 4 09/28/2020    CO2 29 09/28/2020     09/28/2020     Lab Results   Component Value Date    ALT 19 09/28/2020    AST 14 09/28/2020    ALKPHOS 58 09/28/2020    BILITOT 0 6 12/20/2017       Lab Results   Component Value Date    WBC 4 41 09/28/2020    HGB 12 6 09/28/2020    HCT 40 2 09/28/2020     (H) 09/28/2020     09/28/2020       Lab Results   Component Value Date    TSH 1 82 10/02/2018       Lab Results   Component Value Date    CHOL 229 (H) 12/20/2017     Lab Results   Component Value Date    TRIG 94 09/28/2020     Lab Results   Component Value Date    HDL 78 09/28/2020     Lab Results   Component Value Date    LDLCALC 92 09/28/2020     No results found for: HGBA1C    Results for orders placed or performed in visit on 09/28/20   CBC   Result Value Ref Range    WBC 4 41 4 31 - 10 16 Thousand/uL    RBC 4 02 3 81 - 5 12 Million/uL    Hemoglobin 12 6 11 5 - 15 4 g/dL    Hematocrit 40 2 34 8 - 46 1 %     (H) 82 - 98 fL    MCH 31 3 26 8 - 34 3 pg    MCHC 31 3 (L) 31 4 - 37 4 g/dL    RDW 12 8 11 6 - 15 1 %    Platelets 665 887 - 933 Thousands/uL    MPV 9 7 8 9 - 12 7 fL   Comprehensive metabolic panel   Result Value Ref Range    Sodium 141 136 - 145 mmol/L    Potassium 4 4 3 5 - 5 3 mmol/L    Chloride 106 100 - 108 mmol/L    CO2 29 21 - 32 mmol/L    ANION GAP 6 4 - 13 mmol/L    BUN 21 5 - 25 mg/dL    Creatinine 0 92 0 60 - 1 30 mg/dL    Glucose, Fasting 97 65 - 99 mg/dL    Calcium 10 1 8 3 - 10 1 mg/dL    AST 14 5 - 45 U/L    ALT 19 12 - 78 U/L    Alkaline Phosphatase 58 46 - 116 U/L    Total Protein 6 9 6 4 - 8 2 g/dL    Albumin 4 0 3 5 - 5 0 g/dL    Total Bilirubin 0 60 0 20 - 1 00 mg/dL    eGFR 63 ml/min/1 73sq m   Lipid panel   Result Value Ref Range    Cholesterol 189 50 - 200 mg/dL    Triglycerides 94 <=150 mg/dL    HDL, Direct 78 >=40 mg/dL    LDL Calculated 92 0 - 100 mg/dL    Non-HDL-Chol (CHOL-HDL) 111 mg/dl   TSH, 3rd generation   Result Value Ref Range    TSH 3RD GENERATON 1 130 0 358 - 3 740 uIU/mL       Orders Placed This Encounter   Procedures    CBC    Comprehensive metabolic panel    Lipid panel       ALLERGIES:  No Known Allergies    Current Medications     Current Outpatient Medications   Medication Sig Dispense Refill    atorvastatin (LIPITOR) 10 mg tablet TAKE ONE TABLET BY MOUTH EVERY DAY 90 tablet 1    Azelaic Acid (FINACEA) 15 % cream Apply topically daily at bedtime        Calcium Carb-Cholecalciferol (CALTRATE 600+D) 600-800 MG-UNIT TABS Take 2 tablets by mouth daily      Denosumab (PROLIA SC) Inject under the skin 2x year       doxycycline (PERIOSTAT) 20 MG tablet Take 20 mg by mouth 2 (two) times a day      gabapentin (NEURONTIN) 300 mg capsule Take 1 capsule (300 mg total) by mouth 2 (two) times a day 180 capsule 1    Glucosamine-Chondroitin 500-400 MG CAPS Take by mouth      levothyroxine 137 mcg tablet TAKE ONE TABLET BY MOUTH EVERY DAY 90 tablet 3    loratadine (CLARITIN) 10 mg tablet Take 10 mg by mouth daily as needed       methocarbamol (ROBAXIN) 500 mg tablet Take 1 tablet (500 mg total) by mouth 3 (three) times a day 30 tablet 1    metoprolol tartrate (LOPRESSOR) 50 mg tablet TAKE ONE TABLET BY MOUTH EVERY DAY 90 tablet 3    metroNIDAZOLE (METROGEL) 1 % gel Apply 1 application topically daily       naproxen (NAPROSYN) 500 mg tablet TAKE ONE TABLET BY MOUTH TWICE A DAY WITH FOOD 180 tablet 3    psyllium (METAMUCIL) 0 52 g capsule Take 0 52 g by mouth 2 (two) times a day       Triamcinolone Acetonide (NASACORT ALLERGY 24HR NA) 2 sprays into each nostril as needed         No current facility-administered medications for this visit            Health Maintenance     Health Maintenance   Topic Date Due    DTaP,Tdap,and Td Vaccines (1 - Tdap) Never done    Colorectal Cancer Screening  08/13/2014    PT PLAN OF CARE 06/10/2020    BMI: Followup Plan  09/30/2020    Depression Screening PHQ  09/21/2021    Fall Risk  09/21/2021    Medicare Annual Wellness Visit (AWV)  09/21/2021    BMI: Adult  11/11/2021    MAMMOGRAM  10/16/2022    Hepatitis C Screening  Completed    Pneumococcal Vaccine: 65+ Years  Completed    Influenza Vaccine  Completed    COVID-19 Vaccine  Completed    HIB Vaccine  Aged Out    Hepatitis B Vaccine  Aged Out    IPV Vaccine  Aged Out    Hepatitis A Vaccine  Aged Out    Meningococcal ACWY Vaccine  Aged Out    HPV Vaccine  Aged Out     Immunization History   Administered Date(s) Administered    Fluzone Split Quad 0 5 mL 09/26/2014    INFLUENZA 09/26/2014, 10/10/2015, 10/03/2016, 10/17/2017    Influenza Quadrivalent Preservative Free 3 years and older IM 09/26/2014    Influenza Split High Dose Preservative Free IM 10/10/2015, 10/03/2016, 10/17/2017    Influenza, high dose seasonal 0 7 mL 09/27/2018, 09/30/2019, 09/21/2020    Influenza, seasonal, injectable 10/11/2012, 10/17/2013    Pneumococcal Conjugate 13-Valent 12/05/2016    Pneumococcal Polysaccharide PPV23 09/27/2018    SARS-CoV-2 / COVID-19 mRNA IM ([employer]) 02/24/2021, 40/81/0315       Kaya Chavez MD      I spent 25 minutes with this patient which greater than 50 percent was spent counseling

## 2021-04-01 NOTE — ASSESSMENT & PLAN NOTE
Osteopenia  Patient continues to use Prolia injections every 6 months  She is due for repeat DEXA scan at the end of April

## 2021-04-27 ENCOUNTER — HOSPITAL ENCOUNTER (OUTPATIENT)
Dept: RADIOLOGY | Age: 71
Discharge: HOME | End: 2021-04-27
Payer: COMMERCIAL

## 2021-04-27 DIAGNOSIS — M81.0 AGE-RELATED OSTEOPOROSIS WITHOUT CURRENT PATHOLOGICAL FRACTURE: ICD-10-CM

## 2021-04-27 PROCEDURE — 77080 DXA BONE DENSITY AXIAL: CPT

## 2021-04-28 ENCOUNTER — APPOINTMENT (OUTPATIENT)
Dept: LAB | Facility: CLINIC | Age: 71
End: 2021-04-28
Payer: COMMERCIAL

## 2021-04-28 ENCOUNTER — TELEPHONE (OUTPATIENT)
Dept: OBGYN CLINIC | Facility: CLINIC | Age: 71
End: 2021-04-28

## 2021-04-28 DIAGNOSIS — I10 ESSENTIAL HYPERTENSION: ICD-10-CM

## 2021-04-28 DIAGNOSIS — E03.9 HYPOTHYROIDISM, UNSPECIFIED TYPE: ICD-10-CM

## 2021-04-28 LAB
ALBUMIN SERPL BCP-MCNC: 3.7 G/DL (ref 3.5–5)
ALP SERPL-CCNC: 57 U/L (ref 46–116)
ALT SERPL W P-5'-P-CCNC: 22 U/L (ref 12–78)
ANION GAP SERPL CALCULATED.3IONS-SCNC: 3 MMOL/L (ref 4–13)
AST SERPL W P-5'-P-CCNC: 14 U/L (ref 5–45)
BILIRUB SERPL-MCNC: 0.65 MG/DL (ref 0.2–1)
BUN SERPL-MCNC: 17 MG/DL (ref 5–25)
CALCIUM SERPL-MCNC: 9 MG/DL (ref 8.3–10.1)
CHLORIDE SERPL-SCNC: 109 MMOL/L (ref 100–108)
CHOLEST SERPL-MCNC: 159 MG/DL (ref 50–200)
CO2 SERPL-SCNC: 29 MMOL/L (ref 21–32)
CREAT SERPL-MCNC: 0.89 MG/DL (ref 0.6–1.3)
ERYTHROCYTE [DISTWIDTH] IN BLOOD BY AUTOMATED COUNT: 14.6 % (ref 11.6–15.1)
GFR SERPL CREATININE-BSD FRML MDRD: 65 ML/MIN/1.73SQ M
GLUCOSE P FAST SERPL-MCNC: 103 MG/DL (ref 65–99)
HCT VFR BLD AUTO: 38.5 % (ref 34.8–46.1)
HDLC SERPL-MCNC: 92 MG/DL
HGB BLD-MCNC: 12 G/DL (ref 11.5–15.4)
LDLC SERPL CALC-MCNC: 55 MG/DL (ref 0–100)
MCH RBC QN AUTO: 30.9 PG (ref 26.8–34.3)
MCHC RBC AUTO-ENTMCNC: 31.2 G/DL (ref 31.4–37.4)
MCV RBC AUTO: 99 FL (ref 82–98)
NONHDLC SERPL-MCNC: 67 MG/DL
PLATELET # BLD AUTO: 210 THOUSANDS/UL (ref 149–390)
PMV BLD AUTO: 10.6 FL (ref 8.9–12.7)
POTASSIUM SERPL-SCNC: 4.4 MMOL/L (ref 3.5–5.3)
PROT SERPL-MCNC: 7 G/DL (ref 6.4–8.2)
RBC # BLD AUTO: 3.88 MILLION/UL (ref 3.81–5.12)
SODIUM SERPL-SCNC: 141 MMOL/L (ref 136–145)
TRIGL SERPL-MCNC: 61 MG/DL
WBC # BLD AUTO: 3.75 THOUSAND/UL (ref 4.31–10.16)

## 2021-04-28 PROCEDURE — 36415 COLL VENOUS BLD VENIPUNCTURE: CPT

## 2021-04-28 PROCEDURE — 80061 LIPID PANEL: CPT

## 2021-04-28 PROCEDURE — 85027 COMPLETE CBC AUTOMATED: CPT

## 2021-04-28 PROCEDURE — 80053 COMPREHEN METABOLIC PANEL: CPT

## 2021-04-28 NOTE — TELEPHONE ENCOUNTER
Tried to call pt, vm box not set up yet    ----- Message from Elaine Soares PA-C sent at 4/28/2021 10:17 AM EDT -----  Please let Daynajuan Kacy know that her bone density test continues to show osteoporosis, however, there is good increase in her values  She should continue her Prolia, calcium, vitamin D, and weight bearing exercise

## 2021-06-06 DIAGNOSIS — E78.5 HYPERLIPIDEMIA, UNSPECIFIED HYPERLIPIDEMIA TYPE: ICD-10-CM

## 2021-06-07 RX ORDER — ATORVASTATIN CALCIUM 10 MG/1
TABLET, FILM COATED ORAL
Qty: 90 TABLET | Refills: 1 | Status: SHIPPED | OUTPATIENT
Start: 2021-06-07 | End: 2021-11-12

## 2021-06-16 ENCOUNTER — CLINICAL SUPPORT (OUTPATIENT)
Dept: FAMILY MEDICINE CLINIC | Facility: CLINIC | Age: 71
End: 2021-06-16
Payer: COMMERCIAL

## 2021-06-16 VITALS — TEMPERATURE: 98 F

## 2021-06-16 DIAGNOSIS — M81.0 OSTEOPOROSIS, UNSPECIFIED OSTEOPOROSIS TYPE, UNSPECIFIED PATHOLOGICAL FRACTURE PRESENCE: Primary | ICD-10-CM

## 2021-06-16 PROCEDURE — 96372 THER/PROPH/DIAG INJ SC/IM: CPT

## 2021-06-23 ENCOUNTER — VBI (OUTPATIENT)
Dept: ADMINISTRATIVE | Facility: OTHER | Age: 71
End: 2021-06-23

## 2021-07-08 DIAGNOSIS — M25.572 CHRONIC PAIN OF LEFT ANKLE: ICD-10-CM

## 2021-07-08 DIAGNOSIS — G89.29 CHRONIC PAIN OF LEFT ANKLE: ICD-10-CM

## 2021-07-08 RX ORDER — NAPROXEN 500 MG/1
TABLET ORAL
Qty: 180 TABLET | Refills: 3 | Status: SHIPPED | OUTPATIENT
Start: 2021-07-08

## 2021-07-27 ENCOUNTER — VBI (OUTPATIENT)
Dept: ADMINISTRATIVE | Facility: OTHER | Age: 71
End: 2021-07-27

## 2021-08-24 DIAGNOSIS — M54.50 ACUTE MIDLINE LOW BACK PAIN WITHOUT SCIATICA: ICD-10-CM

## 2021-08-24 RX ORDER — METHOCARBAMOL 500 MG/1
500 TABLET, FILM COATED ORAL 3 TIMES DAILY
Qty: 30 TABLET | Refills: 1 | Status: SHIPPED | OUTPATIENT
Start: 2021-08-24 | End: 2022-01-27

## 2021-08-24 NOTE — TELEPHONE ENCOUNTER
Requested Prescriptions     Pending Prescriptions Disp Refills    methocarbamol (ROBAXIN) 500 mg tablet 30 tablet 1     Sig: Take 1 tablet (500 mg total) by mouth 3 (three) times a day       Please review and advise

## 2021-09-15 ENCOUNTER — IMMUNIZATIONS (OUTPATIENT)
Dept: FAMILY MEDICINE CLINIC | Facility: CLINIC | Age: 71
End: 2021-09-15
Payer: COMMERCIAL

## 2021-09-15 VITALS — TEMPERATURE: 97.5 F

## 2021-09-15 DIAGNOSIS — Z23 INFLUENZA VACCINE NEEDED: Primary | ICD-10-CM

## 2021-09-15 PROCEDURE — 90662 IIV NO PRSV INCREASED AG IM: CPT | Performed by: FAMILY MEDICINE

## 2021-09-15 PROCEDURE — G0008 ADMIN INFLUENZA VIRUS VAC: HCPCS | Performed by: FAMILY MEDICINE

## 2021-09-21 DIAGNOSIS — M54.16 RADICULOPATHY, LUMBAR REGION: ICD-10-CM

## 2021-09-21 RX ORDER — GABAPENTIN 300 MG/1
CAPSULE ORAL
Qty: 180 CAPSULE | Refills: 1 | Status: SHIPPED | OUTPATIENT
Start: 2021-09-21 | End: 2022-04-03

## 2021-09-27 ENCOUNTER — TELEPHONE (OUTPATIENT)
Dept: MAMMOGRAPHY | Facility: CLINIC | Age: 71
End: 2021-09-27

## 2021-10-09 ENCOUNTER — IMMUNIZATIONS (OUTPATIENT)
Dept: FAMILY MEDICINE CLINIC | Facility: HOSPITAL | Age: 71
End: 2021-10-09

## 2021-10-09 DIAGNOSIS — Z23 ENCOUNTER FOR IMMUNIZATION: Primary | ICD-10-CM

## 2021-10-09 PROCEDURE — 0001A SARS-COV-2 / COVID-19 MRNA VACCINE (PFIZER-BIONTECH) 30 MCG: CPT

## 2021-10-09 PROCEDURE — 91300 SARS-COV-2 / COVID-19 MRNA VACCINE (PFIZER-BIONTECH) 30 MCG: CPT

## 2021-10-12 ENCOUNTER — OFFICE VISIT (OUTPATIENT)
Dept: FAMILY MEDICINE CLINIC | Facility: CLINIC | Age: 71
End: 2021-10-12
Payer: COMMERCIAL

## 2021-10-12 VITALS
BODY MASS INDEX: 42.72 KG/M2 | DIASTOLIC BLOOD PRESSURE: 60 MMHG | HEART RATE: 62 BPM | HEIGHT: 64 IN | OXYGEN SATURATION: 96 % | TEMPERATURE: 98.4 F | RESPIRATION RATE: 16 BRPM | SYSTOLIC BLOOD PRESSURE: 128 MMHG | WEIGHT: 250.25 LBS

## 2021-10-12 DIAGNOSIS — E03.9 HYPOTHYROIDISM, UNSPECIFIED TYPE: Primary | ICD-10-CM

## 2021-10-12 DIAGNOSIS — E78.5 HYPERLIPIDEMIA, UNSPECIFIED HYPERLIPIDEMIA TYPE: ICD-10-CM

## 2021-10-12 DIAGNOSIS — I10 PRIMARY HYPERTENSION: ICD-10-CM

## 2021-10-12 DIAGNOSIS — M54.50 ACUTE MIDLINE LOW BACK PAIN WITHOUT SCIATICA: ICD-10-CM

## 2021-10-12 PROCEDURE — G0439 PPPS, SUBSEQ VISIT: HCPCS | Performed by: FAMILY MEDICINE

## 2021-10-12 PROCEDURE — 3074F SYST BP LT 130 MM HG: CPT | Performed by: FAMILY MEDICINE

## 2021-10-12 PROCEDURE — 1125F AMNT PAIN NOTED PAIN PRSNT: CPT | Performed by: FAMILY MEDICINE

## 2021-10-12 PROCEDURE — 3008F BODY MASS INDEX DOCD: CPT | Performed by: FAMILY MEDICINE

## 2021-10-12 PROCEDURE — 1160F RVW MEDS BY RX/DR IN RCRD: CPT | Performed by: FAMILY MEDICINE

## 2021-10-12 PROCEDURE — 99214 OFFICE O/P EST MOD 30 MIN: CPT | Performed by: FAMILY MEDICINE

## 2021-10-12 PROCEDURE — 3288F FALL RISK ASSESSMENT DOCD: CPT | Performed by: FAMILY MEDICINE

## 2021-10-12 PROCEDURE — 3078F DIAST BP <80 MM HG: CPT | Performed by: FAMILY MEDICINE

## 2021-10-12 PROCEDURE — 1036F TOBACCO NON-USER: CPT | Performed by: FAMILY MEDICINE

## 2021-10-12 PROCEDURE — 3725F SCREEN DEPRESSION PERFORMED: CPT | Performed by: FAMILY MEDICINE

## 2021-10-12 PROCEDURE — 1170F FXNL STATUS ASSESSED: CPT | Performed by: FAMILY MEDICINE

## 2021-10-13 ENCOUNTER — HOSPITAL ENCOUNTER (OUTPATIENT)
Dept: MAMMOGRAPHY | Facility: CLINIC | Age: 71
Discharge: HOME | End: 2021-10-13
Payer: COMMERCIAL

## 2021-10-13 VITALS — WEIGHT: 250 LBS | HEIGHT: 64 IN | BODY MASS INDEX: 42.68 KG/M2

## 2021-10-13 DIAGNOSIS — Z12.31 ENCOUNTER FOR SCREENING MAMMOGRAM FOR MALIGNANT NEOPLASM OF BREAST: ICD-10-CM

## 2021-10-13 PROCEDURE — 77063 BREAST TOMOSYNTHESIS BI: CPT

## 2021-10-13 PROCEDURE — 77067 SCR MAMMO BI INCL CAD: CPT

## 2021-11-06 DIAGNOSIS — E03.9 HYPOTHYROIDISM, UNSPECIFIED TYPE: ICD-10-CM

## 2021-11-08 ENCOUNTER — APPOINTMENT (OUTPATIENT)
Dept: LAB | Facility: CLINIC | Age: 71
End: 2021-11-08
Payer: COMMERCIAL

## 2021-11-08 ENCOUNTER — TELEPHONE (OUTPATIENT)
Dept: FAMILY MEDICINE CLINIC | Facility: CLINIC | Age: 71
End: 2021-11-08

## 2021-11-08 DIAGNOSIS — I10 PRIMARY HYPERTENSION: ICD-10-CM

## 2021-11-08 DIAGNOSIS — E78.5 HYPERLIPIDEMIA, UNSPECIFIED HYPERLIPIDEMIA TYPE: ICD-10-CM

## 2021-11-08 DIAGNOSIS — E03.9 HYPOTHYROIDISM, UNSPECIFIED TYPE: ICD-10-CM

## 2021-11-08 LAB
ALBUMIN SERPL BCP-MCNC: 3.9 G/DL (ref 3.5–5)
ALP SERPL-CCNC: 61 U/L (ref 46–116)
ALT SERPL W P-5'-P-CCNC: 23 U/L (ref 12–78)
ANION GAP SERPL CALCULATED.3IONS-SCNC: 0 MMOL/L (ref 4–13)
AST SERPL W P-5'-P-CCNC: 17 U/L (ref 5–45)
BILIRUB SERPL-MCNC: 0.7 MG/DL (ref 0.2–1)
BUN SERPL-MCNC: 17 MG/DL (ref 5–25)
CALCIUM SERPL-MCNC: 9.7 MG/DL (ref 8.3–10.1)
CHLORIDE SERPL-SCNC: 107 MMOL/L (ref 100–108)
CO2 SERPL-SCNC: 30 MMOL/L (ref 21–32)
CREAT SERPL-MCNC: 0.93 MG/DL (ref 0.6–1.3)
ERYTHROCYTE [DISTWIDTH] IN BLOOD BY AUTOMATED COUNT: 14.1 % (ref 11.6–15.1)
GFR SERPL CREATININE-BSD FRML MDRD: 62 ML/MIN/1.73SQ M
GLUCOSE P FAST SERPL-MCNC: 104 MG/DL (ref 65–99)
HCT VFR BLD AUTO: 41.1 % (ref 34.8–46.1)
HGB BLD-MCNC: 13.1 G/DL (ref 11.5–15.4)
MCH RBC QN AUTO: 31.6 PG (ref 26.8–34.3)
MCHC RBC AUTO-ENTMCNC: 31.9 G/DL (ref 31.4–37.4)
MCV RBC AUTO: 99 FL (ref 82–98)
PLATELET # BLD AUTO: 215 THOUSANDS/UL (ref 149–390)
PMV BLD AUTO: 10.4 FL (ref 8.9–12.7)
POTASSIUM SERPL-SCNC: 4.1 MMOL/L (ref 3.5–5.3)
PROT SERPL-MCNC: 7.7 G/DL (ref 6.4–8.2)
RBC # BLD AUTO: 4.15 MILLION/UL (ref 3.81–5.12)
SODIUM SERPL-SCNC: 137 MMOL/L (ref 136–145)
TSH SERPL DL<=0.05 MIU/L-ACNC: 12.3 UIU/ML (ref 0.36–3.74)
WBC # BLD AUTO: 4.08 THOUSAND/UL (ref 4.31–10.16)

## 2021-11-08 PROCEDURE — 84443 ASSAY THYROID STIM HORMONE: CPT

## 2021-11-08 PROCEDURE — 36415 COLL VENOUS BLD VENIPUNCTURE: CPT

## 2021-11-08 PROCEDURE — 85027 COMPLETE CBC AUTOMATED: CPT

## 2021-11-08 PROCEDURE — 80053 COMPREHEN METABOLIC PANEL: CPT

## 2021-11-08 RX ORDER — LEVOTHYROXINE SODIUM 137 UG/1
TABLET ORAL
Qty: 90 TABLET | Refills: 3 | Status: SHIPPED | OUTPATIENT
Start: 2021-11-08 | End: 2021-11-08 | Stop reason: SDUPTHER

## 2021-11-12 DIAGNOSIS — I10 ESSENTIAL HYPERTENSION: ICD-10-CM

## 2021-11-12 DIAGNOSIS — E78.5 HYPERLIPIDEMIA, UNSPECIFIED HYPERLIPIDEMIA TYPE: ICD-10-CM

## 2021-11-12 RX ORDER — METOPROLOL TARTRATE 50 MG/1
TABLET, FILM COATED ORAL
Qty: 90 TABLET | Refills: 3 | Status: SHIPPED | OUTPATIENT
Start: 2021-11-12

## 2021-11-12 RX ORDER — ATORVASTATIN CALCIUM 10 MG/1
TABLET, FILM COATED ORAL
Qty: 90 TABLET | Refills: 1 | Status: SHIPPED | OUTPATIENT
Start: 2021-11-12 | End: 2022-04-29

## 2021-12-17 ENCOUNTER — CLINICAL SUPPORT (OUTPATIENT)
Dept: FAMILY MEDICINE CLINIC | Facility: CLINIC | Age: 71
End: 2021-12-17
Payer: COMMERCIAL

## 2021-12-17 VITALS — TEMPERATURE: 97.5 F

## 2021-12-17 DIAGNOSIS — M81.0 OSTEOPOROSIS, UNSPECIFIED OSTEOPOROSIS TYPE, UNSPECIFIED PATHOLOGICAL FRACTURE PRESENCE: Primary | ICD-10-CM

## 2021-12-17 PROCEDURE — 96372 THER/PROPH/DIAG INJ SC/IM: CPT

## 2021-12-22 ENCOUNTER — VBI (OUTPATIENT)
Dept: ADMINISTRATIVE | Facility: OTHER | Age: 71
End: 2021-12-22

## 2022-04-02 DIAGNOSIS — M54.16 RADICULOPATHY, LUMBAR REGION: ICD-10-CM

## 2022-04-02 DIAGNOSIS — E03.9 HYPOTHYROIDISM, UNSPECIFIED TYPE: ICD-10-CM

## 2022-04-03 RX ORDER — LEVOTHYROXINE SODIUM 0.15 MG/1
TABLET ORAL
Qty: 90 TABLET | Refills: 0 | Status: SHIPPED | OUTPATIENT
Start: 2022-04-03 | End: 2022-07-11

## 2022-04-03 RX ORDER — GABAPENTIN 300 MG/1
CAPSULE ORAL
Qty: 180 CAPSULE | Refills: 1 | Status: SHIPPED | OUTPATIENT
Start: 2022-04-03 | End: 2022-04-07

## 2022-04-06 ENCOUNTER — RA CDI HCC (OUTPATIENT)
Dept: OTHER | Facility: HOSPITAL | Age: 72
End: 2022-04-06

## 2022-04-06 NOTE — PROGRESS NOTES
[facility]  coding opportunities    [id number]      Chart Reviewed number of suggestions sent to Provider: 1     Patients Insurance     Medicare Insurance: [address] [place] [id number]

## 2022-04-07 DIAGNOSIS — M54.16 RADICULOPATHY, LUMBAR REGION: ICD-10-CM

## 2022-04-07 RX ORDER — GABAPENTIN 300 MG/1
CAPSULE ORAL
Qty: 180 CAPSULE | Refills: 1 | Status: SHIPPED | OUTPATIENT
Start: 2022-04-07

## 2022-04-12 ENCOUNTER — OFFICE VISIT (OUTPATIENT)
Dept: FAMILY MEDICINE CLINIC | Facility: CLINIC | Age: 72
End: 2022-04-12
Payer: COMMERCIAL

## 2022-04-12 VITALS
BODY MASS INDEX: 43.19 KG/M2 | HEIGHT: 64 IN | WEIGHT: 253 LBS | OXYGEN SATURATION: 95 % | HEART RATE: 80 BPM | RESPIRATION RATE: 18 BRPM | DIASTOLIC BLOOD PRESSURE: 80 MMHG | TEMPERATURE: 97.3 F | SYSTOLIC BLOOD PRESSURE: 134 MMHG

## 2022-04-12 DIAGNOSIS — I10 PRIMARY HYPERTENSION: Primary | ICD-10-CM

## 2022-04-12 DIAGNOSIS — E78.5 HYPERLIPIDEMIA, UNSPECIFIED HYPERLIPIDEMIA TYPE: ICD-10-CM

## 2022-04-12 DIAGNOSIS — E03.9 HYPOTHYROIDISM, UNSPECIFIED TYPE: ICD-10-CM

## 2022-04-12 PROCEDURE — 3725F SCREEN DEPRESSION PERFORMED: CPT | Performed by: FAMILY MEDICINE

## 2022-04-12 PROCEDURE — 1101F PT FALLS ASSESS-DOCD LE1/YR: CPT | Performed by: FAMILY MEDICINE

## 2022-04-12 PROCEDURE — 3075F SYST BP GE 130 - 139MM HG: CPT | Performed by: FAMILY MEDICINE

## 2022-04-12 PROCEDURE — 99214 OFFICE O/P EST MOD 30 MIN: CPT | Performed by: FAMILY MEDICINE

## 2022-04-12 PROCEDURE — 3079F DIAST BP 80-89 MM HG: CPT | Performed by: FAMILY MEDICINE

## 2022-04-12 PROCEDURE — 3008F BODY MASS INDEX DOCD: CPT | Performed by: FAMILY MEDICINE

## 2022-04-12 PROCEDURE — 1036F TOBACCO NON-USER: CPT | Performed by: FAMILY MEDICINE

## 2022-04-12 PROCEDURE — 3288F FALL RISK ASSESSMENT DOCD: CPT | Performed by: FAMILY MEDICINE

## 2022-04-12 PROCEDURE — 1160F RVW MEDS BY RX/DR IN RCRD: CPT | Performed by: FAMILY MEDICINE

## 2022-04-12 NOTE — PROGRESS NOTES
FAMILY PRACTICE OFFICE VISIT       NAME: Isa Padron  AGE: 67 y o  SEX: female       : 1950        MRN: [id number]    DATE: 2022  TIME: 1:14 PM    Assessment and Plan     Problem List Items Addressed This Visit        Endocrine    Hypothyroidism     Hypothyroidism  Patient will check TSH blood work and continue with current dose of levothyroxine         Relevant Orders    CBC    Comprehensive metabolic panel    Lipid panel    TSH, 3rd generation       Cardiovascular and Mediastinum    Hypertension - Primary     Hypertension  Patient blood pressure is stable at this time she will continue current regimen of medications         Relevant Orders    CBC    Comprehensive metabolic panel    Lipid panel    TSH, 3rd generation       Other    Hyperlipidemia     Hyperlipidemia  Patient will check lipid panel blood work and continue with current dose of statin therapy           Other Visit Diagnoses     Body mass index (BMI) 40 0-44 9, adult [facility])            Patient refused colon cancer screening test       Chief Complaint     Chief Complaint   Patient presents with    Follow-up      6 month        History of Present Illness     Patient in the office to monitor chronic medical condition  She denies any recent illness  She has had difficulties with weight loss due to her inability to ambulate very long due to chronic arthralgias from degenerative joint disease  She does try to ride a stationary bicycle on most days for short periods of time  Review of Systems   Review of Systems   Constitutional: Negative  HENT: Negative  Eyes: Negative  Respiratory: Negative  Cardiovascular: Negative  Gastrointestinal: Negative  Endocrine: Negative  Genitourinary: Negative  Musculoskeletal: Positive for arthralgias and gait problem  Skin: Negative  Allergic/Immunologic: Negative  Hematological: Negative  Psychiatric/Behavioral: Negative          Active Problem List Patient Active Problem List   Diagnosis    Pain, joint, ankle and foot, left    DJD (degenerative joint disease), ankle and foot, left    Allergic rhinitis    Esophageal reflux    Hyperlipidemia    Hypertension    Hypothyroidism    Rosacea    Pain of right lower extremity    Female genital prolapse    Fracture of ankle    Osteopenia    Varicose veins of lower extremity    Pain of left heel    Body mass index (BMI) 40 0-44 9, adult    Acute midline low back pain without sciatica       Past Medical History:  Past Medical History:   Diagnosis Date    Allergic rhinitis     Arthritis     Cataract     brock    Disease of thyroid gland     had total thyroidectomy    Diverticulosis     Hyperlipidemia     Hypertension     Mild acid reflux     Osteoporosis     Plantar fasciitis     b/l  l worse uses orthotics in shoes    Rosacea     Use of cane as ambulatory aid     Uterovaginal prolapse        Past Surgical History:  Past Surgical History:   Procedure Laterality Date    ANKLE SURGERY Left     Fracture / hardware removed    COLONOSCOPY      fiberoptic    COLPORRHAPHY N/A 6/17/2019    Procedure: POSTERIOR COLPORRHAPHY;  Surgeon: Meggan Fitzgerald MD;  [facility];  Service: UroGynecology           OTHER SURGICAL HISTORY      thyroid biopsy core needle     IN REMOVAL DEEP IMPLANT Left 9/18/2017    Procedure: REMOVAL HARDWARE ANKLE (1 plate, 8 screws );  Surgeon: Melissa Denise MD;  [facility];  Service: Orthopedics    IN REPAIR OF PERINEUM,NON OBSTETRICAL N/A 6/17/2019    Procedure: Corie Bijou;  Surgeon: Meggan Fitzgerald MD;  [facility];  Service: UroGynecology           IN REVAGINAL PROLAPSE,SACROSP LIG N/A 6/17/2019    Procedure: VE COLPOSUSPENSION (neugide);   Surgeon: Meggan Fitzgerald MD;  [facility];  Service: UroGynecology           SKIN CANCER EXCISION Left     THYROIDECTOMY      Total     TUBAL LIGATION         Family History:  Family History   Problem Relation Age of Onset    COPD Mother     Diabetes Mother     Heart disease Mother    Julieann Frankel Hypertension Mother     Bone cancer Father     No Known Problems Daughter     No Known Problems Maternal Grandmother     No Known Problems Maternal Grandfather     No Known Problems Paternal Grandmother     No Known Problems Paternal Grandfather     No Known Problems Maternal Aunt     No Known Problems Maternal Aunt     No Known Problems Paternal Aunt        Social History:  Social History     Socioeconomic History    Marital status: /Civil Union     Spouse name: Not on file    Number of children: Not on file    Years of education: Not on file    Highest education level: Not on file   Occupational History    Not on file   Tobacco Use    Smoking status: Never Smoker    Smokeless tobacco: Never Used   Vaping Use    Vaping Use: Never used   Substance and Sexual Activity    Alcohol use: No     Comment: Never drank alcohol denied    Drug use: No    Sexual activity: Never   Other Topics Concern    Not on file   Social History Narrative    Exercies moderately 3 or more times a week      Social Determinants of Health     Financial Resource Strain: Not on file   Food Insecurity: Not on file   Transportation Needs: Not on file   Physical Activity: Not on file   Stress: Not on file   Social Connections: Not on file   Intimate Partner Violence: Not on file   Housing Stability: Not on file       Objective     Vitals:    04/12/22 1159   BP: 134/80   Pulse: 80   Resp: 18   Temp: (!) 97 3 °F (36 3 °C)   SpO2: 95%     Wt Readings from Last 3 Encounters:   04/12/22 115 kg (253 lb)   10/13/21 113 kg (250 lb)   10/12/21 114 kg (250 lb 4 oz)       Physical Exam  Constitutional:       General: She is not in acute distress  Appearance: Normal appearance  She is not ill-appearing  HENT:      Head: Normocephalic and atraumatic  Eyes:      General:         Right eye: No discharge           Left eye: No discharge  Extraocular Movements: Extraocular movements intact  Conjunctiva/sclera: Conjunctivae normal       Pupils: Pupils are equal, round, and reactive to light  Neck:      Vascular: No carotid bruit  Cardiovascular:      Rate and Rhythm: Normal rate and regular rhythm  Heart sounds: Normal heart sounds  No murmur heard  Pulmonary:      Effort: Pulmonary effort is normal       Breath sounds: Normal breath sounds  No wheezing, rhonchi or rales  Abdominal:      General: Abdomen is flat  Bowel sounds are normal  There is no distension  Palpations: Abdomen is soft  Tenderness: There is no abdominal tenderness  There is no guarding or rebound  Musculoskeletal:      Right lower leg: No edema  Left lower leg: No edema  Lymphadenopathy:      Cervical: No cervical adenopathy  Skin:     Findings: No rash  Neurological:      General: No focal deficit present  Mental Status: She is alert and oriented to person, place, and time  Cranial Nerves: No cranial nerve deficit  Psychiatric:         Mood and Affect: Mood normal          Behavior: Behavior normal          Thought Content:  Thought content normal          Judgment: Judgment normal          Pertinent Laboratory/Diagnostic Studies:  Lab Results   Component Value Date    GLUCOSE 99 10/22/2014    BUN 17 11/08/2021    CREATININE 0 93 11/08/2021    CALCIUM 9 7 11/08/2021     12/20/2017    K 4 1 11/08/2021    CO2 30 11/08/2021     11/08/2021     Lab Results   Component Value Date    ALT 23 11/08/2021    AST 17 11/08/2021    ALKPHOS 61 11/08/2021    BILITOT 0 6 12/20/2017       Lab Results   Component Value Date    WBC 4 08 (L) 11/08/2021    HGB 13 1 11/08/2021    HCT 41 1 11/08/2021    MCV 99 (H) 11/08/2021     11/08/2021       Lab Results   Component Value Date    TSH 1 82 10/02/2018       Lab Results   Component Value Date    CHOL 229 (H) 12/20/2017     Lab Results   Component Value Date TRIG 61 04/28/2021     Lab Results   Component Value Date    HDL 92 04/28/2021     Lab Results   Component Value Date    LDLCALC 55 04/28/2021     No results found for: HGBA1C    Results for orders placed or performed in visit on 11/08/21   TSH, 3rd generation   Result Value Ref Range    TSH 3RD GENERATON 12 300 (H) 0 358 - 3 740 uIU/mL   CBC   Result Value Ref Range    WBC 4 08 (L) 4 31 - 10 16 Thousand/uL    RBC 4 15 3 81 - 5 12 Million/uL    Hemoglobin 13 1 11 5 - 15 4 g/dL    Hematocrit 41 1 34 8 - 46 1 %    MCV 99 (H) 82 - 98 fL    MCH 31 6 26 8 - 34 3 pg    MCHC 31 9 31 4 - 37 4 g/dL    RDW 14 1 11 6 - 15 1 %    Platelets 067 587 - 805 Thousands/uL    MPV 10 4 8 9 - 12 7 fL   Comprehensive metabolic panel   Result Value Ref Range    Sodium 137 136 - 145 mmol/L    Potassium 4 1 3 5 - 5 3 mmol/L    Chloride 107 100 - 108 mmol/L    CO2 30 21 - 32 mmol/L    ANION GAP 0 (L) 4 - 13 mmol/L    BUN 17 5 - 25 mg/dL    Creatinine 0 93 0 60 - 1 30 mg/dL    Glucose, Fasting 104 (H) 65 - 99 mg/dL    Calcium 9 7 8 3 - 10 1 mg/dL    AST 17 5 - 45 U/L    ALT 23 12 - 78 U/L    Alkaline Phosphatase 61 46 - 116 U/L    Total Protein 7 7 6 4 - 8 2 g/dL    Albumin 3 9 3 5 - 5 0 g/dL    Total Bilirubin 0 70 0 20 - 1 00 mg/dL    eGFR 62 ml/min/1 73sq m       Orders Placed This Encounter   Procedures    CBC    Comprehensive metabolic panel    Lipid panel    TSH, 3rd generation       ALLERGIES:  No Known Allergies    Current Medications     Current Outpatient Medications   Medication Sig Dispense Refill    atorvastatin (LIPITOR) 10 mg tablet TAKE ONE TABLET BY MOUTH EVERY DAY 90 tablet 1    Azelaic Acid (FINACEA) 15 % cream Apply topically daily at bedtime        Calcium Carb-Cholecalciferol (CALTRATE 600+D) 600-800 MG-UNIT TABS Take 2 tablets by mouth daily      Denosumab (PROLIA SC) Inject under the skin 2x year       doxycycline (PERIOSTAT) 20 MG tablet Take 20 mg by mouth 2 (two) times a day      gabapentin (NEURONTIN) 300 mg capsule TAKE ONE CAPSULE BY MOUTH TWICE A  capsule 1    Glucosamine-Chondroitin 500-400 MG CAPS Take by mouth      levothyroxine 150 mcg tablet TAKE ONE TABLET BY MOUTH ONCE DAILY 90 tablet 0    loratadine (CLARITIN) 10 mg tablet Take 10 mg by mouth daily as needed       metoprolol tartrate (LOPRESSOR) 50 mg tablet TAKE ONE TABLET BY MOUTH EVERY DAY 90 tablet 3    metroNIDAZOLE (METROGEL) 1 % gel Apply 1 application topically daily       Multiple Vitamins-Minerals (Centrum Silver 50+Women) TABS       naproxen (NAPROSYN) 500 mg tablet TAKE ONE TABLET BY MOUTH TWICE A DAY WITH FOOD 180 tablet 3    Probiotic Product (PROBIOTIC-10 PO)       Triamcinolone Acetonide (NASACORT ALLERGY 24HR NA) 2 sprays into each nostril as needed         No current facility-administered medications for this visit           Health Maintenance     Health Maintenance   Topic Date Due    DTaP,Tdap,and Td Vaccines (1 - Tdap) Never done    Colorectal Cancer Screening  08/13/2014    PT PLAN OF CARE  06/10/2020    BMI: Followup Plan  10/12/2022    Medicare Annual Wellness Visit (AWV)  10/12/2022    Fall Risk  04/12/2023    Depression Screening  04/12/2023    BMI: Adult  04/12/2023    Breast Cancer Screening: Mammogram  10/13/2023    Hepatitis C Screening  Completed    Osteoporosis Screening  Completed    Pneumococcal Vaccine: 65+ Years  Completed    Influenza Vaccine  Completed    COVID-19 Vaccine  Completed    HIB Vaccine  Aged Out    Hepatitis B Vaccine  Aged Out    IPV Vaccine  Aged Out    Hepatitis A Vaccine  Aged Out    Meningococcal ACWY Vaccine  Aged Out    HPV Vaccine  Aged Out     Immunization History   Administered Date(s) Administered    COVID-19 PFIZER VACCINE 0 3 ML IM 02/24/2021, 03/15/2021, 10/09/2021    Fluzone Split Quad 0 5 mL 09/26/2014    INFLUENZA 09/26/2014, 10/10/2015, 10/03/2016, 10/17/2017    Influenza Quadrivalent Preservative Free 3 years and older IM 09/26/2014    Influenza Split High Dose Preservative Free IM 10/10/2015, 10/03/2016, 10/17/2017    Influenza, high dose seasonal 0 7 mL 09/27/2018, 09/30/2019, 09/21/2020, 09/15/2021    Influenza, seasonal, injectable 10/11/2012, 10/17/2013    Pneumococcal Conjugate 13-Valent 12/05/2016    Pneumococcal Polysaccharide PPV23 12/66/9364       Polo Sams MD  I spent 25 minutes with this patient which greater than 50 percent was spent counseling or reviewing chart

## 2022-04-22 ENCOUNTER — APPOINTMENT (OUTPATIENT)
Dept: LAB | Facility: CLINIC | Age: 72
End: 2022-04-22
Payer: COMMERCIAL

## 2022-04-22 DIAGNOSIS — I10 PRIMARY HYPERTENSION: ICD-10-CM

## 2022-04-22 DIAGNOSIS — E03.9 HYPOTHYROIDISM, UNSPECIFIED TYPE: ICD-10-CM

## 2022-04-22 LAB
ALBUMIN SERPL BCP-MCNC: 3.7 G/DL (ref 3.5–5)
ALP SERPL-CCNC: 56 U/L (ref 46–116)
ALT SERPL W P-5'-P-CCNC: 23 U/L (ref 12–78)
ANION GAP SERPL CALCULATED.3IONS-SCNC: 0 MMOL/L (ref 4–13)
AST SERPL W P-5'-P-CCNC: 20 U/L (ref 5–45)
BILIRUB SERPL-MCNC: 0.62 MG/DL (ref 0.2–1)
BUN SERPL-MCNC: 20 MG/DL (ref 5–25)
CALCIUM SERPL-MCNC: 9.5 MG/DL (ref 8.3–10.1)
CHLORIDE SERPL-SCNC: 107 MMOL/L (ref 100–108)
CHOLEST SERPL-MCNC: 168 MG/DL
CO2 SERPL-SCNC: 30 MMOL/L (ref 21–32)
CREAT SERPL-MCNC: 0.98 MG/DL (ref 0.6–1.3)
ERYTHROCYTE [DISTWIDTH] IN BLOOD BY AUTOMATED COUNT: 13.7 % (ref 11.6–15.1)
GFR SERPL CREATININE-BSD FRML MDRD: 57 ML/MIN/1.73SQ M
GLUCOSE P FAST SERPL-MCNC: 103 MG/DL (ref 65–99)
HCT VFR BLD AUTO: 39.8 % (ref 34.8–46.1)
HDLC SERPL-MCNC: 90 MG/DL
HGB BLD-MCNC: 12.8 G/DL (ref 11.5–15.4)
LDLC SERPL CALC-MCNC: 65 MG/DL (ref 0–100)
MCH RBC QN AUTO: 31.8 PG (ref 26.8–34.3)
MCHC RBC AUTO-ENTMCNC: 32.2 G/DL (ref 31.4–37.4)
MCV RBC AUTO: 99 FL (ref 82–98)
NONHDLC SERPL-MCNC: 78 MG/DL
PLATELET # BLD AUTO: 209 THOUSANDS/UL (ref 149–390)
PMV BLD AUTO: 10.7 FL (ref 8.9–12.7)
POTASSIUM SERPL-SCNC: 4.3 MMOL/L (ref 3.5–5.3)
PROT SERPL-MCNC: 7.3 G/DL (ref 6.4–8.2)
RBC # BLD AUTO: 4.03 MILLION/UL (ref 3.81–5.12)
SODIUM SERPL-SCNC: 137 MMOL/L (ref 136–145)
TRIGL SERPL-MCNC: 64 MG/DL
TSH SERPL DL<=0.05 MIU/L-ACNC: 3.49 UIU/ML (ref 0.45–4.5)
WBC # BLD AUTO: 3.56 THOUSAND/UL (ref 4.31–10.16)

## 2022-04-22 PROCEDURE — 80053 COMPREHEN METABOLIC PANEL: CPT

## 2022-04-22 PROCEDURE — 80061 LIPID PANEL: CPT

## 2022-04-22 PROCEDURE — 85027 COMPLETE CBC AUTOMATED: CPT

## 2022-04-22 PROCEDURE — 36415 COLL VENOUS BLD VENIPUNCTURE: CPT

## 2022-04-22 PROCEDURE — 84443 ASSAY THYROID STIM HORMONE: CPT

## 2022-04-23 ENCOUNTER — IMMUNIZATIONS (OUTPATIENT)
Dept: FAMILY MEDICINE CLINIC | Facility: HOSPITAL | Age: 72
End: 2022-04-23

## 2022-04-23 PROCEDURE — 0054A COVID-19 PFIZER VACC TRIS-SUCROSE GRAY CAP 0.3 ML: CPT

## 2022-04-23 PROCEDURE — 91305 COVID-19 PFIZER VACC TRIS-SUCROSE GRAY CAP 0.3 ML: CPT

## 2022-04-29 DIAGNOSIS — E78.5 HYPERLIPIDEMIA, UNSPECIFIED HYPERLIPIDEMIA TYPE: ICD-10-CM

## 2022-04-29 RX ORDER — ATORVASTATIN CALCIUM 10 MG/1
TABLET, FILM COATED ORAL
Qty: 90 TABLET | Refills: 1 | Status: SHIPPED | OUTPATIENT
Start: 2022-04-29

## 2022-05-11 DIAGNOSIS — M54.50 ACUTE MIDLINE LOW BACK PAIN WITHOUT SCIATICA: ICD-10-CM

## 2022-05-12 RX ORDER — METHOCARBAMOL 500 MG/1
500 TABLET, FILM COATED ORAL 3 TIMES DAILY
Qty: 30 TABLET | Refills: 3 | Status: SHIPPED | OUTPATIENT
Start: 2022-05-12

## 2022-06-22 ENCOUNTER — CLINICAL SUPPORT (OUTPATIENT)
Dept: FAMILY MEDICINE CLINIC | Facility: CLINIC | Age: 72
End: 2022-06-22
Payer: COMMERCIAL

## 2022-06-22 VITALS — TEMPERATURE: 98 F

## 2022-06-22 DIAGNOSIS — M85.80 OSTEOPENIA, UNSPECIFIED LOCATION: ICD-10-CM

## 2022-06-22 DIAGNOSIS — M81.0 OSTEOPOROSIS, UNSPECIFIED OSTEOPOROSIS TYPE, UNSPECIFIED PATHOLOGICAL FRACTURE PRESENCE: Primary | ICD-10-CM

## 2022-06-22 PROCEDURE — 96372 THER/PROPH/DIAG INJ SC/IM: CPT

## 2022-07-11 DIAGNOSIS — E03.9 HYPOTHYROIDISM, UNSPECIFIED TYPE: ICD-10-CM

## 2022-07-11 RX ORDER — LEVOTHYROXINE SODIUM 0.15 MG/1
TABLET ORAL
Qty: 90 TABLET | Refills: 0 | Status: SHIPPED | OUTPATIENT
Start: 2022-07-11 | End: 2022-09-26 | Stop reason: SDUPTHER

## 2022-07-14 ENCOUNTER — VBI (OUTPATIENT)
Dept: ADMINISTRATIVE | Facility: OTHER | Age: 72
End: 2022-07-14

## 2022-08-12 DIAGNOSIS — G89.29 CHRONIC PAIN OF LEFT ANKLE: ICD-10-CM

## 2022-08-12 DIAGNOSIS — M25.572 CHRONIC PAIN OF LEFT ANKLE: ICD-10-CM

## 2022-08-12 RX ORDER — NAPROXEN 500 MG/1
TABLET ORAL
Qty: 180 TABLET | Refills: 3 | Status: SHIPPED | OUTPATIENT
Start: 2022-08-12

## 2022-08-26 ENCOUNTER — RA CDI HCC (OUTPATIENT)
Dept: OTHER | Facility: HOSPITAL | Age: 72
End: 2022-08-26

## 2022-08-26 NOTE — PROGRESS NOTES
[facility]feanyi [facility]  coding opportunities     [id number]     Chart Reviewed number of suggestions sent to Provider: 1     Patients Insurance     Medicare Insurance: [address] [place] [address]

## 2022-08-31 ENCOUNTER — OFFICE VISIT (OUTPATIENT)
Dept: FAMILY MEDICINE CLINIC | Facility: CLINIC | Age: 72
End: 2022-08-31
Payer: COMMERCIAL

## 2022-08-31 VITALS
DIASTOLIC BLOOD PRESSURE: 82 MMHG | HEART RATE: 65 BPM | BODY MASS INDEX: 41.85 KG/M2 | SYSTOLIC BLOOD PRESSURE: 140 MMHG | HEIGHT: 64 IN | WEIGHT: 245.13 LBS | OXYGEN SATURATION: 98 % | RESPIRATION RATE: 18 BRPM | TEMPERATURE: 98 F

## 2022-08-31 DIAGNOSIS — M79.2 NEURALGIA: Primary | ICD-10-CM

## 2022-08-31 PROCEDURE — 1160F RVW MEDS BY RX/DR IN RCRD: CPT | Performed by: FAMILY MEDICINE

## 2022-08-31 PROCEDURE — 3077F SYST BP >= 140 MM HG: CPT | Performed by: FAMILY MEDICINE

## 2022-08-31 PROCEDURE — 3079F DIAST BP 80-89 MM HG: CPT | Performed by: FAMILY MEDICINE

## 2022-08-31 PROCEDURE — 99213 OFFICE O/P EST LOW 20 MIN: CPT | Performed by: FAMILY MEDICINE

## 2022-08-31 NOTE — PROGRESS NOTES
FAMILY PRACTICE OFFICE VISIT       NAME: Lisa Mendoza  AGE: 67 y o  SEX: female       : 1950        MRN: [id number]    DATE: 2022  TIME: 11:31 AM    Assessment and Plan     Problem List Items Addressed This Visit        Other    Neuralgia - Primary     Neuralgia  Patient was given instructions to increase her gabapentin 300 mg to 3 times daily  If symptoms do not improve within the next week patient will call for further recommendations  We may consider continue titrating medication or trial of different medications such as Tegretol                   Chief Complaint     Chief Complaint   Patient presents with    Nerve Pain       History of Present Illness     Patient in the office with 2 week history of sharp shooting pains into her left cheek area of her face  Patient had dermatologic surgery last year for removal of suspicious skin lesion on left cheek  Initially after surgery patient felt mild tingling sensations which ventrally resolved  More recently patient had follow-up visit with Dermatology as well as her dentist who did not find etiology to her symptoms  Patient was suspected of having trigeminal neuralgia  She denies any discomfort in her ears or throat  Review of Systems   Review of Systems   Constitutional: Negative  HENT: Negative      Neurological:        As per HPI       Active Problem List     Patient Active Problem List   Diagnosis    Pain, joint, ankle and foot, left    DJD (degenerative joint disease), ankle and foot, left    Allergic rhinitis    Esophageal reflux    Hyperlipidemia    Hypertension    Hypothyroidism    Rosacea    Pain of right lower extremity    Female genital prolapse    Fracture of ankle    Osteopenia    Varicose veins of lower extremity    Pain of left heel    Body mass index (BMI) 40 0-44 9, adult    Acute midline low back pain without sciatica    Neuralgia       Past Medical History:  Past Medical History:   Diagnosis Date    Allergic rhinitis     Arthritis     Cataract     brock    Disease of thyroid gland     had total thyroidectomy    Diverticulosis     Hyperlipidemia     Hypertension     Mild acid reflux     Osteoporosis     Plantar fasciitis     b/l  l worse uses orthotics in shoes    Rosacea     Use of cane as ambulatory aid     Uterovaginal prolapse        Past Surgical History:  Past Surgical History:   Procedure Laterality Date    ANKLE SURGERY Left     Fracture / hardware removed    COLONOSCOPY      fiberoptic    COLPORRHAPHY N/A 6/17/2019    Procedure: POSTERIOR COLPORRHAPHY;  Surgeon: Segundo Jackson MD;  [facility];  Service: UroGynecology           OTHER SURGICAL HISTORY      thyroid biopsy core needle     PA REMOVAL DEEP IMPLANT Left 9/18/2017    Procedure: REMOVAL HARDWARE ANKLE (1 plate, 8 screws );  Surgeon: Marci Gaona MD;  [facility];  Service: Orthopedics    PA REPAIR OF PERINEUM,NON OBSTETRICAL N/A 6/17/2019    Procedure: [place];  Surgeon: Segundo Jackson MD;  [facility];  Service: UroGynecology           PA Denny Francis LIG N/A 6/17/2019    Procedure: VE COLPOSUSPENSION (neugide);   Surgeon: Segundo Jackson MD;  [facility];  Service: UroGynecology           SKIN CANCER EXCISION Left     THYROIDECTOMY      Total     TUBAL LIGATION         Family History:  Family History   Problem Relation Age of Onset    COPD Mother     Diabetes Mother     Heart disease Mother     Hypertension Mother     Bone cancer Father     No Known Problems Daughter     No Known Problems Maternal Grandmother     No Known Problems Maternal Grandfather     No Known Problems Paternal Grandmother     No Known Problems Paternal Grandfather     No Known Problems Maternal Aunt     No Known Problems Maternal Aunt     No Known Problems Paternal Aunt        Social History:  Social History     Socioeconomic History    Marital status: /Civil Union     Spouse name: Not on file    Number of children: Not on file    Years of education: Not on file    Highest education level: Not on file   Occupational History    Not on file   Tobacco Use    Smoking status: Never Smoker    Smokeless tobacco: Never Used   Vaping Use    Vaping Use: Never used   Substance and Sexual Activity    Alcohol use: No     Comment: Never drank alcohol denied    Drug use: No    Sexual activity: Never   Other Topics Concern    Not on file   Social History Narrative    Exercies moderately 3 or more times a week      Social Determinants of Health     Financial Resource Strain: Not on file   Food Insecurity: Not on file   Transportation Needs: Not on file   Physical Activity: Not on file   Stress: Not on file   Social Connections: Not on file   Intimate Partner Violence: Not on file   Housing Stability: Not on file       Objective     Vitals:    08/31/22 1100   BP: 140/82   Pulse: 65   Resp: 18   Temp: 98 °F (36 7 °C)   SpO2: 98%     Wt Readings from Last 3 Encounters:   08/31/22 111 kg (245 lb 2 oz)   04/12/22 115 kg (253 lb)   10/13/21 113 kg (250 lb)       Physical Exam  Constitutional:       General: She is not in acute distress  Appearance: Normal appearance  She is not ill-appearing  HENT:      Mouth/Throat:      Mouth: Mucous membranes are moist       Pharynx: Oropharynx is clear  No oropharyngeal exudate or posterior oropharyngeal erythema  Eyes:      General:         Right eye: No discharge  Left eye: No discharge  Extraocular Movements: Extraocular movements intact  Conjunctiva/sclera: Conjunctivae normal       Pupils: Pupils are equal, round, and reactive to light  Lymphadenopathy:      Cervical: No cervical adenopathy  Neurological:      General: No focal deficit present  Mental Status: She is alert and oriented to person, place, and time  Mental status is at baseline  Cranial Nerves: No cranial nerve deficit     Psychiatric: Mood and Affect: Mood normal          Behavior: Behavior normal          Thought Content:  Thought content normal          Judgment: Judgment normal          Pertinent Laboratory/Diagnostic Studies:  Lab Results   Component Value Date    GLUCOSE 99 10/22/2014    BUN 20 04/22/2022    CREATININE 0 98 04/22/2022    CALCIUM 9 5 04/22/2022     12/20/2017    K 4 3 04/22/2022    CO2 30 04/22/2022     04/22/2022     Lab Results   Component Value Date    ALT 23 04/22/2022    AST 20 04/22/2022    ALKPHOS 56 04/22/2022    BILITOT 0 6 12/20/2017       Lab Results   Component Value Date    WBC 3 56 (L) 04/22/2022    HGB 12 8 04/22/2022    HCT 39 8 04/22/2022    MCV 99 (H) 04/22/2022     04/22/2022       Lab Results   Component Value Date    TSH 1 82 10/02/2018       Lab Results   Component Value Date    CHOL 229 (H) 12/20/2017     Lab Results   Component Value Date    TRIG 64 04/22/2022     Lab Results   Component Value Date    HDL 90 04/22/2022     Lab Results   Component Value Date    LDLCALC 65 04/22/2022     No results found for: HGBA1C    Results for orders placed or performed in visit on 04/22/22   CBC   Result Value Ref Range    WBC 3 56 (L) 4 31 - 10 16 Thousand/uL    RBC 4 03 3 81 - 5 12 Million/uL    Hemoglobin 12 8 11 5 - 15 4 g/dL    Hematocrit 39 8 34 8 - 46 1 %    MCV 99 (H) 82 - 98 fL    MCH 31 8 26 8 - 34 3 pg    MCHC 32 2 31 4 - 37 4 g/dL    RDW 13 7 11 6 - 15 1 %    Platelets 224 994 - 121 Thousands/uL    MPV 10 7 8 9 - 12 7 fL   Comprehensive metabolic panel   Result Value Ref Range    Sodium 137 136 - 145 mmol/L    Potassium 4 3 3 5 - 5 3 mmol/L    Chloride 107 100 - 108 mmol/L    CO2 30 21 - 32 mmol/L    ANION GAP 0 (L) 4 - 13 mmol/L    BUN 20 5 - 25 mg/dL    Creatinine 0 98 0 60 - 1 30 mg/dL    Glucose, Fasting 103 (H) 65 - 99 mg/dL    Calcium 9 5 8 3 - 10 1 mg/dL    AST 20 5 - 45 U/L    ALT 23 12 - 78 U/L    Alkaline Phosphatase 56 46 - 116 U/L    Total Protein 7 3 6 4 - 8 2 g/dL Albumin 3 7 3 5 - 5 0 g/dL    Total Bilirubin 0 62 0 20 - 1 00 mg/dL    eGFR 57 ml/min/1 73sq m   Lipid panel   Result Value Ref Range    Cholesterol 168 See Comment mg/dL    Triglycerides 64 See Comment mg/dL    HDL, Direct 90 >=50 mg/dL    LDL Calculated 65 0 - 100 mg/dL    Non-HDL-Chol (CHOL-HDL) 78 mg/dl   TSH, 3rd generation   Result Value Ref Range    TSH 3RD GENERATON 3 490 0 450 - 4 500 uIU/mL       No orders of the defined types were placed in this encounter  ALLERGIES:  No Known Allergies    Current Medications     Current Outpatient Medications   Medication Sig Dispense Refill    atorvastatin (LIPITOR) 10 mg tablet TAKE ONE TABLET BY MOUTH EVERY DAY 90 tablet 1    Azelaic Acid 15 % cream Apply topically daily at bedtime        Calcium Carb-Cholecalciferol 600-800 MG-UNIT TABS Take 2 tablets by mouth daily      Denosumab (PROLIA SC) Inject under the skin 2x year       doxycycline (PERIOSTAT) 20 MG tablet Take 20 mg by mouth 2 (two) times a day      gabapentin (NEURONTIN) 300 mg capsule TAKE ONE CAPSULE BY MOUTH TWICE A  capsule 1    Glucosamine-Chondroitin 500-400 MG CAPS Take by mouth      levothyroxine 150 mcg tablet TAKE ONE TABLET BY MOUTH ONCE DAILY 90 tablet 0    loratadine (CLARITIN) 10 mg tablet Take 10 mg by mouth daily as needed       methocarbamol (ROBAXIN) 500 mg tablet Take 1 tablet (500 mg total) by mouth in the morning and 1 tablet (500 mg total) in the evening and 1 tablet (500 mg total) before bedtime   30 tablet 3    metoprolol tartrate (LOPRESSOR) 50 mg tablet TAKE ONE TABLET BY MOUTH EVERY DAY 90 tablet 3    metroNIDAZOLE (METROGEL) 1 % gel Apply 1 application topically daily       Multiple Vitamins-Minerals (Centrum Silver 50+Women) TABS       naproxen (NAPROSYN) 500 mg tablet TAKE ONE TABLET BY MOUTH TWICE A DAY WITH FOOD 180 tablet 3    Probiotic Product (PROBIOTIC-10 PO)       Triamcinolone Acetonide (NASACORT ALLERGY 24HR NA) 2 sprays into each nostril as needed         No current facility-administered medications for this visit           Health Maintenance     Health Maintenance   Topic Date Due    Colorectal Cancer Screening  08/13/2014    PT PLAN OF CARE  06/10/2020    Influenza Vaccine (1) 09/01/2022    Medicare Annual Wellness Visit (AWV)  10/12/2022    BMI: Followup Plan  10/12/2022    Urinary Incontinence Screening  10/12/2022    Fall Risk  04/12/2023    Depression Screening  04/12/2023    BMI: Adult  08/31/2023    Breast Cancer Screening: Mammogram  10/13/2023    Hepatitis C Screening  Completed    Osteoporosis Screening  Completed    Pneumococcal Vaccine: 65+ Years  Completed    COVID-19 Vaccine  Completed    HIB Vaccine  Aged Out    Hepatitis B Vaccine  Aged Out    IPV Vaccine  Aged Out    Hepatitis A Vaccine  Aged Out    Meningococcal ACWY Vaccine  Aged Out    HPV Vaccine  Aged Out     Immunization History   Administered Date(s) Administered    COVID-19 PFIZER VACCINE 0 3 ML IM 02/24/2021, 03/15/2021, 10/09/2021    COVID-19 Pfizer vac (Ilir-sucrose, gray cap) 12 yr+ IM 04/23/2022    Fluzone Split Quad 0 5 mL 09/26/2014    INFLUENZA 09/26/2014, 10/10/2015, 10/03/2016, 10/17/2017    Influenza Quadrivalent Preservative Free 3 years and older IM 09/26/2014    Influenza Split High Dose Preservative Free IM 10/10/2015, 10/03/2016, 10/17/2017    Influenza, high dose seasonal 0 7 mL 09/27/2018, 09/30/2019, 09/21/2020, 09/15/2021    Influenza, seasonal, injectable 10/11/2012, 10/17/2013    Pneumococcal Conjugate 13-Valent 12/05/2016    Pneumococcal Polysaccharide PPV23 73/52/1510       Jony Be MD

## 2022-08-31 NOTE — ASSESSMENT & PLAN NOTE
Neuralgia  Patient was given instructions to increase her gabapentin 300 mg to 3 times daily  If symptoms do not improve within the next week patient will call for further recommendations    We may consider continue titrating medication or trial of different medications such as Tegretol

## 2022-09-06 ENCOUNTER — TELEPHONE (OUTPATIENT)
Dept: FAMILY MEDICINE CLINIC | Facility: CLINIC | Age: 72
End: 2022-09-06

## 2022-09-06 NOTE — TELEPHONE ENCOUNTER
Patient called and stated that she is really not feeling any better since the increase of gabapentin  She stated that its a tiny bit better but nothing significant  What should she do at this time?

## 2022-09-08 DIAGNOSIS — M54.16 RADICULOPATHY, LUMBAR REGION: ICD-10-CM

## 2022-09-08 RX ORDER — GABAPENTIN 300 MG/1
CAPSULE ORAL
Qty: 120 CAPSULE | Refills: 1 | Status: SHIPPED | OUTPATIENT
Start: 2022-09-08 | End: 2022-10-21

## 2022-09-26 DIAGNOSIS — E03.9 HYPOTHYROIDISM, UNSPECIFIED TYPE: ICD-10-CM

## 2022-09-26 RX ORDER — LEVOTHYROXINE SODIUM 0.15 MG/1
150 TABLET ORAL DAILY
Qty: 90 TABLET | Refills: 0 | Status: SHIPPED | OUTPATIENT
Start: 2022-09-26

## 2022-10-10 ENCOUNTER — VBI (OUTPATIENT)
Dept: ADMINISTRATIVE | Facility: OTHER | Age: 72
End: 2022-10-10

## 2022-10-21 DIAGNOSIS — M54.16 RADICULOPATHY, LUMBAR REGION: ICD-10-CM

## 2022-10-21 RX ORDER — GABAPENTIN 300 MG/1
CAPSULE ORAL
Qty: 180 CAPSULE | Refills: 1 | Status: SHIPPED | OUTPATIENT
Start: 2022-10-21

## 2022-10-31 RX ORDER — DOCUSATE SODIUM 100 MG/1
CAPSULE, LIQUID FILLED ORAL
COMMUNITY
Start: 2021-10-01

## 2022-11-03 ENCOUNTER — OFFICE VISIT (OUTPATIENT)
Dept: FAMILY MEDICINE CLINIC | Facility: CLINIC | Age: 72
End: 2022-11-03

## 2022-11-03 VITALS
RESPIRATION RATE: 18 BRPM | DIASTOLIC BLOOD PRESSURE: 90 MMHG | HEIGHT: 64 IN | SYSTOLIC BLOOD PRESSURE: 140 MMHG | HEART RATE: 61 BPM | WEIGHT: 249.13 LBS | TEMPERATURE: 97.8 F | BODY MASS INDEX: 42.53 KG/M2 | OXYGEN SATURATION: 98 %

## 2022-11-03 DIAGNOSIS — M54.16 RADICULOPATHY, LUMBAR REGION: ICD-10-CM

## 2022-11-03 DIAGNOSIS — M79.2 NEURALGIA: ICD-10-CM

## 2022-11-03 DIAGNOSIS — I10 PRIMARY HYPERTENSION: ICD-10-CM

## 2022-11-03 DIAGNOSIS — E78.5 HYPERLIPIDEMIA, UNSPECIFIED HYPERLIPIDEMIA TYPE: ICD-10-CM

## 2022-11-03 DIAGNOSIS — E03.9 HYPOTHYROIDISM, UNSPECIFIED TYPE: ICD-10-CM

## 2022-11-03 DIAGNOSIS — Z59.9 FINANCIAL DIFFICULTIES: Primary | ICD-10-CM

## 2022-11-03 RX ORDER — GABAPENTIN 300 MG/1
CAPSULE ORAL
Qty: 360 CAPSULE | Refills: 1 | Status: SHIPPED | OUTPATIENT
Start: 2022-11-03

## 2022-11-03 SDOH — ECONOMIC STABILITY - INCOME SECURITY: PROBLEM RELATED TO HOUSING AND ECONOMIC CIRCUMSTANCES, UNSPECIFIED: Z59.9

## 2022-11-03 NOTE — PROGRESS NOTES
Assessment and Plan:     Problem List Items Addressed This Visit        Endocrine    Hypothyroidism     Hypothyroidism  Patient will check TSH blood work and continue with current dose of levothyroxine         Relevant Orders    CBC    Comprehensive metabolic panel    Lipid panel    TSH, 3rd generation       Cardiovascular and Mediastinum    Hypertension     Hypertension  Patient blood pressure is borderline today however she has been under more stress recently  She will check home blood pressures and call if they maintain above her 140/90         Relevant Orders    CBC    Comprehensive metabolic panel    Lipid panel    TSH, 3rd generation       Other    Hyperlipidemia     Hyperlipidemia  Patient will check lipid panel blood work and continue with current dose statin therapy         Neuralgia     Neuralgia  Patient was given a refill on current dose of gabapentin  If patient feels comfortable that symptoms have resolved she may try starting to wean down on her use of gabapentin to 1 capsule t i d            Other Visit Diagnoses     Financial difficulties    -  Primary    Radiculopathy, lumbar region        Relevant Medications    gabapentin (NEURONTIN) 300 mg capsule           Preventive health issues were discussed with patient, and age appropriate screening tests were ordered as noted in patient's After Visit Summary  Personalized health advice and appropriate referrals for health education or preventive services given if needed, as noted in patient's After Visit Summary  History of Present Illness:     Patient presents for a Medicare Wellness Visit    Patient the office to review chronic medical condition  She denies any recent illness  She has been busy helping her aunt moved back home after being hospitalized  Patient reports previous facial neuralgia of left face has resolved by using gabapentin 300 mg and taking 1 tablet in the morning, 1 at supper time, and 2 at bedtime       Patient Care Team:  Enid Wright MD as PCP - Barbara Light MD as PCP - [address] [place] [address] (RTE)  Enid Wright MD as PCP - PCP-[facility] (RTE)  Sulma Jameson PA-C (Obstetrics and Gynecology)     Review of Systems:     Review of Systems   Constitutional: Negative  HENT: Negative  Respiratory: Negative  Cardiovascular: Negative  Gastrointestinal: Negative  Genitourinary: Negative  Musculoskeletal: Positive for arthralgias and gait problem  Chronic intermittent left ankle pain from previous fracture  Psychiatric/Behavioral: Negative           Problem List:     Patient Active Problem List   Diagnosis   • Pain, joint, ankle and foot, left   • DJD (degenerative joint disease), ankle and foot, left   • Allergic rhinitis   • Esophageal reflux   • Hyperlipidemia   • Hypertension   • Hypothyroidism   • Rosacea   • Pain of right lower extremity   • Female genital prolapse   • Fracture of ankle   • Osteopenia   • Varicose veins of lower extremity   • Pain of left heel   • Body mass index (BMI) 40 0-44 9, adult   • Acute midline low back pain without sciatica   • Neuralgia      Past Medical and Surgical History:     Past Medical History:   Diagnosis Date   • Allergic rhinitis    • Arthritis    • Cataract     brock   • Disease of thyroid gland     had total thyroidectomy   • Diverticulosis    • Hyperlipidemia    • Hypertension    • Mild acid reflux    • Osteoporosis    • Plantar fasciitis     b/l  l worse uses orthotics in shoes   • Rosacea    • Use of cane as ambulatory aid    • Uterovaginal prolapse      Past Surgical History:   Procedure Laterality Date   • ANKLE SURGERY Left     Fracture / hardware removed   • COLONOSCOPY      fiberoptic   • COLPORRHAPHY N/A 6/17/2019    Procedure: POSTERIOR COLPORRHAPHY;  Surgeon: Alirio Odell MD;  Location: [facility];  Service: UroGynecology          • OTHER SURGICAL HISTORY      thyroid biopsy core needle    • ME REMOVAL DEEP IMPLANT Left 9/18/2017 Procedure: REMOVAL HARDWARE ANKLE (1 plate, 8 screws );  Surgeon: Ke Mart MD;  Location: [facility];  Service: Orthopedics   • OK REPAIR OF PERINEUM,NON OBSTETRICAL N/A 6/17/2019    Procedure: Sylwia Romero;  Surgeon: Maury Fuentes MD;  Location: [facility];  Service: UroGynecology          • OK REVAGINAL PROLAPSE,SACROSP LIG N/A 6/17/2019    Procedure: VE COLPOSUSPENSION (neugide); Surgeon: Maury Fuentes MD;  Location: [facility];  Service: UroGynecology          • SKIN CANCER EXCISION Left    • THYROIDECTOMY      Total    • TUBAL LIGATION        Family History:     Family History   Problem Relation Age of Onset   • COPD Mother    • Diabetes Mother    • Heart disease Mother    • Hypertension Mother    • Bone cancer Father    • No Known Problems Daughter    • No Known Problems Maternal Grandmother    • No Known Problems Maternal Grandfather    • No Known Problems Paternal Grandmother    • No Known Problems Paternal Grandfather    • No Known Problems Maternal Aunt    • No Known Problems Maternal Aunt    • No Known Problems Paternal Aunt       Social History:     Social History     Socioeconomic History   • Marital status: /Civil Union     Spouse name: None   • Number of children: None   • Years of education: None   • Highest education level: None   Occupational History   • None   Tobacco Use   • Smoking status: Never Smoker   • Smokeless tobacco: Never Used   Vaping Use   • Vaping Use: Never used   Substance and Sexual Activity   • Alcohol use: No     Comment: Never drank alcohol denied   • Drug use: No   • Sexual activity: Never   Other Topics Concern   • None   Social History Narrative    Exercies moderately 3 or more times a week      Social Determinants of Health     Financial Resource Strain: Medium Risk   • Difficulty of Paying Living Expenses: Somewhat hard   Food Insecurity: Not on file   Transportation Needs: No Transportation Needs   • Lack of Transportation (Medical):  No   • Lack of Transportation (Non-Medical): No   Physical Activity: Not on file   Stress: Not on file   Social Connections: Not on file   Intimate Partner Violence: Not on file   Housing Stability: Not on file      Medications and Allergies:     Current Outpatient Medications   Medication Sig Dispense Refill   • atorvastatin (LIPITOR) 10 mg tablet TAKE ONE TABLET BY MOUTH EVERY DAY 90 tablet 1   • Azelaic Acid 15 % cream Apply topically daily at bedtime       • Calcium Carb-Cholecalciferol 600-800 MG-UNIT TABS Take 2 tablets by mouth daily     • Denosumab (PROLIA SC) Inject under the skin 2x year      • docusate sodium (COLACE) 100 mg capsule      • doxycycline (PERIOSTAT) 20 MG tablet Take 20 mg by mouth 2 (two) times a day     • gabapentin (NEURONTIN) 300 mg capsule TAKE ONE CAPSULE IN AM, ONE CAPSULE IN AFT  , 2 CAPSULES AT BEDTIME 360 capsule 1   • Glucosamine-Chondroitin 500-400 MG CAPS Take by mouth     • levothyroxine 150 mcg tablet Take 1 tablet (150 mcg total) by mouth daily 90 tablet 0   • loratadine (CLARITIN) 10 mg tablet Take 10 mg by mouth daily as needed      • methocarbamol (ROBAXIN) 500 mg tablet Take 1 tablet (500 mg total) by mouth in the morning and 1 tablet (500 mg total) in the evening and 1 tablet (500 mg total) before bedtime  (Patient taking differently: Take 500 mg by mouth as needed) 30 tablet 3   • metoprolol tartrate (LOPRESSOR) 50 mg tablet TAKE ONE TABLET BY MOUTH EVERY DAY 90 tablet 3   • metroNIDAZOLE (METROGEL) 1 % gel Apply 1 application topically daily      • Multiple Vitamins-Minerals (Centrum Silver 50+Women) TABS      • naproxen (NAPROSYN) 500 mg tablet TAKE ONE TABLET BY MOUTH TWICE A DAY WITH FOOD (Patient taking differently: as needed) 180 tablet 3   • Probiotic Product (PROBIOTIC-10 PO)      • Triamcinolone Acetonide (NASACORT ALLERGY 24HR NA) 2 sprays into each nostril as needed         No current facility-administered medications for this visit       No Known Allergies   Immunizations: Immunization History   Administered Date(s) Administered   • COVID-19 PFIZER VACCINE 0 3 ML IM 02/24/2021, 03/15/2021, 10/09/2021   • COVID-19 Pfizer vac (Ilir-sucrose, gray cap) 12 yr+ IM 04/23/2022   • Fluzone Split Quad 0 5 mL 09/26/2014   • INFLUENZA 09/26/2014, 10/10/2015, 10/03/2016, 10/17/2017   • Influenza Quadrivalent Preservative Free 3 years and older IM 09/26/2014   • Influenza Split High Dose Preservative Free IM 10/10/2015, 10/03/2016, 10/17/2017   • Influenza, high dose seasonal 0 7 mL 09/27/2018, 09/30/2019, 09/21/2020, 09/15/2021   • Influenza, seasonal, injectable 10/11/2012, 10/17/2013   • Pneumococcal Conjugate 13-Valent 12/05/2016   • Pneumococcal Polysaccharide PPV23 09/27/2018      Health Maintenance:         Topic Date Due   • Colorectal Cancer Screening  08/13/2014   • Breast Cancer Screening: Mammogram  10/13/2023   • Hepatitis C Screening  Completed         Topic Date Due   • Influenza Vaccine (1) 09/01/2022      Medicare Screening Tests and Risk Assessments:         Health Risk Assessment:   Patient rates overall health as fair  Patient feels that their physical health rating is slightly worse  Patient is satisfied with their life  Eyesight was rated as slightly worse  Hearing was rated as same  Patient feels that their emotional and mental health rating is slightly worse  Patients states they are sometimes angry  Patient states they are sometimes unusually tired/fatigued  Pain experienced in the last 7 days has been some  Patient's pain rating has been 8/10  Patient states that she has experienced no weight loss or gain in last 6 months  Depression Screening:   PHQ-2 Score: 0      Fall Risk Screening: In the past year, patient has experienced: history of falling in past year      Urinary Incontinence Screening:   Patient has not leaked urine accidently in the last six months  Home Safety:  Patient has trouble with stairs inside or outside of their home   Patient has working smoke alarms and has working carbon monoxide detector  Home safety hazards include: none  Nutrition:   Current diet is Regular, No Added Salt and Limited junk food  More than an average meal portion    Medications:   Patient is currently taking over-the-counter supplements  OTC medications include: See med list  Patient is able to manage medications  Activities of Daily Living (ADLs)/Instrumental Activities of Daily Living (IADLs):   Walk and transfer into and out of bed and chair?: Yes  Dress and groom yourself?: Yes    Bathe or shower yourself?: Yes    Feed yourself? Yes  Do your laundry/housekeeping?: Yes  Manage your money, pay your bills and track your expenses?: Yes  Make your own meals?: Yes    Do your own shopping?: Yes    Durable Medical Equipment Suppliers  None    Previous Hospitalizations:   Any hospitalizations or ED visits within the last 12 months?: No      Advance Care Planning:   Living will: Yes    Durable POA for healthcare: Yes    Advanced directive: Yes      PREVENTIVE SCREENINGS      Cardiovascular Screening:    General: History Lipid Disorder and Risks and Benefits Discussed    Due for: Lipid Panel      Diabetes Screening:     General: Screening Current      Colorectal Cancer Screening:     General: Patient Declines      Breast Cancer Screening:     General: Screening Current      Cervical Cancer Screening:    General: Screening Not Indicated      Osteoporosis Screening:    General: Screening Not Indicated and History Osteoporosis      Lung Cancer Screening:     General: Screening Not Indicated      Hepatitis C Screening:    General: Screening Current      Preventive Screening Comments: Patient refused colon cancer screening  She is aware of risks involved with her decision  Screening, Brief Intervention, and Referral to Treatment (SBIRT)    Screening  Typical number of drinks in a day: 0  Typical number of drinks in a week: 0  Interpretation: Low risk drinking behavior      AUDIT-C Screenin) How often did you have a drink containing alcohol in the past year? never  2) How many drinks did you have on a typical day when you were drinking in the past year? 0  3) How often did you have 6 or more drinks on one occasion in the past year? never    AUDIT-C Score: 0  Interpretation: Score 0-2 (female): Negative screen for alcohol misuse    Single Item Drug Screening:  How often have you used an illegal drug (including marijuana) or a prescription medication for non-medical reasons in the past year? never    Single Item Drug Screen Score: 0  Interpretation: Negative screen for possible drug use disorder    No exam data present     Physical Exam:     /90   Pulse 61   Temp 97 8 °F (36 6 °C)   Resp 18   Ht 5' 4" (1 626 m)   Wt 113 kg (249 lb 2 oz)   LMP  (LMP Unknown)   SpO2 98%   BMI 42 76 kg/m²     Physical Exam  Vitals and nursing note reviewed  Constitutional:       General: She is not in acute distress  Appearance: She is well-developed  HENT:      Head: Normocephalic and atraumatic  Eyes:      Extraocular Movements: Extraocular movements intact  Conjunctiva/sclera: Conjunctivae normal    Cardiovascular:      Rate and Rhythm: Normal rate and regular rhythm  Heart sounds: No murmur heard  Pulmonary:      Effort: Pulmonary effort is normal  No respiratory distress  Breath sounds: Normal breath sounds  No wheezing or rhonchi  Musculoskeletal:      Cervical back: Neck supple  Right lower leg: No edema  Left lower leg: Edema present  Comments: Patient with chronic trace peripheral edema of left ankle status post fracture years ago   Skin:     General: Skin is warm and dry  Neurological:      Mental Status: She is alert  Mental status is at baseline  Psychiatric:         Mood and Affect: Mood normal          Behavior: Behavior normal          Thought Content:  Thought content normal          Judgment: Judgment normal       I spent 25 minutes with this patient which greater than 50% was spent counseling or reviewing chart    Donna Naik MD

## 2022-11-03 NOTE — PATIENT INSTRUCTIONS
Medicare Preventive Visit Patient Instructions  Thank you for completing your Welcome to Medicare Visit or Medicare Annual Wellness Visit today  Your next wellness visit will be due in one year (11/4/2023)  The screening/preventive services that you may require over the next 5-10 years are detailed below  Some tests may not apply to you based off risk factors and/or age  Screening tests ordered at today's visit but not completed yet may show as past due  Also, please note that scanned in results may not display below  Preventive Screenings:  Service Recommendations Previous Testing/Comments   Colorectal Cancer Screening  * Colonoscopy    * Fecal Occult Blood Test (FOBT)/Fecal Immunochemical Test (FIT)  * Fecal DNA/Cologuard Test  * Flexible Sigmoidoscopy Age: 39-70 years old   Colonoscopy: every 10 years (may be performed more frequently if at higher risk)  OR  FOBT/FIT: every 1 year  OR  Cologuard: every 3 years  OR  Sigmoidoscopy: every 5 years  Screening may be recommended earlier than age 39 if at higher risk for colorectal cancer  Also, an individualized decision between you and your healthcare provider will decide whether screening between the ages of 74-80 would be appropriate  Colonoscopy: 08/13/2004  FOBT/FIT: Not on file  Cologuard: Not on file  Sigmoidoscopy: Not on file          Breast Cancer Screening Age: 36 years old  Frequency: every 1-2 years  Not required if history of left and right mastectomy Mammogram: 10/13/2021    Screening Current   Cervical Cancer Screening Between the ages of 21-29, pap smear recommended once every 3 years  Between the ages of 33-67, can perform pap smear with HPV co-testing every 5 years     Recommendations may differ for women with a history of total hysterectomy, cervical cancer, or abnormal pap smears in past  Pap Smear: 11/11/2020    Screening Not Indicated   Hepatitis C Screening Once for adults born between 1945 and 1965  More frequently in patients at high risk for Hepatitis C Hep C Antibody: 05/07/2019    Screening Current   Diabetes Screening 1-2 times per year if you're at risk for diabetes or have pre-diabetes Fasting glucose: 103 mg/dL (4/22/2022)  A1C: No results in last 5 years (No results in last 5 years)  Screening Current   Cholesterol Screening Once every 5 years if you don't have a lipid disorder  May order more often based on risk factors  Lipid panel: 04/22/2022    Screening Not Indicated  History Lipid Disorder     Other Preventive Screenings Covered by Medicare:  1  Abdominal Aortic Aneurysm (AAA) Screening: covered once if your at risk  You're considered to be at risk if you have a family history of AAA  2  Lung Cancer Screening: covers low dose CT scan once per year if you meet all of the following conditions: (1) Age 50-69; (2) No signs or symptoms of lung cancer; (3) Current smoker or have quit smoking within the last 15 years; (4) You have a tobacco smoking history of at least 20 pack years (packs per day multiplied by number of years you smoked); (5) You get a written order from a healthcare provider  3  Glaucoma Screening: covered annually if you're considered high risk: (1) You have diabetes OR (2) Family history of glaucoma OR (3)  aged 48 and older OR (3)  American aged 72 and older  3  Osteoporosis Screening: covered every 2 years if you meet one of the following conditions: (1) You're estrogen deficient and at risk for osteoporosis based off medical history and other findings; (2) Have a vertebral abnormality; (3) On glucocorticoid therapy for more than 3 months; (4) Have primary hyperparathyroidism; (5) On osteoporosis medications and need to assess response to drug therapy  · Last bone density test (DXA Scan): 04/27/2021   5  HIV Screening: covered annually if you're between the age of 15-65  Also covered annually if you are younger than 13 and older than 72 with risk factors for HIV infection   For pregnant patients, it is covered up to 3 times per pregnancy  Immunizations:  Immunization Recommendations   Influenza Vaccine Annual influenza vaccination during flu season is recommended for all persons aged >= 6 months who do not have contraindications   Pneumococcal Vaccine   * Pneumococcal conjugate vaccine = PCV13 (Prevnar 13), PCV15 (Vaxneuvance), PCV20 (Prevnar 20)  * Pneumococcal polysaccharide vaccine = PPSV23 (Pneumovax) Adults 25-60 years old: 1-3 doses may be recommended based on certain risk factors  Adults 72 years old: 1-2 doses may be recommended based off what pneumonia vaccine you previously received   Hepatitis B Vaccine 3 dose series if at intermediate or high risk (ex: diabetes, end stage renal disease, liver disease)   Tetanus (Td) Vaccine - COST NOT COVERED BY MEDICARE PART B Following completion of primary series, a booster dose should be given every 10 years to maintain immunity against tetanus  Td may also be given as tetanus wound prophylaxis  Tdap Vaccine - COST NOT COVERED BY MEDICARE PART B Recommended at least once for all adults  For pregnant patients, recommended with each pregnancy  Shingles Vaccine (Shingrix) - COST NOT COVERED BY MEDICARE PART B  2 shot series recommended in those aged 48 and above     Health Maintenance Due:      Topic Date Due   • Colorectal Cancer Screening  08/13/2014   • Breast Cancer Screening: Mammogram  10/13/2023   • Hepatitis C Screening  Completed     Immunizations Due:      Topic Date Due   • Influenza Vaccine (1) 09/01/2022     Advance Directives   What are advance directives? Advance directives are legal documents that state your wishes and plans for medical care  These plans are made ahead of time in case you lose your ability to make decisions for yourself  Advance directives can apply to any medical decision, such as the treatments you want, and if you want to donate organs  What are the types of advance directives?   There are many types of advance directives, and each state has rules about how to use them  You may choose a combination of any of the following:  · Living will: This is a written record of the treatment you want  You can also choose which treatments you do not want, which to limit, and which to stop at a certain time  This includes surgery, medicine, IV fluid, and tube feedings  · Durable power of  for healthcare [facility]): This is a written record that states who you want to make healthcare choices for you when you are unable to make them for yourself  This person, called a proxy, is usually a family member or a friend  You may choose more than 1 proxy  · Do not resuscitate (DNR) order:  A DNR order is used in case your heart stops beating or you stop breathing  It is a request not to have certain forms of treatment, such as CPR  A DNR order may be included in other types of advance directives  · Medical directive: This covers the care that you want if you are in a coma, near death, or unable to make decisions for yourself  You can list the treatments you want for each condition  Treatment may include pain medicine, surgery, blood transfusions, dialysis, IV or tube feedings, and a ventilator (breathing machine)  · Values history: This document has questions about your views, beliefs, and how you feel and think about life  This information can help others choose the care that you would choose  Why are advance directives important? An advance directive helps you control your care  Although spoken wishes may be used, it is better to have your wishes written down  Spoken wishes can be misunderstood, or not followed  Treatments may be given even if you do not want them  An advance directive may make it easier for your family to make difficult choices about your care  Fall Prevention    Fall prevention  includes ways to make your home and other areas safer  It also includes ways you can move more carefully to prevent a fall  Health conditions that cause changes in your blood pressure, vision, or muscle strength and coordination may increase your risk for falls  Medicines may also increase your risk for falls if they make you dizzy, weak, or sleepy  Fall prevention tips:   · Stand or sit up slowly  · Use assistive devices as directed  · Wear shoes that fit well and have soles that   · Wear a personal alarm  · Stay active  · Manage your medical conditions  Home Safety Tips:  · Add items to prevent falls in the bathroom  · Keep paths clear  · Install bright lights in your home  · Keep items you use often on shelves within reach  · Paint or place reflective tape on the edges of your stairs  Weight Management   Why it is important to manage your weight:  Being overweight increases your risk of health conditions such as heart disease, high blood pressure, type 2 diabetes, and certain types of cancer  It can also increase your risk for osteoarthritis, sleep apnea, and other respiratory problems  Aim for a slow, steady weight loss  Even a small amount of weight loss can lower your risk of health problems  How to lose weight safely:  A safe and healthy way to lose weight is to eat fewer calories and get regular exercise  You can lose up about 1 pound a week by decreasing the number of calories you eat by 500 calories each day  Healthy meal plan for weight management:  A healthy meal plan includes a variety of foods, contains fewer calories, and helps you stay healthy  A healthy meal plan includes the following:  · Eat whole-grain foods more often  A healthy meal plan should contain fiber  Fiber is the part of grains, fruits, and vegetables that is not broken down by your body  Whole-grain foods are healthy and provide extra fiber in your diet  Some examples of whole-grain foods are whole-wheat breads and pastas, oatmeal, brown rice, and bulgur  · Eat a variety of vegetables every day    Include dark, leafy greens such as spinach, kale, jenae greens, and mustard greens  Eat yellow and orange vegetables such as carrots, sweet potatoes, and winter squash  · Eat a variety of fruits every day  Choose fresh or canned fruit (canned in its own juice or light syrup) instead of juice  Fruit juice has very little or no fiber  · Eat low-fat dairy foods  Drink fat-free (skim) milk or 1% milk  Eat fat-free yogurt and low-fat cottage cheese  Try low-fat cheeses such as mozzarella and other reduced-fat cheeses  · Choose meat and other protein foods that are low in fat  Choose beans or other legumes such as split peas or lentils  Choose fish, skinless poultry (chicken or turkey), or lean cuts of red meat (beef or pork)  Before you cook meat or poultry, cut off any visible fat  · Use less fat and oil  Try baking foods instead of frying them  Add less fat, such as margarine, sour cream, regular salad dressing and mayonnaise to foods  Eat fewer high-fat foods  Some examples of high-fat foods include french fries, doughnuts, ice cream, and cakes  · Eat fewer sweets  Limit foods and drinks that are high in sugar  This includes candy, cookies, regular soda, and sweetened drinks  Exercise:  Exercise at least 30 minutes per day on most days of the week  Some examples of exercise include walking, biking, dancing, and swimming  You can also fit in more physical activity by taking the stairs instead of the elevator or parking farther away from stores  Ask your healthcare provider about the best exercise plan for you  © Copyright [employer] 2018 Information is for End User's use only and may not be sold, redistributed or otherwise used for commercial purposes   All illustrations and images included in CareNotes® are the copyrighted property of [employer]  or [address] [place] [address]

## 2022-11-03 NOTE — ASSESSMENT & PLAN NOTE
Hypertension  Patient blood pressure is borderline today however she has been under more stress recently    She will check home blood pressures and call if they maintain above her 140/90

## 2022-11-03 NOTE — ASSESSMENT & PLAN NOTE
Neuralgia  Patient was given a refill on current dose of gabapentin    If patient feels comfortable that symptoms have resolved she may try starting to wean down on her use of gabapentin to 1 capsule t i d

## 2022-11-03 NOTE — ASSESSMENT & PLAN NOTE
Hyperlipidemia    Patient will check lipid panel blood work and continue with current dose statin therapy

## 2022-11-14 ENCOUNTER — OFFICE VISIT (OUTPATIENT)
Dept: FAMILY MEDICINE CLINIC | Facility: CLINIC | Age: 72
End: 2022-11-14

## 2022-11-14 VITALS
OXYGEN SATURATION: 96 % | BODY MASS INDEX: 41.74 KG/M2 | SYSTOLIC BLOOD PRESSURE: 150 MMHG | TEMPERATURE: 98.2 F | HEIGHT: 64 IN | WEIGHT: 244.5 LBS | DIASTOLIC BLOOD PRESSURE: 82 MMHG | HEART RATE: 62 BPM | RESPIRATION RATE: 18 BRPM

## 2022-11-14 DIAGNOSIS — H60.92 OTITIS EXTERNA OF LEFT EAR, UNSPECIFIED CHRONICITY, UNSPECIFIED TYPE: Primary | ICD-10-CM

## 2022-11-14 DIAGNOSIS — H61.22 IMPACTED CERUMEN OF LEFT EAR: ICD-10-CM

## 2022-11-14 RX ORDER — NEOMYCIN SULFATE, POLYMYXIN B SULFATE AND HYDROCORTISONE 10; 3.5; 1 MG/ML; MG/ML; [USP'U]/ML
4 SUSPENSION/ DROPS AURICULAR (OTIC) 3 TIMES DAILY
Qty: 10 ML | Refills: 0 | Status: SHIPPED | OUTPATIENT
Start: 2022-11-14

## 2022-11-14 NOTE — ASSESSMENT & PLAN NOTE
Cerumen impaction    Using irrigation with peroxide and water large amounts of wax were removed from left ear canal

## 2022-11-14 NOTE — ASSESSMENT & PLAN NOTE
Otitis externa  Patient given prescription for Cortisporin otic suspension to use 4 drops t i d  to left ear for 5 days    Patient will call if symptoms persist after medication completed

## 2022-11-14 NOTE — PROGRESS NOTES
FAMILY PRACTICE OFFICE VISIT       NAME: Neeta Luna  AGE: 67 y o  SEX: female       : 1950        MRN: [id number]    DATE: 2022  TIME: 11:02 AM    Assessment and Plan     Problem List Items Addressed This Visit        Nervous and Auditory    Otitis externa of left ear - Primary     Otitis externa  Patient given prescription for Cortisporin otic suspension to use 4 drops t i d  to left ear for 5 days  Patient will call if symptoms persist after medication completed         Relevant Medications    neomycin-polymyxin-hydrocortisone (CORTISPORIN) 0 35%-10,000 units/mL-1% otic suspension    Impacted cerumen of left ear     Cerumen impaction  Using irrigation with peroxide and water large amounts of wax were removed from left ear canal                    Chief Complaint     Chief Complaint   Patient presents with   • L ear discomfort        History of Present Illness     Patient with a one-week history of left ear discomfort with decreased hearing acuity  She denies any recent illness  She did try over-the-counter Debrox earwax removal drops without relief in symptoms  Review of Systems   Review of Systems   Constitutional: Negative  HENT: Positive for ear pain and hearing loss  Respiratory: Negative  Cardiovascular: Negative          Active Problem List     Patient Active Problem List   Diagnosis   • Pain, joint, ankle and foot, left   • DJD (degenerative joint disease), ankle and foot, left   • Allergic rhinitis   • Esophageal reflux   • Hyperlipidemia   • Hypertension   • Hypothyroidism   • Rosacea   • Pain of right lower extremity   • Female genital prolapse   • Fracture of ankle   • Osteopenia   • Varicose veins of lower extremity   • Pain of left heel   • Body mass index (BMI) 40 0-44 9, adult   • Acute midline low back pain without sciatica   • Neuralgia   • Otitis externa of left ear   • Impacted cerumen of left ear       Past Medical History:  Past Medical History: Diagnosis Date   • Allergic rhinitis    • Arthritis    • Cataract     brock   • Disease of thyroid gland     had total thyroidectomy   • Diverticulosis    • Hyperlipidemia    • Hypertension    • Mild acid reflux    • Osteoporosis    • Plantar fasciitis     b/l  l worse uses orthotics in shoes   • Rosacea    • Use of cane as ambulatory aid    • Uterovaginal prolapse        Past Surgical History:  Past Surgical History:   Procedure Laterality Date   • ANKLE SURGERY Left     Fracture / hardware removed   • COLONOSCOPY      fiberoptic   • COLPORRHAPHY N/A 6/17/2019    Procedure: POSTERIOR COLPORRHAPHY;  Surgeon: Kalyn Alvarenga MD;  Location: [facility];  Service: UroGynecology          • OTHER SURGICAL HISTORY      thyroid biopsy core needle    • [place] REMOVAL DEEP IMPLANT Left 9/18/2017    Procedure: REMOVAL HARDWARE ANKLE (1 plate, 8 screws );  Surgeon: Venkat Santizo MD;  [facility]: [facility];  Service: Orthopedics   • NY REPAIR OF PERINEUM,NON OBSTETRICAL N/A 6/17/2019    Procedure: Cherilynathaly Pen;  Surgeon: Kalyn Alvarenga MD;  [facility];  Service: UroGynecology          • NY REVAGINAL PROLAPSE,SACROSP LIG N/A 6/17/2019    Procedure: VE COLPOSUSPENSION (neugide);   Surgeon: Kalyn Alvarenga MD;  [facility];  Service: UroGynecology          • SKIN CANCER EXCISION Left    • THYROIDECTOMY      Total    • TUBAL LIGATION         Family History:  Family History   Problem Relation Age of Onset   • COPD Mother    • Diabetes Mother    • Heart disease Mother    • Hypertension Mother    • Bone cancer Father    • No Known Problems Daughter    • No Known Problems Maternal Grandmother    • No Known Problems Maternal Grandfather    • No Known Problems Paternal Grandmother    • No Known Problems Paternal Grandfather    • No Known Problems Maternal Aunt    • No Known Problems Maternal Aunt    • No Known Problems Paternal Aunt        Social History:  Social History     Socioeconomic History   • Marital status: /Civil [employer]     Spouse name: Not on file   • Number of children: Not on file   • Years of education: Not on file   • Highest education level: Not on file   Occupational History   • Not on file   Tobacco Use   • Smoking status: Never Smoker   • Smokeless tobacco: Never Used   Vaping Use   • Vaping Use: Never used   Substance and Sexual Activity   • Alcohol use: No     Comment: Never drank alcohol denied   • Drug use: No   • Sexual activity: Never   Other Topics Concern   • Not on file   Social History Narrative    Exercies moderately 3 or more times a week      Social Determinants of Health     Financial Resource Strain: Medium Risk   • Difficulty of Paying Living Expenses: Somewhat hard   Food Insecurity: Not on file   Transportation Needs: No Transportation Needs   • Lack of Transportation (Medical): No   • Lack of Transportation (Non-Medical): No   Physical Activity: Not on file   Stress: Not on file   Social Connections: Not on file   Intimate Partner Violence: Not on file   Housing Stability: Not on file       Objective     Vitals:    11/14/22 1033   BP: 150/82   Pulse: 62   Resp: 18   Temp: 98 2 °F (36 8 °C)   SpO2: 96%     Wt Readings from Last 3 Encounters:   11/14/22 111 kg (244 lb 8 oz)   11/03/22 113 kg (249 lb 2 oz)   08/31/22 111 kg (245 lb 2 oz)       Physical Exam  Constitutional:       General: She is not in acute distress  Appearance: Normal appearance  She is not ill-appearing  HENT:      Right Ear: Tympanic membrane, ear canal and external ear normal  There is no impacted cerumen  Left Ear: Tympanic membrane, ear canal and external ear normal  There is impacted cerumen  Ears:      Comments: Left ear canal with cerumen impaction  After removal of impaction it was noted that he ear canal was narrowed due to wall swelling  Tympanic membrane within normal limits  Eyes:      General:         Right eye: No discharge  Left eye: No discharge        Extraocular Movements: Extraocular movements intact  Conjunctiva/sclera: Conjunctivae normal       Pupils: Pupils are equal, round, and reactive to light  Lymphadenopathy:      Cervical: No cervical adenopathy  Neurological:      Mental Status: She is alert  Ear cerumen removal    Date/Time: 11/14/2022 10:59 AM  Performed by: Eboni Ryan MD  Authorized by: Eboni Ryan MD   Universal Protocol:  Consent: Verbal consent obtained  Consent given by: patient      Patient location:  Clinic  Procedure details:     Local anesthetic:  None    Location:  L ear    Procedure type: irrigation with instrumentation      Instrumentation: curette      Approach:  Natural orifice  Post-procedure details:     Complication:  None    Hearing quality:  Improved    Patient tolerance of procedure:   Tolerated well, no immediate complications          Pertinent Laboratory/Diagnostic Studies:  Lab Results   Component Value Date    GLUCOSE 99 10/22/2014    BUN 20 04/22/2022    CREATININE 0 98 04/22/2022    CALCIUM 9 5 04/22/2022     12/20/2017    K 4 3 04/22/2022    CO2 30 04/22/2022     04/22/2022     Lab Results   Component Value Date    ALT 23 04/22/2022    AST 20 04/22/2022    ALKPHOS 56 04/22/2022    BILITOT 0 6 12/20/2017       Lab Results   Component Value Date    WBC 3 56 (L) 04/22/2022    HGB 12 8 04/22/2022    HCT 39 8 04/22/2022    MCV 99 (H) 04/22/2022     04/22/2022       Lab Results   Component Value Date    TSH 1 82 10/02/2018       Lab Results   Component Value Date    CHOL 229 (H) 12/20/2017     Lab Results   Component Value Date    TRIG 64 04/22/2022     Lab Results   Component Value Date    HDL 90 04/22/2022     Lab Results   Component Value Date    LDLCALC 65 04/22/2022     No results found for: HGBA1C    Results for orders placed or performed in visit on 04/22/22   CBC   Result Value Ref Range    WBC 3 56 (L) 4 31 - 10 16 Thousand/uL    RBC 4 03 3 81 - 5 12 Million/uL    Hemoglobin 12 8 11 5 - 15 4 g/dL Hematocrit 39 8 34 8 - 46 1 %    MCV 99 (H) 82 - 98 fL    MCH 31 8 26 8 - 34 3 pg    MCHC 32 2 31 4 - 37 4 g/dL    RDW 13 7 11 6 - 15 1 %    Platelets 645 631 - 942 Thousands/uL    MPV 10 7 8 9 - 12 7 fL   Comprehensive metabolic panel   Result Value Ref Range    Sodium 137 136 - 145 mmol/L    Potassium 4 3 3 5 - 5 3 mmol/L    Chloride 107 100 - 108 mmol/L    CO2 30 21 - 32 mmol/L    ANION GAP 0 (L) 4 - 13 mmol/L    BUN 20 5 - 25 mg/dL    Creatinine 0 98 0 60 - 1 30 mg/dL    Glucose, Fasting 103 (H) 65 - 99 mg/dL    Calcium 9 5 8 3 - 10 1 mg/dL    AST 20 5 - 45 U/L    ALT 23 12 - 78 U/L    Alkaline Phosphatase 56 46 - 116 U/L    Total Protein 7 3 6 4 - 8 2 g/dL    Albumin 3 7 3 5 - 5 0 g/dL    Total Bilirubin 0 62 0 20 - 1 00 mg/dL    eGFR 57 ml/min/1 73sq m   Lipid panel   Result Value Ref Range    Cholesterol 168 See Comment mg/dL    Triglycerides 64 See Comment mg/dL    HDL, Direct 90 >=50 mg/dL    LDL Calculated 65 0 - 100 mg/dL    Non-HDL-Chol (CHOL-HDL) 78 mg/dl   TSH, 3rd generation   Result Value Ref Range    TSH 3RD GENERATON 3 490 0 450 - 4 500 uIU/mL       Orders Placed This Encounter   Procedures   • Ear cerumen removal       ALLERGIES:  No Known Allergies    Current Medications     Current Outpatient Medications   Medication Sig Dispense Refill   • atorvastatin (LIPITOR) 10 mg tablet TAKE ONE TABLET BY MOUTH EVERY DAY 90 tablet 1   • Azelaic Acid 15 % cream Apply topically daily at bedtime       • Calcium Carb-Cholecalciferol 600-800 MG-UNIT TABS Take 2 tablets by mouth daily     • Denosumab (PROLIA SC) Inject under the skin 2x year      • docusate sodium (COLACE) 100 mg capsule      • doxycycline (PERIOSTAT) 20 MG tablet Take 20 mg by mouth 2 (two) times a day     • gabapentin (NEURONTIN) 300 mg capsule TAKE ONE CAPSULE IN AM, ONE CAPSULE IN AFT  , 2 CAPSULES AT BEDTIME 360 capsule 1   • Glucosamine-Chondroitin 500-400 MG CAPS Take by mouth     • levothyroxine 150 mcg tablet Take 1 tablet (150 mcg total) by mouth daily 90 tablet 0   • loratadine (CLARITIN) 10 mg tablet Take 10 mg by mouth daily as needed      • methocarbamol (ROBAXIN) 500 mg tablet Take 1 tablet (500 mg total) by mouth in the morning and 1 tablet (500 mg total) in the evening and 1 tablet (500 mg total) before bedtime  (Patient taking differently: Take 500 mg by mouth as needed) 30 tablet 3   • metoprolol tartrate (LOPRESSOR) 50 mg tablet TAKE ONE TABLET BY MOUTH EVERY DAY 90 tablet 3   • metroNIDAZOLE (METROGEL) 1 % gel Apply 1 application topically daily      • Multiple Vitamins-Minerals (Centrum Silver 50+Women) TABS      • naproxen (NAPROSYN) 500 mg tablet TAKE ONE TABLET BY MOUTH TWICE A DAY WITH FOOD (Patient taking differently: as needed) 180 tablet 3   • neomycin-polymyxin-hydrocortisone (CORTISPORIN) 0 35%-10,000 units/mL-1% otic suspension Administer 4 drops into the left ear 3 (three) times a day 10 mL 0   • Probiotic Product (PROBIOTIC-10 PO)      • Triamcinolone Acetonide (NASACORT ALLERGY 24HR NA) 2 sprays into each nostril as needed         No current facility-administered medications for this visit           Health Maintenance     Health Maintenance   Topic Date Due   • Colorectal Cancer Screening  08/13/2014   • PT PLAN OF CARE  06/10/2020   • BMI: Followup Plan  10/12/2022   • Breast Cancer Screening: Mammogram  10/13/2023   • Fall Risk  11/03/2023   • Depression Screening  11/03/2023   • Urinary Incontinence Screening  11/03/2023   • Medicare Annual Wellness Visit (AWV)  11/03/2023   • BMI: Adult  11/14/2023   • Hepatitis C Screening  Completed   • Osteoporosis Screening  Completed   • Pneumococcal Vaccine: 65+ Years  Completed   • Influenza Vaccine  Completed   • COVID-19 Vaccine  Completed   • HIB Vaccine  Aged Out   • Hepatitis B Vaccine  Aged Out   • IPV Vaccine  Aged Out   • Hepatitis A Vaccine  Aged Out   • Meningococcal ACWY Vaccine  Aged Out   • HPV Vaccine  Aged Out     Immunization History   Administered Date(s) Administered   • COVID-19 PFIZER VACCINE 0 3 ML IM 02/24/2021, 03/15/2021, 10/09/2021   • COVID-19 Pfizer vac (Ilir-sucrose, gray cap) 12 yr+ IM 04/23/2022   • Fluzone Split Quad 0 5 mL 09/26/2014   • INFLUENZA 09/26/2014, 10/10/2015, 10/03/2016, 10/17/2017, 10/29/2022   • Influenza Quadrivalent Preservative Free 3 years and older IM 09/26/2014   • Influenza Split High Dose Preservative Free IM 10/10/2015, 10/03/2016, 10/17/2017   • Influenza, high dose seasonal 0 7 mL 09/27/2018, 09/30/2019, 09/21/2020, 09/15/2021   • Influenza, seasonal, injectable 10/11/2012, 10/17/2013   • Pneumococcal Conjugate 13-Valent 12/05/2016   • Pneumococcal Polysaccharide PPV23 46/36/3183       Medardo Cleveland MD

## 2022-11-18 DIAGNOSIS — I10 ESSENTIAL HYPERTENSION: ICD-10-CM

## 2022-11-18 DIAGNOSIS — E78.5 HYPERLIPIDEMIA, UNSPECIFIED HYPERLIPIDEMIA TYPE: ICD-10-CM

## 2022-11-18 RX ORDER — ATORVASTATIN CALCIUM 10 MG/1
TABLET, FILM COATED ORAL
Qty: 90 TABLET | Refills: 1 | Status: SHIPPED | OUTPATIENT
Start: 2022-11-18

## 2022-11-18 RX ORDER — METOPROLOL TARTRATE 50 MG/1
TABLET, FILM COATED ORAL
Qty: 90 TABLET | Refills: 3 | Status: SHIPPED | OUTPATIENT
Start: 2022-11-18

## 2022-12-05 DIAGNOSIS — H60.92 OTITIS EXTERNA OF LEFT EAR, UNSPECIFIED CHRONICITY, UNSPECIFIED TYPE: ICD-10-CM

## 2022-12-05 RX ORDER — NEOMYCIN SULFATE, POLYMYXIN B SULFATE AND HYDROCORTISONE 10; 3.5; 1 MG/ML; MG/ML; [USP'U]/ML
4 SUSPENSION/ DROPS AURICULAR (OTIC) 3 TIMES DAILY
Qty: 10 ML | Refills: 0 | Status: SHIPPED | OUTPATIENT
Start: 2022-12-05 | End: 2023-02-15 | Stop reason: HOSPADM

## 2022-12-28 ENCOUNTER — CLINICAL SUPPORT (OUTPATIENT)
Dept: FAMILY MEDICINE CLINIC | Facility: CLINIC | Age: 72
End: 2022-12-28

## 2022-12-28 DIAGNOSIS — M85.80 OSTEOPENIA, UNSPECIFIED LOCATION: Primary | ICD-10-CM

## 2023-01-13 DIAGNOSIS — E03.9 HYPOTHYROIDISM, UNSPECIFIED TYPE: ICD-10-CM

## 2023-01-13 PROBLEM — H61.22 IMPACTED CERUMEN OF LEFT EAR: Status: RESOLVED | Noted: 2022-11-14 | Resolved: 2023-01-13

## 2023-01-13 RX ORDER — LEVOTHYROXINE SODIUM 0.15 MG/1
TABLET ORAL
Qty: 90 TABLET | Refills: 0 | Status: SHIPPED | OUTPATIENT
Start: 2023-01-13 | End: 2023-01-16 | Stop reason: SDUPTHER

## 2023-01-16 ENCOUNTER — APPOINTMENT (OUTPATIENT)
Dept: LAB | Facility: CLINIC | Age: 73
End: 2023-01-16

## 2023-01-16 DIAGNOSIS — E03.9 HYPOTHYROIDISM, UNSPECIFIED TYPE: ICD-10-CM

## 2023-01-16 DIAGNOSIS — I10 PRIMARY HYPERTENSION: ICD-10-CM

## 2023-01-16 LAB
ALBUMIN SERPL BCP-MCNC: 3.8 G/DL (ref 3.5–5)
ALP SERPL-CCNC: 62 U/L (ref 46–116)
ALT SERPL W P-5'-P-CCNC: 23 U/L (ref 12–78)
ANION GAP SERPL CALCULATED.3IONS-SCNC: 2 MMOL/L (ref 4–13)
AST SERPL W P-5'-P-CCNC: 20 U/L (ref 5–45)
BILIRUB SERPL-MCNC: 0.64 MG/DL (ref 0.2–1)
BUN SERPL-MCNC: 20 MG/DL (ref 5–25)
CALCIUM SERPL-MCNC: 9.8 MG/DL (ref 8.3–10.1)
CHLORIDE SERPL-SCNC: 105 MMOL/L (ref 96–108)
CHOLEST SERPL-MCNC: 166 MG/DL
CO2 SERPL-SCNC: 30 MMOL/L (ref 21–32)
CREAT SERPL-MCNC: 0.98 MG/DL (ref 0.6–1.3)
ERYTHROCYTE [DISTWIDTH] IN BLOOD BY AUTOMATED COUNT: 13.3 % (ref 11.6–15.1)
GFR SERPL CREATININE-BSD FRML MDRD: 57 ML/MIN/1.73SQ M
GLUCOSE P FAST SERPL-MCNC: 99 MG/DL (ref 65–99)
HCT VFR BLD AUTO: 38.5 % (ref 34.8–46.1)
HDLC SERPL-MCNC: 86 MG/DL
HGB BLD-MCNC: 12 G/DL (ref 11.5–15.4)
LDLC SERPL CALC-MCNC: 66 MG/DL (ref 0–100)
MCH RBC QN AUTO: 31.3 PG (ref 26.8–34.3)
MCHC RBC AUTO-ENTMCNC: 31.2 G/DL (ref 31.4–37.4)
MCV RBC AUTO: 101 FL (ref 82–98)
NONHDLC SERPL-MCNC: 80 MG/DL
PLATELET # BLD AUTO: 208 THOUSANDS/UL (ref 149–390)
PMV BLD AUTO: 10.2 FL (ref 8.9–12.7)
POTASSIUM SERPL-SCNC: 4.6 MMOL/L (ref 3.5–5.3)
PROT SERPL-MCNC: 7.1 G/DL (ref 6.4–8.4)
RBC # BLD AUTO: 3.83 MILLION/UL (ref 3.81–5.12)
SODIUM SERPL-SCNC: 137 MMOL/L (ref 135–147)
TRIGL SERPL-MCNC: 72 MG/DL
TSH SERPL DL<=0.05 MIU/L-ACNC: 14.7 UIU/ML (ref 0.45–4.5)
WBC # BLD AUTO: 4.82 THOUSAND/UL (ref 4.31–10.16)

## 2023-01-16 RX ORDER — LEVOTHYROXINE SODIUM 175 UG/1
175 TABLET ORAL DAILY
Qty: 90 TABLET | Refills: 1 | Status: SHIPPED | OUTPATIENT
Start: 2023-01-16 | End: 2023-06-27

## 2023-01-17 ENCOUNTER — TELEPHONE (OUTPATIENT)
Dept: FAMILY MEDICINE CLINIC | Facility: CLINIC | Age: 73
End: 2023-01-17

## 2023-01-17 DIAGNOSIS — I10 PRIMARY HYPERTENSION: ICD-10-CM

## 2023-01-17 DIAGNOSIS — E03.9 HYPOTHYROIDISM, UNSPECIFIED TYPE: Primary | ICD-10-CM

## 2023-01-17 NOTE — TELEPHONE ENCOUNTER
----- Message from Julissa Herrera MD sent at 2/79/6609  5:44 PM EST -----  Recent blood work shows low thyroid function  If she has been on her levothyroxine 150 mcg tablet daily I would recommend increasing to 175 mcg tablet and repeat blood work in 6 weeks    I will send new prescription to pharmacy

## 2023-01-18 DIAGNOSIS — E03.9 HYPOTHYROIDISM, UNSPECIFIED TYPE: ICD-10-CM

## 2023-01-18 RX ORDER — LEVOTHYROXINE SODIUM 175 UG/1
175 TABLET ORAL DAILY
Qty: 90 TABLET | Refills: 1 | OUTPATIENT
Start: 2023-01-18

## 2023-01-18 NOTE — TELEPHONE ENCOUNTER
Patient is getting this from [address] [place] [address] Street have it in time to start it tomorrow, she would like a short order supply sent to [facility] in [place]  Is this okay? Please advise

## 2023-01-28 NOTE — PROGRESS NOTES
FAMILY PRACTICE OFFICE VISIT       NAME: Krystal Nelson  AGE: 76 y o  SEX: female       : 1950        MRN: [id number]    DATE: 2018  TIME: 12:12 PM    Assessment and Plan     Problem List Items Addressed This Visit     Hyperlipidemia     Hyperlipidemia  Patient will check lipid panel blood work and continue with current dose of statin therapy         Relevant Orders    CBC    Comprehensive metabolic panel    Lipid panel    TSH, 3rd generation    Hypertension     Hypertension  Patient's blood pressure is stable at this time and she will continue with current regimen of medications  She will obtain blood work as ordered         Hypothyroidism     Hypothyroidism  Patient will have TSH blood work and continue with current dose of levothyroxine           Other Visit Diagnoses     Need for pneumococcal vaccination    -  Primary    Relevant Orders    PNEUMOCOCCAL POLYSACCHARIDE VACCINE 23-VALENT =>3YO SQ IM (Completed)    Periodic health assessment, general screening, adult        Relevant Orders    Cologuard    Need for influenza vaccination        Relevant Orders    influenza vaccine, 2724-4354, high-dose, PF 0 5 mL, for patients 65 yr+ (FLUZONE HIGH-DOSE) (Completed)       Patient did receive her annual flu vaccine as well as her pneumonia 23 vaccine today      There are no Patient Instructions on file for this visit  Chief Complaint     Chief Complaint   Patient presents with    Follow-up     Pt is here follow up        History of Present Illness     Patient denies any recent illness however she does get seasonal allergies  She has been going to the gym 3 days a week to try and exercise  She does see her gynecologist on a regular basis once a year  She is up-to-date with mammograms  Patient refused colonoscopy but was willing to consider cologuard testing        Review of Systems   Review of Systems   Constitutional: Negative  HENT: Positive for postnasal drip      Respiratory: Negative  Cardiovascular: Negative  Gastrointestinal: Negative  Genitourinary: Negative  Musculoskeletal:        Chronic left ankle pain due to prior surgery and arthritis   Allergic/Immunologic: Positive for environmental allergies  Neurological: Negative  Psychiatric/Behavioral: Negative  Active Problem List     Patient Active Problem List   Diagnosis    Pain, joint, ankle and foot, left    DJD (degenerative joint disease), ankle and foot, left    Bilateral impacted cerumen    Acute otitis externa of left ear    Allergic rhinitis    Esophageal reflux    Hyperlipidemia    Hypertension    Hypothyroidism    Rosacea       Past Medical History:  Past Medical History:   Diagnosis Date    Allergic rhinitis     Disease of thyroid gland     Diverticulosis     GERD (gastroesophageal reflux disease)     Hyperlipidemia     Hypertension     Rosacea     Thyroid nodule     non-toxic, single        Past Surgical History:  Past Surgical History:   Procedure Laterality Date    ANKLE SURGERY Left     Fracture tx    COLONOSCOPY      fiberoptic    OTHER SURGICAL HISTORY      thyroid biopsy core needle     [place] REMOVAL DEEP IMPLANT Left 9/18/2017    Procedure: REMOVAL HARDWARE ANKLE (1 plate, 8 screws );  Surgeon: Priscilla Babinski, MD;  [facility];  Service: Orthopedics    THYROIDECTOMY      Total     TUBAL LIGATION         Family History:  Family History   Problem Relation Age of Onset    COPD Mother     Diabetes Mother     Heart disease Mother     Hypertension Mother     Bone cancer Father     Colon cancer Family        Social History:  Social History     Social History    Marital status: /Civil Union     Spouse name: N/A    Number of children: N/A    Years of education: N/A     Occupational History    Not on file       Social History Main Topics    Smoking status: Never Smoker    Smokeless tobacco: Never Used    Alcohol use No      Comment: Never drank alcohol denied    Drug use: No    Sexual activity: Yes     Other Topics Concern    Not on file     Social History Narrative    Exercies moderately 3 or more times a week        Objective     Vitals:    09/27/18 1205   BP: 132/80   Pulse:    Resp:    Temp:      Wt Readings from Last 3 Encounters:   09/27/18 108 kg (238 lb 9 6 oz)   05/30/18 107 kg (235 lb 14 3 oz)   05/14/18 107 kg (235 lb 6 4 oz)       Physical Exam   Constitutional: No distress  HENT:   Mouth/Throat: Oropharynx is clear and moist  No oropharyngeal exudate  Tympanic membranes within normal limits bilaterally   Neck:   No carotid bruits   Cardiovascular:   Regular rate and rhythm with no murmurs   Pulmonary/Chest:   Lungs are clear to auscultation without wheezes,rales, or rhonchi   Abdominal:   Abdomen is soft, nontender with positive bowel sounds  There is no rebound or guarding  No masses palpated   Musculoskeletal: She exhibits edema  Chronic trace peripheral edema left ankle due to surgery and arthritis   Lymphadenopathy:     She has no cervical adenopathy  Psychiatric: She has a normal mood and affect   Her behavior is normal  Judgment and thought content normal        Pertinent Laboratory/Diagnostic Studies:  Lab Results   Component Value Date    GLUCOSE 99 10/22/2014    BUN 20 12/20/2017    CREATININE 0 91 12/20/2017    CALCIUM 9 6 12/20/2017     12/20/2017    K 4 2 12/20/2017    CO2 27 12/20/2017     12/20/2017     Lab Results   Component Value Date    ALT 10 12/20/2017    AST 13 12/20/2017    ALKPHOS 74 12/20/2017    BILITOT 0 6 12/20/2017       Lab Results   Component Value Date    WBC 5 6 12/20/2017    HGB 12 5 12/20/2017    HCT 38 6 12/20/2017    MCV 94 4 12/20/2017     12/20/2017       No results found for: TSH    Lab Results   Component Value Date    CHOL 229 (H) 12/20/2017     Lab Results   Component Value Date    TRIG 86 12/20/2017     Lab Results   Component Value Date    HDL 83 12/20/2017     Lab Results Component Value Date    LDLCALC 123 (H) 10/22/2014     No results found for: HGBA1C    Results for orders placed or performed in visit on 12/21/17   CBC AND PLATELET (HISTORICAL)   Result Value Ref Range    WBC 5 6 3 8 - 10 8 Thousand/uL    RBC 4 09 3 80 - 5 10 Million/uL    Hemoglobin 12 5 11 7 - 15 5 g/dL    Hematocrit 38 6 35 0 - 45 0 %    MCV 94 4 80 0 - 100 0 fL    MCH 30 6 27 0 - 33 0 pg    MCHC 32 4 32 0 - 36 0 g/dL    RDW 12 6 11 0 - 15 0 %    Platelets 365 376 - 515 Thousand/uL    MPV 10 3 7 5 - 12 5 fL   TSH, 3RD GENERATION (HISTORICAL)   Result Value Ref Range    TSH 3RD GENERATON 0 97 0 40 - 4 50 mIU/L       Orders Placed This Encounter   Procedures    Cologuard    influenza vaccine, 9590-0208, high-dose, PF 0 5 mL, for patients 65 yr+ (FLUZONE HIGH-DOSE)    PNEUMOCOCCAL POLYSACCHARIDE VACCINE 23-VALENT =>3YO SQ IM    CBC    Comprehensive metabolic panel    Lipid panel    TSH, 3rd generation       ALLERGIES:  No Known Allergies    Current Medications     Current Outpatient Prescriptions   Medication Sig Dispense Refill    atorvastatin (LIPITOR) 10 mg tablet Take 1 tablet (10 mg total) by mouth daily 90 tablet 1    Azelaic Acid (FINACEA) 15 % cream Apply topically daily at bedtime        Calcium Carb-Cholecalciferol (CALTRATE 600+D) 600-800 MG-UNIT TABS Take 2 tablets by mouth daily      doxycycline (PERIOSTAT) 20 MG tablet Take 20 mg by mouth 2 (two) times a day      GLUCOSAMINE-CHONDROITIN PO Take by mouth      ibandronate (BONIVA) 150 MG tablet Take 150 mg by mouth every 30 (thirty) days Next dose due 10  2 17      levothyroxine 137 mcg tablet Take 137 mcg by mouth daily      loratadine (CLARITIN) 10 mg tablet Take 10 mg by mouth daily      metoprolol tartrate (LOPRESSOR) 50 mg tablet Take 50 mg by mouth daily        metroNIDAZOLE (METROGEL) 1 % gel Apply topically daily      naproxen (NAPROSYN) 500 mg tablet Take 500 mg by mouth daily        neomycin-polymyxin-hydrocortisone (CORTISPORIN) 0 35%-10,000 units/mL-1% otic suspension 5 drops to Left ear twice daily for 5 days 10 mL 0    psyllium (METAMUCIL) 0 52 g capsule Take 0 52 g by mouth daily      Triamcinolone Acetonide (NASACORT ALLERGY 24HR NA) 2 sprays into each nostril daily       No current facility-administered medications for this visit            Health Maintenance     Health Maintenance   Topic Date Due    DTaP,Tdap,and Td Vaccines (1 - Tdap) 01/[birth year]/1971    CRC Screening: Colonoscopy  08/13/2014    Pneumococcal PPSV23/PCV13 65+ Years / Low and Medium Risk (2 of 2 - PPSV23) 12/05/2017    INFLUENZA VACCINE  09/01/2018    Fall Risk  09/27/2019    Depression Screening PHQ  09/27/2019    Urinary Incontinence Screening  09/27/2019     Immunization History   Administered Date(s) Administered    Influenza 09/26/2014, 10/10/2015, 10/03/2016, 10/17/2017    Influenza Quadrivalent Preservative Free 3 years and older IM 09/26/2014    Influenza Split High Dose Preservative Free IM 10/10/2015, 10/03/2016, 10/17/2017    Influenza TIV (IM) 10/11/2012, 10/17/2013    Influenza, high dose seasonal 0 5 mL 09/27/2018    Pneumococcal Conjugate 13-Valent 12/05/2016    Pneumococcal Polysaccharide PPV23 01/49/7654       Nadia Shrestha MD show

## 2023-02-15 ENCOUNTER — CONSULT (OUTPATIENT)
Dept: ENDOCRINOLOGY | Facility: CLINIC | Age: 73
End: 2023-02-15

## 2023-02-15 VITALS
HEART RATE: 72 BPM | HEIGHT: 64 IN | BODY MASS INDEX: 42.02 KG/M2 | WEIGHT: 246.13 LBS | DIASTOLIC BLOOD PRESSURE: 72 MMHG | SYSTOLIC BLOOD PRESSURE: 128 MMHG

## 2023-02-15 DIAGNOSIS — E03.9 HYPOTHYROIDISM, UNSPECIFIED TYPE: ICD-10-CM

## 2023-02-15 DIAGNOSIS — N18.31 STAGE 3A CHRONIC KIDNEY DISEASE (HCC): Primary | ICD-10-CM

## 2023-02-15 NOTE — PROGRESS NOTES
Siobhan Lugo 68 y o  female MRN: [id number]    Encounter: [id number]    New pt progress note    Impression  History of Multinodular Goiter status post total thyroidectomy  Post surgical Hypothyroidism  Hx of Obesity  Hx of Osteoporosis  Assessment: This is a 68y o -year-old female with past medical history of multinodular goiter status post total thyroidectomy in 2012, history of post surgical hypothyroidism, obesity, osteoporosis  Presents to the clinic for a consult on hypothyroidism, requested by her PCP Dr Joe Mallory:  TSH: 14 (1/2023)  Patient reports that is taking her medication early in the morning with an empty stomach, denies biotin use  Current regimen: levothyroxine 175 mcg daily, (dose was recently increased by PCP based on latest TSH level)  We will continue with current dose, and will order a TSH that needs to be done in 2 weeks  Will adjust dose if needed      CC: Post surgical Hypothyridism      HPI:    68years old female, with past medical history of multinodular goiter status post total thyroidectomy in 2012, history of postsurgical hypothyroidism, obesity osteoporosis,   Who presents to the clinic for a consult on hypothyroidism  Patient denies any symptoms associated with hypothyroidism such as weight change, hair loss, fatigue, constipation, neck tenderness or neck enlargement  Patient states that she is compliant with her home regimen and takes Levothyroxine  early in the morning with an empty stomach  Patient denies taking over-the-counter supplements such as biotin    Family history: Hyperthyroidism-Mother    Review of Systems   Constitutional: Negative for activity change and appetite change  HENT: Negative for congestion and facial swelling  Eyes: Negative for photophobia and discharge  Respiratory: Negative for apnea, shortness of breath and wheezing  Cardiovascular: Negative for chest pain and palpitations     Gastrointestinal: Negative for abdominal distention and abdominal pain  Endocrine: Negative for cold intolerance, heat intolerance and polydipsia  Musculoskeletal: Negative for arthralgias and back pain  Skin: Negative for color change and wound  Neurological: Negative for tremors, weakness and headaches  Psychiatric/Behavioral: Negative for agitation, behavioral problems and confusion  Historical Information   Past Medical History:   Diagnosis Date   • Allergic rhinitis    • Arthritis    • Cataract     brock   • Disease of thyroid gland     had total thyroidectomy   • Diverticulosis    • Hyperlipidemia    • Hypertension    • Mild acid reflux    • Osteoporosis    • Plantar fasciitis     b/l  l worse uses orthotics in shoes   • Rosacea    • Use of cane as ambulatory aid    • Uterovaginal prolapse      Past Surgical History:   Procedure Laterality Date   • ANKLE SURGERY Left 2011    Fracture / hardware removed   • COLONOSCOPY      fiberoptic   • COLPORRHAPHY N/A 06/17/2019    Procedure: POSTERIOR COLPORRHAPHY;  Surgeon: Sergio Hdez MD;  Location: [facility];  Service: UroGynecology          • OTHER SURGICAL HISTORY      thyroid biopsy core needle    • ID COLPOPEXY VAGINAL EXTRAPERITONEAL APPROACH N/A 06/17/2019    Procedure: VE COLPOSUSPENSION (neugide);   Surgeon: Sergio Hdez MD;  Location: [facility];  Service: UroGynecology          • ID PERINEOPLASTY RPR PERINEUM NONOBSTETRICAL [address] [place] [address] N/A 06/17/2019    Procedure: Lovelace Ip;  Surgeon: Sergio Hdez MD;  Location: [facility];  Service: UroGynecology          • ID REMOVAL IMPLANT DEEP Left 09/18/2017    Procedure: REMOVAL HARDWARE ANKLE (1 plate, 8 screws );  Surgeon: Lesli Perez MD;  Location:  MAIN OR;  Service: Orthopedics   • SKIN BIOPSY  2021   • SKIN CANCER EXCISION Left    • THYROIDECTOMY  2012    Total    • TUBAL LIGATION       Social History   Social History     Substance and Sexual Activity   Alcohol Use No    Comment: Never drank alcohol denied     Social History Substance and Sexual Activity   Drug Use No     Social History     Tobacco Use   Smoking Status Never   Smokeless Tobacco Never     Family History:   Family History   Problem Relation Age of Onset   • COPD Mother    • Diabetes Mother    • Heart disease Mother    • Hypertension Mother    • Diabetes type II Mother    • Thyroid disease unspecified Mother         Goiter removed by radioactive tx   • Bone cancer Father    • No Known Problems Daughter    • Diabetes type II Maternal Grandmother    • No Known Problems Maternal Grandfather    • No Known Problems Paternal Grandmother    • Diabetes type II Paternal Grandfather    • No Known Problems Maternal Aunt    • No Known Problems Maternal Aunt    • No Known Problems Paternal Aunt    • Diabetes type II Brother        Meds/Allergies   Current Outpatient Medications   Medication Sig Dispense Refill   • atorvastatin (LIPITOR) 10 mg tablet TAKE ONE TABLET BY MOUTH EVERY DAY 90 tablet 1   • Azelaic Acid 15 % cream Apply topically daily at bedtime       • Calcium Carb-Cholecalciferol 600-800 MG-UNIT TABS Take 2 tablets by mouth daily     • Denosumab (PROLIA SC) Inject under the skin 2x year      • docusate sodium (COLACE) 100 mg capsule      • doxycycline (PERIOSTAT) 20 MG tablet Take 20 mg by mouth 2 (two) times a day     • gabapentin (NEURONTIN) 300 mg capsule TAKE ONE CAPSULE IN AM, ONE CAPSULE IN AFT  , 2 CAPSULES AT BEDTIME 360 capsule 1   • Glucosamine-Chondroitin (COSAMIN DS PO) Take by mouth     • levothyroxine 175 mcg tablet Take 1 tablet (175 mcg total) by mouth daily 90 tablet 1   • loratadine (CLARITIN) 10 mg tablet Take 10 mg by mouth daily as needed      • methocarbamol (ROBAXIN) 500 mg tablet Take 1 tablet (500 mg total) by mouth in the morning and 1 tablet (500 mg total) in the evening and 1 tablet (500 mg total) before bedtime   (Patient taking differently: Take 500 mg by mouth as needed) 30 tablet 3   • metoprolol tartrate (LOPRESSOR) 50 mg tablet TAKE ONE TABLET BY MOUTH EVERY DAY 90 tablet 3   • metroNIDAZOLE (METROGEL) 1 % gel Apply 1 application topically daily      • Multiple Vitamins-Minerals (Centrum Silver 50+Women) TABS      • naproxen (NAPROSYN) 500 mg tablet TAKE ONE TABLET BY MOUTH TWICE A DAY WITH FOOD (Patient taking differently: Take 500 mg by mouth 2 (two) times a day) 180 tablet 3   • Probiotic Product (PROBIOTIC-10 PO)      • psyllium (METAMUCIL) 0 52 g capsule      • Triamcinolone Acetonide (NASACORT ALLERGY 24HR NA) 2 sprays into each nostril as needed         No current facility-administered medications for this visit  No Known Allergies    Objective   Vitals: Blood pressure 128/72, pulse 72, height 5' 4" (1 626 m), weight 112 kg (246 lb 2 oz), not currently breastfeeding  Physical Exam  Constitutional:       Appearance: Normal appearance  HENT:      Nose: No congestion or rhinorrhea  Mouth/Throat:      Pharynx: No oropharyngeal exudate or posterior oropharyngeal erythema  Eyes:      Conjunctiva/sclera: Conjunctivae normal       Pupils: Pupils are equal, round, and reactive to light  Neck:      Comments: Absent thyroid  Cardiovascular:      Rate and Rhythm: Normal rate and regular rhythm  Pulses: Normal pulses  Heart sounds: No murmur heard  No friction rub  Pulmonary:      Effort: No respiratory distress  Breath sounds: No stridor  No wheezing  Abdominal:      General: There is no distension  Palpations: Abdomen is soft  Tenderness: There is no abdominal tenderness  Musculoskeletal:         General: No swelling  Cervical back: No rigidity or tenderness  Skin:     Coloration: Skin is not jaundiced  Findings: No bruising  Neurological:      General: No focal deficit present  Mental Status: She is alert and oriented to person, place, and time  Motor: No weakness  Psychiatric:         Mood and Affect: Mood normal          Thought Content:  Thought content normal  Judgment: Judgment normal            Lab Results:   Lab Results   Component Value Date/Time    TSH 3RD GENERATON 14 700 (H) 01/16/2023 10:26 AM    TSH 3RD [employer] 3 490 04/22/2022 09:52 AM         Portions of the record may have been created with voice recognition software  Occasional wrong word or "sound a like" substitutions may have occurred due to the inherent limitations of voice recognition software  Read the chart carefully and recognize, using context, where substitutions have occurred

## 2023-02-22 ENCOUNTER — TELEPHONE (OUTPATIENT)
Dept: FAMILY MEDICINE CLINIC | Facility: CLINIC | Age: 73
End: 2023-02-22

## 2023-02-22 DIAGNOSIS — M85.80 OSTEOPENIA, UNSPECIFIED LOCATION: Primary | ICD-10-CM

## 2023-02-22 NOTE — TELEPHONE ENCOUNTER
Patient called and stated that she needs a new script for a  Dexa scan that she is due for in April    Please call to advise

## 2023-03-01 ENCOUNTER — APPOINTMENT (OUTPATIENT)
Dept: LAB | Facility: CLINIC | Age: 73
End: 2023-03-01

## 2023-03-01 DIAGNOSIS — E03.9 HYPOTHYROIDISM, UNSPECIFIED TYPE: ICD-10-CM

## 2023-03-01 LAB — TSH SERPL DL<=0.05 MIU/L-ACNC: 0.91 UIU/ML (ref 0.45–4.5)

## 2023-03-21 ENCOUNTER — VBI (OUTPATIENT)
Dept: ADMINISTRATIVE | Facility: OTHER | Age: 73
End: 2023-03-21

## 2023-04-19 DIAGNOSIS — M72.2 PLANTAR FASCIITIS: Primary | ICD-10-CM

## 2023-04-24 ENCOUNTER — EVALUATION (OUTPATIENT)
Dept: PHYSICAL THERAPY | Facility: REHABILITATION | Age: 73
End: 2023-04-24

## 2023-04-24 DIAGNOSIS — R26.9 GAIT ABNORMALITY: ICD-10-CM

## 2023-04-24 DIAGNOSIS — M79.672 LEFT FOOT PAIN: Primary | ICD-10-CM

## 2023-04-24 DIAGNOSIS — R26.89 BALANCE PROBLEM: ICD-10-CM

## 2023-04-24 DIAGNOSIS — M72.2 PLANTAR FASCIITIS: ICD-10-CM

## 2023-04-24 NOTE — PROGRESS NOTES
PT Evaluation     Today's date: 2023  Patient name: Candance Silber  : 1950  MRN: [id number]  Referring provider: Vanna Rose MD  Dx:   Encounter Diagnosis     ICD-10-CM    1  Left foot pain  M79 672       2  Plantar fasciitis  M72 2 Ambulatory Referral to Physical Therapy      3  Balance problem  R26 89       4  Gait abnormality  R26 9           Start Time: 0845  Stop Time: 930  Total time in clinic (min): 45 minutes    Assessment  Assessment details: Pt is a pleasant 69 yo F who presents with L>R foot pain, gait abnormalities, and balance problem with relevant PMH including complex ankle fracture over 10 years ago  She presents with very limited ankle ROM and ankle joint mobility restrictions, decreased balance (TUG and 5 times sit to stand scores place her at fall risk), decreased tolerace to standing and walking, and foot pain that is worse first thing in the morning and after prolonged sitting  Pt is an excellent candidate for skilled PT to address these impairments, reduce falls risk and maximize function  Impairments: abnormal gait, abnormal or restricted ROM, activity intolerance, impaired balance, impaired physical strength, lacks appropriate home exercise program, pain with function, safety issue and weight-bearing intolerance    Symptom irritability: highUnderstanding of Dx/Px/POC: good   Prognosis: good    Goals  Short term goals: to be met in 4-5 weeks  Pt independent with initial HEP, rationale, technique and frequency, for ROM and pain control  Achieve 5 times sit to stand test score to <12 sec with BUE indicating improved fucntional LE strength and reduced fall risk  Pt able to perform 1 sit to stand without UE indicating and improvement in functional LE strength  Achieve an improvement in L ankle DF AROM for improved ease of standing and walking and to reduce strain on the longitudinal arch    Pt will achieve an improvement in FOTO score indicating an improvement in pain and function  Long term goals: to be met in 8-10 weeks  Pt will report a 75% or > reduction in subjective pain complaints/symptoms to better manage ADLs and functional mobility  Achieve a further improvement in L ankle DF AROM for improved ease of standing and walking and to reduce strain on the longitudinal arch  Pt able to perform 3 sit to stands without UE indicating and improvement in functional LE strength  Pt will perform the TUG test in less than <14 seconds indicating decreased risk for falls  Pt will improve FOTO score to = or better then expected outcome indicating an overall improvement in pain and function   Pt independent with rationale, technique and plan for performance of advanced HEP to maintain gains made in therapy  Achieve pts goal: strengthen legs, be able to manuever better, less discomfort      Plan  Patient would benefit from: skilled physical therapy  Planned modality interventions: TENS, cryotherapy, ultrasound, low level laser therapy and thermotherapy: hydrocollator packs  Planned therapy interventions: manual therapy, patient education, strengthening, stretching, therapeutic activities, therapeutic exercise, home exercise program, functional ROM exercises, flexibility, neuromuscular re-education, body mechanics training, balance and joint mobilization  Frequency: 1-3x/week  Duration in weeks: 12  Treatment plan discussed with: patient        Subjective Evaluation    History of Present Illness  Mechanism of injury: Pt presents L>R foot pain  States she broke her ankle in 2011 and had to have plates and pins put in her foot, then later removed  Plate and screws were in the distal fibula as well as the medial malleolus  Pt walks with a SPC, has been using for years  States she doesn't use it all the time, but most of the time  Pt states at this point the mornings are the worst with most pain in the arch of the foot   Also has pain after sitting for awhile when she goes up to try to stand and walk again  Feels more unsteady when she gets up rather than pain  Feels her balance is off  Does wear orthotics  Pain is better once up and moving  States the foot doesn't want to go flat  Denies n/t in the feet  States she does have a history of plantar fasciitis  Pt states she would also like to strengthen her legs  Feels her legs are not as strong as they used to be     Pain  Current pain ratin  At best pain ratin  At worst pain ratin  Location: arch of foot  Quality: sharp  Aggravating factors: standing and walking    Social Support  Lives in: multiple-level home  Lives with: spouse    Employment status: not working  Treatments  No previous or current treatments  Patient Goals  Patient goals for therapy: improved balance, increased strength, independence with ADLs/IADLs, increased motion and decreased pain  Patient goal: strengthen legs, be able to manuever better, less discomfort        Objective     Joint mobility assessment:  L foot sits in supination  Very hypomobility TC and ST joint on the L  Mildly hypomobile TC and ST joint on the R    Great toe ext ROM WNL on the R, slightly limited on the L    TTP L longitudinal arch, neg windlass bilat       Lower Extremity Strength    MMT   AROM   PROM     Knee Left Right Left Right Left Right   Flex 5 5         Ext 4+ 4+                  Hip           flex 4 4          ext             abd                      Ankle             DF 5 5  lacking 32 lacking 12       PF    55  60       inversion 4+ 5 10 10     Eversion  4+ 5 30 30         Static Balance Tests        Romberg EO, firm    Romberg EC, firm    Romberg EO, AIREX    Romberg EC, AIREX      Right Left   Tandem EO, firm     SLS EO, firm        Dynamic Balance Tests  Gait speed (m/s)  1 2 m/s required to cross the street safely    5x sit to stand (sec)  >14 sec indicates high risk for falls 14 22 with BUE, unable without UE   TUG (sec)  >14 sec indicates high risk for falls 19 sec with SPC Gait:  ambulates with SPC, L foot turned out, wide KEILY, L foot plantar flexed and heel inverted, unsteady       Precautions: HTN, OP, fall risk, CA hx, hx of L ankle surgery    FOTO  4/24 = 44/58      Manuals 4/24            L ankle TC PA and distraction and ST mobs Gr II, III                                                    Neuro Re-Ed             balance                                                                                           Ther Ex             DF MWM nv                                                                                                       Ther Activity             Sit to stands HEP                         Gait Training                                       Modalities

## 2023-04-27 ENCOUNTER — OFFICE VISIT (OUTPATIENT)
Dept: PHYSICAL THERAPY | Facility: REHABILITATION | Age: 73
End: 2023-04-27

## 2023-04-27 DIAGNOSIS — M72.2 PLANTAR FASCIITIS: Primary | ICD-10-CM

## 2023-04-27 DIAGNOSIS — R26.9 GAIT ABNORMALITY: ICD-10-CM

## 2023-04-27 DIAGNOSIS — R26.89 BALANCE PROBLEM: ICD-10-CM

## 2023-04-27 DIAGNOSIS — M79.672 LEFT FOOT PAIN: ICD-10-CM

## 2023-04-27 NOTE — PROGRESS NOTES
"Daily Note     Today's date: 2023  Patient name: Flex Haney  : 1950  MRN: [id number]  Referring provider: Phylicia Summers MD  Dx:   Encounter Diagnosis     ICD-10-CM    1  Plantar fasciitis  M72 2       2  Balance problem  R26 89       3  Gait abnormality  R26 9       4  Left foot pain  M79 672           Start Time: 1645  Stop Time: 1732  Total time in clinic (min): 47 minutes    Subjective: Pt states the mobilizations after the eval seemed to help      Objective: See treatment diary below      Assessment: Pt tolerated ankle mobs very well  She was appropriately challenged by tandem balance on airex and fatigued with VG  Pt will benefit from continued skilled outpatient PT to improve strength, ankle joint mobility and ROM, balance, to address therapy goals, to reduce pain, and improve function  Plan: Continue per plan of care        Precautions: HTN, OP, fall risk, CA hx, hx of L ankle surgery    FOTO   = 44/58      Manuals            L ankle TC PA and distraction and ST mobs Gr II, III  Gr IV                                                  Neuro Re-Ed             Tandem on airex  30\"x2 ea Close S                                                                                         Ther Ex             DF MWM nv 4\" step med blk J band x30           VG for strength and ROM  L7 5'                                                                                         Ther Activity             Sit to stands HEP                         Gait Training                                       Modalities                                            "

## 2023-05-01 ENCOUNTER — OFFICE VISIT (OUTPATIENT)
Dept: PHYSICAL THERAPY | Facility: REHABILITATION | Age: 73
End: 2023-05-01

## 2023-05-01 DIAGNOSIS — M79.672 LEFT FOOT PAIN: ICD-10-CM

## 2023-05-01 DIAGNOSIS — R26.9 GAIT ABNORMALITY: ICD-10-CM

## 2023-05-01 DIAGNOSIS — R26.89 BALANCE PROBLEM: ICD-10-CM

## 2023-05-01 DIAGNOSIS — M72.2 PLANTAR FASCIITIS: Primary | ICD-10-CM

## 2023-05-01 NOTE — PROGRESS NOTES
"Daily Note     Today's date: 2023  Patient name: Teri Delgadillo  : 1950  MRN: [id number]  Referring provider: Austen Samuel MD  Dx:   Encounter Diagnosis     ICD-10-CM    1  Plantar fasciitis  M72 2       2  Balance problem  R26 89       3  Gait abnormality  R26 9       4  Left foot pain  M79 672           Start Time: 1010  Stop Time: 1100  Total time in clinic (min): 50 minutes    Subjective: Pt reports the plantar fascia is more bothersome than the ankle      Objective: See treatment diary below  Access Code: [email or web address]  URL: [email or web address]  Date: 2023  Prepared by: Mis Media    Exercises  - Long Sitting Calf Stretch with Strap  - 2-3 x daily - 7 x weekly - 1 sets - 4 reps - 30-45 hold  - Seated Plantar Fascia Mobilization with Small Ball  - 2-3 x daily - 7 x weekly - 1 sets - 3-5 reps - 60 hold      Assessment: Initiated HEP as above  Pt was able to achieve a strong calf stretch with long sit DF stretch as well as slant board  Advised pt not to over do it  Pt will benefit from continued skilled outpatient PT to improve strength, ankle joint mobility and ROM, balance, to address therapy goals, to reduce pain, and improve function  Plan: Continue per plan of care        Precautions: HTN, OP, fall risk, CA hx, hx of L ankle surgery    FOTO   = 44/58      Manuals  5/1          L ankle TC PA and distraction and ST mobs Gr II, III  Gr IV Gr IV                                                 Neuro Re-Ed             Tandem on airex  30\"x2 ea Close S 30\"x2 ea Close S                                                                                        Ther Ex             DF MWM nv 4\" step med blk J band x30           VG for strength and ROM  L7 5'           Golf ball self STM   3' HEP          Long sit DF stretch with strap   30\"x5 HEP          Slant    30\"x3          Leg press seat 10, reclined with pillow   100#  3x12                                  " Ther Activity             Sit to stands HEP                         Gait Training                                       Modalities

## 2023-05-02 ENCOUNTER — RA CDI HCC (OUTPATIENT)
Dept: OTHER | Facility: HOSPITAL | Age: 73
End: 2023-05-02

## 2023-05-03 ENCOUNTER — OFFICE VISIT (OUTPATIENT)
Dept: FAMILY MEDICINE CLINIC | Facility: CLINIC | Age: 73
End: 2023-05-03

## 2023-05-03 VITALS
RESPIRATION RATE: 18 BRPM | OXYGEN SATURATION: 100 % | SYSTOLIC BLOOD PRESSURE: 150 MMHG | DIASTOLIC BLOOD PRESSURE: 80 MMHG | HEART RATE: 65 BPM | BODY MASS INDEX: 41.89 KG/M2 | HEIGHT: 64 IN | TEMPERATURE: 97.3 F | WEIGHT: 245.38 LBS

## 2023-05-03 DIAGNOSIS — I10 ESSENTIAL HYPERTENSION: Primary | ICD-10-CM

## 2023-05-03 DIAGNOSIS — I10 PRIMARY HYPERTENSION: ICD-10-CM

## 2023-05-03 DIAGNOSIS — E03.9 HYPOTHYROIDISM, UNSPECIFIED TYPE: ICD-10-CM

## 2023-05-03 DIAGNOSIS — M72.2 PLANTAR FASCIITIS: ICD-10-CM

## 2023-05-03 DIAGNOSIS — E78.5 HYPERLIPIDEMIA, UNSPECIFIED HYPERLIPIDEMIA TYPE: ICD-10-CM

## 2023-05-03 PROBLEM — I73.9 PERIPHERAL VASCULAR DISEASE (HCC): Status: ACTIVE | Noted: 2023-05-03

## 2023-05-04 ENCOUNTER — APPOINTMENT (OUTPATIENT)
Dept: PHYSICAL THERAPY | Facility: REHABILITATION | Age: 73
End: 2023-05-04
Payer: COMMERCIAL

## 2023-05-04 PROBLEM — M72.2 PLANTAR FASCIITIS: Status: ACTIVE | Noted: 2023-05-04

## 2023-05-04 NOTE — PROGRESS NOTES
FAMILY PRACTICE OFFICE VISIT       NAME: Lena Narayan  AGE: 68 y o  SEX: female       : 1950        MRN: [id number]    DATE: 2023  TIME: 6:40 AM    Assessment and Plan     Problem List Items Addressed This Visit        Endocrine    Hypothyroidism     Hypothyroidism  Patient will check TSH blood work and continue her current dose of levothyroxine            Cardiovascular and Mediastinum    Hypertension     Hypertension  The patient's blood pressure is stable at this time and he will continue current regimen of medications            Musculoskeletal and Integument    Plantar fasciitis     Planter fasciitis  Patient will continue with physical therapy as ordered  If symptoms do not improve she may consider consultation with podiatry for possible cortisone injection            Other    Hyperlipidemia     Hyperlipidemia  Patient will check lipid panel blood work and continue with current dose of statin therapy         Relevant Orders    Comprehensive metabolic panel    CBC   Other Visit Diagnoses     Essential hypertension    -  Primary    Relevant Orders    Comprehensive metabolic panel    CBC              Chief Complaint     Chief Complaint   Patient presents with    Follow-up     6 month        History of Present Illness     Patient in the office to review chronic medical condition  She continues to go to physical therapy for Planter fasciitis of her left foot  She denies any recent illness  Review of Systems   Review of Systems   Constitutional: Negative  HENT: Negative  Eyes: Negative  Respiratory: Negative  Cardiovascular: Negative  Gastrointestinal: Negative  Genitourinary: Negative  Musculoskeletal: Positive for arthralgias and gait problem  Skin: Negative  Allergic/Immunologic: Positive for environmental allergies  Psychiatric/Behavioral: Negative          Active Problem List     Patient Active Problem List   Diagnosis    Pain, joint, ankle and foot, left    DJD (degenerative joint disease), ankle and foot, left    Allergic rhinitis    Esophageal reflux    Hyperlipidemia    Hypertension    Hypothyroidism    Rosacea    Pain of right lower extremity    Female genital prolapse    Fracture of ankle    Osteopenia    Varicose veins of lower extremity    Pain of left heel    Body mass index (BMI) 40 0-44 9, adult    Acute midline low back pain without sciatica    Neuralgia    Otitis externa of left ear    Stage 3a chronic kidney disease (HCC)    Peripheral vascular disease ([address] )    Plantar fasciitis       Past Medical History:  Past Medical History:   Diagnosis Date    Allergic rhinitis     Arthritis     Cataract     brock    Disease of thyroid gland     had total thyroidectomy    Diverticulosis     Hyperlipidemia     Hypertension     Mild acid reflux     Osteoporosis     Plantar fasciitis     b/l  l worse uses orthotics in shoes    Rosacea     Use of cane as ambulatory aid     Uterovaginal prolapse        Past Surgical History:  Past Surgical History:   Procedure Laterality Date    ANKLE SURGERY Left 2011    Fracture / hardware removed    COLONOSCOPY      fiberoptic    COLPORRHAPHY N/A 06/17/2019    Procedure: POSTERIOR COLPORRHAPHY;  Surgeon: Ingrid Allen MD;  [facility];  Service: UroGynecology           OTHER SURGICAL HISTORY      thyroid biopsy core needle     [place] COLPOPEXY VAGINAL EXTRAPERITONEAL APPROACH N/A 06/17/2019    Procedure: VE COLPOSUSPENSION (neugide);   Surgeon: Ingrid Allen MD;  [facility];  Service: UroGynecology           [place] PERINEOPLASTY RPR PERINEUM NONOBSTETRICAL [address] [place] [address] N/A 06/17/2019    Procedure: Fiona Border;  Surgeon: Ingrid Allen MD;  [facility];  Service: UroGynecology           [place] REMOVAL IMPLANT DEEP Left 09/18/2017    Procedure: REMOVAL HARDWARE ANKLE (1 plate, 8 screws );  Surgeon: Sita Staples MD;  [facility];  Service: Orthopedics    SKIN BIOPSY  2021    SKIN CANCER EXCISION Left     THYROIDECTOMY  2012    Total     TUBAL LIGATION         Family History:  Family History   Problem Relation Age of Onset   [facility] COPD Mother    [facility] Diabetes Mother     Heart disease Mother     Hypertension Mother     Diabetes type II Mother     Thyroid disease unspecified Mother         Goiter removed by radioactive tx    Bone cancer Father     No Known Problems Daughter     Diabetes type II Maternal Grandmother     No Known Problems Maternal Grandfather     No Known Problems Paternal Grandmother     Diabetes type II Paternal Grandfather     No Known Problems Maternal Aunt     No Known Problems Maternal Aunt     No Known Problems Paternal Aunt     Diabetes type II Brother        Social History:  Social History     Socioeconomic History    Marital status: /Civil Union     Spouse name: Not on file    Number of children: Not on file    Years of education: Not on file    Highest education level: Not on file   Occupational History    Not on file   Tobacco Use    Smoking status: Never    Smokeless tobacco: Never   Vaping Use    Vaping Use: Never used   Substance and Sexual Activity    Alcohol use: No     Comment: Never drank alcohol denied    Drug use: No    Sexual activity: Not Currently     Partners: Male   Other Topics Concern    Not on file   Social History Narrative    Exercies moderately 3 or more times a week      Social Determinants of Health     Financial Resource Strain: Medium Risk    Difficulty of Paying Living Expenses: Somewhat hard   Food Insecurity: Not on file   Transportation Needs: No Transportation Needs    Lack of Transportation (Medical): No    Lack of Transportation (Non-Medical):  No   Physical Activity: Not on file   Stress: Not on file   Social Connections: Not on file   Intimate Partner Violence: Not on file   Housing Stability: Not on file       Objective     Vitals:    05/03/23 1141   BP: 150/80   Pulse:    Resp: Temp:    SpO2:      Wt Readings from Last 3 Encounters:   05/03/23 111 kg (245 lb 6 oz)   02/15/23 112 kg (246 lb 2 oz)   11/14/22 111 kg (244 lb 8 oz)       Physical Exam  Constitutional:       General: She is not in acute distress  Appearance: Normal appearance  She is not ill-appearing  HENT:      Head: Normocephalic and atraumatic  Eyes:      General:         Right eye: No discharge  Left eye: No discharge  Extraocular Movements: Extraocular movements intact  Conjunctiva/sclera: Conjunctivae normal       Pupils: Pupils are equal, round, and reactive to light  Neck:      Vascular: No carotid bruit  Cardiovascular:      Rate and Rhythm: Normal rate and regular rhythm  Heart sounds: Normal heart sounds  No murmur heard  Pulmonary:      Effort: Pulmonary effort is normal       Breath sounds: Normal breath sounds  No wheezing, rhonchi or rales  Musculoskeletal:      Right lower leg: No edema  Left lower leg: Edema present  Comments: Patient with chronic trace to +1 peripheral edema left ankle status post previous fracture and surgery   Lymphadenopathy:      Cervical: No cervical adenopathy  Skin:     Findings: No rash  Neurological:      General: No focal deficit present  Mental Status: She is alert and oriented to person, place, and time  Cranial Nerves: No cranial nerve deficit  Psychiatric:         Mood and Affect: Mood normal          Behavior: Behavior normal          Thought Content:  Thought content normal          Judgment: Judgment normal          Pertinent Laboratory/Diagnostic Studies:  Lab Results   Component Value Date    GLUCOSE 99 10/22/2014    BUN 20 01/16/2023    CREATININE 0 98 01/16/2023    CALCIUM 9 8 01/16/2023     12/20/2017    K 4 6 01/16/2023    CO2 30 01/16/2023     01/16/2023     Lab Results   Component Value Date    ALT 23 01/16/2023    AST 20 01/16/2023    ALKPHOS 62 01/16/2023    BILITOT 0 6 12/20/2017       Lab Results   Component Value Date    WBC 4 82 01/16/2023    HGB 12 0 01/16/2023    HCT 38 5 01/16/2023     (H) 01/16/2023     01/16/2023       Lab Results   Component Value Date    TSH 1 82 10/02/2018       Lab Results   Component Value Date    CHOL 229 (H) 12/20/2017     Lab Results   Component Value Date    TRIG 72 01/16/2023     Lab Results   Component Value Date    HDL 86 01/16/2023     Lab Results   Component Value Date    LDLCALC 66 01/16/2023     No results found for: HGBA1C    Results for orders placed or performed in visit on 03/01/23   TSH, 3rd generation Lab Collect   Result Value Ref Range    TSH 3RD GENERATON 0 908 0 450 - 4 500 uIU/mL       Orders Placed This Encounter   Procedures    Comprehensive metabolic panel    CBC       ALLERGIES:  No Known Allergies    Current Medications     Current Outpatient Medications   Medication Sig Dispense Refill    atorvastatin (LIPITOR) 10 mg tablet TAKE ONE TABLET BY MOUTH EVERY DAY 90 tablet 1    Azelaic Acid 15 % cream Apply topically daily at bedtime        Calcium Carb-Cholecalciferol 600-800 MG-UNIT TABS Take 2 tablets by mouth daily      Denosumab (PROLIA SC) Inject under the skin 2x year       docusate sodium (COLACE) 100 mg capsule       doxycycline (PERIOSTAT) 20 MG tablet Take 20 mg by mouth 2 (two) times a day      gabapentin (NEURONTIN) 300 mg capsule TAKE ONE CAPSULE IN AM, ONE CAPSULE IN AFT  , 2 CAPSULES AT BEDTIME 360 capsule 1    Glucosamine-Chondroitin (COSAMIN DS PO) Take by mouth      levothyroxine 175 mcg tablet Take 1 tablet (175 mcg total) by mouth daily 90 tablet 1    loratadine (CLARITIN) 10 mg tablet Take 10 mg by mouth daily as needed       methocarbamol (ROBAXIN) 500 mg tablet Take 1 tablet (500 mg total) by mouth in the morning and 1 tablet (500 mg total) in the evening and 1 tablet (500 mg total) before bedtime   (Patient taking differently: Take 500 mg by mouth as needed) 30 tablet 3    metoprolol tartrate (LOPRESSOR) 50 mg tablet TAKE ONE TABLET BY MOUTH EVERY DAY 90 tablet 3    metroNIDAZOLE (METROGEL) 1 % gel Apply 1 application topically daily       Multiple Vitamins-Minerals (Centrum Silver 50+Women) TABS       naproxen (NAPROSYN) 500 mg tablet TAKE ONE TABLET BY MOUTH TWICE A DAY WITH FOOD (Patient taking differently: Take 500 mg by mouth 2 (two) times a day) 180 tablet 3    Probiotic Product (PROBIOTIC-10 PO)       psyllium (METAMUCIL) 0 52 g capsule       Triamcinolone Acetonide (NASACORT ALLERGY 24HR NA) 2 sprays into each nostril as needed         No current facility-administered medications for this visit           Health Maintenance     Health Maintenance   Topic Date Due    Colorectal Cancer Screening  08/13/2014    Falls: Plan of Care  Never done    COVID-19 Vaccine (5 - Booster for Pfizer series) 06/18/2022    BMI: Followup Plan  10/12/2022    PT PLAN OF CARE  05/24/2023    Breast Cancer Screening: Mammogram  10/13/2023    Urinary Incontinence Screening  11/03/2023    Medicare Annual Wellness Visit (AWV)  11/03/2023    Fall Risk  04/24/2024    Depression Screening  05/03/2024    BMI: Adult  05/03/2024    Hepatitis C Screening  Completed    Osteoporosis Screening  Completed    Pneumococcal Vaccine: 65+ Years  Completed    Influenza Vaccine  Completed    HIB Vaccine  Aged Out    IPV Vaccine  Aged Out    Hepatitis A Vaccine  Aged Out    Meningococcal ACWY Vaccine  Aged Out    HPV Vaccine  Aged Out     Immunization History   Administered Date(s) Administered    COVID-19 PFIZER VACCINE 0 3 ML IM 02/24/2021, 03/15/2021, 10/09/2021    COVID-19 Pfizer vac (Ilir-sucrose, gray cap) 12 yr+ IM 04/23/2022    Fluzone Split Quad 0 5 mL 09/26/2014    INFLUENZA 09/26/2014, 10/10/2015, 10/03/2016, 10/17/2017, 10/29/2022    Influenza Quadrivalent Preservative Free 3 years and older IM 09/26/2014    Influenza Split High Dose Preservative Free IM 10/10/2015, 10/03/2016, 10/17/2017    Influenza, high dose seasonal 0 7 mL 09/27/2018, 09/30/2019, 09/21/2020, 09/15/2021    Influenza, seasonal, injectable 10/11/2012, 10/17/2013    Pneumococcal Conjugate 13-Valent 12/05/2016    Pneumococcal Polysaccharide PPV23 67/65/2570       Giovanni Arevalo MD    I spent 30 minutes with this patient of which greater than 50% was spent counseling or reviewing chart

## 2023-05-04 NOTE — ASSESSMENT & PLAN NOTE
Planter fasciitis  Patient will continue with physical therapy as ordered    If symptoms do not improve she may consider consultation with podiatry for possible cortisone injection

## 2023-05-05 ENCOUNTER — OFFICE VISIT (OUTPATIENT)
Dept: PHYSICAL THERAPY | Facility: REHABILITATION | Age: 73
End: 2023-05-05

## 2023-05-05 DIAGNOSIS — M79.672 LEFT FOOT PAIN: ICD-10-CM

## 2023-05-05 DIAGNOSIS — R26.9 GAIT ABNORMALITY: ICD-10-CM

## 2023-05-05 DIAGNOSIS — M72.2 PLANTAR FASCIITIS: Primary | ICD-10-CM

## 2023-05-05 DIAGNOSIS — R26.89 BALANCE PROBLEM: ICD-10-CM

## 2023-05-05 NOTE — PROGRESS NOTES
"Daily Note     Today's date: 2023  Patient name: Funmi Jasmine  : 1950  MRN: [id number]  Referring provider: Rosalia Titus MD  Dx:   Encounter Diagnosis     ICD-10-CM    1  Plantar fasciitis  M72 2       2  Balance problem  R26 89       3  Gait abnormality  R26 9       4  Left foot pain  M79 672           Start Time: 1147  Stop Time: 1230  Total time in clinic (min): 43 minutes    Subjective: Pt reports the plantar fascia is still very painful  Has been doing her HEP  Objective: See treatment diary below  Access Code: [email or web address]  URL: [email or web address]  Date: 2023  Prepared by: John Alvarez    Exercises  - Long Sitting Calf Stretch with Strap  - 2-3 x daily - 7 x weekly - 1 sets - 4 reps - 30-45 hold  - Seated Plantar Fascia Mobilization with Small Ball  - 2-3 x daily - 7 x weekly - 1 sets - 3-5 reps - 60 hold      Assessment:  Pt responded well to MWM  She was hesitant to perform sand dune marching without UE support but was able to do so with encouragement  She fatigued with hip abd on the airex but required cues to slow down and to avoid abducting too far  Pt will benefit from continued skilled outpatient PT to improve strength, ankle joint mobility and ROM, balance, to address therapy goals, to reduce pain, and improve function  Plan: Continue per plan of care        Precautions: HTN, OP, fall risk, CA hx, hx of L ankle surgery    FOTO   = 44/58      Manuals  5         L ankle TC PA and distraction and ST mobs Gr II, III  Gr IV Gr IV Gr IV                                                Neuro Re-Ed             Tandem on airex  30\"x2 ea Close S 30\"x2 ea Close S          Sand dune mini march    2 min light UE prn         SLS on airex with contralat hip abd,     x20 ea light UE                                                              Ther Ex             DF MWM nv 4\" step med blk J band x30  8\" step PT over pressure 2x15         VG for strength " "and ROM  L7 5'  S/l L5 3x10 ea (head elevated with wedge)         Golf ball self STM   3' HEP          Long sit DF stretch with strap   30\"x5 HEP          Slant    30\"x3 30\"x3 LLE only         Leg press seat 10, reclined with pillow   100#  3x12                                    Ther Activity             Sit to stands HEP                         Gait Training                                       Modalities                                            "

## 2023-05-10 ENCOUNTER — OFFICE VISIT (OUTPATIENT)
Dept: PHYSICAL THERAPY | Facility: REHABILITATION | Age: 73
End: 2023-05-10

## 2023-05-10 DIAGNOSIS — R26.9 GAIT ABNORMALITY: ICD-10-CM

## 2023-05-10 DIAGNOSIS — M72.2 PLANTAR FASCIITIS: Primary | ICD-10-CM

## 2023-05-10 DIAGNOSIS — M79.672 LEFT FOOT PAIN: ICD-10-CM

## 2023-05-10 DIAGNOSIS — R26.89 BALANCE PROBLEM: ICD-10-CM

## 2023-05-10 NOTE — PROGRESS NOTES
"Daily Note     Today's date: 5/10/2023  Patient name: Srinivasan Laws  : 1950  MRN: [id number]  Referring provider: Jaret Stearns MD  Dx:   Encounter Diagnosis     ICD-10-CM    1  Plantar fasciitis  M72 2       2  Balance problem  R26 89       3  Gait abnormality  R26 9       4  Left foot pain  M79 672           Start Time: 1100  Stop Time: 1145  Total time in clinic (min): 45 minutes    Subjective: Pt reports she saw Dr Jessica Mai and got a shot in the foot on Monday  States her foot felt a lot better yesterday and feels ok today  Was sore in the quads after last visit from the VG but tolerable  Objective: See treatment diary below  Access Code: [email or web address]  URL: [email or web address]  Date: 2023  Prepared by: Harjeet Gibson    Exercises  - Long Sitting Calf Stretch with Strap  - 2-3 x daily - 7 x weekly - 1 sets - 4 reps - 30-45 hold  - Seated Plantar Fascia Mobilization with Small Ball  - 2-3 x daily - 7 x weekly - 1 sets - 3-5 reps - 60 hold      Assessment:  Pt was challenged by addition of step ups but tolerated well  Cues given during step ups as well as  SLS on airex with hip abd to dec UE use and encourage LE use more  Pt will benefit from continued skilled outpatient PT to improve strength, ankle joint mobility and ROM, balance, to address therapy goals, to reduce pain, and improve function  Plan: Continue per plan of care        Precautions: HTN, OP, fall risk, CA hx, hx of L ankle surgery    FOTO   = 44/58      Manuals 4/24 4/27 5/1 5/5 5/10        L ankle TC PA and distraction and ST mobs Gr II, III  Gr IV Gr IV Gr IV DS Gr IV                                               Neuro Re-Ed             Tandem on airex  30\"x2 ea Close S 30\"x2 ea Close S          Sand dune mini march    2 min light UE prn 2 min light UE prn        SLS on airex with contralat hip abd,     x20 ea light UE  x20 ea light UE                                                            Ther Ex       " "      DF MWM nv 4\" step med blk J band x30  8\" step PT over pressure 2x15 8\" step PT over pressure 2x15        VG for strength and ROM  L7 5'  S/l L5 3x10 ea (head elevated with wedge)         Golf ball self STM   3' HEP          Long sit DF stretch with strap   30\"x5 HEP          Slant    30\"x3 30\"x3 LLE only 30\"x4 LLE only        Leg press seat 10, reclined with pillow   100#  3x12          Step ups     4\" x15 ea 1UE                     Ther Activity             Sit to stands HEP                         Gait Training                                       Modalities                                            "

## 2023-05-12 ENCOUNTER — APPOINTMENT (OUTPATIENT)
Dept: PHYSICAL THERAPY | Facility: REHABILITATION | Age: 73
End: 2023-05-12
Payer: COMMERCIAL

## 2023-05-17 ENCOUNTER — OFFICE VISIT (OUTPATIENT)
Dept: PHYSICAL THERAPY | Facility: REHABILITATION | Age: 73
End: 2023-05-17

## 2023-05-17 DIAGNOSIS — R26.89 BALANCE PROBLEM: ICD-10-CM

## 2023-05-17 DIAGNOSIS — M72.2 PLANTAR FASCIITIS: Primary | ICD-10-CM

## 2023-05-17 DIAGNOSIS — M79.672 LEFT FOOT PAIN: ICD-10-CM

## 2023-05-17 DIAGNOSIS — R26.9 GAIT ABNORMALITY: ICD-10-CM

## 2023-05-17 NOTE — PROGRESS NOTES
"Daily Note     Today's date: 2023  Patient name: Silviano Lee  : 1950  MRN: [id number]  Referring provider: Duran Ocampo MD  Dx:   Encounter Diagnosis     ICD-10-CM    1  Plantar fasciitis  M72 2       2  Balance problem  R26 89       3  Gait abnormality  R26 9       4  Left foot pain  M79 672           Start Time: 1100  Stop Time: 1145  Total time in clinic (min): 45 minutes    Subjective: Pt feels PT is helping  Still feels pain after sitting for awhile then trying to stand  Feel the foot wont go flat      Objective: See treatment diary below  Access Code: [email or web address]  URL: [email or web address]  Date: 2023  Prepared by: Meagan Easton    Exercises  - Long Sitting Calf Stretch with Strap  - 2-3 x daily - 7 x weekly - 1 sets - 4 reps - 30-45 hold  - Seated Plantar Fascia Mobilization with Small Ball  - 2-3 x daily - 7 x weekly - 1 sets - 3-5 reps - 60 hold      Assessment:  Pt was challenged by sand dune step ups and required UE support for balance  She responded well to addition of mobilization belt during DF MWM  Okreysandra Roy Pt will benefit from continued skilled outpatient PT to improve strength, ankle joint mobility and ROM, balance, to address therapy goals, to reduce pain, and improve function  Plan: Continue per plan of care        Precautions: HTN, OP, fall risk, CA hx, hx of L ankle surgery    FOTO   = 44/58      Manuals  5/5 5/10 5/17       L ankle TC PA and distraction and ST mobs Gr II, III  Gr IV Gr IV Gr IV DS Gr IV DS Gr IV                                              Neuro Re-Ed             Tandem on airex  30\"x2 ea Close S 30\"x2 ea Close S          Sand dune mini march    2 min light UE prn 2 min light UE prn 2 min no UE close S       SLS on airex with contralat hip abd,     x20 ea light UE  x20 ea light UE x30 ea light UE       Sand dune step ups      1 min ea LE light UE close S                                              Ther Ex             DF MWM " "nv 4\" step med blk J band x30  8\" step PT over pressure 2x15 8\" step PT over pressure 2x15 6\" step PT overpress 2x10, 1 round with belt       VG for strength and ROM  L7 5'  S/l L5 3x10 ea (head elevated with wedge) S/l L5 3x10 ea (head elevated with wedge) S/l L6 3x10 ea (head elevated with wedge)       Golf ball self STM   3' HEP          Long sit DF stretch with strap   30\"x5 HEP          Slant    30\"x3 30\"x3 LLE only 30\"x4 LLE only 45\"x3 LLE only       Leg press seat 10, reclined with pillow   100#  3x12          Step ups     4\" x15 ea 1UE                     Ther Activity             Sit to stands HEP                         Gait Training                                       Modalities                                            "

## 2023-05-18 NOTE — PROGRESS NOTES
"Daily Note     Today's date: 2023  Patient name: Ant Nassar  : 1950  MRN: [id number]  Referring provider: Anish Cline MD  Dx:   Encounter Diagnosis     ICD-10-CM    1  Plantar fasciitis  M72 2       2  Balance problem  R26 89       3  Gait abnormality  R26 9       4  Left foot pain  M79 672           Start Time: 1015  Stop Time: 1109  Total time in clinic (min): 54 minutes    Subjective: Pt reports her quad was a little sore after last visit  Objective: See treatment diary below  Access Code: [email or web address]  URL: [email or web address]  Date: 2023  Prepared by: Delle Clause    Exercises  - Long Sitting Calf Stretch with Strap  - 2-3 x daily - 7 x weekly - 1 sets - 4 reps - 30-45 hold  - Seated Plantar Fascia Mobilization with Small Ball  - 2-3 x daily - 7 x weekly - 1 sets - 3-5 reps - 60 hold      Assessment:  Pt responded well to the exercises and was appropriately challenged by addition of band during hip abd  Pt will benefit from continued skilled outpatient PT to improve strength, ankle joint mobility and ROM, balance, to address therapy goals, to reduce pain, and improve function  Plan: Continue per plan of care        Precautions: HTN, OP, fall risk, CA hx, hx of L ankle surgery    FOTO   = 44/58   = 57/58      Manuals 4/24 4/27 5/1 5/5 5/10 5/17 5/19      L ankle TC PA and distraction and ST mobs Gr II, III  Gr IV Gr IV Gr IV DS Gr IV DS Gr IV DS Gr IV                                             Neuro Re-Ed             Tandem on airex  30\"x2 ea Close S 30\"x2 ea Close S          Sand dune mini march    2 min light UE prn 2 min light UE prn 2 min no UE close S 3 min no UE close S      SLS on airex with contralat hip abd,     x20 ea light UE  x20 ea light UE x30 ea light UE YMB x20 ea light UE      Sand dune step ups      1 min ea LE light UE close S 1 min ea LE light UE close S                                             Ther Ex             DF MWM nv 4\" " "step med blk J band x30  8\" step PT over pressure 2x15 8\" step PT over pressure 2x15 6\" step PT overpress 2x10, 1 round with belt 8\" step PT overpress 2x10, 1 round with belt      VG for strength and ROM  L7 5'  S/l L5 3x10 ea (head elevated with wedge) S/l L5 3x10 ea (head elevated with wedge) S/l L6 3x10 ea (head elevated with wedge) S/l L5 4x10 ea (head elevated with wedge)      Golf ball self STM   3' HEP          Long sit DF stretch with strap   30\"x5 HEP          Slant    30\"x3 30\"x3 LLE only 30\"x4 LLE only 45\"x3 LLE only 30\"x2  45\"x2  LLE only      Leg press seat 10, reclined with pillow   100#  3x12          Step ups     4\" x15 ea 1UE                     Ther Activity             Sit to stands HEP                         Gait Training                                       Modalities                                            "

## 2023-05-19 ENCOUNTER — OFFICE VISIT (OUTPATIENT)
Dept: PHYSICAL THERAPY | Facility: REHABILITATION | Age: 73
End: 2023-05-19

## 2023-05-19 DIAGNOSIS — M72.2 PLANTAR FASCIITIS: Primary | ICD-10-CM

## 2023-05-19 DIAGNOSIS — R26.9 GAIT ABNORMALITY: ICD-10-CM

## 2023-05-19 DIAGNOSIS — R26.89 BALANCE PROBLEM: ICD-10-CM

## 2023-05-19 DIAGNOSIS — M79.672 LEFT FOOT PAIN: ICD-10-CM

## 2023-05-22 ENCOUNTER — APPOINTMENT (OUTPATIENT)
Dept: LAB | Facility: CLINIC | Age: 73
End: 2023-05-22

## 2023-05-22 DIAGNOSIS — E78.5 HYPERLIPIDEMIA, UNSPECIFIED HYPERLIPIDEMIA TYPE: ICD-10-CM

## 2023-05-22 DIAGNOSIS — I10 ESSENTIAL HYPERTENSION: ICD-10-CM

## 2023-05-22 LAB
ALBUMIN SERPL BCP-MCNC: 3.7 G/DL (ref 3.5–5)
ALP SERPL-CCNC: 66 U/L (ref 46–116)
ALT SERPL W P-5'-P-CCNC: 22 U/L (ref 12–78)
ANION GAP SERPL CALCULATED.3IONS-SCNC: -3 MMOL/L (ref 4–13)
AST SERPL W P-5'-P-CCNC: 16 U/L (ref 5–45)
BILIRUB SERPL-MCNC: 0.45 MG/DL (ref 0.2–1)
BUN SERPL-MCNC: 23 MG/DL (ref 5–25)
CALCIUM SERPL-MCNC: 9.9 MG/DL (ref 8.3–10.1)
CHLORIDE SERPL-SCNC: 107 MMOL/L (ref 96–108)
CO2 SERPL-SCNC: 31 MMOL/L (ref 21–32)
CREAT SERPL-MCNC: 1.06 MG/DL (ref 0.6–1.3)
ERYTHROCYTE [DISTWIDTH] IN BLOOD BY AUTOMATED COUNT: 13.2 % (ref 11.6–15.1)
GFR SERPL CREATININE-BSD FRML MDRD: 52 ML/MIN/1.73SQ M
GLUCOSE P FAST SERPL-MCNC: 110 MG/DL (ref 65–99)
HCT VFR BLD AUTO: 39.1 % (ref 34.8–46.1)
HGB BLD-MCNC: 12.2 G/DL (ref 11.5–15.4)
MCH RBC QN AUTO: 30.7 PG (ref 26.8–34.3)
MCHC RBC AUTO-ENTMCNC: 31.2 G/DL (ref 31.4–37.4)
MCV RBC AUTO: 99 FL (ref 82–98)
PLATELET # BLD AUTO: 254 THOUSANDS/UL (ref 149–390)
PMV BLD AUTO: 10.6 FL (ref 8.9–12.7)
POTASSIUM SERPL-SCNC: 4.4 MMOL/L (ref 3.5–5.3)
PROT SERPL-MCNC: 7.4 G/DL (ref 6.4–8.4)
RBC # BLD AUTO: 3.97 MILLION/UL (ref 3.81–5.12)
SODIUM SERPL-SCNC: 135 MMOL/L (ref 135–147)
WBC # BLD AUTO: 6.83 THOUSAND/UL (ref 4.31–10.16)

## 2023-05-23 ENCOUNTER — TELEPHONE (OUTPATIENT)
Dept: FAMILY MEDICINE CLINIC | Facility: CLINIC | Age: 73
End: 2023-05-23

## 2023-05-23 NOTE — TELEPHONE ENCOUNTER
----- Message from Rosalia Stewart MD sent at 6/52/1067 10:07 AM EDT -----  When sodium,chloride, and carbon dioxide values are normal anion gap is meaningless  No need for concern  ----- Message -----  From: Clarice Blanco MA  Sent: 5/23/2023   8:56 AM EDT  To: Rosalia Stewart MD    Hi Dr Jean Lazo and spoke with patient in regards of her BW results  Patient verbalized understanding but have a question on ANION GAP result  Patient is very concerned about that result  Patient wants a call from you explaining that result  Patient can be reached at [phone]   Thanks

## 2023-05-24 ENCOUNTER — OFFICE VISIT (OUTPATIENT)
Dept: PHYSICAL THERAPY | Facility: REHABILITATION | Age: 73
End: 2023-05-24

## 2023-05-24 DIAGNOSIS — M79.672 LEFT FOOT PAIN: ICD-10-CM

## 2023-05-24 DIAGNOSIS — M72.2 PLANTAR FASCIITIS: Primary | ICD-10-CM

## 2023-05-24 DIAGNOSIS — R26.89 BALANCE PROBLEM: ICD-10-CM

## 2023-05-24 DIAGNOSIS — R26.9 GAIT ABNORMALITY: ICD-10-CM

## 2023-05-24 NOTE — PROGRESS NOTES
"Daily Note     Today's date: 2023  Patient name: Kim Yates  : 1950  MRN: [id number]  Referring provider: Ivette Matt MD  Dx:   Encounter Diagnosis     ICD-10-CM    1  Plantar fasciitis  M72 2       2  Balance problem  R26 89       3  Gait abnormality  R26 9       4  Left foot pain  M79 672           Start Time: 1100  Stop Time: 1148  Total time in clinic (min): 48 minutes    Subjective: Pt reports she got her new custom orthotics and they have been helping  Objective: See treatment diary below  Access Code: [email or web address]  URL: [email or web address]  Date: 2023  Prepared by: Portia Guevara    Exercises  - Long Sitting Calf Stretch with Strap  - 2-3 x daily - 7 x weekly - 1 sets - 4 reps - 30-45 hold  - Seated Plantar Fascia Mobilization with Small Ball  - 2-3 x daily - 7 x weekly - 1 sets - 3-5 reps - 60 hold      Assessment:  Pt was challenged by progression in s/l VG  Cues given during sand dune step ups to use as little UE support as possible  Pt will benefit from continued skilled outpatient PT to improve strength, ankle joint mobility and ROM, balance, to address therapy goals, to reduce pain, and improve function  Plan: Continue per plan of care        Precautions: HTN, OP, fall risk, CA hx, hx of L ankle surgery    FOTO   = 44/58   = 57/58      Manuals 4/24 4/27 5/1 5/5 5/10 5/17 5/19 5/24     L ankle TC PA and distraction and ST mobs Gr II, III  Gr IV Gr IV Gr IV DS Gr IV DS Gr IV DS Gr IV DS Gr IV                                            Neuro Re-Ed             Tandem on airex  30\"x2 ea Close S 30\"x2 ea Close S          Sand dune mini march    2 min light UE prn 2 min light UE prn 2 min no UE close S 3 min no UE close S 3 min no UE close S     SLS on airex with contralat hip abd,     x20 ea light UE  x20 ea light UE x30 ea light UE YMB x20 ea light UE YMB x20 ea light UE     Sand dune step ups      1 min ea LE light UE close S 1 min ea LE light UE " "close S 1 min ea LE light UE close S                                            Ther Ex             DF MWM nv 4\" step med blk J band x30  8\" step PT over pressure 2x15 8\" step PT over pressure 2x15 6\" step PT overpress 2x10, 1 round with belt 8\" step PT overpress 2x10, 1 round with belt 8\" step PT overpress 2x15, 1 round with belt     VG for strength and ROM  L7 5'  S/l L5 3x10 ea (head elevated with wedge) S/l L5 3x10 ea (head elevated with wedge) S/l L6 3x10 ea (head elevated with wedge) S/l L5 4x10 ea (head elevated with wedge) S/l L6 3xF ea (head elevated with wedge)     Golf ball self STM   3' HEP          Long sit DF stretch with strap   30\"x5 HEP          Slant    30\"x3 30\"x3 LLE only 30\"x4 LLE only 45\"x3 LLE only 30\"x2  45\"x2  LLE only 45\"x3 LLE only     Leg press seat 10, reclined with pillow   100#  3x12          Step ups     4\" x15 ea 1UE                     Ther Activity             Sit to stands HEP                         Gait Training                                       Modalities                                            "

## 2023-05-26 ENCOUNTER — OFFICE VISIT (OUTPATIENT)
Dept: PHYSICAL THERAPY | Facility: REHABILITATION | Age: 73
End: 2023-05-26

## 2023-05-26 DIAGNOSIS — R26.89 BALANCE PROBLEM: ICD-10-CM

## 2023-05-26 DIAGNOSIS — R26.9 GAIT ABNORMALITY: ICD-10-CM

## 2023-05-26 DIAGNOSIS — M79.672 LEFT FOOT PAIN: ICD-10-CM

## 2023-05-26 DIAGNOSIS — M72.2 PLANTAR FASCIITIS: Primary | ICD-10-CM

## 2023-05-26 NOTE — PROGRESS NOTES
"Daily Note     Today's date: 2023  Patient name: Jami Ryan  : 1950  MRN: [id number]  Referring provider: Genaro Garland MD  Dx:   Encounter Diagnosis     ICD-10-CM    1  Plantar fasciitis  M72 2       2  Balance problem  R26 89       3  Gait abnormality  R26 9       4  Left foot pain  M79 672           Start Time: 1015  Stop Time: 1103  Total time in clinic (min): 48 minutes    Subjective: Pt reports her quad was a little sore but not too bad  Objective: See treatment diary below  Access Code: [email or web address]  URL: [email or web address]  Date: 2023  Prepared by: Aysha Araujo    Exercises  - Long Sitting Calf Stretch with Strap  - 2-3 x daily - 7 x weekly - 1 sets - 4 reps - 30-45 hold  - Seated Plantar Fascia Mobilization with Small Ball  - 2-3 x daily - 7 x weekly - 1 sets - 3-5 reps - 60 hold      Assessment:  Pt demonstrates improving balance during static on sand dune as well as sand dune step ups  Pt will benefit from continued skilled outpatient PT to improve strength, ankle joint mobility and ROM, balance, to address therapy goals, to reduce pain, and improve function  Plan: Continue per plan of care        Precautions: HTN, OP, fall risk, CA hx, hx of L ankle surgery    FOTO   = 44/58   = 57/58      Manuals 4/24 4/27 5/1 5/5 5/10 5/17 5/19 5/24 5/26    L ankle TC PA and distraction and ST mobs Gr II, III  Gr IV Gr IV Gr IV DS Gr IV DS Gr IV DS Gr IV DS Gr IV DS Gr IV                                           Neuro Re-Ed             Tandem on airex  30\"x2 ea Close S 30\"x2 ea Close S          Sand dune mini march    2 min light UE prn 2 min light UE prn 2 min no UE close S 3 min no UE close S 3 min no UE close S 3 min no UE close S    SLS on airex with contralat hip abd,     x20 ea light UE  x20 ea light UE x30 ea light UE YMB x20 ea light UE YMB x20 ea light UE YMB x20 ea light UE    Sand dune step ups      1 min ea LE light UE close S 1 min ea LE light UE " "close S 1 min ea LE light UE close S 1 min ea LE light UE close S                                           Ther Ex             DF MWM nv 4\" step med blk J band x30  8\" step PT over pressure 2x15 8\" step PT over pressure 2x15 6\" step PT overpress 2x10, 1 round with belt 8\" step PT overpress 2x10, 1 round with belt 8\" step PT overpress 2x15, 1 round with belt 8\" step PT overpress 2x15, 1 round with belt    VG for strength and ROM  L7 5'  S/l L5 3x10 ea (head elevated with wedge) S/l L5 3x10 ea (head elevated with wedge) S/l L6 3x10 ea (head elevated with wedge) S/l L5 4x10 ea (head elevated with wedge) S/l L6 3xF ea (head elevated with wedge) S/l L6 3xF ea (head elevated with wedge)    Golf ball self STM   3' HEP          Long sit DF stretch with strap   30\"x5 HEP          Slant    30\"x3 30\"x3 LLE only 30\"x4 LLE only 45\"x3 LLE only 30\"x2  45\"x2  LLE only 45\"x3 LLE only 45\"x3 LLE only    Leg press seat 10, reclined with pillow   100#  3x12          Step ups     4\" x15 ea 1UE                     Ther Activity             Sit to stands HEP                         Gait Training                                       Modalities                                            "

## 2023-05-30 ENCOUNTER — OFFICE VISIT (OUTPATIENT)
Dept: PHYSICAL THERAPY | Facility: REHABILITATION | Age: 73
End: 2023-05-30

## 2023-05-30 DIAGNOSIS — R26.89 BALANCE PROBLEM: ICD-10-CM

## 2023-05-30 DIAGNOSIS — M79.672 LEFT FOOT PAIN: ICD-10-CM

## 2023-05-30 DIAGNOSIS — R26.9 GAIT ABNORMALITY: ICD-10-CM

## 2023-05-30 DIAGNOSIS — M72.2 PLANTAR FASCIITIS: Primary | ICD-10-CM

## 2023-05-30 NOTE — PROGRESS NOTES
"Daily Note     Today's date: 2023  Patient name: Lincoln Trujillo  : 1950  MRN: [id number]  Referring provider: Shelby Hawkins MD  Dx:   Encounter Diagnosis     ICD-10-CM    1  Plantar fasciitis  M72 2       2  Balance problem  R26 89       3  Gait abnormality  R26 9       4  Left foot pain  M79 672           Start Time: 1017  Stop Time: 1105  Total time in clinic (min): 48 minutes    Subjective: Pt reports her L foot continues to hurt when fully WBing on it  Objective: See treatment diary below  Access Code: [email or web address]  URL: [email or web address]  Date: 2023  Prepared by: Torie Merchant    Exercises  - Long Sitting Calf Stretch with Strap  - 2-3 x daily - 7 x weekly - 1 sets - 4 reps - 30-45 hold  - Seated Plantar Fascia Mobilization with Small Ball  - 2-3 x daily - 7 x weekly - 1 sets - 3-5 reps - 60 hold      Assessment:  Encouraged pt to perform sand dune mini marches with more marching with pt tending to perform more as a wt shift  She was more challenged by this  Pt will benefit from continued skilled outpatient PT to improve strength, ankle joint mobility and ROM, balance, to address therapy goals, to reduce pain, and improve function  Plan: Continue per plan of care        Precautions: HTN, OP, fall risk, CA hx, hx of L ankle surgery    FOTO   = 44/58   = 57/58      Manuals 4/24 4/27 5/1 5/5 5/10 5/17 5/19 5/24 5/26 5/30   L ankle TC PA and distraction and ST mobs Gr II, III  Gr IV Gr IV Gr IV DS Gr IV DS Gr IV DS Gr IV DS Gr IV DS Gr IV DS Gr IV                                          Neuro Re-Ed             Tandem on airex  30\"x2 ea Close S 30\"x2 ea Close S          Sand dune mini march    2 min light UE prn 2 min light UE prn 2 min no UE close S 3 min no UE close S 3 min no UE close S 3 min no UE close S 2 min no UE close S   SLS on airex with contralat hip abd,     x20 ea light UE  x20 ea light UE x30 ea light UE YMB x20 ea light UE YMB x20 ea light UE " "YMB x20 ea light UE YMB x20 ea light UE   Sand dune step ups      1 min ea LE light UE close S 1 min ea LE light UE close S 1 min ea LE light UE close S 1 min ea LE light UE close S 1 min ea LE light UE close S                                          Ther Ex             DF MWM nv 4\" step med blk J band x30  8\" step PT over pressure 2x15 8\" step PT over pressure 2x15 6\" step PT overpress 2x10, 1 round with belt 8\" step PT overpress 2x10, 1 round with belt 8\" step PT overpress 2x15, 1 round with belt 8\" step PT overpress 2x15, 1 round with belt 8\" step PT overpress 2x15, 1 round with belt   VG for strength and ROM  L7 5'  S/l L5 3x10 ea (head elevated with wedge) S/l L5 3x10 ea (head elevated with wedge) S/l L6 3x10 ea (head elevated with wedge) S/l L5 4x10 ea (head elevated with wedge) S/l L6 3xF ea (head elevated with wedge) S/l L6 3xF ea (head elevated with wedge) S/l L6 3xF ea (head elevated with wedge)   Golf ball self STM   3' HEP          Long sit DF stretch with strap   30\"x5 HEP          Slant    30\"x3 30\"x3 LLE only 30\"x4 LLE only 45\"x3 LLE only 30\"x2  45\"x2  LLE only 45\"x3 LLE only 45\"x3 LLE only 45\"x3 LLE only   Leg press seat 10, reclined with pillow   100#  3x12          Step ups     4\" x15 ea 1UE                     Ther Activity             Sit to stands HEP                         Gait Training                                       Modalities                                            "

## 2023-06-01 ENCOUNTER — APPOINTMENT (OUTPATIENT)
Dept: PHYSICAL THERAPY | Facility: REHABILITATION | Age: 73
End: 2023-06-01
Payer: COMMERCIAL

## 2023-06-05 ENCOUNTER — OFFICE VISIT (OUTPATIENT)
Dept: PHYSICAL THERAPY | Facility: REHABILITATION | Age: 73
End: 2023-06-05
Payer: COMMERCIAL

## 2023-06-05 DIAGNOSIS — R26.9 GAIT ABNORMALITY: ICD-10-CM

## 2023-06-05 DIAGNOSIS — M79.672 LEFT FOOT PAIN: ICD-10-CM

## 2023-06-05 DIAGNOSIS — R26.89 BALANCE PROBLEM: ICD-10-CM

## 2023-06-05 DIAGNOSIS — M72.2 PLANTAR FASCIITIS: Primary | ICD-10-CM

## 2023-06-05 PROCEDURE — 97110 THERAPEUTIC EXERCISES: CPT | Performed by: PHYSICAL THERAPIST

## 2023-06-05 PROCEDURE — 97140 MANUAL THERAPY 1/> REGIONS: CPT | Performed by: PHYSICAL THERAPIST

## 2023-06-05 PROCEDURE — 97112 NEUROMUSCULAR REEDUCATION: CPT | Performed by: PHYSICAL THERAPIST

## 2023-06-05 NOTE — PROGRESS NOTES
"Daily Note     Today's date: 2023  Patient name: French Donovan  : 1950  MRN: [id number]  Referring provider: Lisa Monteiro MD  Dx:   Encounter Diagnosis     ICD-10-CM    1  Plantar fasciitis  M72 2       2  Balance problem  R26 89       3  Gait abnormality  R26 9       4  Left foot pain  M79 672           Start Time: 1445  Stop Time: 1530  Total time in clinic (min): 45 minutes    Subjective: Pt states she feels a little light headed today      Objective: See treatment diary below  Vitals  HR = 63  BP = 126/65      Access Code: [email or web address]  URL: [email or web address]/  Date: 2023  Prepared by: Anika Hoyt    Exercises  - Long Sitting Calf Stretch with Strap  - 2-3 x daily - 7 x weekly - 1 sets - 4 reps - 30-45 hold  - Seated Plantar Fascia Mobilization with Small Ball  - 2-3 x daily - 7 x weekly - 1 sets - 3-5 reps - 60 hold      Assessment:  BP assessed at start of visit as above and WNL  Pt was more balance with the sand dune exercises  She also is performing the VG with less fatigue and more reps  Pt will benefit from continued skilled outpatient PT to improve strength, ankle joint mobility and ROM, balance, to address therapy goals, to reduce pain, and improve function  Plan: Continue per plan of care    Progress VG to L7 nv     Precautions: HTN, OP, fall risk, CA hx, hx of L ankle surgery    FOTO   = 44/58   = 57/58      Manuals 6/5 4/27 5/1 5/5 5/10 5/17 5/19 5/24 5/26 5/30   L ankle TC PA and distraction and ST mobs Gr IV Gr IV Gr IV Gr IV DS Gr IV DS Gr IV DS Gr IV DS Gr IV DS Gr IV DS Gr IV                                          Neuro Re-Ed             Tandem on airex  \"x2 ea Close S 30\"x2 ea Close S          Sand dune mini march 2 min no UE close S   2 min light UE prn 2 min light UE prn 2 min no UE close S 3 min no UE close S 3 min no UE close S 3 min no UE close S 2 min no UE close S   SLS on airex with contralat hip abd,  YMB x20 ea light UE   x20 ea " "light UE  x20 ea light UE x30 ea light UE YMB x20 ea light UE YMB x20 ea light UE YMB x20 ea light UE YMB x20 ea light UE   Sand dune step ups 1 min ea LE light UE close S     1 min ea LE light UE close S 1 min ea LE light UE close S 1 min ea LE light UE close S 1 min ea LE light UE close S 1 min ea LE light UE close S                                          Ther Ex             DF MWM 8\" step PT overpress 2x15, 1 round with belt 4\" step med blk J band x30  8\" step PT over pressure 2x15 8\" step PT over pressure 2x15 6\" step PT overpress 2x10, 1 round with belt 8\" step PT overpress 2x10, 1 round with belt 8\" step PT overpress 2x15, 1 round with belt 8\" step PT overpress 2x15, 1 round with belt 8\" step PT overpress 2x15, 1 round with belt   VG for strength and ROM S/l L6 3x10 ea (head elevated with wedge) L7 5'  S/l L5 3x10 ea (head elevated with wedge) S/l L5 3x10 ea (head elevated with wedge) S/l L6 3x10 ea (head elevated with wedge) S/l L5 4x10 ea (head elevated with wedge) S/l L6 3xF ea (head elevated with wedge) S/l L6 3xF ea (head elevated with wedge) S/l L6 3xF ea (head elevated with wedge)   Golf ball self STM   3' HEP          Long sit DF stretch with strap   30\"x5 HEP          Slant  45\"x3 LLE only  30\"x3 30\"x3 LLE only 30\"x4 LLE only 45\"x3 LLE only 30\"x2  45\"x2  LLE only 45\"x3 LLE only 45\"x3 LLE only 45\"x3 LLE only   Leg press seat 10, reclined with pillow   100#  3x12          Step ups     4\" x15 ea 1UE                     Ther Activity             Sit to stands                          Gait Training                                       Modalities                                            "

## 2023-06-07 ENCOUNTER — EVALUATION (OUTPATIENT)
Dept: PHYSICAL THERAPY | Facility: REHABILITATION | Age: 73
End: 2023-06-07
Payer: COMMERCIAL

## 2023-06-07 DIAGNOSIS — R26.89 BALANCE PROBLEM: ICD-10-CM

## 2023-06-07 DIAGNOSIS — M79.672 LEFT FOOT PAIN: ICD-10-CM

## 2023-06-07 DIAGNOSIS — R26.9 GAIT ABNORMALITY: ICD-10-CM

## 2023-06-07 DIAGNOSIS — M72.2 PLANTAR FASCIITIS: Primary | ICD-10-CM

## 2023-06-07 DIAGNOSIS — E78.5 HYPERLIPIDEMIA, UNSPECIFIED HYPERLIPIDEMIA TYPE: ICD-10-CM

## 2023-06-07 PROCEDURE — 97112 NEUROMUSCULAR REEDUCATION: CPT | Performed by: PHYSICAL THERAPIST

## 2023-06-07 PROCEDURE — 97110 THERAPEUTIC EXERCISES: CPT | Performed by: PHYSICAL THERAPIST

## 2023-06-07 PROCEDURE — 97140 MANUAL THERAPY 1/> REGIONS: CPT | Performed by: PHYSICAL THERAPIST

## 2023-06-07 RX ORDER — ATORVASTATIN CALCIUM 10 MG/1
TABLET, FILM COATED ORAL
Qty: 90 TABLET | Refills: 1 | Status: SHIPPED | OUTPATIENT
Start: 2023-06-07

## 2023-06-07 NOTE — PROGRESS NOTES
Daily Note /re-eval    Today's date: 2023  Patient name: Deloris Michelle  : 1950  MRN: [id number]  Referring provider: Bipin Amado MD  Dx:   Encounter Diagnosis     ICD-10-CM    1  Plantar fasciitis  M72 2       2  Balance problem  R26 89       3  Gait abnormality  R26 9       4  Left foot pain  M79 672           Start Time: 1102  Stop Time: 1150  Total time in clinic (min): 48 minutes    Subjective: Pt reports 40% improvement overall  Pain  Current pain ratin  At best pain ratin  At worst pain ratin in the past week, 8 at eval  Location: arch of foot  Quality: sharp    Objective: See treatment diary below          Lower Extremity Strength    MMT   AROM   PROM     Knee Left Right Left Right Left Right   Flex 5 5           Ext 4+ 4+                           Hip               flex 4 4           ext               abd                               Ankle               DF 5 5  lacking 18 lacking 12       PF             inversion 4+ 5         Eversion  4+ 5              Dynamic Balance Tests  Gait speed (m/s)  1 2 m/s required to cross the street safely     5x sit to stand (sec)  >14 sec indicates high risk for falls IE = 14 22 with BUE, unable without UE  6/7 = 12 12, unable without UE   TUG (sec)  >14 sec indicates high risk for falls IE = 19 sec with SPC  6/7 = 16 95 with SPC      Gait:  ambulates with SPC, L foot turned out, wide KEILY, L foot plantar flexed and heel inverted, unsteady       Access Code: [email or web address]  URL: [email or web address]  Date: 2023  Prepared by: Josh Alvarez    Exercises  - Long Sitting Calf Stretch with Strap  - 2-3 x daily - 7 x weekly - 1 sets - 4 reps - 30-45 hold  - Seated Plantar Fascia Mobilization with Small Ball  - 2-3 x daily - 7 x weekly - 1 sets - 3-5 reps - 60 hold      Assessment:  Pt presents for her 12th visit and reassessment  Since eval, pt has made progress in ankle ROM, pain, and function   She has met almost all short term goals and is progressing towards her long term goals  Her TUG test and 5 times sit to stand scores have improved indicating an overall improvement in fall risk and functional strength  Pt will benefit from continued skilled outpatient PT to improve strength and balance, to address therapy goals, to reduce pain, and improve function  Goals  Short term goals: to be met in 4-5 weeks  Pt independent with initial HEP, rationale, technique and frequency, for ROM and pain control  MET  Achieve 5 times sit to stand test score to <12 sec with BUE indicating improved fucntional LE strength and reduced fall risk  IN PROGRESS  Pt able to perform 1 sit to stand without UE indicating and improvement in functional LE strength  UNMET  Achieve an improvement in L ankle DF AROM for improved ease of standing and walking and to reduce strain on the longitudinal arch  MET  Pt will achieve an improvement in FOTO score indicating an improvement in pain and function  MET        Long term goals: to be met in 8-10 weeks - IN PROGRESS  Pt will report a 75% or > reduction in subjective pain complaints/symptoms to better manage ADLs and functional mobility  Achieve a further improvement in L ankle DF AROM for improved ease of standing and walking and to reduce strain on the longitudinal arch  Pt able to perform 3 sit to stands without UE indicating and improvement in functional LE strength  Pt will perform the TUG test in less than <14 seconds indicating decreased risk for falls  Pt will improve FOTO score to = or better then expected outcome indicating an overall improvement in pain and function   Pt independent with rationale, technique and plan for performance of advanced HEP to maintain gains made in therapy  Achieve pts goal: strengthen legs, be able to manuever better, less discomfort    Plan: Continue per plan of care  2x per week for 12 weeks       Precautions: HTN, OP, fall risk, CA hx, hx of L ankle surgery    FOTO  4/24 = "44/58  5/19 = 57/58      Manuals 6/5 6/7    5/17 5/19 5/24 5/26 5/30   L ankle TC PA and distraction and ST mobs Gr IV Gr IV    DS Gr IV DS Gr IV DS Gr IV DS Gr IV DS Gr IV     RE - DS                                     Neuro Re-Ed             Sand dune mini march 2 min no UE close S 2 min no UE close S    2 min no UE close S 3 min no UE close S 3 min no UE close S 3 min no UE close S 2 min no UE close S   SLS on airex with contralat hip abd,  YMB x20 ea light UE YMB x20 ea light UE    x30 ea light UE YMB x20 ea light UE YMB x20 ea light UE YMB x20 ea light UE YMB x20 ea light UE   Sand dune step ups 1 min ea LE light UE close S nv    1 min ea LE light UE close S 1 min ea LE light UE close S 1 min ea LE light UE close S 1 min ea LE light UE close S 1 min ea LE light UE close S                                          Ther Ex             DF MWM 8\" step PT overpress 2x15, with belt 8\" step PT overpress 2x10, with belt    6\" step PT overpress 2x10, 1 round with belt 8\" step PT overpress 2x10, 1 round with belt 8\" step PT overpress 2x15, 1 round with belt 8\" step PT overpress 2x15, 1 round with belt 8\" step PT overpress 2x15, 1 round with belt   VG for strength and ROM S/l L6 3x10 ea (head elevated with wedge) S/l L7 3x10 ea (head elevated with wedge)    S/l L6 3x10 ea (head elevated with wedge) S/l L5 4x10 ea (head elevated with wedge) S/l L6 3xF ea (head elevated with wedge) S/l L6 3xF ea (head elevated with wedge) S/l L6 3xF ea (head elevated with wedge)   Slant  45\"x3 LLE only 45\"x3 LLE only    45\"x3 LLE only 30\"x2  45\"x2  LLE only 45\"x3 LLE only 45\"x3 LLE only 45\"x3 LLE only                                          Ther Activity             Sit to stands  5xSTS x2  TUG x2                        Gait Training                                       Modalities                                            "

## 2023-06-12 ENCOUNTER — OFFICE VISIT (OUTPATIENT)
Dept: PHYSICAL THERAPY | Facility: REHABILITATION | Age: 73
End: 2023-06-12
Payer: COMMERCIAL

## 2023-06-12 DIAGNOSIS — M72.2 PLANTAR FASCIITIS: Primary | ICD-10-CM

## 2023-06-12 DIAGNOSIS — R26.89 BALANCE PROBLEM: ICD-10-CM

## 2023-06-12 DIAGNOSIS — M79.672 LEFT FOOT PAIN: ICD-10-CM

## 2023-06-12 DIAGNOSIS — R26.9 GAIT ABNORMALITY: ICD-10-CM

## 2023-06-12 PROCEDURE — 97112 NEUROMUSCULAR REEDUCATION: CPT | Performed by: PHYSICAL THERAPIST

## 2023-06-12 PROCEDURE — 97110 THERAPEUTIC EXERCISES: CPT | Performed by: PHYSICAL THERAPIST

## 2023-06-12 PROCEDURE — 97140 MANUAL THERAPY 1/> REGIONS: CPT | Performed by: PHYSICAL THERAPIST

## 2023-06-12 NOTE — PROGRESS NOTES
"Daily Note     Today's date: 2023  Patient name: French Donovan  : 1950  MRN: [id number]  Referring provider: Lisa Monteiro MD  Dx:   Encounter Diagnosis     ICD-10-CM    1  Plantar fasciitis  M72 2       2  Balance problem  R26 89       3  Gait abnormality  R26 9       4  Left foot pain  M79 672           Start Time: 1215  Stop Time: 1300  Total time in clinic (min): 45 minutes    Subjective: Pt offers no new complaints  Objective: See treatment diary below      Assessment: Pt responded well to the exercises  Her L TC joint mobility remains hypomobile and will continue to benefit from mobilizations  Pt will benefit from continued skilled outpatient PT to improve strength, ROM, joint mobility, balance, to address therapy goals, to reduce pain, and improve function  Plan: Continue per plan of care  Progress treatment as tolerated         Precautions: HTN, OP, fall risk, CA hx, hx of L ankle surgery    FOTO   = 44/58   = 57/58      Manuals    L ankle TC PA and distraction and ST mobs Gr IV Gr IV Gr IV   DS Gr IV DS Gr IV DS Gr IV DS Gr IV DS Gr IV     RE - DS                                     Neuro Re-Ed             Sand dune mini march 2 min no UE close S 2 min no UE close S 3' no UE close S   2 min no UE close S 3 min no UE close S 3 min no UE close S 3 min no UE close S 2 min no UE close S   SLS on airex with contralat hip abd,  YMB x20 ea light UE YMB x20 ea light UE YMB x30 ea light UE   x30 ea light UE YMB x20 ea light UE YMB x20 ea light UE YMB x20 ea light UE YMB x20 ea light UE   Sand dune step ups 1 min ea LE light UE close S nv 1 min ea LE light UE close S   1 min ea LE light UE close S 1 min ea LE light UE close S 1 min ea LE light UE close S 1 min ea LE light UE close S 1 min ea LE light UE close S                                          Ther Ex             DF MWM 8\" step PT overpress 2x15, with belt 8\" step PT overpress 2x10, " "with belt 8\" step PT overpress 2x15 with belt   6\" step PT overpress 2x10, 1 round with belt 8\" step PT overpress 2x10, 1 round with belt 8\" step PT overpress 2x15, 1 round with belt 8\" step PT overpress 2x15, 1 round with belt 8\" step PT overpress 2x15, 1 round with belt   VG for strength and ROM S/l L6 3x10 ea (head elevated with wedge) S/l L7 3x10 ea (head elevated with wedge) S/l L7 3x10 ea (head elevated with wedge)   S/l L6 3x10 ea (head elevated with wedge) S/l L5 4x10 ea (head elevated with wedge) S/l L6 3xF ea (head elevated with wedge) S/l L6 3xF ea (head elevated with wedge) S/l L6 3xF ea (head elevated with wedge)   Slant  45\"x3 LLE only 45\"x3 LLE only 45\"x4 LLE only   45\"x3 LLE only 30\"x2  45\"x2  LLE only 45\"x3 LLE only 45\"x3 LLE only 45\"x3 LLE only                                          Ther Activity             Sit to stands  5xSTS x2  TUG x2                        Gait Training                                       Modalities                                            "

## 2023-06-14 ENCOUNTER — OFFICE VISIT (OUTPATIENT)
Dept: PHYSICAL THERAPY | Facility: REHABILITATION | Age: 73
End: 2023-06-14
Payer: COMMERCIAL

## 2023-06-14 DIAGNOSIS — M79.672 LEFT FOOT PAIN: ICD-10-CM

## 2023-06-14 DIAGNOSIS — R26.89 BALANCE PROBLEM: ICD-10-CM

## 2023-06-14 DIAGNOSIS — M72.2 PLANTAR FASCIITIS: Primary | ICD-10-CM

## 2023-06-14 DIAGNOSIS — R26.9 GAIT ABNORMALITY: ICD-10-CM

## 2023-06-14 PROCEDURE — 97140 MANUAL THERAPY 1/> REGIONS: CPT | Performed by: PHYSICAL THERAPIST

## 2023-06-14 PROCEDURE — 97110 THERAPEUTIC EXERCISES: CPT | Performed by: PHYSICAL THERAPIST

## 2023-06-14 PROCEDURE — 97112 NEUROMUSCULAR REEDUCATION: CPT | Performed by: PHYSICAL THERAPIST

## 2023-06-14 NOTE — PROGRESS NOTES
Daily Note     Today's date: 2023  Patient name: Laura Pruitt  : 1950  MRN: [id number]  Referring provider: Anil Crow MD  Dx:   Encounter Diagnosis     ICD-10-CM    1  Plantar fasciitis  M72 2       2  Balance problem  R26 89       3  Gait abnormality  R26 9       4  Left foot pain  M79 672           Start Time: 1055  Stop Time: 1135  Total time in clinic (min): 40 minutes    Subjective: Pt offers no new complaints  Reports continued slow improvement  Objective: See treatment diary below      Assessment: Pt is still very challenged by SLS with hip abd with mini band  Cues given to reduce UE support and encourage her to work on balance more  Pt will benefit from continued skilled outpatient PT to improve strength, ROM, joint mobility, balance, to address therapy goals, to reduce pain, and improve function  Plan: Continue per plan of care  Progress treatment as tolerated         Precautions: HTN, OP, fall risk, CA hx, hx of L ankle surgery    FOTO   = 44/58   = 57/58      Manuals    L ankle TC PA and distraction and ST mobs Gr IV Gr IV Gr IV DS Gr IV  DS Gr IV DS Gr IV DS Gr IV DS Gr IV DS Gr IV     RE - DS                                     Neuro Re-Ed             Sand dune mini march 2 min no UE close S 2 min no UE close S 3' no UE close S 2 min no UE close S  2 min no UE close S 3 min no UE close S 3 min no UE close S 3 min no UE close S 2 min no UE close S   SLS on airex with contralat hip abd,  YMB x20 ea light UE YMB x20 ea light UE YMB x30 ea light UE YMB x30 ea light UE  x30 ea light UE YMB x20 ea light UE YMB x20 ea light UE YMB x20 ea light UE YMB x20 ea light UE   Sand dune step ups 1 min ea LE light UE close S nv 1 min ea LE light UE close S 1 min ea LE light UE close S  1 min ea LE light UE close S 1 min ea LE light UE close S 1 min ea LE light UE close S 1 min ea LE light UE close S 1 min ea LE light UE close S "                                 Ther Ex             DF MWM 8\" step PT overpress 2x15, with belt 8\" step PT overpress 2x10, with belt 8\" step PT overpress 2x15 with belt 8\" step PT overpress 2x15 with belt  6\" step PT overpress 2x10, 1 round with belt 8\" step PT overpress 2x10, 1 round with belt 8\" step PT overpress 2x15, 1 round with belt 8\" step PT overpress 2x15, 1 round with belt 8\" step PT overpress 2x15, 1 round with belt   VG for strength and ROM S/l L6 3x10 ea (head elevated with wedge) S/l L7 3x10 ea (head elevated with wedge) S/l L7 3x10 ea (head elevated with wedge) S/l L7 3x10 ea (head elevated with wedge)  S/l L6 3x10 ea (head elevated with wedge) S/l L5 4x10 ea (head elevated with wedge) S/l L6 3xF ea (head elevated with wedge) S/l L6 3xF ea (head elevated with wedge) S/l L6 3xF ea (head elevated with wedge)   Slant  45\"x3 LLE only 45\"x3 LLE only 45\"x4 LLE only 45\"x3 LLE only  45\"x3 LLE only 30\"x2  45\"x2  LLE only 45\"x3 LLE only 45\"x3 LLE only 45\"x3 LLE only                                          Ther Activity             Sit to stands  5xSTS x2  TUG x2                        Gait Training                                       Modalities                                            "

## 2023-06-19 ENCOUNTER — OFFICE VISIT (OUTPATIENT)
Dept: PHYSICAL THERAPY | Facility: REHABILITATION | Age: 73
End: 2023-06-19
Payer: COMMERCIAL

## 2023-06-19 VITALS — DIASTOLIC BLOOD PRESSURE: 81 MMHG | SYSTOLIC BLOOD PRESSURE: 150 MMHG | HEART RATE: 62 BPM

## 2023-06-19 DIAGNOSIS — R26.9 GAIT ABNORMALITY: ICD-10-CM

## 2023-06-19 DIAGNOSIS — M72.2 PLANTAR FASCIITIS: Primary | ICD-10-CM

## 2023-06-19 DIAGNOSIS — M79.672 LEFT FOOT PAIN: ICD-10-CM

## 2023-06-19 DIAGNOSIS — R26.89 BALANCE PROBLEM: ICD-10-CM

## 2023-06-19 PROCEDURE — 97110 THERAPEUTIC EXERCISES: CPT | Performed by: PHYSICAL THERAPIST

## 2023-06-19 PROCEDURE — 97112 NEUROMUSCULAR REEDUCATION: CPT | Performed by: PHYSICAL THERAPIST

## 2023-06-19 PROCEDURE — 97140 MANUAL THERAPY 1/> REGIONS: CPT | Performed by: PHYSICAL THERAPIST

## 2023-06-19 NOTE — PROGRESS NOTES
Daily Note     Today's date: 2023  Patient name: Jose M Irving  : 1950  MRN: [id number]  Referring provider: Nguyen Demarco MD  Dx:   Encounter Diagnosis     ICD-10-CM    1  Plantar fasciitis  M72 2       2  Balance problem  R26 89       3  Gait abnormality  R26 9       4  Left foot pain  M79 672           Start Time: 1216  Stop Time: 1310  Total time in clinic (min): 54 minutes    Subjective: Pt reports the ankle is more stiff today and her foot is hurting more  Just got her new custom orthotics      Objective: See treatment diary below  Vitals  HR = 62  BP = 150/81        Assessment: Pt ambulated into the clinic with slower, more antalgic gait and decreased DF during step through; this improved following ankle joint mobilizations  Pt will benefit from continued skilled outpatient PT to improve strength, ROM, joint mobility, balance, to address therapy goals, to reduce pain, and improve function  Plan: Continue per plan of care  Progress treatment as tolerated         Precautions: HTN, OP, fall risk, CA hx, hx of L ankle surgery    FOTO   = 44/58   = 57/58      Manuals    L ankle TC PA and distraction and ST mobs Gr IV Gr IV Gr IV DS Gr IV DS Gr IV   DS Gr IV DS Gr IV DS Gr IV     RE - DS                                     Neuro Re-Ed             Sand dune mini march 2 min no UE close S 2 min no UE close S 3' no UE close S 2 min no UE close S 3' no UE close S   3 min no UE close S 3 min no UE close S 2 min no UE close S   SLS on airex with contralat hip abd,  YMB x20 ea light UE YMB x20 ea light UE YMB x30 ea light UE YMB x30 ea light UE YMB x30 ea light UE   YMB x20 ea light UE YMB x20 ea light UE YMB x20 ea light UE   Sand dune step ups 1 min ea LE light UE close S nv 1 min ea LE light UE close S 1 min ea LE light UE close S 1 min ea LE light UE close S   1 min ea LE light UE close S 1 min ea LE light UE close S 1 min ea LE light UE close S "                                         Ther Ex             DF MWM 8\" step PT overpress 2x15, with belt 8\" step PT overpress 2x10, with belt 8\" step PT overpress 2x15 with belt 8\" step PT overpress 2x15 with belt 8\" step PT overpress 2x15 with belt   8\" step PT overpress 2x15, 1 round with belt 8\" step PT overpress 2x15, 1 round with belt 8\" step PT overpress 2x15, 1 round with belt   VG for strength and ROM S/l L6 3x10 ea (head elevated with wedge) S/l L7 3x10 ea (head elevated with wedge) S/l L7 3x10 ea (head elevated with wedge) S/l L7 3x10 ea (head elevated with wedge) S/l L7 3x10 ea (head elevated with wedge)   S/l L6 3xF ea (head elevated with wedge) S/l L6 3xF ea (head elevated with wedge) S/l L6 3xF ea (head elevated with wedge)   Slant  45\"x3 LLE only 45\"x3 LLE only 45\"x4 LLE only 45\"x3 LLE only 45\"x3 LLE only  prostretch   45\"x3 LLE only 45\"x3 LLE only 45\"x3 LLE only                                          Ther Activity             Sit to stands  5xSTS x2  TUG x2                        Gait Training                                       Modalities                                            "

## 2023-06-21 ENCOUNTER — OFFICE VISIT (OUTPATIENT)
Dept: PHYSICAL THERAPY | Facility: REHABILITATION | Age: 73
End: 2023-06-21
Payer: COMMERCIAL

## 2023-06-21 DIAGNOSIS — R26.9 GAIT ABNORMALITY: ICD-10-CM

## 2023-06-21 DIAGNOSIS — R26.89 BALANCE PROBLEM: ICD-10-CM

## 2023-06-21 DIAGNOSIS — M79.672 LEFT FOOT PAIN: ICD-10-CM

## 2023-06-21 DIAGNOSIS — M72.2 PLANTAR FASCIITIS: Primary | ICD-10-CM

## 2023-06-21 PROCEDURE — 97112 NEUROMUSCULAR REEDUCATION: CPT | Performed by: PHYSICAL THERAPIST

## 2023-06-21 PROCEDURE — 97140 MANUAL THERAPY 1/> REGIONS: CPT | Performed by: PHYSICAL THERAPIST

## 2023-06-21 PROCEDURE — 97110 THERAPEUTIC EXERCISES: CPT | Performed by: PHYSICAL THERAPIST

## 2023-06-21 NOTE — PROGRESS NOTES
Daily Note     Today's date: 2023  Patient name: Bello Garza  : 1950  MRN: [id number]  Referring provider: Donna Lemos MD  Dx:   Encounter Diagnosis     ICD-10-CM    1  Plantar fasciitis  M72 2       2  Balance problem  R26 89       3  Gait abnormality  R26 9       4  Left foot pain  M79 672           Start Time: 1128  Stop Time: 1215  Total time in clinic (min): 47 minutes    Subjective: Pt reports she is feeling a lot better today compared to Monday  Less stiff and less painful  Objective: See treatment diary below      Assessment: Pt had a less stiff and antalgic gait today  She continues to be challenged by SLS with hip abd on the airex and would benefit from continued practice  Cues given during sand dune step ups to control step down and not land hard on the LE  Pt will benefit from continued skilled outpatient PT to improve strength, ROM, joint mobility, balance, to address therapy goals, to reduce pain, and improve function  Plan: Continue per plan of care  Progress treatment as tolerated         Precautions: HTN, OP, fall risk, CA hx, hx of L ankle surgery    FOTO   = 44/58   = 57/58      Manuals    L ankle TC PA and distraction and ST mobs Gr IV Gr IV Gr IV DS Gr IV DS Gr IV DS Gr IV  DS Gr IV DS Gr IV DS Gr IV     RE - DS                                     Neuro Re-Ed             Sand dune mini march 2 min no UE close S 2 min no UE close S 3' no UE close S 2 min no UE close S 3' no UE close S 3' no UE close S  3 min no UE close S 3 min no UE close S 2 min no UE close S   SLS on airex with contralat hip abd,  YMB x20 ea light UE YMB x20 ea light UE YMB x30 ea light UE YMB x30 ea light UE YMB x30 ea light UE YMB x30 ea light UE  YMB x20 ea light UE YMB x20 ea light UE YMB x20 ea light UE   Sand dune step ups 1 min ea LE light UE close S nv 1 min ea LE light UE close S 1 min ea LE light UE close S 1 min ea LE light UE close "S 1 min ea LE light UE close S  1 min ea LE light UE close S 1 min ea LE light UE close S 1 min ea LE light UE close S                                          Ther Ex             DF MWM 8\" step PT overpress 2x15, with belt 8\" step PT overpress 2x10, with belt 8\" step PT overpress 2x15 with belt 8\" step PT overpress 2x15 with belt 8\" step PT overpress 2x15 with belt 8\" step PT overpress 2x15 with belt  8\" step PT overpress 2x15, 1 round with belt 8\" step PT overpress 2x15, 1 round with belt 8\" step PT overpress 2x15, 1 round with belt   VG for strength and ROM S/l L6 3x10 ea (head elevated with wedge) S/l L7 3x10 ea (head elevated with wedge) S/l L7 3x10 ea (head elevated with wedge) S/l L7 3x10 ea (head elevated with wedge) S/l L7 3x10 ea (head elevated with wedge) S/l L7 3x10 ea (head elevated with wedge)  S/l L6 3xF ea (head elevated with wedge) S/l L6 3xF ea (head elevated with wedge) S/l L6 3xF ea (head elevated with wedge)   Slant  45\"x3 LLE only 45\"x3 LLE only 45\"x4 LLE only 45\"x3 LLE only 45\"x3 LLE only  prostretch 45\"x4 LLE only  prostretch  45\"x3 LLE only 45\"x3 LLE only 45\"x3 LLE only                                          Ther Activity             Sit to stands  5xSTS x2  TUG x2                        Gait Training                                       Modalities                                            "

## 2023-06-26 ENCOUNTER — OFFICE VISIT (OUTPATIENT)
Dept: PHYSICAL THERAPY | Facility: REHABILITATION | Age: 73
End: 2023-06-26
Payer: COMMERCIAL

## 2023-06-26 DIAGNOSIS — R26.9 GAIT ABNORMALITY: ICD-10-CM

## 2023-06-26 DIAGNOSIS — R26.89 BALANCE PROBLEM: ICD-10-CM

## 2023-06-26 DIAGNOSIS — M79.672 LEFT FOOT PAIN: ICD-10-CM

## 2023-06-26 DIAGNOSIS — M72.2 PLANTAR FASCIITIS: Primary | ICD-10-CM

## 2023-06-26 PROCEDURE — 97140 MANUAL THERAPY 1/> REGIONS: CPT | Performed by: PHYSICAL THERAPIST

## 2023-06-26 PROCEDURE — 97112 NEUROMUSCULAR REEDUCATION: CPT | Performed by: PHYSICAL THERAPIST

## 2023-06-26 PROCEDURE — 97110 THERAPEUTIC EXERCISES: CPT | Performed by: PHYSICAL THERAPIST

## 2023-06-26 NOTE — PROGRESS NOTES
Daily Note     Today's date: 2023  Patient name: Smita Valdez  : 1950  MRN: [id number]  Referring provider: Melony Espana MD  Dx:   Encounter Diagnosis     ICD-10-CM    1  Plantar fasciitis  M72 2       2  Balance problem  R26 89       3  Gait abnormality  R26 9       4  Left foot pain  M79 672           Start Time: 1017  Stop Time: 1057  Total time in clinic (min): 40 minutes    Subjective: Pt reports she feels a little stiff this morning  Objective: See treatment diary below      Assessment: Pt ambulated into the clinic with a wider base of support today, L foot supinated and inverted  She was still requires light UE support for sand dune step ups and sand dune mini   Pt will benefit from continued skilled outpatient PT to improve strength, ROM, joint mobility, balance, to address therapy goals, to reduce pain, and improve function  Plan: Continue per plan of care  Progress treatment as tolerated         Precautions: HTN, OP, fall risk, CA hx, hx of L ankle surgery        Manuals    L ankle TC PA and distraction and ST mobs Gr IV Gr IV Gr IV DS Gr IV DS Gr IV DS Gr IV DS Gr IV  DS Gr IV DS Gr IV     RE - DS                                     Neuro Re-Ed             Sand dune mini march 2 min no UE close S 2 min no UE close S 3' no UE close S 2 min no UE close S 3' no UE close S 3' no UE close S 3 min no UE close S  3 min no UE close S 2 min no UE close S   SLS on airex with contralat hip abd,  YMB x20 ea light UE YMB x20 ea light UE YMB x30 ea light UE YMB x30 ea light UE YMB x30 ea light UE YMB x30 ea light UE YMB x30 ea light UE  YMB x20 ea light UE YMB x20 ea light UE   Sand dune step ups 1 min ea LE light UE close S nv 1 min ea LE light UE close S 1 min ea LE light UE close S 1 min ea LE light UE close S 1 min ea LE light UE close S 1 min ea LE light UE close S  1 min ea LE light UE close S 1 min ea LE light UE close S "                            Ther Ex             DF MWM 8\" step PT overpress 2x15, with belt 8\" step PT overpress 2x10, with belt 8\" step PT overpress 2x15 with belt 8\" step PT overpress 2x15 with belt 8\" step PT overpress 2x15 with belt 8\" step PT overpress 2x15 with belt 8\" step PT overpress 2x15, 1 round with belt  8\" step PT overpress 2x15, 1 round with belt 8\" step PT overpress 2x15, 1 round with belt   VG for strength and ROM S/l L6 3x10 ea (head elevated with wedge) S/l L7 3x10 ea (head elevated with wedge) S/l L7 3x10 ea (head elevated with wedge) S/l L7 3x10 ea (head elevated with wedge) S/l L7 3x10 ea (head elevated with wedge) S/l L7 3x10 ea (head elevated with wedge) S/l L7 3xF ea (head elevated with wedge)  S/l L6 3xF ea (head elevated with wedge) S/l L6 3xF ea (head elevated with wedge)   Slant  45\"x3 LLE only 45\"x3 LLE only 45\"x4 LLE only 45\"x3 LLE only 45\"x3 LLE only  prostretch 45\"x4 LLE only  prostretch 45\"x3 LLE only, standing calf stretch  45\"x3 LLE only 45\"x3 LLE only                                          Ther Activity             Sit to stands  5xSTS x2  TUG x2                        Gait Training                                       Modalities                                            "

## 2023-06-27 DIAGNOSIS — E03.9 HYPOTHYROIDISM, UNSPECIFIED TYPE: ICD-10-CM

## 2023-06-27 RX ORDER — LEVOTHYROXINE SODIUM 175 UG/1
TABLET ORAL
Qty: 90 TABLET | Refills: 1 | Status: SHIPPED | OUTPATIENT
Start: 2023-06-27

## 2023-06-28 ENCOUNTER — OFFICE VISIT (OUTPATIENT)
Dept: PHYSICAL THERAPY | Facility: REHABILITATION | Age: 73
End: 2023-06-28
Payer: COMMERCIAL

## 2023-06-28 DIAGNOSIS — R26.89 BALANCE PROBLEM: ICD-10-CM

## 2023-06-28 DIAGNOSIS — M72.2 PLANTAR FASCIITIS: Primary | ICD-10-CM

## 2023-06-28 DIAGNOSIS — R26.9 GAIT ABNORMALITY: ICD-10-CM

## 2023-06-28 DIAGNOSIS — M79.672 LEFT FOOT PAIN: ICD-10-CM

## 2023-06-28 PROCEDURE — 97110 THERAPEUTIC EXERCISES: CPT | Performed by: PHYSICAL THERAPIST

## 2023-06-28 PROCEDURE — 97112 NEUROMUSCULAR REEDUCATION: CPT | Performed by: PHYSICAL THERAPIST

## 2023-06-28 PROCEDURE — 97140 MANUAL THERAPY 1/> REGIONS: CPT | Performed by: PHYSICAL THERAPIST

## 2023-06-28 NOTE — PROGRESS NOTES
"Daily Note     Today's date: 2023  Patient name: Iva Marie  : 1950  MRN: [id number]  Referring provider: Cassandra Mccrary MD  Dx:   Encounter Diagnosis     ICD-10-CM    1  Plantar fasciitis  M72 2       2  Balance problem  R26 89       3  Gait abnormality  R26 9       4  Left foot pain  M79 672           Start Time: 1045  Stop Time: 1125  Total time in clinic (min): 40 minutes    Subjective: Pt reports no sig change today      Objective: See treatment diary below      Assessment: Pt continues to make slow gains in balance and mobility  Pt will benefit from continued skilled outpatient PT to improve strength, ROM, joint mobility, balance, to address therapy goals, to reduce pain, and improve function  Plan: Continue per plan of care  Progress treatment as tolerated         Precautions: HTN, OP, fall risk, CA hx, hx of L ankle surgery        Manuals    L ankle TC PA and distraction and ST mobs Gr IV Gr IV Gr IV DS Gr IV DS Gr IV DS Gr IV DS Gr IV DS Gr IV  DS Gr IV     RE - DS                                     Neuro Re-Ed             Sand dune mini march 2 min no UE close S 2 min no UE close S 3' no UE close S 2 min no UE close S 3' no UE close S 3' no UE close S 3 min no UE close S 3 min no UE close S  2 min no UE close S   SLS on airex with contralat hip abd,  YMB x20 ea light UE YMB x20 ea light UE YMB x30 ea light UE YMB x30 ea light UE YMB x30 ea light UE YMB x30 ea light UE YMB x30 ea light UE YMB x20 ea light UE  YMB x20 ea light UE   Sand dune step ups 1 min ea LE light UE close S nv 1 min ea LE light UE close S 1 min ea LE light UE close S 1 min ea LE light UE close S 1 min ea LE light UE close S 1 min ea LE light UE close S 1 min ea LE light UE close S  1 min ea LE light UE close S                                          Ther Ex             DF MWM 8\" step PT overpress 2x15, with belt 8\" step PT overpress 2x10, with belt 8\" step PT " "overpress 2x15 with belt 8\" step PT overpress 2x15 with belt 8\" step PT overpress 2x15 with belt 8\" step PT overpress 2x15 with belt 8\" step PT overpress 2x15, 1 round with belt 8\" step PT overpress 2x15, 1 round with belt  8\" step PT overpress 2x15, 1 round with belt   VG for strength and ROM S/l L6 3x10 ea (head elevated with wedge) S/l L7 3x10 ea (head elevated with wedge) S/l L7 3x10 ea (head elevated with wedge) S/l L7 3x10 ea (head elevated with wedge) S/l L7 3x10 ea (head elevated with wedge) S/l L7 3x10 ea (head elevated with wedge) S/l L7 3xF ea (head elevated with wedge) S/l L7 3xF ea (head elevated with wedge)  S/l L6 3xF ea (head elevated with wedge)   Slant  45\"x3 LLE only 45\"x3 LLE only 45\"x4 LLE only 45\"x3 LLE only 45\"x3 LLE only  prostretch 45\"x4 LLE only  prostretch 45\"x3 LLE only, standing calf stretch 45\"x3 LLE only, standing calf stretch  45\"x3 LLE only                                          Ther Activity             Sit to stands  5xSTS x2  TUG x2                        Gait Training                                       Modalities                                            "

## 2023-06-29 ENCOUNTER — CLINICAL SUPPORT (OUTPATIENT)
Dept: FAMILY MEDICINE CLINIC | Facility: CLINIC | Age: 73
End: 2023-06-29
Payer: COMMERCIAL

## 2023-06-29 DIAGNOSIS — M81.0 OSTEOPOROSIS, UNSPECIFIED OSTEOPOROSIS TYPE, UNSPECIFIED PATHOLOGICAL FRACTURE PRESENCE: Primary | ICD-10-CM

## 2023-06-29 PROCEDURE — 96372 THER/PROPH/DIAG INJ SC/IM: CPT | Performed by: FAMILY MEDICINE

## 2023-07-03 ENCOUNTER — OFFICE VISIT (OUTPATIENT)
Dept: PHYSICAL THERAPY | Facility: REHABILITATION | Age: 73
End: 2023-07-03
Payer: COMMERCIAL

## 2023-07-03 DIAGNOSIS — M79.672 LEFT FOOT PAIN: ICD-10-CM

## 2023-07-03 DIAGNOSIS — R26.9 GAIT ABNORMALITY: ICD-10-CM

## 2023-07-03 DIAGNOSIS — M72.2 PLANTAR FASCIITIS: Primary | ICD-10-CM

## 2023-07-03 DIAGNOSIS — R26.89 BALANCE PROBLEM: ICD-10-CM

## 2023-07-03 PROCEDURE — 97110 THERAPEUTIC EXERCISES: CPT | Performed by: PHYSICAL THERAPIST

## 2023-07-03 PROCEDURE — 97112 NEUROMUSCULAR REEDUCATION: CPT | Performed by: PHYSICAL THERAPIST

## 2023-07-03 PROCEDURE — 97140 MANUAL THERAPY 1/> REGIONS: CPT | Performed by: PHYSICAL THERAPIST

## 2023-07-03 NOTE — PROGRESS NOTES
Daily Note     Today's date: 7/3/2023  Patient name: Bk Duron  : 1950  MRN: [id number]  Referring provider: Anabella Reyes MD  Dx:   Encounter Diagnosis     ICD-10-CM    1. Plantar fasciitis  M72.2       2. Balance problem  R26.89       3. Gait abnormality  R26.9       4. Left foot pain  M79.672           Start Time: 1015  Stop Time: 1055  Total time in clinic (min): 40 minutes    Subjective: Pt reports she is wearing smith today and feels these shoes are more supportive and she is able to walk better, but her Hokas are more comfortable and less painful to her plantar faciitis. Objective: See treatment diary below      Assessment: Pt arrives wearing shoes with more lateral support today. She had less difficulty maintaining balance and ambulated with better mechanics. Discussed pt wearing these shoes more often vs her Hokas to assist with lateral ankle support. Pt tends to stand and walk with her foot in inversion, but eversion is limited with a hard end feel, possible due to scar tissue. Pt will benefit from continued skilled outpatient PT to improve strength, ROM, joint mobility, balance, to address therapy goals, to reduce pain, and improve function. Plan: Continue per plan of care. Progress treatment as tolerated.        Precautions: HTN, OP, fall risk, CA hx, hx of L ankle surgery        Manuals 6/5 6/7 6/12 6/14 6/19 6/21 6/26 6/28 7/3    L ankle TC PA and distraction and ST mobs Gr IV Gr IV Gr IV DS Gr IV DS Gr IV DS Gr IV DS Gr IV DS Gr IV DS Gr IV      RE - DS                                     Neuro Re-Ed             Dianne bazzi mini march 2 min no UE close S 2 min no UE close S 3' no UE close S 2 min no UE close S 3' no UE close S 3' no UE close S 3 min no UE close S 3 min no UE close S 3 min no UE close S    SLS on airex with contralat hip abd,  YMB x20 ea light UE YMB x20 ea light UE YMB x30 ea light UE YMB x30 ea light UE YMB x30 ea light UE YMB x30 ea light UE YMB x30 ea light UE YMB x20 ea light UE YMB x30 ea light UE    Sand dune step ups 1 min ea LE light UE close S nv 1 min ea LE light UE close S 1 min ea LE light UE close S 1 min ea LE light UE close S 1 min ea LE light UE close S 1 min ea LE light UE close S 1 min ea LE light UE close S 1 min ea LE light UE close S                                           Ther Ex             DF MWM 8" step PT overpress 2x15, with belt 8" step PT overpress 2x10, with belt 8" step PT overpress 2x15 with belt 8" step PT overpress 2x15 with belt 8" step PT overpress 2x15 with belt 8" step PT overpress 2x15 with belt 8" step PT overpress 2x15, 1 round with belt 8" step PT overpress 2x15, 1 round with belt 8" step PT overpress 2x15, 1 round with belt    VG for strength and ROM S/l L6 3x10 ea (head elevated with wedge) S/l L7 3x10 ea (head elevated with wedge) S/l L7 3x10 ea (head elevated with wedge) S/l L7 3x10 ea (head elevated with wedge) S/l L7 3x10 ea (head elevated with wedge) S/l L7 3x10 ea (head elevated with wedge) S/l L7 3xF ea (head elevated with wedge) S/l L7 3xF ea (head elevated with wedge) S/l L7 3xF ea (head elevated with wedge)    Slant  45"x3 LLE only 45"x3 LLE only 45"x4 LLE only 45"x3 LLE only 45"x3 LLE only  prostretch 45"x4 LLE only  prostretch 45"x3 LLE only, standing calf stretch 45"x3 LLE only, standing calf stretch 45"x3 LLE only, standing calf stretch                                           Ther Activity             Sit to stands  5xSTS x2  TUG x2                        Gait Training                                       Modalities

## 2023-07-05 ENCOUNTER — OFFICE VISIT (OUTPATIENT)
Dept: PHYSICAL THERAPY | Facility: REHABILITATION | Age: 73
End: 2023-07-05
Payer: COMMERCIAL

## 2023-07-05 DIAGNOSIS — M79.672 LEFT FOOT PAIN: ICD-10-CM

## 2023-07-05 DIAGNOSIS — R26.9 GAIT ABNORMALITY: ICD-10-CM

## 2023-07-05 DIAGNOSIS — R26.89 BALANCE PROBLEM: ICD-10-CM

## 2023-07-05 DIAGNOSIS — M72.2 PLANTAR FASCIITIS: Primary | ICD-10-CM

## 2023-07-05 PROCEDURE — 97112 NEUROMUSCULAR REEDUCATION: CPT | Performed by: PHYSICAL THERAPIST

## 2023-07-05 PROCEDURE — 97110 THERAPEUTIC EXERCISES: CPT | Performed by: PHYSICAL THERAPIST

## 2023-07-05 PROCEDURE — 97140 MANUAL THERAPY 1/> REGIONS: CPT | Performed by: PHYSICAL THERAPIST

## 2023-07-05 NOTE — PROGRESS NOTES
Daily Note     Today's date: 2023  Patient name: Andreina Will  : 1950  MRN: [id number]  Referring provider: Jenna Estrada MD  Dx:   Encounter Diagnosis     ICD-10-CM    1. Plantar fasciitis  M72.2       2. Balance problem  R26.89       3. Gait abnormality  R26.9       4. Left foot pain  M79.672           Start Time: 1130  Stop Time: 1212  Total time in clinic (min): 42 minutes    Subjective: Pt reports her anterior ankle was very sore and painful after wearing her [place] Munda last visit so she is alternating between her [place] Munda and her Hokas to give her ankle a break. Objective: See treatment diary below      Assessment: Pt responded well to the exercises. Therapist avoided ST eversion glides due to increased anterior ankle pain. She noted improved pain and mobility in the foot and ankle post manuals. Pt will benefit from continued skilled outpatient PT to improve strength, ROM, joint mobility, balance, to address therapy goals, to reduce pain, and improve function. Plan: Continue per plan of care. Progress treatment as tolerated.        Precautions: HTN, OP, fall risk, CA hx, hx of L ankle surgery        Manuals 6/5 6/7 6/12 6/14 6/19 6/21 6/26 6/28 7/3 7/5   L ankle TC PA and distraction and ST mobs Gr IV Gr IV Gr IV DS Gr IV DS Gr IV DS Gr IV DS Gr IV DS Gr IV DS Gr IV DS Gr IV     RE - DS                                     Neuro Re-Ed             Sand dune mini march 2 min no UE close S 2 min no UE close S 3' no UE close S 2 min no UE close S 3' no UE close S 3' no UE close S 3 min no UE close S 3 min no UE close S 3 min no UE close S 3 min no UE close S   SLS on airex with contralat hip abd,  YMB x20 ea light UE YMB x20 ea light UE YMB x30 ea light UE YMB x30 ea light UE YMB x30 ea light UE YMB x30 ea light UE YMB x30 ea light UE YMB x20 ea light UE YMB x30 ea light UE YMB x30 ea light UE   Sand dune step ups 1 min ea LE light UE close S nv 1 min ea LE light UE close S 1 min ea LE light UE close S 1 min ea LE light UE close S 1 min ea LE light UE close S 1 min ea LE light UE close S 1 min ea LE light UE close S 1 min ea LE light UE close S 1 min ea LE light UE close S                                          Ther Ex             DF MWM 8" step PT overpress 2x15, with belt 8" step PT overpress 2x10, with belt 8" step PT overpress 2x15 with belt 8" step PT overpress 2x15 with belt 8" step PT overpress 2x15 with belt 8" step PT overpress 2x15 with belt 8" step PT overpress 2x15, 1 round with belt 8" step PT overpress 2x15, 1 round with belt 8" step PT overpress 2x15, 1 round with belt 8" step PT overpress 2x15, 1 round with belt   VG for strength and ROM S/l L6 3x10 ea (head elevated with wedge) S/l L7 3x10 ea (head elevated with wedge) S/l L7 3x10 ea (head elevated with wedge) S/l L7 3x10 ea (head elevated with wedge) S/l L7 3x10 ea (head elevated with wedge) S/l L7 3x10 ea (head elevated with wedge) S/l L7 3xF ea (head elevated with wedge) S/l L7 3xF ea (head elevated with wedge) S/l L7 3xF ea (head elevated with wedge) S/l L7 3xF ea (head elevated with wedge)   Slant  45"x3 LLE only 45"x3 LLE only 45"x4 LLE only 45"x3 LLE only 45"x3 LLE only  prostretch 45"x4 LLE only  prostretch 45"x3 LLE only, standing calf stretch 45"x3 LLE only, standing calf stretch 45"x3 LLE only, standing calf stretch 45"x3 LLE only, standing calf stretch                                          Ther Activity             Sit to stands  5xSTS x2  TUG x2                        Gait Training                                       Modalities

## 2023-07-12 ENCOUNTER — OFFICE VISIT (OUTPATIENT)
Dept: PHYSICAL THERAPY | Facility: REHABILITATION | Age: 73
End: 2023-07-12
Payer: COMMERCIAL

## 2023-07-12 ENCOUNTER — HOSPITAL ENCOUNTER (OUTPATIENT)
Dept: RADIOLOGY | Age: 73
Discharge: HOME | End: 2023-07-12
Payer: COMMERCIAL

## 2023-07-12 DIAGNOSIS — M72.2 PLANTAR FASCIITIS: Primary | ICD-10-CM

## 2023-07-12 DIAGNOSIS — R26.9 GAIT ABNORMALITY: ICD-10-CM

## 2023-07-12 DIAGNOSIS — Z12.31 ENCOUNTER FOR SCREENING MAMMOGRAM FOR MALIGNANT NEOPLASM OF BREAST: ICD-10-CM

## 2023-07-12 DIAGNOSIS — R26.89 BALANCE PROBLEM: ICD-10-CM

## 2023-07-12 DIAGNOSIS — M79.672 LEFT FOOT PAIN: ICD-10-CM

## 2023-07-12 DIAGNOSIS — M85.80 OSTEOPENIA, UNSPECIFIED LOCATION: ICD-10-CM

## 2023-07-12 PROCEDURE — 77063 BREAST TOMOSYNTHESIS BI: CPT

## 2023-07-12 PROCEDURE — 77067 SCR MAMMO BI INCL CAD: CPT

## 2023-07-12 PROCEDURE — 97140 MANUAL THERAPY 1/> REGIONS: CPT | Performed by: PHYSICAL THERAPIST

## 2023-07-12 PROCEDURE — 97110 THERAPEUTIC EXERCISES: CPT | Performed by: PHYSICAL THERAPIST

## 2023-07-12 PROCEDURE — 97112 NEUROMUSCULAR REEDUCATION: CPT | Performed by: PHYSICAL THERAPIST

## 2023-07-12 NOTE — PROGRESS NOTES
Daily Note     Today's date: 2023  Patient name: Bozena Cooney  : 1950  MRN: [id number]  Referring provider: En Cedeño MD  Dx:   Encounter Diagnosis     ICD-10-CM    1. Plantar fasciitis  M72.2       2. Balance problem  R26.89       3. Gait abnormality  R26.9       4. Left foot pain  M79.672           Start Time: 1445  Stop Time: 1532  Total time in clinic (min): 47 minutes    Subjective: Pt reports she feels like she is having a good day today. Objective: See treatment diary below      Assessment: Pt is performing SLS on airex with hip abd with more ease/less difficulty; will progress next visit. She is still very challenged by sand [employer] and step ups. Pt will benefit from continued skilled outpatient PT to improve strength, ROM, joint mobility, balance, to address therapy goals, to reduce pain, and improve function. Plan: Continue per plan of care. Progress treatment as tolerated.        Precautions: HTN, OP, fall risk, CA hx, hx of L ankle surgery        Manuals 7/12   6/14 6/19 6/21 6/26 6/28 7/3 7/5   L ankle TC PA and distraction and ST mobs DS Gr IV   DS Gr IV DS Gr IV DS Gr IV DS Gr IV DS Gr IV DS Gr IV DS Gr IV                                          Neuro Re-Ed             Sand dune mini march 3 min no UE close S   2 min no UE close S 3' no UE close S 3' no UE close S 3 min no UE close S 3 min no UE close S 3 min no UE close S 3 min no UE close S   SLS on airex with contralat hip abd,  YMB x30 ea light UE   YMB x30 ea light UE YMB x30 ea light UE YMB x30 ea light UE YMB x30 ea light UE YMB x20 ea light UE YMB x30 ea light UE YMB x30 ea light UE   Sand dune step ups 1 min ea LE light UE close S   1 min ea LE light UE close S 1 min ea LE light UE close S 1 min ea LE light UE close S 1 min ea LE light UE close S 1 min ea LE light UE close S 1 min ea LE light UE close S 1 min ea LE light UE close S                                          Ther Ex             DF MWM 8" step PT overpress 2x15, with belt   8" step PT overpress 2x15 with belt 8" step PT overpress 2x15 with belt 8" step PT overpress 2x15 with belt 8" step PT overpress 2x15, 1 round with belt 8" step PT overpress 2x15, 1 round with belt 8" step PT overpress 2x15, 1 round with belt 8" step PT overpress 2x15, 1 round with belt   VG for strength and ROM S/l L7 3x10 ea (head elevated with wedge)   S/l L7 3x10 ea (head elevated with wedge) S/l L7 3x10 ea (head elevated with wedge) S/l L7 3x10 ea (head elevated with wedge) S/l L7 3xF ea (head elevated with wedge) S/l L7 3xF ea (head elevated with wedge) S/l L7 3xF ea (head elevated with wedge) S/l L7 3xF ea (head elevated with wedge)   Slant  45"x3 LLE only, standing calf stretch   45"x3 LLE only 45"x3 LLE only  prostretch 45"x4 LLE only  prostretch 45"x3 LLE only, standing calf stretch 45"x3 LLE only, standing calf stretch 45"x3 LLE only, standing calf stretch 45"x3 LLE only, standing calf stretch                                          Ther Activity             Sit to stands                          Gait Training                                       Modalities

## 2023-07-13 ENCOUNTER — OFFICE VISIT (OUTPATIENT)
Dept: PHYSICAL THERAPY | Facility: REHABILITATION | Age: 73
End: 2023-07-13
Payer: COMMERCIAL

## 2023-07-13 DIAGNOSIS — R26.89 BALANCE PROBLEM: ICD-10-CM

## 2023-07-13 DIAGNOSIS — R26.9 GAIT ABNORMALITY: ICD-10-CM

## 2023-07-13 DIAGNOSIS — M79.672 LEFT FOOT PAIN: ICD-10-CM

## 2023-07-13 DIAGNOSIS — M72.2 PLANTAR FASCIITIS: Primary | ICD-10-CM

## 2023-07-13 PROCEDURE — 97110 THERAPEUTIC EXERCISES: CPT | Performed by: PHYSICAL THERAPIST

## 2023-07-13 PROCEDURE — 97140 MANUAL THERAPY 1/> REGIONS: CPT | Performed by: PHYSICAL THERAPIST

## 2023-07-13 PROCEDURE — 97112 NEUROMUSCULAR REEDUCATION: CPT | Performed by: PHYSICAL THERAPIST

## 2023-07-13 NOTE — PROGRESS NOTES
Daily Note     Today's date: 2023  Patient name: Chase Gamble  : 1950  MRN: [id number]  Referring provider: Harjit Hernandez MD  Dx:   Encounter Diagnosis     ICD-10-CM    1. Plantar fasciitis  M72.2       2. Balance problem  R26.89       3. Gait abnormality  R26.9       4. Left foot pain  M79.672           Start Time: 1147  Stop Time: 1226  Total time in clinic (min): 39 minutes    Subjective: Pt reports she is stiff and sore today. States her medial and lateral ankle are sore, unsure why. Objective: See treatment diary below      Assessment: Pt responded to tx with an improvement in stiffness and pain. She ambulated with improved gait post tx. Pt will benefit from continued skilled outpatient PT to improve strength, ROM, joint mobility, balance, to address therapy goals, to reduce pain, and improve function. Plan: Continue per plan of care. Progress treatment as tolerated.        Precautions: HTN, OP, fall risk, CA hx, hx of L ankle surgery        Manuals 7/12 7/13  6/14 6/19 6/21 6/26 6/28 7/3 7/5   L ankle TC PA and distraction and ST mobs DS Gr IV DS Gr IV  DS Gr IV DS Gr IV DS Gr IV DS Gr IV DS Gr IV DS Gr IV DS Gr IV                                          Neuro Re-Ed             Sand dune mini march 3 min no UE close S 3 min no UE close S  2 min no UE close S 3' no UE close S 3' no UE close S 3 min no UE close S 3 min no UE close S 3 min no UE close S 3 min no UE close S   SLS on airex with contralat hip abd,  YMB x30 ea light UE YMB x30 ea light UE  YMB x30 ea light UE YMB x30 ea light UE YMB x30 ea light UE YMB x30 ea light UE YMB x20 ea light UE YMB x30 ea light UE YMB x30 ea light UE   Sand dune step ups 1 min ea LE light UE close S 1 min ea LE light UE close S  1 min ea LE light UE close S 1 min ea LE light UE close S 1 min ea LE light UE close S 1 min ea LE light UE close S 1 min ea LE light UE close S 1 min ea LE light UE close S 1 min ea LE light UE close S Ther Ex             DF MWM 8" step PT overpress 2x15, with belt 8" step PT overpress 2x15, with belt  8" step PT overpress 2x15 with belt 8" step PT overpress 2x15 with belt 8" step PT overpress 2x15 with belt 8" step PT overpress 2x15, 1 round with belt 8" step PT overpress 2x15, 1 round with belt 8" step PT overpress 2x15, 1 round with belt 8" step PT overpress 2x15, 1 round with belt   VG for strength and ROM S/l L7 3x10 ea (head elevated with wedge) S/l L7 3x10 ea (head elevated with wedge)  S/l L7 3x10 ea (head elevated with wedge) S/l L7 3x10 ea (head elevated with wedge) S/l L7 3x10 ea (head elevated with wedge) S/l L7 3xF ea (head elevated with wedge) S/l L7 3xF ea (head elevated with wedge) S/l L7 3xF ea (head elevated with wedge) S/l L7 3xF ea (head elevated with wedge)   Slant  45"x3 LLE only, standing calf stretch 45"x3 LLE only, standing calf stretch  45"x3 LLE only 45"x3 LLE only  prostretch 45"x4 LLE only  prostretch 45"x3 LLE only, standing calf stretch 45"x3 LLE only, standing calf stretch 45"x3 LLE only, standing calf stretch 45"x3 LLE only, standing calf stretch                                          Ther Activity             Sit to stands                          Gait Training                                       Modalities

## 2023-07-14 ENCOUNTER — TELEPHONE (OUTPATIENT)
Dept: FAMILY MEDICINE CLINIC | Facility: CLINIC | Age: 73
End: 2023-07-14

## 2023-07-14 NOTE — TELEPHONE ENCOUNTER
----- Message from Harjit Hernandez MD sent at 5/32/0194  4:38 PM EDT -----  Recent mammogram was stable for this year

## 2023-07-18 ENCOUNTER — OFFICE VISIT (OUTPATIENT)
Dept: PHYSICAL THERAPY | Facility: REHABILITATION | Age: 73
End: 2023-07-18
Payer: COMMERCIAL

## 2023-07-18 DIAGNOSIS — M79.672 LEFT FOOT PAIN: ICD-10-CM

## 2023-07-18 DIAGNOSIS — R26.89 BALANCE PROBLEM: ICD-10-CM

## 2023-07-18 DIAGNOSIS — R26.9 GAIT ABNORMALITY: ICD-10-CM

## 2023-07-18 DIAGNOSIS — M72.2 PLANTAR FASCIITIS: Primary | ICD-10-CM

## 2023-07-18 PROCEDURE — 97110 THERAPEUTIC EXERCISES: CPT | Performed by: PHYSICAL THERAPIST

## 2023-07-18 PROCEDURE — 97140 MANUAL THERAPY 1/> REGIONS: CPT | Performed by: PHYSICAL THERAPIST

## 2023-07-18 NOTE — PROGRESS NOTES
Daily Note     Today's date: 2023  Patient name: Amor Reilly  : 1950  MRN: [id number]  Referring provider: Ferdinand Byrne MD  Dx:   Encounter Diagnosis     ICD-10-CM    1. Plantar fasciitis  M72.2       2. Balance problem  R26.89       3. Gait abnormality  R26.9       4. Left foot pain  M79.672           Start Time: 1015  Stop Time: 1055  Total time in clinic (min): 40 minutes    Subjective: Pt reports she is having a harder time walking today due to her pain in the plantar fascia and ankle. States she is using her walker due to feeling more unsteady. Objective: See treatment diary below      Assessment: Pt presents ambulating with RW today rather than her SPC. Modified tx and added laser tx to address increase in plantar fascia pain. She reported some improvement in pain following manuals. She was unable to perform hip abd on airex with the RLE due to PF pain and sand dune exercises were held. Pt will benefit from continued skilled outpatient PT to improve strength, ROM, joint mobility, balance, to address therapy goals, to reduce pain, and improve function. Plan: Continue per plan of care. Progress treatment as tolerated.        Precautions: HTN, OP, fall risk, CA hx, hx of L ankle surgery        Manuals 7/12 7/13 7/18    6/26 6/28 7/3 7   L ankle TC PA and distraction and ST mobs DS Gr IV DS Gr IV DS Gr IV    DS Gr IV DS Gr IV DS Gr IV DS Gr IV   L plantar fascia laser   pwr 18 4'          STM PF   DS                       Neuro Re-Ed             [employer] mini march 3 min no UE close S 3 min no UE close S hold    3 min no UE close S 3 min no UE close S 3 min no UE close S 3 min no UE close S   SLS on airex with contralat hip abd,  YMB x30 ea light UE YMB x30 ea light UE YMB x30 LLE only  light UE    YMB x30 ea light UE YMB x20 ea light UE YMB x30 ea light UE YMB x30 ea light UE   Sand dune step ups 1 min ea LE light UE close S 1 min ea LE light UE close S hold    1 min ea LE light UE close S 1 min ea LE light UE close S 1 min ea LE light UE close S 1 min ea LE light UE close S                                          Ther Ex             DF MWM 8" step PT overpress 2x15, with belt 8" step PT overpress 2x15, with belt 8" step PT overpress 2x15, with belt    8" step PT overpress 2x15, 1 round with belt 8" step PT overpress 2x15, 1 round with belt 8" step PT overpress 2x15, 1 round with belt 8" step PT overpress 2x15, 1 round with belt   VG for strength and ROM S/l L7 3x10 ea (head elevated with wedge) S/l L7 3x10 ea (head elevated with wedge) S/l L7 3x10 ea     S/l L7 3xF ea (head elevated with wedge) S/l L7 3xF ea (head elevated with wedge) S/l L7 3xF ea (head elevated with wedge) S/l L7 3xF ea (head elevated with wedge)   Slant  45"x3 LLE only, standing calf stretch 45"x3 LLE only, standing calf stretch 45"x4 LLE only, standing calf stretch    45"x3 LLE only, standing calf stretch 45"x3 LLE only, standing calf stretch 45"x3 LLE only, standing calf stretch 45"x3 LLE only, standing calf stretch                                          Ther Activity             Sit to stands                          Gait Training                                       Modalities

## 2023-07-19 DIAGNOSIS — M54.50 ACUTE MIDLINE LOW BACK PAIN WITHOUT SCIATICA: ICD-10-CM

## 2023-07-19 RX ORDER — METHOCARBAMOL 500 MG/1
500 TABLET, FILM COATED ORAL 3 TIMES DAILY
Qty: 30 TABLET | Refills: 3 | Status: SHIPPED | OUTPATIENT
Start: 2023-07-19

## 2023-07-20 ENCOUNTER — OFFICE VISIT (OUTPATIENT)
Dept: ENDOCRINOLOGY | Facility: CLINIC | Age: 73
End: 2023-07-20
Payer: COMMERCIAL

## 2023-07-20 ENCOUNTER — APPOINTMENT (OUTPATIENT)
Dept: PHYSICAL THERAPY | Facility: REHABILITATION | Age: 73
End: 2023-07-20
Payer: COMMERCIAL

## 2023-07-20 ENCOUNTER — HOSPITAL ENCOUNTER (OUTPATIENT)
Dept: RADIOLOGY | Age: 73
Discharge: HOME | End: 2023-07-20
Payer: COMMERCIAL

## 2023-07-20 VITALS
HEIGHT: 64 IN | SYSTOLIC BLOOD PRESSURE: 158 MMHG | HEART RATE: 65 BPM | WEIGHT: 247 LBS | BODY MASS INDEX: 42.17 KG/M2 | DIASTOLIC BLOOD PRESSURE: 80 MMHG

## 2023-07-20 DIAGNOSIS — Z12.11 ENCOUNTER FOR SCREENING COLONOSCOPY: Primary | ICD-10-CM

## 2023-07-20 DIAGNOSIS — I10 PRIMARY HYPERTENSION: ICD-10-CM

## 2023-07-20 DIAGNOSIS — E78.5 HYPERLIPIDEMIA, UNSPECIFIED HYPERLIPIDEMIA TYPE: ICD-10-CM

## 2023-07-20 DIAGNOSIS — R73.03 PREDIABETES: ICD-10-CM

## 2023-07-20 DIAGNOSIS — E03.9 HYPOTHYROIDISM, UNSPECIFIED TYPE: ICD-10-CM

## 2023-07-20 PROCEDURE — 77080 DXA BONE DENSITY AXIAL: CPT

## 2023-07-20 PROCEDURE — 99214 OFFICE O/P EST MOD 30 MIN: CPT

## 2023-07-20 NOTE — ASSESSMENT & PLAN NOTE
The patient has had more than 2 fasting BG on lab work above 99 and below 126 which would meet criteria for pre-diabetes. She has a family history of T2DM and is concerned about developing this. She is requesting to have a hgbA1C drawn today which I have ordered. We discussed main stay of treatment for pre-diabetes would be lifestyle modifications including diet and exercise which is limited for her. We also discussed she may start metformin. She would like to work on lifestyle modifications for now and re-examine in 6 months. I discussed with her she may also follow up with her PCP about this.

## 2023-07-20 NOTE — PROGRESS NOTES
Established Patient Progress Note    CC: Follow up for hypothyroidism and pre-diabetes    Impression & Plan:    Problem List Items Addressed This Visit        Endocrine    Hypothyroidism     03/2023- TSH normal. Continue current dose of levothyroxine. Repeat TSH in 6 months from prior lab work, ~ September 2023. Patient may also follow up with PCP which we discussed today. Relevant Orders    TSH, 3rd generation Lab Collect       Cardiovascular and Mediastinum    Hypertension     Systolic BP is elevated in office. Discussed with patient goal BP < 130/80. She should continue to monitor this at home. If remains elevated, she should notify her PCP. Other    Hyperlipidemia     Controlled. continue with current regimen. Prediabetes     The patient has had more than 2 fasting BG on lab work above 99 and below 126 which would meet criteria for pre-diabetes. She has a family history of T2DM and is concerned about developing this. She is requesting to have a hgbA1C drawn today which I have ordered. We discussed main stay of treatment for pre-diabetes would be lifestyle modifications including diet and exercise which is limited for her. We also discussed she may start metformin. She would like to work on lifestyle modifications for now and re-examine in 6 months. I discussed with her she may also follow up with her PCP about this. Relevant Orders    HEMOGLOBIN A1C W/ EAG ESTIMATION Lab Collect   Other Visit Diagnoses     Encounter for screening colonoscopy    -  Primary    Relevant Orders    Ambulatory referral for colon cancer education          Orders Placed This Encounter   Procedures   • TSH, 3rd generation Lab Collect     This is a patient instruction: This test is non-fasting. Please drink two glasses of water morning of bloodwork.         Standing Status:   Future     Standing Expiration Date:   7/20/2024   • HEMOGLOBIN A1C W/ EAG ESTIMATION Lab Collect     Standing Status:   Future Standing Expiration Date:   7/20/2024   • Ambulatory referral for colon cancer education     Standing Status:   Future     Standing Expiration Date:   7/20/2024     Referral Priority:   Routine     Referral Type:   Consult - AMB     Referral Reason:   Specialty Services Required     Requested Specialty:   Gastroenterology     Number of Visits Requested:   1     Expiration Date:   7/20/2024       History of Present Illness:   Shavonne Zapata is a 68 y.o. female with a history of hypothyroidism and pre-diabetes. She is concerned about developing diabetes. She has a family history of type 2 diabetes mellitus. Hypothyroidism: She has a history of total thyroidectomy for multiple thyroid nodules which were benign in 2012. TSH from March is normal. She is maintained on 175 mcg levothyroxine daily. She denies any symptoms of hypothyroidism.      Patient Active Problem List   Diagnosis   • Pain, joint, ankle and foot, left   • DJD (degenerative joint disease), ankle and foot, left   • Allergic rhinitis   • Esophageal reflux   • Hyperlipidemia   • Hypertension   • Hypothyroidism   • Rosacea   • Pain of right lower extremity   • Female genital prolapse   • Fracture of ankle   • Osteopenia   • Varicose veins of lower extremity   • Pain of left heel   • Body mass index (BMI) 40.0-44.9, adult   • Acute midline low back pain without sciatica   • Neuralgia   • Otitis externa of left ear   • Stage 3a chronic kidney disease (HCC)   • Peripheral vascular disease (HCC)   • Plantar fasciitis   • Prediabetes      Past Medical History:   Diagnosis Date   • Allergic rhinitis    • Arthritis    • Cataract     brock   • Disease of thyroid gland     had total thyroidectomy   • Diverticulosis    • Hyperlipidemia    • Hypertension    • Mild acid reflux    • Osteoporosis    • Plantar fasciitis     b/l  l worse uses orthotics in shoes   • Rosacea    • Squamous cell skin cancer    • Use of cane as ambulatory aid    • Uterovaginal prolapse Past Surgical History:   Procedure Laterality Date   • ANKLE SURGERY Left 2011    Fracture / hardware removed   • COLONOSCOPY      fiberoptic   • COLPORRHAPHY N/A 06/17/2019    Procedure: POSTERIOR COLPORRHAPHY;  Surgeon: Davdi Poole MD;  Location: [facility];  Service: UroGynecology          • OTHER SURGICAL HISTORY      thyroid biopsy core needle    • NV COLPOPEXY VAGINAL EXTRAPERITONEAL APPROACH N/A 06/17/2019    Procedure: VE COLPOSUSPENSION (neugide);   Surgeon: David Poole MD;  Location: [facility];  Service: UroGynecology          • NV PERINEOPLASTY RPR PERINEUM NONOBSTETRICAL [facility] N/A 06/17/2019    Procedure: Birgit Malave;  Surgeon: David Poole MD;  Location: [facility];  Service: UroGynecology          • NV REMOVAL IMPLANT DEEP Left 09/18/2017    Procedure: REMOVAL HARDWARE ANKLE (1 plate, 8 screws.);  Surgeon: Juan M Ward MD;  Location:  MAIN OR;  Service: Orthopedics   • SKIN BIOPSY  2021   • SKIN CANCER EXCISION Left    • THYROIDECTOMY  2012    Total    • TUBAL LIGATION        Family History   Problem Relation Age of Onset   • COPD Mother    • Diabetes Mother    • Heart disease Mother    • Hypertension Mother    • Diabetes type II Mother    • Thyroid disease unspecified Mother         Goiter removed by radioactive tx   • Bone cancer Father    • No Known Problems Daughter    • Diabetes type II Maternal Grandmother    • No Known Problems Maternal Grandfather    • No Known Problems Paternal Grandmother    • Diabetes type II Paternal Grandfather    • No Known Problems Maternal Aunt    • No Known Problems Maternal Aunt    • No Known Problems Paternal Aunt    • Diabetes type II Brother      Social History     Tobacco Use   • Smoking status: Never   • Smokeless tobacco: Never   Substance Use Topics   • Alcohol use: No     Comment: Never drank alcohol denied     No Known Allergies      Current Outpatient Medications:   •  atorvastatin (LIPITOR) 10 mg tablet, TAKE ONE TABLET BY MOUTH EVERY DAY, Disp: 90 tablet, Rfl: 1  •  Azelaic Acid 15 % cream, Apply topically daily at bedtime  , Disp: , Rfl:   •  Calcium Carb-Cholecalciferol 600-800 MG-UNIT TABS, Take 2 tablets by mouth daily, Disp: , Rfl:   •  Denosumab (PROLIA SC), Inject under the skin 2x year , Disp: , Rfl:   •  docusate sodium (COLACE) 100 mg capsule, , Disp: , Rfl:   •  doxycycline (PERIOSTAT) 20 MG tablet, Take 20 mg by mouth 2 (two) times a day, Disp: , Rfl:   •  gabapentin (NEURONTIN) 300 mg capsule, TAKE ONE CAPSULE IN AM, ONE CAPSULE IN AFT. , 2 CAPSULES AT BEDTIME (Patient taking differently: TAKE 2 CAPSULE IN AM, 1 CAPSULES AT BEDTIME), Disp: 360 capsule, Rfl: 1  •  Glucosamine-Chondroitin (COSAMIN DS PO), Take by mouth, Disp: , Rfl:   •  levothyroxine 175 mcg tablet, TAKE ONE TABLET BY MOUTH EVERY DAY, Disp: 90 tablet, Rfl: 1  •  loratadine (CLARITIN) 10 mg tablet, Take 10 mg by mouth daily as needed , Disp: , Rfl:   •  methocarbamol (ROBAXIN) 500 mg tablet, TAKE 1 TABLET (500 MG TOTAL) BY MOUTH IN THE MORNING AND 1 TABLET (500 MG TOTAL) IN THE EVENING AND 1 TABLET (500 MG TOTAL) BEFORE BEDTIME., Disp: 30 tablet, Rfl: 3  •  metoprolol tartrate (LOPRESSOR) 50 mg tablet, TAKE ONE TABLET BY MOUTH EVERY DAY, Disp: 90 tablet, Rfl: 3  •  metroNIDAZOLE (METROGEL) 1 % gel, Apply 1 application topically daily , Disp: , Rfl:   •  Multiple Vitamins-Minerals (Centrum Silver 50+Women) TABS, , Disp: , Rfl:   •  naproxen (NAPROSYN) 500 mg tablet, TAKE ONE TABLET BY MOUTH TWICE A DAY WITH FOOD (Patient taking differently: Take 500 mg by mouth 2 (two) times a day), Disp: 180 tablet, Rfl: 3  •  Probiotic Product (PROBIOTIC-10 PO), , Disp: , Rfl:   •  psyllium (METAMUCIL) 0.52 g capsule, , Disp: , Rfl:   •  Triamcinolone Acetonide (NASACORT ALLERGY 24HR NA), 2 sprays into each nostril as needed  , Disp: , Rfl:     Review of Systems   Constitutional: Negative for chills and fever. HENT: Negative for ear pain and sore throat.     Eyes: Negative for pain and visual disturbance. Respiratory: Negative for cough and shortness of breath. Cardiovascular: Negative for chest pain and palpitations. Gastrointestinal: Negative for abdominal pain and vomiting. Genitourinary: Negative for dysuria and hematuria. Musculoskeletal: Negative for arthralgias and back pain. Skin: Negative for color change and rash. Neurological: Negative for seizures and syncope. All other systems reviewed and are negative. Physical Exam:  Body mass index is 42.4 kg/m². /80   Pulse 65   Ht 5' 4" (1.626 m)   Wt 112 kg (247 lb)   LMP  (LMP Unknown)   BMI 42.40 kg/m²    Wt Readings from Last 3 Encounters:   07/20/23 112 kg (247 lb)   05/03/23 111 kg (245 lb 6 oz)   02/15/23 112 kg (246 lb 2 oz)       Physical Exam  Vitals reviewed. Constitutional:       Appearance: Normal appearance. HENT:      Head: Normocephalic and atraumatic. Cardiovascular:      Rate and Rhythm: Normal rate and regular rhythm. Heart sounds: Normal heart sounds. Pulmonary:      Effort: Pulmonary effort is normal.      Breath sounds: Normal breath sounds. Musculoskeletal:      Cervical back: Neck supple. No tenderness. Lymphadenopathy:      Cervical: No cervical adenopathy. Neurological:      Mental Status: She is alert and oriented to person, place, and time.    Psychiatric:         Mood and Affect: Mood normal.         Behavior: Behavior normal.         Labs:   No results found for: "HGBA1C"  Lab Results   Component Value Date    CREATININE 1.06 05/22/2023    CREATININE 0.98 01/16/2023    CREATININE 0.98 04/22/2022    BUN 23 05/22/2023     12/20/2017    K 4.4 05/22/2023     05/22/2023    CO2 31 05/22/2023     eGFR   Date Value Ref Range Status   05/22/2023 52 ml/min/1.73sq m Final     Lab Results   Component Value Date    CHOL 229 (H) 12/20/2017    HDL 86 01/16/2023    TRIG 72 01/16/2023     Lab Results   Component Value Date    ALT 22 05/22/2023    AST 16 05/22/2023    ALKPHOS 66 05/22/2023    BILITOT 0.6 12/20/2017     Lab Results   Component Value Date    QRF8PVHWEZDG 0.908 03/01/2023    HHV1PJMJUGTV 14.700 (H) 01/16/2023    HGJ9SNYWASIA 3.490 04/22/2022     No results found for: "Tammy Officer", "TSI"      There are no Patient Instructions on file for this visit. Discussed with the patient and all questioned fully answered. She will call me if any problems arise.

## 2023-07-20 NOTE — ASSESSMENT & PLAN NOTE
Systolic BP is elevated in office. Discussed with patient goal BP < 130/80. She should continue to monitor this at home. If remains elevated, she should notify her PCP.

## 2023-07-20 NOTE — PROGRESS NOTES
Daily Note /re-eval    Today's date: 2023  Patient name: Kenia Rodriguez  : 1950  MRN: [id number]  Referring provider: Jeffry Millan MD  Dx:   Encounter Diagnosis     ICD-10-CM    1. Plantar fasciitis  M72.2       2. Balance problem  R26.89       3. Gait abnormality  R26.9       4. Left foot pain  M79.672           Start Time: 1145  Stop Time: 1233  Total time in clinic (min): 48 minutes    Subjective: Pt reports 40% improvement overall. Pt reports that ice has offered some pain relief. Pain  Current pain ratin  At best pain ratin  At worst pain ratin in the past week, 8 at eval, 5 last RE  Location: arch of foot  Quality: sharp     Objective: See treatment diary below    Palpation:  TPP: Proximal Longitudinal arch (L)    Joint Assessment:  Hypomobile throughout subtalar and talocrural      Lower Extremity Strength    MMT   AROM   PROM     Knee Left Right Left Right Left Right   Flex 5 5           Ext 4+ 4+                           Hip               flex 4 4           ext               abd                               Ankle               DF 5 5  lacking 20(prev 18) lacking 12       PF               inversion 5(prev 4+) 5           Eversion  5 (prev 4+) 5                 Dynamic Balance Tests  Gait speed (m/s)  1.2 m/s required to cross the street safely     5x sit to stand (sec)  >14 sec indicates high risk for falls IE = 14.22 with BUE, unable without UE   = 12.12, unable without UE   = 13.06,  unable without UE   TUG (sec)  >14 sec indicates high risk for falls IE = 19 sec with [facility]   = 16.95 with [facility]   = 17.48 with [facility]      Gait:  ambulates with S[facility], L foot turned out, wide KEILY, L foot plantar flexed and heel inverted, unsteady        Access Code: [email or web address]  URL: [email or web address]  Date: 2023  Prepared by: Alexandre Velez     Exercises  - Long Sitting Calf Stretch with Strap  - 2-3 x daily - 7 x weekly - 1 sets - 4 reps - 30-45 hold  - Seated Plantar Fascia Mobilization with Small Ball  - 2-3 x daily - 7 x weekly - 1 sets - 3-5 reps - 60 hold        Assessment:  Pt presents for her 24th visit and reassessment. Since last RE, pt has made further progress in ankle strength. Her DF AROM has remained about the same as well as her TUG test and 5 times sit to stand scores, but are still improved compared to eval. Recommend continued skilled outpatient PT to improve strength and balance, to address therapy goals, to reduce pain, and improve function.           Goals  Short term goals: to be met in 4-5 weeks  Pt independent with initial HEP, rationale, technique and frequency, for ROM and pain control. MET  Achieve 5 times sit to stand test score to <12 sec with BUE indicating improved fucntional LE strength and reduced fall risk. ALMOST MET  Pt able to perform 1 sit to stand without UE indicating and improvement in functional LE strength. UNMET  Achieve an improvement in L ankle DF AROM for improved ease of standing and walking and to reduce strain on the longitudinal arch. MET  Pt will achieve an improvement in FOTO score indicating an improvement in pain and function. MET        Long term goals: to be met in 8-10 weeks   Pt will report a 75% or > reduction in subjective pain complaints/symptoms to better manage ADLs and functional mobility. IN PROGRESS  Achieve a further improvement in L ankle DF AROM for improved ease of standing and walking and to reduce strain on the longitudinal arch. UNMET  Pt able to perform 3 sit to stands without UE indicating and improvement in functional LE strength. UNMET  Pt will perform the TUG test in less than <14 seconds indicating decreased risk for falls. UNMET  Pt will improve FOTO score to = or better then expected outcome indicating an overall improvement in pain and function    Pt independent with rationale, technique and plan for performance of advanced HEP to maintain gains made in therapy.   IN PROGRESS  Achieve pts goal: strengthen legs, be able to manuever better, less discomfort  IN PROGRESS       Plan: Continue per plan of care. Progress treatment as tolerated. 2x per week for 6 weeks.      Precautions: HTN, OP, fall risk, CA hx, hx of L ankle surgery        Manuals 7/12 7/13 7/18 7/21   6/26 6/28 7/3 7/5   L ankle TC PA and distraction and ST mobs DS Gr IV DS Gr IV DS Gr IV DS Gr IV   DS Gr IV DS Gr IV DS Gr IV DS Gr IV   L plantar fascia laser   pwr 18 4' np         STM PF   DS DS         Heel lift admin    bilat             RE - DS         Neuro Re-Ed             [employer] mini march 3 min no UE close S 3 min no UE close S hold 3 min no UE close S   3 min no UE close S 3 min no UE close S 3 min no UE close S 3 min no UE close S   SLS on airex with contralat hip abd,  YMB x30 ea light UE YMB x30 ea light UE YMB x30 LLE only  light UE YMB x30 LLE only  light UE   YMB x30 ea light UE YMB x20 ea light UE YMB x30 ea light UE YMB x30 ea light UE   Sand dune step ups 1 min ea LE light UE close S 1 min ea LE light UE close S hold 1 min ea LE light UE close S   1 min ea LE light UE close S 1 min ea LE light UE close S 1 min ea LE light UE close S 1 min ea LE light UE close S                                          Ther Ex             DF MWM 8" step PT overpress 2x15, with belt 8" step PT overpress 2x15, with belt 8" step PT overpress 2x15, with belt 8" step PT overpress 2x15, with belt   8" step PT overpress 2x15, 1 round with belt 8" step PT overpress 2x15, 1 round with belt 8" step PT overpress 2x15, 1 round with belt 8" step PT overpress 2x15, 1 round with belt   VG for strength and ROM S/l L7 3x10 ea (head elevated with wedge) S/l L7 3x10 ea (head elevated with wedge) S/l L7 3x10 ea  nv   S/l L7 3xF ea (head elevated with wedge) S/l L7 3xF ea (head elevated with wedge) S/l L7 3xF ea (head elevated with wedge) S/l L7 3xF ea (head elevated with wedge)   Slant  45"x3 LLE only, standing calf stretch 45"x3 LLE only, standing calf stretch 45"x4 LLE only, standing calf stretch 45"x3 LLE only, standing calf stretch   45"x3 LLE only, standing calf stretch 45"x3 LLE only, standing calf stretch 45"x3 LLE only, standing calf stretch 45"x3 LLE only, standing calf stretch                                          Ther Activity             Sit to stands    5xSTS   TUGx2                      Gait Training                                       Modalities

## 2023-07-20 NOTE — ASSESSMENT & PLAN NOTE
03/2023- TSH normal. Continue current dose of levothyroxine. Repeat TSH in 6 months from prior lab work, ~ September 2023. Patient may also follow up with PCP which we discussed today.

## 2023-07-21 ENCOUNTER — EVALUATION (OUTPATIENT)
Dept: PHYSICAL THERAPY | Facility: REHABILITATION | Age: 73
End: 2023-07-21
Payer: COMMERCIAL

## 2023-07-21 DIAGNOSIS — M79.672 LEFT FOOT PAIN: ICD-10-CM

## 2023-07-21 DIAGNOSIS — M72.2 PLANTAR FASCIITIS: Primary | ICD-10-CM

## 2023-07-21 DIAGNOSIS — R26.9 GAIT ABNORMALITY: ICD-10-CM

## 2023-07-21 DIAGNOSIS — R26.89 BALANCE PROBLEM: ICD-10-CM

## 2023-07-21 PROCEDURE — 97112 NEUROMUSCULAR REEDUCATION: CPT | Performed by: PHYSICAL THERAPIST

## 2023-07-21 PROCEDURE — 97140 MANUAL THERAPY 1/> REGIONS: CPT | Performed by: PHYSICAL THERAPIST

## 2023-07-21 PROCEDURE — 97110 THERAPEUTIC EXERCISES: CPT | Performed by: PHYSICAL THERAPIST

## 2023-07-24 ENCOUNTER — TELEPHONE (OUTPATIENT)
Dept: FAMILY MEDICINE CLINIC | Facility: CLINIC | Age: 73
End: 2023-07-24

## 2023-07-25 ENCOUNTER — OFFICE VISIT (OUTPATIENT)
Dept: PHYSICAL THERAPY | Facility: REHABILITATION | Age: 73
End: 2023-07-25
Payer: COMMERCIAL

## 2023-07-25 ENCOUNTER — APPOINTMENT (OUTPATIENT)
Dept: LAB | Facility: CLINIC | Age: 73
End: 2023-07-25
Payer: COMMERCIAL

## 2023-07-25 ENCOUNTER — TELEPHONE (OUTPATIENT)
Dept: FAMILY MEDICINE CLINIC | Facility: CLINIC | Age: 73
End: 2023-07-25

## 2023-07-25 DIAGNOSIS — M72.2 PLANTAR FASCIITIS: Primary | ICD-10-CM

## 2023-07-25 DIAGNOSIS — R26.89 BALANCE PROBLEM: ICD-10-CM

## 2023-07-25 DIAGNOSIS — M79.672 LEFT FOOT PAIN: ICD-10-CM

## 2023-07-25 DIAGNOSIS — R26.9 GAIT ABNORMALITY: ICD-10-CM

## 2023-07-25 DIAGNOSIS — R73.03 PREDIABETES: ICD-10-CM

## 2023-07-25 LAB
EST. AVERAGE GLUCOSE BLD GHB EST-MCNC: 134 MG/DL
HBA1C MFR BLD: 6.3 %

## 2023-07-25 PROCEDURE — 83036 HEMOGLOBIN GLYCOSYLATED A1C: CPT

## 2023-07-25 PROCEDURE — 97110 THERAPEUTIC EXERCISES: CPT | Performed by: PHYSICAL THERAPIST

## 2023-07-25 PROCEDURE — 36415 COLL VENOUS BLD VENIPUNCTURE: CPT

## 2023-07-25 PROCEDURE — 97140 MANUAL THERAPY 1/> REGIONS: CPT | Performed by: PHYSICAL THERAPIST

## 2023-07-25 PROCEDURE — 97112 NEUROMUSCULAR REEDUCATION: CPT | Performed by: PHYSICAL THERAPIST

## 2023-07-25 NOTE — TELEPHONE ENCOUNTER
----- Message from Jose Carlos Ford MD sent at 5/92/2876 12:44 PM EDT -----  Recent DEXA scan showed 5 to 6% increase in bone density since her last DEXA scan however she is still in the osteoporosis stage.   Continue current regimen of medications

## 2023-07-25 NOTE — PROGRESS NOTES
Daily Note     Today's date: 2023  Patient name: Amadeo Vizcarra  : 1950  MRN: [id number]  Referring provider: Michelle Aguilera MD  Dx:   Encounter Diagnosis     ICD-10-CM    1. Plantar fasciitis  M72.2       2. Balance problem  R26.89       3. Gait abnormality  R26.9       4. Left foot pain  M79.672           Start Time: 1108  Stop Time: 1148  Total time in clinic (min): 40 minutes    Subjective: Pt reports her ankle feels stiff today. States she wore the heel lifts and they helped initially then were bothersome so she took them out for a bit but wants to keep trying to increase wearing time. Objective: See treatment diary below      Assessment: Pt was appropriately challenged by progression in airex mini band kicks; she noted mild glute med fatigue. Pt will benefit from continued skilled outpatient PT to improve strength, balance, ROM and joint mobility, to address therapy goals, to reduce pain, and improve function. Plan: Continue per plan of care. Progress treatment as tolerated.        Precautions: HTN, OP, fall risk, CA hx, hx of L ankle surgery        Manuals      7   L ankle TC PA and distraction and ST mobs DS Gr IV DS Gr IV DS Gr IV DS Gr IV DS Gr IV     DS Gr IV   L plantar fascia laser   pwr 18 4' np         STM PF   DS DS DS        Heel lift admin    bilat             RE - DS         Neuro Re-Ed             [employer] mini march 3 min no UE close S 3 min no UE close S hold 3 min no UE close S 3 min no UE close S     3 min no UE close S   SLS on airex with contralat hip abd,  YMB x30 ea light UE YMB x30 ea light UE YMB x30 LLE only  light UE YMB x30 LLE only  light UE GMB 3x5 LLE only  light UE     YMB x30 ea light UE   Sand dune step ups 1 min ea LE light UE close S 1 min ea LE light UE close S hold 1 min ea LE light UE close S 1 min ea LE light UE close S     1 min ea LE light UE close S                                          Ther Ex             DF MWM 8" step PT overpress 2x15, with belt 8" step PT overpress 2x15, with belt 8" step PT overpress 2x15, with belt 8" step PT overpress 2x15, with belt 8" step PT overpress 2x15, with belt     8" step PT overpress 2x15, 1 round with belt   VG for strength and ROM S/l L7 3x10 ea (head elevated with wedge) S/l L7 3x10 ea (head elevated with wedge) S/l L7 3x10 ea  nv S/l L7 3x10 ea      S/l L7 3xF ea (head elevated with wedge)   Slant  45"x3 LLE only, standing calf stretch 45"x3 LLE only, standing calf stretch 45"x4 LLE only, standing calf stretch 45"x3 LLE only, standing calf stretch 45"x4 LLE only, standing calf stretch     45"x3 LLE only, standing calf stretch                                          Ther Activity             Sit to stands    5xSTS   TUGx2                      Gait Training                                       Modalities

## 2023-07-26 DIAGNOSIS — R73.03 PREDIABETES: Primary | ICD-10-CM

## 2023-07-26 RX ORDER — METFORMIN HYDROCHLORIDE 500 MG/1
500 TABLET, EXTENDED RELEASE ORAL
Qty: 30 TABLET | Refills: 0 | Status: SHIPPED | OUTPATIENT
Start: 2023-07-26

## 2023-07-26 NOTE — TELEPHONE ENCOUNTER
Covering for [place]. Reviewed her last OV note. She suggested starting metformin due to levels in prediabetic range. If patient is agreeable, she can start metformin  mg daily with dinner. Please send script if patient willing to start treatment.

## 2023-07-26 NOTE — TELEPHONE ENCOUNTER
Patient called to let you know that she did review her A1C results via my chart and want to know if you want to prescribe the medication that was discuss at her appointment, please advise

## 2023-07-26 NOTE — TELEPHONE ENCOUNTER
Requested medication(s) are due for refill today: Yes  Patient has already received a courtesy refill: No  Other reason request has been forwarded to provider: Please approve pending rx,Per pt she would like for now 30 days supply

## 2023-07-27 ENCOUNTER — OFFICE VISIT (OUTPATIENT)
Dept: PHYSICAL THERAPY | Facility: REHABILITATION | Age: 73
End: 2023-07-27
Payer: COMMERCIAL

## 2023-07-27 DIAGNOSIS — R26.9 GAIT ABNORMALITY: ICD-10-CM

## 2023-07-27 DIAGNOSIS — M72.2 PLANTAR FASCIITIS: Primary | ICD-10-CM

## 2023-07-27 DIAGNOSIS — M79.672 LEFT FOOT PAIN: ICD-10-CM

## 2023-07-27 DIAGNOSIS — R26.89 BALANCE PROBLEM: ICD-10-CM

## 2023-07-27 PROCEDURE — 97112 NEUROMUSCULAR REEDUCATION: CPT

## 2023-07-27 PROCEDURE — 97140 MANUAL THERAPY 1/> REGIONS: CPT

## 2023-07-27 PROCEDURE — 97110 THERAPEUTIC EXERCISES: CPT

## 2023-07-27 NOTE — PROGRESS NOTES
Daily Note     Today's date: 2023  Patient name: Kenia Rodriguez  : 1950  MRN: [id number]  Referring provider: Jeffry Millan MD  Dx:   Encounter Diagnosis     ICD-10-CM    1. Plantar fasciitis  M72.2       2. Balance problem  R26.89       3. Gait abnormality  R26.9       4. Left foot pain  M79.672           Start Time: 1017  Stop Time: 1100  Total time in clinic (min): 43 minutes    Subjective: Patient reports increased medial heel pain this am, improved once she got up and moving. Objective: See treatment diary below      Assessment: Patient tolerated all activities well. Crepitus noted in the TCJ and STJ with joint mobs. Patient asymptomatic with activities throughout session. Continue to progress as tolerated. Patient would benefit from continued PT      Plan: Continue per plan of care.       Precautions: HTN, OP, fall risk, CA hx, hx of L ankle surgery        Manuals     7   L ankle TC PA and distraction and ST mobs DS Gr IV DS Gr IV DS Gr IV DS Gr IV DS Gr IV WI Gr IV ea    DS Gr IV   L plantar fascia laser   pwr 18 4' np         STM PF   DS DS DS WI       Heel lift admin    bilat             RE - DS         Neuro Re-Ed             [employer] mini march 3 min no UE close S 3 min no UE close S hold 3 min no UE close S 3 min no UE close S 3 min no UE close S    3 min no UE close S   SLS on airex with contralat hip abd,  YMB x30 ea light UE YMB x30 ea light UE YMB x30 LLE only  light UE YMB x30 LLE only  light UE GMB 3x5 LLE only  light UE GMB x10 ea only  light UE    YMB x30 ea light UE   Sand dune step ups 1 min ea LE light UE close S 1 min ea LE light UE close S hold 1 min ea LE light UE close S 1 min ea LE light UE close S 1 min ea LE light UE close S    1 min ea LE light UE close S                                          Ther Ex             DF MWM 8" step PT overpress 2x15, with belt 8" step PT overpress 2x15, with belt 8" step PT overpress 2x15, with belt 8" step PT overpress 2x15, with belt 8" step PT overpress 2x15, with belt 8" step PT overpress 2x15, with belt    8" step PT overpress 2x15, 1 round with belt   VG for strength and ROM S/l L7 3x10 ea (head elevated with wedge) S/l L7 3x10 ea (head elevated with wedge) S/l L7 3x10 ea  nv S/l L7 3x10 ea  S/l L7 3x10 ea     S/l L7 3xF ea (head elevated with wedge)   Slant  45"x3 LLE only, standing calf stretch 45"x3 LLE only, standing calf stretch 45"x4 LLE only, standing calf stretch 45"x3 LLE only, standing calf stretch 45"x4 LLE only, standing calf stretch 45"x4 LLE only, standing calf stretch    45"x3 LLE only, standing calf stretch                                          Ther Activity             Sit to stands    5xSTS   TUGx2                      Gait Training                                       Modalities

## 2023-07-31 ENCOUNTER — APPOINTMENT (OUTPATIENT)
Dept: PHYSICAL THERAPY | Facility: REHABILITATION | Age: 73
End: 2023-07-31
Payer: COMMERCIAL

## 2023-08-02 ENCOUNTER — OFFICE VISIT (OUTPATIENT)
Dept: PHYSICAL THERAPY | Facility: REHABILITATION | Age: 73
End: 2023-08-02
Payer: COMMERCIAL

## 2023-08-02 DIAGNOSIS — R26.9 GAIT ABNORMALITY: ICD-10-CM

## 2023-08-02 DIAGNOSIS — R26.89 BALANCE PROBLEM: ICD-10-CM

## 2023-08-02 DIAGNOSIS — M72.2 PLANTAR FASCIITIS: Primary | ICD-10-CM

## 2023-08-02 DIAGNOSIS — M79.672 LEFT FOOT PAIN: ICD-10-CM

## 2023-08-02 PROCEDURE — 97140 MANUAL THERAPY 1/> REGIONS: CPT

## 2023-08-02 PROCEDURE — 97112 NEUROMUSCULAR REEDUCATION: CPT

## 2023-08-02 PROCEDURE — 97110 THERAPEUTIC EXERCISES: CPT

## 2023-08-02 NOTE — PROGRESS NOTES
Daily Note     Today's date: 2023  Patient name: Leopoldo Huertas  : 1950  MRN: [id number]  Referring provider: Guru Hubbard MD  Dx:   Encounter Diagnosis     ICD-10-CM    1. Plantar fasciitis  M72.2       2. Balance problem  R26.89       3. Gait abnormality  R26.9       4. Left foot pain  M79.672           Start Time: 1215  Stop Time: 1255  Total time in clinic (min): 40 minutes    Subjective: Patient reports some increased PF discomfort this morning. Objective: See treatment diary below      Assessment: Improved symptoms end of session. Patient tolerated treatment and progressions very well. Discussed performing self PROM and PF STM home on days where symptoms are intensified. Patient would benefit from continued PT      Plan: Continue per plan of care.       Precautions: HTN, OP, fall risk, CA hx, hx of L ankle surgery        Manuals    L ankle TC PA and distraction and ST mobs DS Gr IV DS Gr IV DS Gr IV DS Gr IV DS Gr IV NE Gr IV ea NE Gr IV ea   DS Gr IV   L plantar fascia laser   pwr 18 4' np         STM PF   DS DS DS NE NE      Heel lift admin    bilat             RE - DS         Neuro Re-Ed             [employer] mini march 3 min no UE close S 3 min no UE close S hold 3 min no UE close S 3 min no UE close S 3 min no UE close S 3 min no UE close S   3 min no UE close S   SLS on airex with contralat hip abd,  YMB x30 ea light UE YMB x30 ea light UE YMB x30 LLE only  light UE YMB x30 LLE only  light UE GMB 3x5 LLE only  light UE GMB x10 ea only  light UE GMB x25 ea only  light UE   YMB x30 ea light UE   Sand dune step ups 1 min ea LE light UE close S 1 min ea LE light UE close S hold 1 min ea LE light UE close S 1 min ea LE light UE close S 1 min ea LE light UE close S 1 min ea LE light UE close S   1 min ea LE light UE close S                                          Ther Ex             DF MWM 8" step PT overpress 2x15, with belt 8" step PT overpress 2x15, with belt 8" step PT overpress 2x15, with belt 8" step PT overpress 2x15, with belt 8" step PT overpress 2x15, with belt 8" step PT overpress 2x15, with belt 8" step PT overpress 2x15, with belt   8" step PT overpress 2x15, 1 round with belt   VG for strength and ROM S/l L7 3x10 ea (head elevated with wedge) S/l L7 3x10 ea (head elevated with wedge) S/l L7 3x10 ea  nv S/l L7 3x10 ea  S/l L7 3x10 ea  S/l L7 3x10 ea  D/l L7 2x10 (end)   S/l L7 3xF ea (head elevated with wedge)   Slant  45"x3 LLE only, standing calf stretch 45"x3 LLE only, standing calf stretch 45"x4 LLE only, standing calf stretch 45"x3 LLE only, standing calf stretch 45"x4 LLE only, standing calf stretch 45"x4 LLE only, standing calf stretch 45"x4 LLE only, standing calf stretch   45"x3 LLE only, standing calf stretch                                          Ther Activity             Sit to stands    5xSTS   TUGx2                      Gait Training                                       Modalities

## 2023-08-03 ENCOUNTER — APPOINTMENT (OUTPATIENT)
Dept: PHYSICAL THERAPY | Facility: REHABILITATION | Age: 73
End: 2023-08-03
Payer: COMMERCIAL

## 2023-08-04 ENCOUNTER — OFFICE VISIT (OUTPATIENT)
Dept: PHYSICAL THERAPY | Facility: REHABILITATION | Age: 73
End: 2023-08-04
Payer: COMMERCIAL

## 2023-08-04 DIAGNOSIS — R26.89 BALANCE PROBLEM: ICD-10-CM

## 2023-08-04 DIAGNOSIS — R26.9 GAIT ABNORMALITY: ICD-10-CM

## 2023-08-04 DIAGNOSIS — M72.2 PLANTAR FASCIITIS: Primary | ICD-10-CM

## 2023-08-04 DIAGNOSIS — M79.672 LEFT FOOT PAIN: ICD-10-CM

## 2023-08-04 PROCEDURE — 97140 MANUAL THERAPY 1/> REGIONS: CPT

## 2023-08-04 PROCEDURE — 97110 THERAPEUTIC EXERCISES: CPT

## 2023-08-04 PROCEDURE — 97112 NEUROMUSCULAR REEDUCATION: CPT

## 2023-08-04 NOTE — PROGRESS NOTES
Daily Note     Today's date: 2023  Patient name: Idalmis Tuttle  : 1950  MRN: [id number]  Referring provider: Sathish Lugo MD  Dx:   Encounter Diagnosis     ICD-10-CM    1. Plantar fasciitis  M72.2       2. Balance problem  R26.89       3. Gait abnormality  R26.9       4. Left foot pain  M79.672           Start Time: 1115  Stop Time: 1155  Total time in clinic (min): 40 minutes    Subjective: Patient reports symptoms improved following manuals last visit. States symptoms returned in the am.      Objective: See treatment diary below      Assessment: Tolerated treatment well. Patient continues to respond well to manuals techniques. Continue to progress as tolerated. Patient would benefit from continued PT      Plan: Continue per plan of care.       Precautions: HTN, OP, fall risk, CA hx, hx of L ankle surgery        Manuals   7   L ankle TC PA and distraction and ST mobs DS Gr IV DS Gr IV DS Gr IV DS Gr IV DS Gr IV MT Gr IV ea MT Gr IV ea MT Gr IV ea  DS Gr IV   L plantar fascia laser   pwr 18 4' np         STM PF   DS DS DS MT MT MT     Heel lift admin    bilat             RE - DS         Neuro Re-Ed             [employer] mini march 3 min no UE close S 3 min no UE close S hold 3 min no UE close S 3 min no UE close S 3 min no UE close S 3 min no UE close S 3 min no UE close S  3 min no UE close S   SLS on airex with contralat hip abd,  YMB x30 ea light UE YMB x30 ea light UE YMB x30 LLE only  light UE YMB x30 LLE only  light UE GMB 3x5 LLE only  light UE GMB x10 ea only  light UE GMB x25 ea only  light UE GMB 2x15 ea only  light UE  YMB x30 ea light UE   Sand dune step ups 1 min ea LE light UE close S 1 min ea LE light UE close S hold 1 min ea LE light UE close S 1 min ea LE light UE close S 1 min ea LE light UE close S 1 min ea LE light UE close S 1 min ea LE light UE close S  1 min ea LE light UE close S                                          Ther Ex DF MWM 8" step PT overpress 2x15, with belt 8" step PT overpress 2x15, with belt 8" step PT overpress 2x15, with belt 8" step PT overpress 2x15, with belt 8" step PT overpress 2x15, with belt 8" step PT overpress 2x15, with belt 8" step PT overpress 2x15, with belt 8" step PT overpress 2x15, with belt  8" step PT overpress 2x15, 1 round with belt   VG for strength and ROM S/l L7 3x10 ea (head elevated with wedge) S/l L7 3x10 ea (head elevated with wedge) S/l L7 3x10 ea  nv S/l L7 3x10 ea  S/l L7 3x10 ea  S/l L7 3x10 ea  D/l L7 2x10 (end) S/l L7 3x10 ea  D/l L7 2x10 (end)  S/l L7 3xF ea (head elevated with wedge)   Slant  45"x3 LLE only, standing calf stretch 45"x3 LLE only, standing calf stretch 45"x4 LLE only, standing calf stretch 45"x3 LLE only, standing calf stretch 45"x4 LLE only, standing calf stretch 45"x4 LLE only, standing calf stretch 45"x4 LLE only, standing calf stretch 45"x4 LLE only, standing calf stretch  45"x3 LLE only, standing calf stretch                                          Ther Activity             Sit to stands    5xSTS   TUGx2                      Gait Training                                       Modalities

## 2023-08-07 ENCOUNTER — OFFICE VISIT (OUTPATIENT)
Dept: PHYSICAL THERAPY | Facility: REHABILITATION | Age: 73
End: 2023-08-07
Payer: COMMERCIAL

## 2023-08-07 DIAGNOSIS — R26.9 GAIT ABNORMALITY: ICD-10-CM

## 2023-08-07 DIAGNOSIS — M54.16 RADICULOPATHY, LUMBAR REGION: ICD-10-CM

## 2023-08-07 DIAGNOSIS — M79.672 LEFT FOOT PAIN: ICD-10-CM

## 2023-08-07 DIAGNOSIS — R26.89 BALANCE PROBLEM: ICD-10-CM

## 2023-08-07 DIAGNOSIS — M72.2 PLANTAR FASCIITIS: Primary | ICD-10-CM

## 2023-08-07 PROCEDURE — 97112 NEUROMUSCULAR REEDUCATION: CPT | Performed by: PHYSICAL THERAPIST

## 2023-08-07 PROCEDURE — 97110 THERAPEUTIC EXERCISES: CPT | Performed by: PHYSICAL THERAPIST

## 2023-08-07 PROCEDURE — 97140 MANUAL THERAPY 1/> REGIONS: CPT | Performed by: PHYSICAL THERAPIST

## 2023-08-07 RX ORDER — GABAPENTIN 300 MG/1
CAPSULE ORAL
Qty: 120 CAPSULE | Refills: 0 | Status: SHIPPED | OUTPATIENT
Start: 2023-08-07 | End: 2023-08-10 | Stop reason: SDUPTHER

## 2023-08-07 NOTE — PROGRESS NOTES
Daily Note     Today's date: 2023  Patient name: Tanvi Rosado  : 1950  MRN: [id number]  Referring provider: Yane Gonzalez MD  Dx:   Encounter Diagnosis     ICD-10-CM    1. Plantar fasciitis  M72.2       2. Balance problem  R26.89       3. Gait abnormality  R26.9       4. Left foot pain  M79.672           Start Time: 1130  Stop Time: 1215  Total time in clinic (min): 45 minutes    Subjective: Patient reports her walking is always better after therapy. Objective: See treatment diary below      Assessment: Pt demonstrated improved gait mechanics following manual techniques. Plan: Continue per plan of care.       Precautions: HTN, OP, fall risk, CA hx, hx of L ankle surgery        Manuals     L ankle TC PA and distraction and ST mobs DS Gr IV DS Gr IV DS Gr IV DS Gr IV DS Gr IV NJ Gr IV ea NJ Gr IV ea NJ Gr IV ea DS Gr IV    L plantar fascia laser   pwr 18 4' np         STM PF   DS DS DS NJ NJ NJ DS    Heel lift admin    bilat             RE - DS         Neuro Re-Ed             [employer] mini march 3 min no UE close S 3 min no UE close S hold 3 min no UE close S 3 min no UE close S 3 min no UE close S 3 min no UE close S 3 min no UE close S 3 min no UE close S    SLS on airex with contralat hip abd,  YMB x30 ea light UE YMB x30 ea light UE YMB x30 LLE only  light UE YMB x30 LLE only  light UE GMB 3x5 LLE only  light UE GMB x10 ea only  light UE GMB x25 ea only  light UE GMB 2x15 ea only  light UE GMB x30 ea light UE    Sand dune step ups 1 min ea LE light UE close S 1 min ea LE light UE close S hold 1 min ea LE light UE close S 1 min ea LE light UE close S 1 min ea LE light UE close S 1 min ea LE light UE close S 1 min ea LE light UE close S 1 min ea LE light UE close S                                           Ther Ex             DF MWM 8" step PT overpress 2x15, with belt 8" step PT overpress 2x15, with belt 8" step PT overpress 2x15, with belt 8" step PT overpress 2x15, with belt 8" step PT overpress 2x15, with belt 8" step PT overpress 2x15, with belt 8" step PT overpress 2x15, with belt 8" step PT overpress 2x15, with belt 8" step PT overpress 2x15, 1 round with belt    VG for strength and ROM S/l L7 3x10 ea (head elevated with wedge) S/l L7 3x10 ea (head elevated with wedge) S/l L7 3x10 ea  nv S/l L7 3x10 ea  S/l L7 3x10 ea  S/l L7 3x10 ea  D/l L7 2x10 (end) S/l L7 3x10 ea  D/l L7 2x10 (end) S/l L7 3xF ea (head elevated with wedge)    Slant  45"x3 LLE only, standing calf stretch 45"x3 LLE only, standing calf stretch 45"x4 LLE only, standing calf stretch 45"x3 LLE only, standing calf stretch 45"x4 LLE only, standing calf stretch 45"x4 LLE only, standing calf stretch 45"x4 LLE only, standing calf stretch 45"x4 LLE only, standing calf stretch 45"x4 LLE only, standing calf stretch                                           Ther Activity             Sit to stands    5xSTS   TUGx2                      Gait Training                                       Modalities

## 2023-08-10 ENCOUNTER — OFFICE VISIT (OUTPATIENT)
Dept: PHYSICAL THERAPY | Facility: REHABILITATION | Age: 73
End: 2023-08-10
Payer: COMMERCIAL

## 2023-08-10 DIAGNOSIS — R26.89 BALANCE PROBLEM: ICD-10-CM

## 2023-08-10 DIAGNOSIS — M72.2 PLANTAR FASCIITIS: Primary | ICD-10-CM

## 2023-08-10 DIAGNOSIS — R26.9 GAIT ABNORMALITY: ICD-10-CM

## 2023-08-10 DIAGNOSIS — M79.672 LEFT FOOT PAIN: ICD-10-CM

## 2023-08-10 DIAGNOSIS — M54.16 RADICULOPATHY, LUMBAR REGION: ICD-10-CM

## 2023-08-10 PROCEDURE — 97112 NEUROMUSCULAR REEDUCATION: CPT | Performed by: PHYSICAL THERAPIST

## 2023-08-10 PROCEDURE — 97110 THERAPEUTIC EXERCISES: CPT | Performed by: PHYSICAL THERAPIST

## 2023-08-10 PROCEDURE — 97140 MANUAL THERAPY 1/> REGIONS: CPT | Performed by: PHYSICAL THERAPIST

## 2023-08-10 RX ORDER — GABAPENTIN 300 MG/1
CAPSULE ORAL
Qty: 360 CAPSULE | Refills: 0 | Status: SHIPPED | OUTPATIENT
Start: 2023-08-10 | End: 2023-08-16 | Stop reason: SDUPTHER

## 2023-08-10 NOTE — TELEPHONE ENCOUNTER
Will you fill for more or would you add in an order for labs for patient to have prior. The only order in her chart is a TSH.

## 2023-08-10 NOTE — PROGRESS NOTES
Daily Note     Today's date: 8/10/2023  Patient name: Bozena Cooney  : 1950  MRN: [id number]  Referring provider: En Cedeño MD  Dx:   Encounter Diagnosis     ICD-10-CM    1. Plantar fasciitis  M72.2       2. Balance problem  R26.89       3. Gait abnormality  R26.9       4. Left foot pain  M79.672           Start Time: 1140  Stop Time: 1224  Total time in clinic (min): 44 minutes    Subjective: Patient reports her plantar fascia is bothersome today. Objective: See treatment diary below      Assessment: Pt responded well to tx. Modified sand dune marching to [employer] today to reduce pain/plantar fascia strain. Pt will benefit from continued skilled outpatient PT to improve strength, to address therapy goals, to reduce pain, and improve function. Plan: Continue per plan of care.       Precautions: HTN, OP, fall risk, CA hx, hx of L ankle surgery        Manuals 7/12 7/13 7/18 7/21 7/25 7/27 8/1 8/4 8/7 8/10   L ankle TC PA and distraction and ST mobs DS Gr IV DS Gr IV DS Gr IV DS Gr IV DS Gr IV WV Gr IV ea WV Gr IV ea WV Gr IV ea DS Gr IV DS Gr IV   L plantar fascia laser   pwr 18 4' np         STM PF   DS DS DS WV WV WV DS DS   Heel lift admin    bilat             RE - DS         Neuro Re-Ed             Sand dune mini march 3 min no UE close S 3 min no UE close S hold 3 min no UE close S 3 min no UE close S 3 min no UE close S 3 min no UE close S 3 min no UE close S 3 min no UE close S 3 min no UE close S   SLS on airex with contralat hip abd,  YMB x30 ea light UE YMB x30 ea light UE YMB x30 LLE only  light UE YMB x30 LLE only  light UE GMB 3x5 LLE only  light UE GMB x10 ea only  light UE GMB x25 ea only  light UE GMB 2x15 ea only  light UE GMB x30 ea light UE GMB x30 ea light UE   Sand dune step ups 1 min ea LE light UE close S 1 min ea LE light UE close S hold 1 min ea LE light UE close S 1 min ea LE light UE close S 1 min ea LE light UE close S 1 min ea LE light UE close S 1 min ea LE light UE close S 1 min ea LE light UE close S                                           Ther Ex             DF MWM 8" step PT overpress 2x15, with belt 8" step PT overpress 2x15, with belt 8" step PT overpress 2x15, with belt 8" step PT overpress 2x15, with belt 8" step PT overpress 2x15, with belt 8" step PT overpress 2x15, with belt 8" step PT overpress 2x15, with belt 8" step PT overpress 2x15, with belt 8" step PT overpress 2x15, 1 round with belt 8" step PT overpress 2x15, 1 round with belt   VG for strength and ROM S/l L7 3x10 ea (head elevated with wedge) S/l L7 3x10 ea (head elevated with wedge) S/l L7 3x10 ea  nv S/l L7 3x10 ea  S/l L7 3x10 ea  S/l L7 3x10 ea  D/l L7 2x10 (end) S/l L7 3x10 ea  D/l L7 2x10 (end) S/l L7 3xF ea (head elevated with wedge)    Slant  45"x3 LLE only, standing calf stretch 45"x3 LLE only, standing calf stretch 45"x4 LLE only, standing calf stretch 45"x3 LLE only, standing calf stretch 45"x4 LLE only, standing calf stretch 45"x4 LLE only, standing calf stretch 45"x4 LLE only, standing calf stretch 45"x4 LLE only, standing calf stretch 45"x4 LLE only, standing calf stretch 45"x3 LLE only, standing calf stretch                                          Ther Activity             Sit to stands    5xSTS   TUGx2                      Gait Training                                       Modalities

## 2023-08-14 ENCOUNTER — OFFICE VISIT (OUTPATIENT)
Dept: PHYSICAL THERAPY | Facility: REHABILITATION | Age: 73
End: 2023-08-14
Payer: COMMERCIAL

## 2023-08-14 DIAGNOSIS — M72.2 PLANTAR FASCIITIS: Primary | ICD-10-CM

## 2023-08-14 DIAGNOSIS — M79.672 LEFT FOOT PAIN: ICD-10-CM

## 2023-08-14 DIAGNOSIS — R26.9 GAIT ABNORMALITY: ICD-10-CM

## 2023-08-14 DIAGNOSIS — R26.89 BALANCE PROBLEM: ICD-10-CM

## 2023-08-14 PROCEDURE — 97112 NEUROMUSCULAR REEDUCATION: CPT | Performed by: PHYSICAL THERAPIST

## 2023-08-14 PROCEDURE — 97140 MANUAL THERAPY 1/> REGIONS: CPT | Performed by: PHYSICAL THERAPIST

## 2023-08-14 PROCEDURE — 97110 THERAPEUTIC EXERCISES: CPT | Performed by: PHYSICAL THERAPIST

## 2023-08-14 NOTE — PROGRESS NOTES
Daily Note     Today's date: 2023  Patient name: Liyah Sherman  : 1950  MRN: [id number]  Referring provider: Joe Bland MD  Dx:   Encounter Diagnosis     ICD-10-CM    1. Plantar fasciitis  M72.2       2. Balance problem  R26.89       3. Gait abnormality  R26.9       4. Left foot pain  M79.672           Start Time: 1212  Stop Time: 1251  Total time in clinic (min): 39 minutes    Subjective: Patient reports she always feels better after therapy but then the pain returns. Objective: See treatment diary below      Assessment: Pt fatigued with sand dune marching but otherwise tolerated well. She continues to respond well to therapy, but carryover from session to session is limited. Pt will benefit from continued skilled outpatient PT to improve strength, to address therapy goals, to reduce pain, and improve function. Plan: Continue per plan of care.       Precautions: HTN, OP, fall risk, CA hx, hx of L ankle surgery        Manuals 8/14     7/27 8/1 8/4 8/7 8/10   L ankle TC PA and distraction and ST mobs DS Gr IV     TN Gr IV ea TN Gr IV ea TN Gr IV ea DS Gr IV DS Gr IV                STM PF DS     TN TN TN DS DS   Heel lift admin                          Neuro Re-Ed             Sand dune mini march 3 min no UE close S     3 min no UE close S 3 min no UE close S 3 min no UE close S 3 min no UE close S 3 min no UE close S   SLS on airex with contralat hip abd,  GMB x30 ea light UE     GMB x10 ea only  light UE GMB x25 ea only  light UE GMB 2x15 ea only  light UE GMB x30 ea light UE GMB x30 ea light UE   Sand dune step ups 1 min ea LE light UE close S     1 min ea LE light UE close S 1 min ea LE light UE close S 1 min ea LE light UE close S 1 min ea LE light UE close S                                           Ther Ex             DF MWM 8" step PT overpress 2x15, with belt     8" step PT overpress 2x15, with belt 8" step PT overpress 2x15, with belt 8" step PT overpress 2x15, with belt 8" step PT overpress 2x15, 1 round with belt 8" step PT overpress 2x15, 1 round with belt   VG for strength and ROM S/l L7 3x10 ea (head elevated with wedge)     S/l L7 3x10 ea  S/l L7 3x10 ea  D/l L7 2x10 (end) S/l L7 3x10 ea  D/l L7 2x10 (end) S/l L7 3xF ea (head elevated with wedge)    Slant  45"x3 LLE only, standing calf stretch     45"x4 LLE only, standing calf stretch 45"x4 LLE only, standing calf stretch 45"x4 LLE only, standing calf stretch 45"x4 LLE only, standing calf stretch 45"x3 LLE only, standing calf stretch                                          Ther Activity             Sit to stands                          Gait Training                                       Modalities

## 2023-08-16 ENCOUNTER — OFFICE VISIT (OUTPATIENT)
Dept: PHYSICAL THERAPY | Facility: REHABILITATION | Age: 73
End: 2023-08-16
Payer: COMMERCIAL

## 2023-08-16 DIAGNOSIS — M72.2 PLANTAR FASCIITIS: Primary | ICD-10-CM

## 2023-08-16 DIAGNOSIS — R26.9 GAIT ABNORMALITY: ICD-10-CM

## 2023-08-16 DIAGNOSIS — M54.16 RADICULOPATHY, LUMBAR REGION: ICD-10-CM

## 2023-08-16 DIAGNOSIS — I10 ESSENTIAL HYPERTENSION: ICD-10-CM

## 2023-08-16 DIAGNOSIS — R26.89 BALANCE PROBLEM: ICD-10-CM

## 2023-08-16 DIAGNOSIS — M79.672 LEFT FOOT PAIN: ICD-10-CM

## 2023-08-16 PROCEDURE — 97112 NEUROMUSCULAR REEDUCATION: CPT | Performed by: PHYSICAL THERAPIST

## 2023-08-16 PROCEDURE — 97110 THERAPEUTIC EXERCISES: CPT | Performed by: PHYSICAL THERAPIST

## 2023-08-16 PROCEDURE — 97140 MANUAL THERAPY 1/> REGIONS: CPT | Performed by: PHYSICAL THERAPIST

## 2023-08-16 RX ORDER — METOPROLOL TARTRATE 50 MG/1
50 TABLET, FILM COATED ORAL DAILY
Qty: 90 TABLET | Refills: 3 | Status: SHIPPED | OUTPATIENT
Start: 2023-08-16

## 2023-08-16 RX ORDER — GABAPENTIN 300 MG/1
CAPSULE ORAL
Qty: 360 CAPSULE | Refills: 0 | Status: SHIPPED | OUTPATIENT
Start: 2023-08-16

## 2023-08-16 NOTE — PROGRESS NOTES
Daily Note     Today's date: 2023  Patient name: Leopoldo Huertas  : 1950  MRN: [id number]  Referring provider: Guru Hubbard MD  Dx:   Encounter Diagnosis     ICD-10-CM    1. Plantar fasciitis  M72.2       2. Balance problem  R26.89       3. Gait abnormality  R26.9       4. Left foot pain  M79.672           Start Time: 1212  Stop Time: 1253  Total time in clinic (min): 41 minutes    Subjective: Patient reports she is feeling alright today and her pain isn't too bad. States she is going to [place] later today which may flare her up more. Objective: See treatment diary below      Assessment: Pt responded well to tx and completed without issue. Pt will benefit from continued skilled outpatient PT to improve strength, to address therapy goals, to reduce pain, and improve function. Plan: Continue per plan of care.       Precautions: HTN, OP, fall risk, CA hx, hx of L ankle surgery        Manuals 8/14 8/16    7/27 8/1 8/4 8/7 8/10   L ankle TC PA and distraction and ST mobs DS Gr IV DS Gr IV    WA Gr IV ea WA Gr IV ea WA Gr IV ea DS Gr IV DS Gr IV                STM PF DS DS    WA WA WA DS DS   Heel lift admin                          Neuro Re-Ed             Sand dune mini march 3 min no UE close S 3 min no UE close S    3 min no UE close S 3 min no UE close S 3 min no UE close S 3 min no UE close S 3 min no UE close S   SLS on airex with contralat hip abd,  GMB x30 ea light UE GMB x30 ea light UE    GMB x10 ea only  light UE GMB x25 ea only  light UE GMB 2x15 ea only  light UE GMB x30 ea light UE GMB x30 ea light UE   Sand dune step ups 1 min ea LE light UE close S 1 min ea LE light UE close S    1 min ea LE light UE close S 1 min ea LE light UE close S 1 min ea LE light UE close S 1 min ea LE light UE close S                                           Ther Ex             DF MWM 8" step PT overpress 2x15, with belt 8" step PT overpress 2x15, with belt    8" step PT overpress 2x15, with belt 8" step PT overpress 2x15, with belt 8" step PT overpress 2x15, with belt 8" step PT overpress 2x15, 1 round with belt 8" step PT overpress 2x15, 1 round with belt   VG for strength and ROM S/l L7 3x10 ea  S/l L7 3x10 ea    S/l L7 3x10 ea  S/l L7 3x10 ea  D/l L7 2x10 (end) S/l L7 3x10 ea  D/l L7 2x10 (end) S/l L7 3xF ea     Slant  45"x3 LLE only, standing calf stretch 45"x3 LLE only, standing calf stretch    45"x4 LLE only, standing calf stretch 45"x4 LLE only, standing calf stretch 45"x4 LLE only, standing calf stretch 45"x4 LLE only, standing calf stretch 45"x3 LLE only, standing calf stretch                                          Ther Activity             Sit to stands                          Gait Training                                       Modalities

## 2023-08-17 ENCOUNTER — VBI (OUTPATIENT)
Dept: ADMINISTRATIVE | Facility: OTHER | Age: 73
End: 2023-08-17

## 2023-08-20 DIAGNOSIS — R73.03 PREDIABETES: ICD-10-CM

## 2023-08-21 ENCOUNTER — OFFICE VISIT (OUTPATIENT)
Dept: PHYSICAL THERAPY | Facility: REHABILITATION | Age: 73
End: 2023-08-21
Payer: COMMERCIAL

## 2023-08-21 DIAGNOSIS — R26.9 GAIT ABNORMALITY: ICD-10-CM

## 2023-08-21 DIAGNOSIS — R26.89 BALANCE PROBLEM: ICD-10-CM

## 2023-08-21 DIAGNOSIS — R73.03 PREDIABETES: ICD-10-CM

## 2023-08-21 DIAGNOSIS — M79.672 LEFT FOOT PAIN: ICD-10-CM

## 2023-08-21 DIAGNOSIS — M72.2 PLANTAR FASCIITIS: Primary | ICD-10-CM

## 2023-08-21 PROCEDURE — 97140 MANUAL THERAPY 1/> REGIONS: CPT | Performed by: PHYSICAL THERAPIST

## 2023-08-21 PROCEDURE — 97112 NEUROMUSCULAR REEDUCATION: CPT | Performed by: PHYSICAL THERAPIST

## 2023-08-21 PROCEDURE — 97110 THERAPEUTIC EXERCISES: CPT | Performed by: PHYSICAL THERAPIST

## 2023-08-21 NOTE — TELEPHONE ENCOUNTER
Requested medication(s) are due for refill today: Yes  Patient has already received a courtesy refill: No  Other reason request has been forwarded to provider: guidelines failed, pt not scheduled today, has appt 2/2024

## 2023-08-21 NOTE — PROGRESS NOTES
Daily Note     Today's date: 2023  Patient name: Johnson Suarez  : 1950  MRN: [id number]  Referring provider: Feli Kennedy MD  Dx:   Encounter Diagnosis     ICD-10-CM    1. Plantar fasciitis  M72.2       2. Balance problem  R26.89       3. Gait abnormality  R26.9       4. Left foot pain  M79.672           Start Time: 1212  Stop Time: 1252  Total time in clinic (min): 40 minutes    Subjective: Patient reports no change. Objective: See treatment diary below      Assessment: Pt responded well to tx and completed without issue. Pt will benefit from continued skilled outpatient PT to improve strength, to address therapy goals, to reduce pain, and improve function. Plan: Continue per plan of care.       Precautions: HTN, OP, fall risk, CA hx, hx of L ankle surgery        Manuals 8/14 8/16 8/21   7/27 8/1 8/4 8/7 8/10   L ankle TC PA and distraction and ST mobs DS Gr IV DS Gr IV DS gr IVl   AK Gr IV ea AK Gr IV ea AK Gr IV ea DS Gr IV DS Gr IV                STM PF DS DS DS   AK AK AK DS DS   Heel lift admin                          Neuro Re-Ed             Sand dune mini march 3 min no UE close S 3 min no UE close S 3 min no UE close S   3 min no UE close S 3 min no UE close S 3 min no UE close S 3 min no UE close S 3 min no UE close S   SLS on airex with contralat hip abd,  GMB x30 ea light UE GMB x30 ea light UE GMB x30 ea light UE   GMB x10 ea only  light UE GMB x25 ea only  light UE GMB 2x15 ea only  light UE GMB x30 ea light UE GMB x30 ea light UE   Sand dune step ups 1 min ea LE light UE close S 1 min ea LE light UE close S 1 min ea LE light UE close S   1 min ea LE light UE close S 1 min ea LE light UE close S 1 min ea LE light UE close S 1 min ea LE light UE close S                                           Ther Ex             DF MWM 8" step PT overpress 2x15, with belt 8" step PT overpress 2x15, with belt 8" step PT overpress 2x15, with belt   8" step PT overpress 2x15, with belt 8" step PT overpress 2x15, with belt 8" step PT overpress 2x15, with belt 8" step PT overpress 2x15, 1 round with belt 8" step PT overpress 2x15, 1 round with belt   VG for strength and ROM S/l L7 3x10 ea  S/l L7 3x10 ea S/l L7 3x10 ea   S/l L7 3x10 ea  S/l L7 3x10 ea  D/l L7 2x10 (end) S/l L7 3x10 ea  D/l L7 2x10 (end) S/l L7 3xF ea     Slant  45"x3 LLE only, standing calf stretch 45"x3 LLE only, standing calf stretch 45"x3 LLE only, standing calf stretch   45"x4 LLE only, standing calf stretch 45"x4 LLE only, standing calf stretch 45"x4 LLE only, standing calf stretch 45"x4 LLE only, standing calf stretch 45"x3 LLE only, standing calf stretch                                          Ther Activity             Sit to stands                          Gait Training                                       Modalities

## 2023-08-22 DIAGNOSIS — R73.03 PREDIABETES: ICD-10-CM

## 2023-08-22 RX ORDER — METFORMIN HYDROCHLORIDE 500 MG/1
500 TABLET, EXTENDED RELEASE ORAL
Qty: 30 TABLET | Refills: 2 | Status: SHIPPED | OUTPATIENT
Start: 2023-08-22

## 2023-08-22 RX ORDER — METFORMIN HYDROCHLORIDE 500 MG/1
500 TABLET, EXTENDED RELEASE ORAL
Qty: 30 TABLET | Refills: 0 | OUTPATIENT
Start: 2023-08-22

## 2023-08-22 RX ORDER — METFORMIN HYDROCHLORIDE 500 MG/1
500 TABLET, EXTENDED RELEASE ORAL
Qty: 30 TABLET | Refills: 2 | Status: SHIPPED | OUTPATIENT
Start: 2023-08-22 | End: 2023-08-22 | Stop reason: SDUPTHER

## 2023-08-23 ENCOUNTER — OFFICE VISIT (OUTPATIENT)
Dept: PHYSICAL THERAPY | Facility: REHABILITATION | Age: 73
End: 2023-08-23
Payer: COMMERCIAL

## 2023-08-23 DIAGNOSIS — M72.2 PLANTAR FASCIITIS: Primary | ICD-10-CM

## 2023-08-23 DIAGNOSIS — R26.9 GAIT ABNORMALITY: ICD-10-CM

## 2023-08-23 DIAGNOSIS — R26.89 BALANCE PROBLEM: ICD-10-CM

## 2023-08-23 DIAGNOSIS — M79.672 LEFT FOOT PAIN: ICD-10-CM

## 2023-08-23 PROCEDURE — 97140 MANUAL THERAPY 1/> REGIONS: CPT | Performed by: PHYSICAL THERAPIST

## 2023-08-23 PROCEDURE — 97110 THERAPEUTIC EXERCISES: CPT | Performed by: PHYSICAL THERAPIST

## 2023-08-23 PROCEDURE — 97112 NEUROMUSCULAR REEDUCATION: CPT | Performed by: PHYSICAL THERAPIST

## 2023-08-23 NOTE — PROGRESS NOTES
Daily Note     Today's date: 2023  Patient name: Garry Whittington  : 1950  MRN: [id number]  Referring provider: Piero Sanchez MD  Dx:   Encounter Diagnosis     ICD-10-CM    1. Plantar fasciitis  M72.2       2. Balance problem  R26.89       3. Gait abnormality  R26.9       4. Left foot pain  M79.672           Start Time: 1215  Stop Time: 1257  Total time in clinic (min): 42 minutes    Subjective: Patient reports her ankle is aching a bit more today and her heel is sore. Objective: See treatment diary below      Assessment: Pt responded well to the exercises and noted less pain and stiffness post tx. Pt will benefit from continued skilled outpatient PT to improve strength, to address therapy goals, to reduce pain, and improve function. Plan: Continue per plan of care.       Precautions: HTN, OP, fall risk, CA hx, hx of L ankle surgery        Manuals 8/14 8/16 8/21 8/23    8/4 8/7 8/10   L ankle TC PA and distraction and ST mobs DS Gr IV DS Gr IV DS gr IV DS gr IV    SC Gr IV ea DS Gr IV DS Gr IV                STM PF DS DS DS DS    SC DS DS   Heel lift admin                          Neuro Re-Ed             Sand dune mini march 3 min no UE close S 3 min no UE close S 3 min no UE close S 3 min no UE close S    3 min no UE close S 3 min no UE close S 3 min no UE close S   SLS on airex with contralat hip abd,  GMB x30 ea light UE GMB x30 ea light UE GMB x30 ea light UE GMB x30 ea light UE    GMB 2x15 ea only  light UE GMB x30 ea light UE GMB x30 ea light UE   Sand dune step ups 1 min ea LE light UE close S 1 min ea LE light UE close S 1 min ea LE light UE close S 1 min ea LE light UE close S    1 min ea LE light UE close S 1 min ea LE light UE close S                                           Ther Ex             DF MWM 8" step PT overpress 2x15, with belt 8" step PT overpress 2x15, with belt 8" step PT overpress 2x15, with belt 8" step PT overpress 2x15, with belt    8" step PT overpress 2x15, with belt 8" step PT overpress 2x15, 1 round with belt 8" step PT overpress 2x15, 1 round with belt   VG for strength and ROM S/l L7 3x10 ea  S/l L7 3x10 ea S/l L7 3x10 ea S/l L7 3x10 ea    S/l L7 3x10 ea  D/l L7 2x10 (end) S/l L7 3xF ea     Slant  45"x3 LLE only, standing calf stretch 45"x3 LLE only, standing calf stretch 45"x3 LLE only, standing calf stretch 45"x3 LLE only, standing calf stretch    45"x4 LLE only, standing calf stretch 45"x4 LLE only, standing calf stretch 45"x3 LLE only, standing calf stretch                                          Ther Activity             Sit to stands                          Gait Training                                       Modalities

## 2023-08-28 ENCOUNTER — OFFICE VISIT (OUTPATIENT)
Dept: PHYSICAL THERAPY | Facility: REHABILITATION | Age: 73
End: 2023-08-28
Payer: COMMERCIAL

## 2023-08-28 DIAGNOSIS — M72.2 PLANTAR FASCIITIS: Primary | ICD-10-CM

## 2023-08-28 DIAGNOSIS — R26.89 BALANCE PROBLEM: ICD-10-CM

## 2023-08-28 DIAGNOSIS — R26.9 GAIT ABNORMALITY: ICD-10-CM

## 2023-08-28 DIAGNOSIS — M79.672 LEFT FOOT PAIN: ICD-10-CM

## 2023-08-28 PROCEDURE — 97112 NEUROMUSCULAR REEDUCATION: CPT | Performed by: PHYSICAL THERAPIST

## 2023-08-28 PROCEDURE — 97110 THERAPEUTIC EXERCISES: CPT | Performed by: PHYSICAL THERAPIST

## 2023-08-28 PROCEDURE — 97140 MANUAL THERAPY 1/> REGIONS: CPT | Performed by: PHYSICAL THERAPIST

## 2023-08-28 NOTE — PROGRESS NOTES
Daily Note     Today's date: 2023  Patient name: Annabelle Wilson  : 1950  MRN: [id number]  Referring provider: Elena Christensen MD  Dx:   Encounter Diagnosis     ICD-10-CM    1. Plantar fasciitis  M72.2       2. Balance problem  R26.89       3. Gait abnormality  R26.9       4. Left foot pain  M79.672           Start Time: 1215  Stop Time: 1255  Total time in clinic (min): 40 minutes    Subjective: Patient reports nothing new      Objective: See treatment diary below      Assessment: Favorable response to tx. Pt will benefit from continued skilled outpatient PT to improve strength, to address therapy goals, to reduce pain, and improve function. Plan: Continue per plan of care.       Precautions: HTN, OP, fall risk, CA hx, hx of L ankle surgery        Manuals 8/14 8/16 8/21 8/23 8/28   8/4 8/7 8/10   L ankle TC PA and distraction and ST mobs DS Gr IV DS Gr IV DS gr IV DS gr IV DS gr IV   NJ Gr IV ea DS Gr IV DS Gr IV                STM PF DS DS DS DS DS   NJ DS DS   Heel lift admin                          Neuro Re-Ed             Sand dune mini march 3 min no UE close S 3 min no UE close S 3 min no UE close S 3 min no UE close S 3 min no UE close S   3 min no UE close S 3 min no UE close S 3 min no UE close S   SLS on airex with contralat hip abd,  GMB x30 ea light UE GMB x30 ea light UE GMB x30 ea light UE GMB x30 ea light UE GMB x30 ea light UE   GMB 2x15 ea only  light UE GMB x30 ea light UE GMB x30 ea light UE   Sand dune step ups 1 min ea LE light UE close S 1 min ea LE light UE close S 1 min ea LE light UE close S 1 min ea LE light UE close S 1 min ea LE light UE close S   1 min ea LE light UE close S 1 min ea LE light UE close S                                           Ther Ex             DF MWM 8" step PT overpress 2x15, with belt 8" step PT overpress 2x15, with belt 8" step PT overpress 2x15, with belt 8" step PT overpress 2x15, with belt 8" step PT overpress 2x15, with belt   8" step PT overpress 2x15, with belt 8" step PT overpress 2x15, 1 round with belt 8" step PT overpress 2x15, 1 round with belt   VG for strength and ROM S/l L7 3x10 ea  S/l L7 3x10 ea S/l L7 3x10 ea S/l L7 3x10 ea S/l L7 3x10 ea   S/l L7 3x10 ea  D/l L7 2x10 (end) S/l L7 3xF ea     Slant  45"x3 LLE only, standing calf stretch 45"x3 LLE only, standing calf stretch 45"x3 LLE only, standing calf stretch 45"x3 LLE only, standing calf stretch 45"x3 LLE only, standing calf stretch   45"x4 LLE only, standing calf stretch 45"x4 LLE only, standing calf stretch 45"x3 LLE only, standing calf stretch                                          Ther Activity             Sit to stands                          Gait Training                                       Modalities

## 2023-08-30 ENCOUNTER — EVALUATION (OUTPATIENT)
Dept: PHYSICAL THERAPY | Facility: REHABILITATION | Age: 73
End: 2023-08-30
Payer: COMMERCIAL

## 2023-08-30 DIAGNOSIS — M79.672 LEFT FOOT PAIN: ICD-10-CM

## 2023-08-30 DIAGNOSIS — M72.2 PLANTAR FASCIITIS: Primary | ICD-10-CM

## 2023-08-30 DIAGNOSIS — R26.89 BALANCE PROBLEM: ICD-10-CM

## 2023-08-30 DIAGNOSIS — R26.9 GAIT ABNORMALITY: ICD-10-CM

## 2023-08-30 PROCEDURE — 97140 MANUAL THERAPY 1/> REGIONS: CPT | Performed by: PHYSICAL THERAPIST

## 2023-08-30 PROCEDURE — 97110 THERAPEUTIC EXERCISES: CPT | Performed by: PHYSICAL THERAPIST

## 2023-08-30 NOTE — PROGRESS NOTES
Daily Note /re-eval    Today's date: 2023  Patient name: Josh Mena  : 1950  MRN: [id number]  Referring provider: Cassie Johnson MD  Dx:   Encounter Diagnosis     ICD-10-CM    1. Plantar fasciitis  M72.2 PT plan of care cert/re-cert      2. Balance problem  R26.89 PT plan of care cert/re-cert      3. Gait abnormality  R26.9 PT plan of care cert/re-cert      4. Left foot pain  M79.672 PT plan of care cert/re-cert          Start Time: 1210  Stop Time: 1258  Total time in clinic (min): 48 minutes    Subjective: Pt reports 45-50% improvement overall. Pain  Current pain ratin  At best pain ratin  At worst pain ratin in the past week, 8 at eval, 7 last RE  Location: arch of foot and TC joint  Quality: sharp     Objective: See treatment diary below     Palpation:  TPP: Proximal Longitudinal arch (L)     Joint Assessment:  Hypomobile throughout subtalar and talocrural      Lower Extremity Strength    MMT   AROM   PROM     Knee Left Right Left Right Left Right   Flex 5 5           Ext 4+ 4+                           Hip               flex 4 4           ext               abd                               Ankle               DF 5 5 lacking 20 (prev 20) lacking 12       PF               inversion 5(prev 4+) 5           Eversion  5 (prev 4+) 5                 Dynamic Balance Tests  Gait speed (m/s)  1.2 m/s required to cross the street safely     5x sit to stand (sec)  >14 sec indicates high risk for falls IE = 14.22 with BUE, unable without UE   = 12.12, unable without UE   = 13.06,  unable without UE   = 11.70, unable without UE   TUG (sec)  >14 sec indicates high risk for falls IE = 19 sec with [facility]   = 16.95 with [facility]   = 17.48 with SPC   = 12.50 with SPC      Gait:  ambulates with SPC, L foot turned out, wide KEILY, L foot plantar flexed and heel inverted, unsteady        Access Code: [email or web address]  URL: [email or web address]  Date: 2023  Prepared by: Leidy Mann     Exercises  - Long Sitting Calf Stretch with Strap  - 2-3 x daily - 7 x weekly - 1 sets - 4 reps - 30-45 hold  - Seated Plantar Fascia Mobilization with Small Ball  - 2-3 x daily - 7 x weekly - 1 sets - 3-5 reps - 60 hold        Assessment:  Pt presents for her 36th visit and reassessment. Since last RE, pt has excellent progress in balance/fucntional strength. This is demonstrated by improvements in 5 times sit to stand and TUG test scores. Her test scores also place her at lower risk for falls. Her DF AROM has remained about the same, but is still improved compared to eval. She continues to make slow by steady progress towards her therapy goals. Recommend continued skilled outpatient PT to improve strength and balance, to maintain ankle ROM and joint mobility, to address therapy goals, to reduce pain, and improve function.           Goals  Short term goals: to be met in 4-5 weeks  Pt independent with initial HEP, rationale, technique and frequency, for ROM and pain control.  MET  Achieve 5 times sit to stand test score to <12 sec with BUE indicating improved fucntional LE strength and reduced fall risk. MET  Pt able to perform 1 sit to stand without UE indicating and improvement in functional LE strength. UNMET  Achieve an improvement in L ankle DF AROM for improved ease of standing and walking and to reduce strain on the longitudinal arch. MET  Pt will achieve an improvement in FOTO score indicating an improvement in pain and function. MET        Long term goals: to be met in 8-10 weeks   Pt will report a 75% or > reduction in subjective pain complaints/symptoms to better manage ADLs and functional mobility. IN PROGRESS  Achieve a further improvement in L ankle DF AROM for improved ease of standing and walking and to reduce strain on the longitudinal arch. UNMET  Pt able to perform 3 sit to stands without UE indicating and improvement in functional LE strength.  UNMET  Pt will perform the TUG test in less than <14 seconds indicating decreased risk for falls. MET  Pt will improve FOTO score to = or better then expected outcome indicating an overall improvement in pain and function    Pt independent with rationale, technique and plan for performance of advanced HEP to maintain gains made in therapy. IN PROGRESS  Achieve pts goal: strengthen legs, be able to manuever better, less discomfort  IN PROGRESS            Plan: Continue per plan of care. 2x/week for 6-8 weeks.      Precautions: HTN, OP, fall risk, CA hx, hx of L ankle surgery        Manuals 8/14 8/16 8/21 8/23 8/28 8/30  8/4 8/7 8/10   L ankle TC PA and distraction and ST mobs DS Gr IV DS Gr IV DS gr IV DS gr IV DS gr IV DS Gr IV  LA Gr IV ea DS Gr IV DS Gr IV                STM PF DS DS DS DS DS DS  LA DS DS   Heel lift admin                          Neuro Re-Ed             Sand dune mini march 3 min no UE close S 3 min no UE close S 3 min no UE close S 3 min no UE close S 3 min no UE close S 3 min no UE close S  3 min no UE close S 3 min no UE close S 3 min no UE close S   SLS on airex with contralat hip abd,  GMB x30 ea light UE GMB x30 ea light UE GMB x30 ea light UE GMB x30 ea light UE GMB x30 ea light UE nv  GMB 2x15 ea only  light UE GMB x30 ea light UE GMB x30 ea light UE   Sand dune step ups 1 min ea LE light UE close S 1 min ea LE light UE close S 1 min ea LE light UE close S 1 min ea LE light UE close S 1 min ea LE light UE close S 1 min ea LE light UE close S  1 min ea LE light UE close S 1 min ea LE light UE close S                                           Ther Ex             DF MWM 8" step PT overpress 2x15, with belt 8" step PT overpress 2x15, with belt 8" step PT overpress 2x15, with belt 8" step PT overpress 2x15, with belt 8" step PT overpress 2x15, with belt 8" step PT overpress 2x15, with belt  8" step PT overpress 2x15, with belt 8" step PT overpress 2x15, 1 round with belt 8" step PT overpress 2x15, 1 round with belt   VG for strength and ROM S/l L7 3x10 ea  S/l L7 3x10 ea S/l L7 3x10 ea S/l L7 3x10 ea S/l L7 3x10 ea S/l L7 3x10 ea  S/l L7 3x10 ea  D/l L7 2x10 (end) S/l L7 3xF ea     Slant  45"x3 LLE only, standing calf stretch 45"x3 LLE only, standing calf stretch 45"x3 LLE only, standing calf stretch 45"x3 LLE only, standing calf stretch 45"x3 LLE only, standing calf stretch 45"x3 LLE only, standing calf stretch  45"x4 LLE only, standing calf stretch 45"x4 LLE only, standing calf stretch 45"x3 LLE only, standing calf stretch                                          Ther Activity             Sit to stands      5x STS  TUG x2                    Gait Training                                       Modalities

## 2023-09-06 ENCOUNTER — APPOINTMENT (OUTPATIENT)
Dept: PHYSICAL THERAPY | Facility: REHABILITATION | Age: 73
End: 2023-09-06
Payer: COMMERCIAL

## 2023-09-08 ENCOUNTER — OFFICE VISIT (OUTPATIENT)
Dept: PHYSICAL THERAPY | Facility: REHABILITATION | Age: 73
End: 2023-09-08
Payer: COMMERCIAL

## 2023-09-08 DIAGNOSIS — M72.2 PLANTAR FASCIITIS: Primary | ICD-10-CM

## 2023-09-08 DIAGNOSIS — R26.9 GAIT ABNORMALITY: ICD-10-CM

## 2023-09-08 DIAGNOSIS — M79.672 LEFT FOOT PAIN: ICD-10-CM

## 2023-09-08 DIAGNOSIS — R26.89 BALANCE PROBLEM: ICD-10-CM

## 2023-09-08 PROCEDURE — 97112 NEUROMUSCULAR REEDUCATION: CPT | Performed by: PHYSICAL THERAPIST

## 2023-09-08 PROCEDURE — 97140 MANUAL THERAPY 1/> REGIONS: CPT | Performed by: PHYSICAL THERAPIST

## 2023-09-08 PROCEDURE — 97110 THERAPEUTIC EXERCISES: CPT | Performed by: PHYSICAL THERAPIST

## 2023-09-08 NOTE — PROGRESS NOTES
Daily Note     Today's date: 2023  Patient name: Shashi Sutherland  : 1950  MRN: [id number]  Referring provider: Rita Stanton MD  Dx:   Encounter Diagnosis     ICD-10-CM    1. Plantar fasciitis  M72.2       2. Balance problem  R26.89       3. Gait abnormality  R26.9       4. Left foot pain  M79.672           Start Time: 1230  Stop Time: 1313  Total time in clinic (min): 43 minutes    Subjective: Pt reports nothing new      Objective: See treatment diary below      Assessment: Pt's gait improved following manuals. She is still challenged by the sand dune exercises secondary to balance impairments. Pt will benefit from continued skilled outpatient PT to improve strength, ROM, jt mobility, to address therapy goals, to reduce pain, and improve function. Plan: Continue per plan of care. Progress treatment as tolerated.        Precautions: HTN, OP, fall risk, CA hx, hx of L ankle surgery        Manuals 8/14 8/16 8/21 8/23 8/28 8/30 9/8  8/7 8/10   L ankle TC PA and distraction and ST mobs DS Gr IV DS Gr IV DS gr IV DS gr IV DS gr IV DS Gr IV DS Gr IV  DS Gr IV DS Gr IV                STM PF DS DS DS DS DS DS DS  DS DS   Heel lift admin                          Neuro Re-Ed             Sand dune mini march 3 min no UE close S 3 min no UE close S 3 min no UE close S 3 min no UE close S 3 min no UE close S 3 min no UE close S 3 min no UE close S  3 min no UE close S 3 min no UE close S   SLS on airex with contralat hip abd,  GMB x30 ea light UE GMB x30 ea light UE GMB x30 ea light UE GMB x30 ea light UE GMB x30 ea light UE nv GMB x30 ea light UE  GMB x30 ea light UE GMB x30 ea light UE   Sand dune step ups 1 min ea LE light UE close S 1 min ea LE light UE close S 1 min ea LE light UE close S 1 min ea LE light UE close S 1 min ea LE light UE close S 1 min ea LE light UE close S 1 min ea LE light UE close S  1 min ea LE light UE close S                                           Ther Ex             DF MWM 8" step PT overpress 2x15, with belt 8" step PT overpress 2x15, with belt 8" step PT overpress 2x15, with belt 8" step PT overpress 2x15, with belt 8" step PT overpress 2x15, with belt 8" step PT overpress 2x15, with belt 8" step PT overpress 2x15, with belt  8" step PT overpress 2x15, 1 round with belt 8" step PT overpress 2x15, 1 round with belt   VG for strength and ROM S/l L7 3x10 ea  S/l L7 3x10 ea S/l L7 3x10 ea S/l L7 3x10 ea S/l L7 3x10 ea S/l L7 3x10 ea S/l L7 3x10 ea  S/l L7 3xF ea     Slant  45"x3 LLE only, standing calf stretch 45"x3 LLE only, standing calf stretch 45"x3 LLE only, standing calf stretch 45"x3 LLE only, standing calf stretch 45"x3 LLE only, standing calf stretch 45"x3 LLE only, standing calf stretch 45"x3 LLE only, standing calf stretch  45"x4 LLE only, standing calf stretch 45"x3 LLE only, standing calf stretch                                          Ther Activity             Sit to stands      5x STS  TUG x2                    Gait Training                                       Modalities

## 2023-09-11 ENCOUNTER — OFFICE VISIT (OUTPATIENT)
Dept: PHYSICAL THERAPY | Facility: REHABILITATION | Age: 73
End: 2023-09-11
Payer: COMMERCIAL

## 2023-09-11 DIAGNOSIS — R26.9 GAIT ABNORMALITY: ICD-10-CM

## 2023-09-11 DIAGNOSIS — R26.89 BALANCE PROBLEM: ICD-10-CM

## 2023-09-11 DIAGNOSIS — M79.672 LEFT FOOT PAIN: ICD-10-CM

## 2023-09-11 DIAGNOSIS — M72.2 PLANTAR FASCIITIS: Primary | ICD-10-CM

## 2023-09-11 PROCEDURE — 97110 THERAPEUTIC EXERCISES: CPT | Performed by: PHYSICAL THERAPIST

## 2023-09-11 PROCEDURE — 97112 NEUROMUSCULAR REEDUCATION: CPT | Performed by: PHYSICAL THERAPIST

## 2023-09-11 PROCEDURE — 97140 MANUAL THERAPY 1/> REGIONS: CPT | Performed by: PHYSICAL THERAPIST

## 2023-09-11 NOTE — PROGRESS NOTES
Daily Note     Today's date: 2023  Patient name: Vidya Pennington  : 1950  MRN: [id number]  Referring provider: Dasia Mabry MD  Dx:   Encounter Diagnosis     ICD-10-CM    1. Plantar fasciitis  M72.2       2. Balance problem  R26.89       3. Gait abnormality  R26.9       4. Left foot pain  M79.672           Start Time: 1015  Stop Time: 1100  Total time in clinic (min): 45 minutes    Subjective: Pt reports the STM to the plantar fascia has been helpful and making her foot pain better when standing and walking. Objective: See treatment diary below      Assessment: Pt responded well to the exercises. She continues to ambulate better post tx with a less antalgic gait and improved LLE stance time. Pt will benefit from continued skilled outpatient PT to improve strength, ROM, jt mobility, to address therapy goals, to reduce pain, and improve function. Plan: Continue per plan of care. Progress treatment as tolerated.        Precautions: HTN, OP, fall risk, CA hx, hx of L ankle surgery        Manuals 8/14 8/16 8/21 8/23 8/28 8/30 9/8 9/11  8/10   L ankle TC PA and distraction and ST mobs DS Gr IV DS Gr IV DS gr IV DS gr IV DS gr IV DS Gr IV DS Gr IV DS Gr IV  DS Gr IV                STM PF DS DS DS DS DS DS DS DS  DS   Heel lift admin                          Neuro Re-Ed             Sand dune mini march 3 min no UE close S 3 min no UE close S 3 min no UE close S 3 min no UE close S 3 min no UE close S 3 min no UE close S 3 min no UE close S 3 min no UE close S  3 min no UE close S   SLS on airex with contralat hip abd,  GMB x30 ea light UE GMB x30 ea light UE GMB x30 ea light UE GMB x30 ea light UE GMB x30 ea light UE nv GMB x30 ea light UE GMB x30 ea light UE  GMB x30 ea light UE   Sand dune step ups 1 min ea LE light UE close S 1 min ea LE light UE close S 1 min ea LE light UE close S 1 min ea LE light UE close S 1 min ea LE light UE close S 1 min ea LE light UE close S 1 min ea LE light UE close S 1 min ea LE light UE close S                                            Ther Ex             DF MWM 8" step PT overpress 2x15, with belt 8" step PT overpress 2x15, with belt 8" step PT overpress 2x15, with belt 8" step PT overpress 2x15, with belt 8" step PT overpress 2x15, with belt 8" step PT overpress 2x15, with belt 8" step PT overpress 2x15, with belt 8" step PT overpress 2x15, 1 round with belt  8" step PT overpress 2x15, 1 round with belt   VG for strength and ROM S/l L7 3x10 ea  S/l L7 3x10 ea S/l L7 3x10 ea S/l L7 3x10 ea S/l L7 3x10 ea S/l L7 3x10 ea S/l L7 3x10 ea S/l L7 3x10 ea      Slant  45"x3 LLE only, standing calf stretch 45"x3 LLE only, standing calf stretch 45"x3 LLE only, standing calf stretch 45"x3 LLE only, standing calf stretch 45"x3 LLE only, standing calf stretch 45"x3 LLE only, standing calf stretch 45"x3 LLE only, standing calf stretch 45"x3 LLE only, standing calf stretch  45"x3 LLE only, standing calf stretch                                          Ther Activity             Sit to stands      5x STS  TUG x2                    Gait Training                                       Modalities

## 2023-09-13 ENCOUNTER — OFFICE VISIT (OUTPATIENT)
Dept: PHYSICAL THERAPY | Facility: REHABILITATION | Age: 73
End: 2023-09-13
Payer: COMMERCIAL

## 2023-09-13 DIAGNOSIS — M72.2 PLANTAR FASCIITIS: Primary | ICD-10-CM

## 2023-09-13 DIAGNOSIS — R26.89 BALANCE PROBLEM: ICD-10-CM

## 2023-09-13 DIAGNOSIS — R26.9 GAIT ABNORMALITY: ICD-10-CM

## 2023-09-13 DIAGNOSIS — M79.672 LEFT FOOT PAIN: ICD-10-CM

## 2023-09-13 PROCEDURE — 97140 MANUAL THERAPY 1/> REGIONS: CPT | Performed by: PHYSICAL THERAPIST

## 2023-09-13 PROCEDURE — 97110 THERAPEUTIC EXERCISES: CPT | Performed by: PHYSICAL THERAPIST

## 2023-09-13 PROCEDURE — 97112 NEUROMUSCULAR REEDUCATION: CPT | Performed by: PHYSICAL THERAPIST

## 2023-09-13 NOTE — PROGRESS NOTES
Daily Note     Today's date: 2023  Patient name: Steve Bergman  : 1950  MRN: [id number]  Referring provider: Crystal Mcintosh MD  Dx:   Encounter Diagnosis     ICD-10-CM    1. Plantar fasciitis  M72.2       2. Balance problem  R26.89       3. Gait abnormality  R26.9       4. Left foot pain  M79.672           Start Time: 1217  Stop Time: 1302  Total time in clinic (min): 45 minutes    Subjective: Pt reports she had to ice her ankle earlier b/c she was on her feet a lot this morning and had a lot of pain. Objective: See treatment diary below      Assessment: Favorable response to tx with reduction in pain and improvement in mobility. Pt will benefit from continued skilled outpatient PT to improve strength, ROM, jt mobility, to address therapy goals, to reduce pain, and improve function. Plan: Continue per plan of care. Progress treatment as tolerated.        Precautions: HTN, OP, fall risk, CA hx, hx of L ankle surgery        Manuals     L ankle TC PA and distraction and ST mobs DS Gr IV DS Gr IV DS gr IV DS gr IV DS gr IV DS Gr IV DS Gr IV DS Gr IV DS Gr IV                 STM PF DS DS DS DS DS DS DS DS DS    Heel lift admin                          Neuro Re-Ed             Sand dune mini march 3 min no UE close S 3 min no UE close S 3 min no UE close S 3 min no UE close S 3 min no UE close S 3 min no UE close S 3 min no UE close S 3 min no UE close S 3 min no UE close S    SLS on airex with contralat hip abd,  GMB x30 ea light UE GMB x30 ea light UE GMB x30 ea light UE GMB x30 ea light UE GMB x30 ea light UE nv GMB x30 ea light UE GMB x30 ea light UE GMB x30 ea light UE    Sand dune step ups 1 min ea LE light UE close S 1 min ea LE light UE close S 1 min ea LE light UE close S 1 min ea LE light UE close S 1 min ea LE light UE close S 1 min ea LE light UE close S 1 min ea LE light UE close S 1 min ea LE light UE close S 1 min ea LE light UE close S Ther Ex             DF MWM 8" step PT overpress 2x15, with belt 8" step PT overpress 2x15, with belt 8" step PT overpress 2x15, with belt 8" step PT overpress 2x15, with belt 8" step PT overpress 2x15, with belt 8" step PT overpress 2x15, with belt 8" step PT overpress 2x15, with belt 8" step PT overpress 2x15, 1 round with belt 8" step PT overpress 2x15, 1 round with belt    VG for strength and ROM S/l L7 3x10 ea  S/l L7 3x10 ea S/l L7 3x10 ea S/l L7 3x10 ea S/l L7 3x10 ea S/l L7 3x10 ea S/l L7 3x10 ea S/l L7 3x10 ea  S/l L7 3x10 ea     Slant  45"x3 LLE only, standing calf stretch 45"x3 LLE only, standing calf stretch 45"x3 LLE only, standing calf stretch 45"x3 LLE only, standing calf stretch 45"x3 LLE only, standing calf stretch 45"x3 LLE only, standing calf stretch 45"x3 LLE only, standing calf stretch 45"x3 LLE only, standing calf stretch 45"x4 LLE only, standing calf stretch                                           Ther Activity             Sit to stands      5x STS  TUG x2                    Gait Training                                       Modalities

## 2023-09-18 ENCOUNTER — OFFICE VISIT (OUTPATIENT)
Dept: PHYSICAL THERAPY | Facility: REHABILITATION | Age: 73
End: 2023-09-18
Payer: COMMERCIAL

## 2023-09-18 DIAGNOSIS — M72.2 PLANTAR FASCIITIS: Primary | ICD-10-CM

## 2023-09-18 DIAGNOSIS — R26.9 GAIT ABNORMALITY: ICD-10-CM

## 2023-09-18 DIAGNOSIS — R26.89 BALANCE PROBLEM: ICD-10-CM

## 2023-09-18 DIAGNOSIS — M79.672 LEFT FOOT PAIN: ICD-10-CM

## 2023-09-18 PROCEDURE — 97112 NEUROMUSCULAR REEDUCATION: CPT | Performed by: PHYSICAL THERAPIST

## 2023-09-18 PROCEDURE — 97110 THERAPEUTIC EXERCISES: CPT | Performed by: PHYSICAL THERAPIST

## 2023-09-18 PROCEDURE — 97140 MANUAL THERAPY 1/> REGIONS: CPT | Performed by: PHYSICAL THERAPIST

## 2023-09-18 NOTE — PROGRESS NOTES
Daily Note     Today's date: 2023  Patient name: Crow Wan  : 1950  MRN: [id number]  Referring provider: Audrey Maza MD  Dx:   Encounter Diagnosis     ICD-10-CM    1. Plantar fasciitis  M72.2       2. Balance problem  R26.89       3. Gait abnormality  R26.9       4. Left foot pain  M79.672           Start Time: 1015  Stop Time: 1055  Total time in clinic (min): 40 minutes    Subjective: Pt reports her ankle is aching b/c of the rainy weather today    Objective: See treatment diary below      Assessment: Pt was a little more achy today but responded well to manuals and noted more mobility post tx. Pt will benefit from continued skilled outpatient PT to improve strength, ROM, jt mobility, to address therapy goals, to reduce pain, and improve function. Plan: Continue per plan of care. Progress treatment as tolerated.        Precautions: HTN, OP, fall risk, CA hx, hx of L ankle surgery        Manuals    L ankle TC PA and distraction and ST mobs DS Gr IV DS Gr IV DS gr IV DS gr IV DS gr IV DS Gr IV DS Gr IV DS Gr IV DS Gr IV DS Gr IV                STM PF DS DS DS DS DS DS DS DS DS DS   Heel lift admin                          Neuro Re-Ed             Sand dune mini march 3 min no UE close S 3 min no UE close S 3 min no UE close S 3 min no UE close S 3 min no UE close S 3 min no UE close S 3 min no UE close S 3 min no UE close S 3 min no UE close S 3 min no UE close S   SLS on airex with contralat hip abd,  GMB x30 ea light UE GMB x30 ea light UE GMB x30 ea light UE GMB x30 ea light UE GMB x30 ea light UE nv GMB x30 ea light UE GMB x30 ea light UE GMB x30 ea light UE GMB x30 ea light UE   Sand dune step ups 1 min ea LE light UE close S 1 min ea LE light UE close S 1 min ea LE light UE close S 1 min ea LE light UE close S 1 min ea LE light UE close S 1 min ea LE light UE close S 1 min ea LE light UE close S 1 min ea LE light UE close S 1 min ea LE light UE close S 1 min ea LE light UE close S                                          Ther Ex             DF MWM 8" step PT overpress 2x15, with belt 8" step PT overpress 2x15, with belt 8" step PT overpress 2x15, with belt 8" step PT overpress 2x15, with belt 8" step PT overpress 2x15, with belt 8" step PT overpress 2x15, with belt 8" step PT overpress 2x15, with belt 8" step PT overpress 2x15, 1 round with belt 8" step PT overpress 2x15, 1 round with belt 8" step PT overpress 2x15, 1 round with belt   VG for strength and ROM S/l L7 3x10 ea  S/l L7 3x10 ea S/l L7 3x10 ea S/l L7 3x10 ea S/l L7 3x10 ea S/l L7 3x10 ea S/l L7 3x10 ea S/l L7 3x10 ea  S/l L7 3x10 ea  S/l L7 3x10 ea    Slant  45"x3 LLE only, standing calf stretch 45"x3 LLE only, standing calf stretch 45"x3 LLE only, standing calf stretch 45"x3 LLE only, standing calf stretch 45"x3 LLE only, standing calf stretch 45"x3 LLE only, standing calf stretch 45"x3 LLE only, standing calf stretch 45"x3 LLE only, standing calf stretch 45"x4 LLE only, standing calf stretch 45"x4 LLE only, standing calf stretch                                          Ther Activity             Sit to stands      5x STS  TUG x2                    Gait Training                                       Modalities

## 2023-09-20 ENCOUNTER — APPOINTMENT (OUTPATIENT)
Dept: PHYSICAL THERAPY | Facility: REHABILITATION | Age: 73
End: 2023-09-20
Payer: COMMERCIAL

## 2023-09-21 ENCOUNTER — OFFICE VISIT (OUTPATIENT)
Dept: PHYSICAL THERAPY | Facility: REHABILITATION | Age: 73
End: 2023-09-21
Payer: COMMERCIAL

## 2023-09-21 DIAGNOSIS — R26.9 GAIT ABNORMALITY: ICD-10-CM

## 2023-09-21 DIAGNOSIS — R26.89 BALANCE PROBLEM: ICD-10-CM

## 2023-09-21 DIAGNOSIS — M72.2 PLANTAR FASCIITIS: Primary | ICD-10-CM

## 2023-09-21 DIAGNOSIS — M79.672 LEFT FOOT PAIN: ICD-10-CM

## 2023-09-21 PROCEDURE — 97110 THERAPEUTIC EXERCISES: CPT | Performed by: PHYSICAL THERAPIST

## 2023-09-21 PROCEDURE — 97140 MANUAL THERAPY 1/> REGIONS: CPT | Performed by: PHYSICAL THERAPIST

## 2023-09-21 PROCEDURE — 97112 NEUROMUSCULAR REEDUCATION: CPT | Performed by: PHYSICAL THERAPIST

## 2023-09-21 NOTE — PROGRESS NOTES
Daily Note     Today's date: 2023  Patient name: Alyssa Chauhan  : 1950  MRN: [id number]  Referring provider: Cuauhtemoc Jefferson MD  Dx:   Encounter Diagnosis     ICD-10-CM    1. Plantar fasciitis  M72.2       2. Balance problem  R26.89       3. Gait abnormality  R26.9       4. Left foot pain  M79.672           Start Time: 1445  Stop Time: 1527  Total time in clinic (min): 42 minutes    Subjective: Pt reports her ankle is aching b/c of the rainy weather today    Objective: See treatment diary below      Assessment: Pt responded well to the exercises and continues to benefit from mobilizations. Pt will benefit from continued skilled outpatient PT to improve strength, ROM, jt mobility, to address therapy goals, to reduce pain, and improve function. Plan: Continue per plan of care. Progress treatment as tolerated.           Precautions: HTN, OP, fall risk, CA hx, hx of L ankle surgery        Manuals    L ankle TC PA and distraction and ST mobs DS Gr IV    DS gr IV DS Gr IV DS Gr IV DS Gr IV DS Gr IV DS Gr IV                STM PF DS    DS DS DS DS DS DS   Heel lift admin                          Neuro Re-Ed             Sand dune mini march 3 min no UE close S    3 min no UE close S 3 min no UE close S 3 min no UE close S 3 min no UE close S 3 min no UE close S 3 min no UE close S   SLS on airex with contralat hip abd,  GMB x30 ea light UE    GMB x30 ea light UE nv GMB x30 ea light UE GMB x30 ea light UE GMB x30 ea light UE GMB x30 ea light UE   Sand dune step ups 1 min ea LE light UE close S    1 min ea LE light UE close S 1 min ea LE light UE close S 1 min ea LE light UE close S 1 min ea LE light UE close S 1 min ea LE light UE close S 1 min ea LE light UE close S                                          Ther Ex             DF MWM 8" step PT overpress 2x15, with belt    8" step PT overpress 2x15, with belt 8" step PT overpress 2x15, with belt 8" step PT overpress 2x15, with belt 8" step PT overpress 2x15, 1 round with belt 8" step PT overpress 2x15, 1 round with belt 8" step PT overpress 2x15, 1 round with belt   VG for strength and ROM S/l L7 3x10 ea     S/l L7 3x10 ea S/l L7 3x10 ea S/l L7 3x10 ea S/l L7 3x10 ea  S/l L7 3x10 ea  S/l L7 3x10 ea    Slant  45"x3 LLE only, standing calf stretch    45"x3 LLE only, standing calf stretch 45"x3 LLE only, standing calf stretch 45"x3 LLE only, standing calf stretch 45"x3 LLE only, standing calf stretch 45"x4 LLE only, standing calf stretch 45"x4 LLE only, standing calf stretch                                          Ther Activity             Sit to stands      5x STS  TUG x2                    Gait Training                                       Modalities

## 2023-09-25 ENCOUNTER — APPOINTMENT (OUTPATIENT)
Dept: PHYSICAL THERAPY | Facility: REHABILITATION | Age: 73
End: 2023-09-25
Payer: COMMERCIAL

## 2023-09-27 ENCOUNTER — OFFICE VISIT (OUTPATIENT)
Dept: PHYSICAL THERAPY | Facility: REHABILITATION | Age: 73
End: 2023-09-27
Payer: COMMERCIAL

## 2023-09-27 DIAGNOSIS — M79.672 LEFT FOOT PAIN: ICD-10-CM

## 2023-09-27 DIAGNOSIS — R26.89 BALANCE PROBLEM: ICD-10-CM

## 2023-09-27 DIAGNOSIS — M72.2 PLANTAR FASCIITIS: Primary | ICD-10-CM

## 2023-09-27 DIAGNOSIS — R26.9 GAIT ABNORMALITY: ICD-10-CM

## 2023-09-27 PROCEDURE — 97110 THERAPEUTIC EXERCISES: CPT | Performed by: PHYSICAL THERAPIST

## 2023-09-27 PROCEDURE — 97112 NEUROMUSCULAR REEDUCATION: CPT | Performed by: PHYSICAL THERAPIST

## 2023-09-27 PROCEDURE — 97140 MANUAL THERAPY 1/> REGIONS: CPT | Performed by: PHYSICAL THERAPIST

## 2023-09-27 NOTE — PROGRESS NOTES
Daily Note     Today's date: 2023  Patient name: Arturo Baumann  : 1950  MRN: [id number]  Referring provider: Colten Nagel MD  Dx:   Encounter Diagnosis     ICD-10-CM    1. Plantar fasciitis  M72.2       2. Balance problem  R26.89       3. Gait abnormality  R26.9       4. Left foot pain  M79.672           Start Time: 1215  Stop Time: 1254  Total time in clinic (min): 39 minutes    Subjective: Pt reports she woke up hip and ankle pain Monday from the damp weather. Feels better today. Objective: See treatment diary below      Assessment: Pt responded well to the exercises and continues to benefit from mobilizations. Pt will benefit from continued skilled outpatient PT to improve strength, ROM, jt mobility, to address therapy goals, to reduce pain, and improve function. Plan: Continue per plan of care. Progress treatment as tolerated.           Precautions: HTN, OP, fall risk, CA hx, hx of L ankle surgery        Manuals    L ankle TC PA and distraction and ST mobs DS Gr IV DS Gr IV   DS gr IV DS Gr IV DS Gr IV DS Gr IV DS Gr IV DS Gr IV                STM PF DS DS   DS DS DS DS DS DS   Heel lift admin                          Neuro Re-Ed             Sand dune mini march 3 min no UE close S 3 min no UE close S   3 min no UE close S 3 min no UE close S 3 min no UE close S 3 min no UE close S 3 min no UE close S 3 min no UE close S   SLS on airex with contralat hip abd,  GMB x30 ea light UE GMB x30 ea light UE   GMB x30 ea light UE nv GMB x30 ea light UE GMB x30 ea light UE GMB x30 ea light UE GMB x30 ea light UE   Sand dune step ups 1 min ea LE light UE close S 1 min ea LE light UE close S   1 min ea LE light UE close S 1 min ea LE light UE close S 1 min ea LE light UE close S 1 min ea LE light UE close S 1 min ea LE light UE close S 1 min ea LE light UE close S                                          Ther Ex             DF MWM 8" step PT overpress 2x15, with belt 8" step PT overpress 2x15, with belt   8" step PT overpress 2x15, with belt 8" step PT overpress 2x15, with belt 8" step PT overpress 2x15, with belt 8" step PT overpress 2x15, 1 round with belt 8" step PT overpress 2x15, 1 round with belt 8" step PT overpress 2x15, 1 round with belt   VG for strength and ROM S/l L7 3x10 ea  S/l L7 3x10 ea    S/l L7 3x10 ea S/l L7 3x10 ea S/l L7 3x10 ea S/l L7 3x10 ea  S/l L7 3x10 ea  S/l L7 3x10 ea    Slant  45"x3 LLE only, standing calf stretch 45"x3 LLE only, standing calf stretch   45"x3 LLE only, standing calf stretch 45"x3 LLE only, standing calf stretch 45"x3 LLE only, standing calf stretch 45"x3 LLE only, standing calf stretch 45"x4 LLE only, standing calf stretch 45"x4 LLE only, standing calf stretch                                          Ther Activity             Sit to stands      5x STS  TUG x2                    Gait Training                                       Modalities

## 2023-10-02 ENCOUNTER — OFFICE VISIT (OUTPATIENT)
Dept: PHYSICAL THERAPY | Facility: REHABILITATION | Age: 73
End: 2023-10-02
Payer: COMMERCIAL

## 2023-10-02 DIAGNOSIS — R26.9 GAIT ABNORMALITY: ICD-10-CM

## 2023-10-02 DIAGNOSIS — M72.2 PLANTAR FASCIITIS: Primary | ICD-10-CM

## 2023-10-02 DIAGNOSIS — M79.672 LEFT FOOT PAIN: ICD-10-CM

## 2023-10-02 DIAGNOSIS — R26.89 BALANCE PROBLEM: ICD-10-CM

## 2023-10-02 PROCEDURE — 97112 NEUROMUSCULAR REEDUCATION: CPT | Performed by: PHYSICAL THERAPIST

## 2023-10-02 PROCEDURE — 97140 MANUAL THERAPY 1/> REGIONS: CPT | Performed by: PHYSICAL THERAPIST

## 2023-10-02 PROCEDURE — 97110 THERAPEUTIC EXERCISES: CPT | Performed by: PHYSICAL THERAPIST

## 2023-10-02 NOTE — PROGRESS NOTES
Daily Note     Today's date: 10/2/2023  Patient name: Silvano Stewart  : 1950  MRN: [id number]  Referring provider: Win Eng MD  Dx:   Encounter Diagnosis     ICD-10-CM    1. Plantar fasciitis  M72.2       2. Balance problem  R26.89       3. Gait abnormality  R26.9       4. Left foot pain  M79.672           Start Time: 1215  Stop Time: 1253  Total time in clinic (min): 38 minutes    Subjective: Pt reports she was on her feet a lot Saturday and had a lot of ankle pain as a result. Objective: See treatment diary below      Assessment: Pt was TTP over the planter fascia but tolerated STM well. Pt will benefit from continued skilled outpatient PT to improve strength, ROM, jt mobility, to address therapy goals, to reduce pain, and improve function. Plan: Continue per plan of care. Progress treatment as tolerated.           Precautions: HTN, OP, fall risk, CA hx, hx of L ankle surgery        Manuals 9/21 9/27 10/2  8/28 8/30 9/8 9/11 9/13 9/18   L ankle TC PA and distraction and ST mobs DS Gr IV DS Gr IV DS Gr IV  DS gr IV DS Gr IV DS Gr IV DS Gr IV DS Gr IV DS Gr IV                STM PF DS DS DS  DS DS DS DS DS DS   Heel lift admin                          Neuro Re-Ed             Sand dune mini march 3 min no UE close S 3 min no UE close S 3 min no UE close S  3 min no UE close S 3 min no UE close S 3 min no UE close S 3 min no UE close S 3 min no UE close S 3 min no UE close S   SLS on airex with contralat hip abd,  GMB x30 ea light UE GMB x30 ea light UE GMB x30 ea light UE  GMB x30 ea light UE nv GMB x30 ea light UE GMB x30 ea light UE GMB x30 ea light UE GMB x30 ea light UE   Sand dune step ups 1 min ea LE light UE close S 1 min ea LE light UE close S 1 min ea LE light UE close S  1 min ea LE light UE close S 1 min ea LE light UE close S 1 min ea LE light UE close S 1 min ea LE light UE close S 1 min ea LE light UE close S 1 min ea LE light UE close S Ther Ex             DF MWM 8" step PT overpress 2x15, with belt 8" step PT overpress 2x15, with belt 8" step PT overpress 2x15, with belt  8" step PT overpress 2x15, with belt 8" step PT overpress 2x15, with belt 8" step PT overpress 2x15, with belt 8" step PT overpress 2x15, 1 round with belt 8" step PT overpress 2x15, 1 round with belt 8" step PT overpress 2x15, 1 round with belt   VG for strength and ROM S/l L7 3x10 ea  S/l L7 3x10 ea  S/l L7 3x10 ea  S/l L7 3x10 ea S/l L7 3x10 ea S/l L7 3x10 ea S/l L7 3x10 ea  S/l L7 3x10 ea  S/l L7 3x10 ea    Slant  45"x3 LLE only, standing calf stretch 45"x3 LLE only, standing calf stretch 45"x3 LLE only, standing calf stretch  45"x3 LLE only, standing calf stretch 45"x3 LLE only, standing calf stretch 45"x3 LLE only, standing calf stretch 45"x3 LLE only, standing calf stretch 45"x4 LLE only, standing calf stretch 45"x4 LLE only, standing calf stretch                                          Ther Activity             Sit to stands      5x STS  TUG x2                    Gait Training                                       Modalities

## 2023-10-04 ENCOUNTER — APPOINTMENT (OUTPATIENT)
Dept: PHYSICAL THERAPY | Facility: REHABILITATION | Age: 73
End: 2023-10-04
Payer: COMMERCIAL

## 2023-10-09 ENCOUNTER — OFFICE VISIT (OUTPATIENT)
Dept: PHYSICAL THERAPY | Facility: REHABILITATION | Age: 73
End: 2023-10-09
Payer: COMMERCIAL

## 2023-10-09 DIAGNOSIS — M79.672 LEFT FOOT PAIN: ICD-10-CM

## 2023-10-09 DIAGNOSIS — R26.89 BALANCE PROBLEM: ICD-10-CM

## 2023-10-09 DIAGNOSIS — R26.9 GAIT ABNORMALITY: ICD-10-CM

## 2023-10-09 DIAGNOSIS — M72.2 PLANTAR FASCIITIS: Primary | ICD-10-CM

## 2023-10-09 PROCEDURE — 97112 NEUROMUSCULAR REEDUCATION: CPT | Performed by: PHYSICAL THERAPIST

## 2023-10-09 PROCEDURE — 97140 MANUAL THERAPY 1/> REGIONS: CPT | Performed by: PHYSICAL THERAPIST

## 2023-10-09 PROCEDURE — 97110 THERAPEUTIC EXERCISES: CPT | Performed by: PHYSICAL THERAPIST

## 2023-10-09 NOTE — PROGRESS NOTES
Daily Note     Today's date: 10/9/2023  Patient name: Alyssa Chuahan  : 1950  MRN: [id number]  Referring provider: Cuauhtemoc Jefferson MD  Dx:   Encounter Diagnosis     ICD-10-CM    1. Plantar fasciitis  M72.2       2. Balance problem  R26.89       3. Gait abnormality  R26.9       4. Left foot pain  M79.672           Start Time: 1215  Stop Time: 1300  Total time in clinic (min): 45 minutes    Subjective: Pt offers no new complaints. Objective: See treatment diary below      Assessment: Pt's ankle and foot mobility improved following manuals, but carryover is limited from session to session. She is still challenged by sand dune exercises and requires UE support for balance. Pt will benefit from continued skilled outpatient PT to improve strength, ROM, jt mobility, to address therapy goals, to reduce pain, and improve function. Plan: Continue per plan of care. Progress treatment as tolerated.           Precautions: HTN, OP, fall risk, CA hx, hx of L ankle surgery        Manuals 9/21 9/27 10/2 10/9  8/30 9/8 9/11 9/13 9/18   L ankle TC PA and distraction and ST mobs DS Gr IV DS Gr IV DS Gr IV DS gr IV  DS Gr IV DS Gr IV DS Gr IV DS Gr IV DS Gr IV                STM PF DS DS DS DS  DS DS DS DS DS   Heel lift admin                          Neuro Re-Ed             Sand dune mini march 3 min no UE close S 3 min no UE close S 3 min no UE close S 3 min no UE close S  3 min no UE close S 3 min no UE close S 3 min no UE close S 3 min no UE close S 3 min no UE close S   SLS on airex with contralat hip abd,  GMB x30 ea light UE GMB x30 ea light UE GMB x30 ea light UE GMB x30 ea light UE  nv GMB x30 ea light UE GMB x30 ea light UE GMB x30 ea light UE GMB x30 ea light UE   Sand dune step ups 1 min ea LE light UE close S 1 min ea LE light UE close S 1 min ea LE light UE close S 1 min ea LE light UE close S  1 min ea LE light UE close S 1 min ea LE light UE close S 1 min ea LE light UE close S 1 min ea LE light UE close S 1 min ea LE light UE close S                                          Ther Ex             DF MWM 8" step PT overpress 2x15, with belt 8" step PT overpress 2x15, with belt 8" step PT overpress 2x15, with belt 8" step PT overpress 2x15, with belt  8" step PT overpress 2x15, with belt 8" step PT overpress 2x15, with belt 8" step PT overpress 2x15, 1 round with belt 8" step PT overpress 2x15, 1 round with belt 8" step PT overpress 2x15, 1 round with belt   VG for strength and ROM S/l L7 3x10 ea  S/l L7 3x10 ea  S/l L7 3x10 ea S/l L7 3x10 ea  S/l L7 3x10 ea S/l L7 3x10 ea S/l L7 3x10 ea  S/l L7 3x10 ea  S/l L7 3x10 ea    Slant  45"x3 LLE only, standing calf stretch 45"x3 LLE only, standing calf stretch 45"x3 LLE only, standing calf stretch 45"x3 LLE only, standing calf stretch  45"x3 LLE only, standing calf stretch 45"x3 LLE only, standing calf stretch 45"x3 LLE only, standing calf stretch 45"x4 LLE only, standing calf stretch 45"x4 LLE only, standing calf stretch                                          Ther Activity             Sit to stands      5x STS  TUG x2                    Gait Training                                       Modalities

## 2023-10-11 ENCOUNTER — OFFICE VISIT (OUTPATIENT)
Dept: PHYSICAL THERAPY | Facility: REHABILITATION | Age: 73
End: 2023-10-11
Payer: COMMERCIAL

## 2023-10-11 DIAGNOSIS — R26.89 BALANCE PROBLEM: ICD-10-CM

## 2023-10-11 DIAGNOSIS — M72.2 PLANTAR FASCIITIS: Primary | ICD-10-CM

## 2023-10-11 DIAGNOSIS — R26.9 GAIT ABNORMALITY: ICD-10-CM

## 2023-10-11 DIAGNOSIS — M79.672 LEFT FOOT PAIN: ICD-10-CM

## 2023-10-11 PROCEDURE — 97112 NEUROMUSCULAR REEDUCATION: CPT | Performed by: PHYSICAL THERAPIST

## 2023-10-11 PROCEDURE — 97110 THERAPEUTIC EXERCISES: CPT | Performed by: PHYSICAL THERAPIST

## 2023-10-11 PROCEDURE — 97140 MANUAL THERAPY 1/> REGIONS: CPT | Performed by: PHYSICAL THERAPIST

## 2023-10-11 NOTE — PROGRESS NOTES
Daily Note     Today's date: 10/11/2023  Patient name: Maximus Bain  : 1950  MRN: [id number]  Referring provider: Paige Nesbitt MD  Dx:   Encounter Diagnosis     ICD-10-CM    1. Plantar fasciitis  M72.2       2. Gait abnormality  R26.9       3. Left foot pain  M79.672       4. Balance problem  R26.89             Start Time: 1215  Stop Time: 1254  Total time in clinic (min): 39 minutes    Subjective: Pt reports her plantar fascia is more bothersome today. Objective: See treatment diary below      Assessment: Pt had palpable restrictions along the plantar fascia and responded well to STM. Pt will benefit from continued skilled outpatient PT to improve strength, ROM, jt mobility, to address therapy goals, to reduce pain, and improve function. Plan: Continue per plan of care. Progress treatment as tolerated.           Precautions: HTN, OP, fall risk, CA hx, hx of L ankle surgery        Manuals 9/21 9/27 10/2 10/9 10/11   9/11 9/13 9/18   L ankle TC PA and distraction and ST mobs DS Gr IV DS Gr IV DS Gr IV DS gr IV DS Gr IV   DS Gr IV DS Gr IV DS Gr IV                STM PF DS DS DS DS DS   DS DS DS   Heel lift admin                          Neuro Re-Ed             Sand dune mini march 3 min no UE close S 3 min no UE close S 3 min no UE close S 3 min no UE close S 3 min no UE close S   3 min no UE close S 3 min no UE close S 3 min no UE close S   SLS on airex with contralat hip abd,  GMB x30 ea light UE GMB x30 ea light UE GMB x30 ea light UE GMB x30 ea light UE GMB x30 ea light UE   GMB x30 ea light UE GMB x30 ea light UE GMB x30 ea light UE   Sand dune step ups 1 min ea LE light UE close S 1 min ea LE light UE close S 1 min ea LE light UE close S 1 min ea LE light UE close S 1 min ea LE light UE close S   1 min ea LE light UE close S 1 min ea LE light UE close S 1 min ea LE light UE close S                                          Ther Ex             DF MWM 8" step PT overpress 2x15, with belt 8" step PT overpress 2x15, with belt 8" step PT overpress 2x15, with belt 8" step PT overpress 2x15, with belt 8" step PT overpress 2x15, with belt   8" step PT overpress 2x15, 1 round with belt 8" step PT overpress 2x15, 1 round with belt 8" step PT overpress 2x15, 1 round with belt   VG for strength and ROM S/l L7 3x10 ea  S/l L7 3x10 ea  S/l L7 3x10 ea S/l L7 3x10 ea S/l L7 3x10 ea   S/l L7 3x10 ea  S/l L7 3x10 ea  S/l L7 3x10 ea    Slant  45"x3 LLE only, standing calf stretch 45"x3 LLE only, standing calf stretch 45"x3 LLE only, standing calf stretch 45"x3 LLE only, standing calf stretch 45"x3 LLE only, standing calf stretch   45"x3 LLE only, standing calf stretch 45"x4 LLE only, standing calf stretch 45"x4 LLE only, standing calf stretch                                          Ther Activity             Sit to stands                          Gait Training                                       Modalities

## 2023-10-16 ENCOUNTER — OFFICE VISIT (OUTPATIENT)
Dept: PHYSICAL THERAPY | Facility: REHABILITATION | Age: 73
End: 2023-10-16
Payer: COMMERCIAL

## 2023-10-16 DIAGNOSIS — R26.89 BALANCE PROBLEM: ICD-10-CM

## 2023-10-16 DIAGNOSIS — M79.672 LEFT FOOT PAIN: ICD-10-CM

## 2023-10-16 DIAGNOSIS — M72.2 PLANTAR FASCIITIS: Primary | ICD-10-CM

## 2023-10-16 DIAGNOSIS — R26.9 GAIT ABNORMALITY: ICD-10-CM

## 2023-10-16 PROCEDURE — 97140 MANUAL THERAPY 1/> REGIONS: CPT | Performed by: PHYSICAL THERAPIST

## 2023-10-16 PROCEDURE — 97110 THERAPEUTIC EXERCISES: CPT | Performed by: PHYSICAL THERAPIST

## 2023-10-16 PROCEDURE — 97112 NEUROMUSCULAR REEDUCATION: CPT | Performed by: PHYSICAL THERAPIST

## 2023-10-16 NOTE — PROGRESS NOTES
Daily Note     Today's date: 10/16/2023  Patient name: Vidya Pennington  : 1950  MRN: [id number]  Referring provider: Dasia Mabry MD  Dx:   Encounter Diagnosis     ICD-10-CM    1. Plantar fasciitis  M72.2       2. Gait abnormality  R26.9       3. Left foot pain  M79.672       4. Balance problem  R26.89             Start Time: 1215  Stop Time: 1255  Total time in clinic (min): 40 minutes    Subjective: Pt reports she can walk better and longer when the plantar fascia is not bothering her      Objective: See treatment diary below      Assessment: Pt responded well to tx and continues to be challenged appropriately. Pt will benefit from continued skilled outpatient PT to improve strength, ROM, jt mobility, to address therapy goals, to reduce pain, and improve function. Plan: Continue per plan of care. Progress treatment as tolerated.           Precautions: HTN, OP, fall risk, CA hx, hx of L ankle surgery        Manuals 9/21 9/27 10/2 10/9 10/11 10/16  9/11 9/13 9/18   L ankle TC PA and distraction and ST mobs DS Gr IV DS Gr IV DS Gr IV DS gr IV DS Gr IV DS Gr IV  DS Gr IV DS Gr IV DS Gr IV                STM PF DS DS DS DS DS DS  DS DS DS   Heel lift admin                          Neuro Re-Ed             Sand dune mini march 3 min no UE close S 3 min no UE close S 3 min no UE close S 3 min no UE close S 3 min no UE close S 3 min no UE close S  3 min no UE close S 3 min no UE close S 3 min no UE close S   SLS on airex with contralat hip abd,  GMB x30 ea light UE GMB x30 ea light UE GMB x30 ea light UE GMB x30 ea light UE GMB x30 ea light UE GMB x30 ea light UE  GMB x30 ea light UE GMB x30 ea light UE GMB x30 ea light UE   Sand dune step ups 1 min ea LE light UE close S 1 min ea LE light UE close S 1 min ea LE light UE close S 1 min ea LE light UE close S 1 min ea LE light UE close S 1 min ea LE light UE close S  1 min ea LE light UE close S 1 min ea LE light UE close S 1 min ea LE light UE close S Ther Ex             DF MWM 8" step PT overpress 2x15, with belt 8" step PT overpress 2x15, with belt 8" step PT overpress 2x15, with belt 8" step PT overpress 2x15, with belt 8" step PT overpress 2x15, with belt 8" step PT overpress 2x15, with belt  8" step PT overpress 2x15, 1 round with belt 8" step PT overpress 2x15, 1 round with belt 8" step PT overpress 2x15, 1 round with belt   VG for strength and ROM S/l L7 3x10 ea  S/l L7 3x10 ea  S/l L7 3x10 ea S/l L7 3x10 ea S/l L7 3x10 ea S/l L7 3x10 ea  S/l L7 3x10 ea  S/l L7 3x10 ea  S/l L7 3x10 ea    Slant  45"x3 LLE only, standing calf stretch 45"x3 LLE only, standing calf stretch 45"x3 LLE only, standing calf stretch 45"x3 LLE only, standing calf stretch 45"x3 LLE only, standing calf stretch 45"x3 LLE only, standing calf stretch  45"x3 LLE only, standing calf stretch 45"x4 LLE only, standing calf stretch 45"x4 LLE only, standing calf stretch                                          Ther Activity             Sit to stands                          Gait Training                                       Modalities

## 2023-10-19 ENCOUNTER — OFFICE VISIT (OUTPATIENT)
Dept: PHYSICAL THERAPY | Facility: REHABILITATION | Age: 73
End: 2023-10-19
Payer: COMMERCIAL

## 2023-10-19 DIAGNOSIS — M79.672 LEFT FOOT PAIN: ICD-10-CM

## 2023-10-19 DIAGNOSIS — R26.89 BALANCE PROBLEM: ICD-10-CM

## 2023-10-19 DIAGNOSIS — R26.9 GAIT ABNORMALITY: ICD-10-CM

## 2023-10-19 DIAGNOSIS — M72.2 PLANTAR FASCIITIS: Primary | ICD-10-CM

## 2023-10-19 PROCEDURE — 97112 NEUROMUSCULAR REEDUCATION: CPT | Performed by: PHYSICAL THERAPIST

## 2023-10-19 PROCEDURE — 97110 THERAPEUTIC EXERCISES: CPT | Performed by: PHYSICAL THERAPIST

## 2023-10-19 PROCEDURE — 97140 MANUAL THERAPY 1/> REGIONS: CPT | Performed by: PHYSICAL THERAPIST

## 2023-10-19 NOTE — PROGRESS NOTES
Daily Note     Today's date: 10/19/2023  Patient name: Tod Arteaga  : 1950  MRN: [id number]  Referring provider: Rebecca Holm MD  Dx:   Encounter Diagnosis     ICD-10-CM    1. Plantar fasciitis  M72.2       2. Gait abnormality  R26.9       3. Left foot pain  M79.672       4. Balance problem  R26.89               Start Time: 1055  Stop Time: 1139  Total time in clinic (min): 44 minutes    Subjective: Pt reports the top of the ankle joint is bothering her today. Objective: See treatment diary below      Assessment: Pt responded well to tx. Joint mobility improves with tx but carryover from session to session is limited. Pt will benefit from continued skilled outpatient PT to improve strength, ROM, jt mobility, to address therapy goals, to reduce pain, and improve function. Plan: Continue per plan of care. Progress treatment as tolerated.           Precautions: HTN, OP, fall risk, CA hx, hx of L ankle surgery        Manuals 9/21 9/27 10/2 10/9 10/11 10/16 10/19  9/13 9/18   L ankle TC PA and distraction and ST mobs DS Gr IV DS Gr IV DS Gr IV DS gr IV DS Gr IV DS Gr IV DS Gr IV  DS Gr IV DS Gr IV                STM PF DS DS DS DS DS DS DS  DS DS   Heel lift admin                          Neuro Re-Ed             [place] dune mini march 3 min no UE close S 3 min no UE close S 3 min no UE close S 3 min no UE close S 3 min no UE close S 3 min no UE close S 3 min no UE close S  3 min no UE close S 3 min no UE close S   SLS on airex with contralat hip abd,  GMB x30 ea light UE GMB x30 ea light UE GMB x30 ea light UE GMB x30 ea light UE GMB x30 ea light UE GMB x30 ea light UE GMB x30 ea light UE  GMB x30 ea light UE GMB x30 ea light UE   [place] dune step ups 1 min ea LE light UE close S 1 min ea LE light UE close S 1 min ea LE light UE close S 1 min ea LE light UE close S 1 min ea LE light UE close S 1 min ea LE light UE close S 1 min ea LE light UE close S  1 min ea LE light UE close S 1 min ea LE light UE close S                                          Ther Ex             DF MWM 8" step PT overpress 2x15, with belt 8" step PT overpress 2x15, with belt 8" step PT overpress 2x15, with belt 8" step PT overpress 2x15, with belt 8" step PT overpress 2x15, with belt 8" step PT overpress 2x15, with belt 8" step PT overpress 2x15, 1 round with belt  8" step PT overpress 2x15, 1 round with belt 8" step PT overpress 2x15, 1 round with belt   VG for strength and ROM S/l L7 3x10 ea  S/l L7 3x10 ea  S/l L7 3x10 ea S/l L7 3x10 ea S/l L7 3x10 ea S/l L7 3x10 ea S/l L7 3x10 ea   S/l L7 3x10 ea  S/l L7 3x10 ea    Slant  45"x3 LLE only, standing calf stretch 45"x3 LLE only, standing calf stretch 45"x3 LLE only, standing calf stretch 45"x3 LLE only, standing calf stretch 45"x3 LLE only, standing calf stretch 45"x3 LLE only, standing calf stretch 45"x3 LLE only, standing calf stretch  45"x4 LLE only, standing calf stretch 45"x4 LLE only, standing calf stretch                                          Ther Activity             Sit to stands                          Gait Training                                       Modalities

## 2023-10-23 ENCOUNTER — EVALUATION (OUTPATIENT)
Dept: PHYSICAL THERAPY | Facility: REHABILITATION | Age: 73
End: 2023-10-23
Payer: COMMERCIAL

## 2023-10-23 DIAGNOSIS — M79.672 LEFT FOOT PAIN: ICD-10-CM

## 2023-10-23 DIAGNOSIS — R26.9 GAIT ABNORMALITY: ICD-10-CM

## 2023-10-23 DIAGNOSIS — M72.2 PLANTAR FASCIITIS: Primary | ICD-10-CM

## 2023-10-23 DIAGNOSIS — R26.89 BALANCE PROBLEM: ICD-10-CM

## 2023-10-23 PROCEDURE — 97110 THERAPEUTIC EXERCISES: CPT | Performed by: PHYSICAL THERAPIST

## 2023-10-23 PROCEDURE — 97140 MANUAL THERAPY 1/> REGIONS: CPT | Performed by: PHYSICAL THERAPIST

## 2023-10-23 PROCEDURE — 97112 NEUROMUSCULAR REEDUCATION: CPT | Performed by: PHYSICAL THERAPIST

## 2023-10-23 NOTE — PROGRESS NOTES
Daily Note /re-eval    Today's date: 10/23/2023  Patient name: Brittany Reza  : 1950  MRN: [id number]  Referring provider: Ofe Medina MD  Dx:   Encounter Diagnosis     ICD-10-CM    1. Plantar fasciitis  M72.2       2. Left foot pain  M79.672       3. Balance problem  R26.89       4. Gait abnormality  R26.9                 Start Time: 1100  Stop Time: 1143  Total time in clinic (min): 43 minutes    Subjective: Pt reports 50% improvement overall. Pain  Current pain ratin  At best pain ratin  At worst pain ratin in the past week, 8 at eval, 4 last RE  Location: arch of foot and TC joint  Quality: sharp     Objective: See treatment diary below     Palpation:  TPP: Proximal Longitudinal arch (L)     Joint Assessment:  Hypomobile throughout subtalar and talocrural      Lower Extremity Strength    MMT   AROM   PROM     Knee Left Right Left Right Left Right   Flex 5 5           Ext 4+ 4+                           Hip               flex 4 4           ext               abd                               Ankle               DF 5 5 lacking 17 (prev 20) lacking 12       PF               inversion 5(prev 4+) 5           Eversion  5 (prev 4+) 5                 Dynamic Balance Tests  5x sit to stand (sec)  >14 sec indicates high risk for falls IE = 14.22 with BUE, unable without UE   = 12.12, unable without UE   = 13.06,  unable without UE   = 11.70, unable without UE  10/23 = 11.74, unable without UE   TUG (sec)  >14 sec indicates high risk for falls IE = 19 sec with [facility]   = 16.95 with [facility]   = 17.48 with [facility]   = 12.50 with [facility]  10/23 = 13.41 with [facility]      Gait:  ambulates with [facility], L foot turned out, wide KEILY, L foot plantar flexed and heel inverted, unsteady        Access Code: [email or web address]  URL: [email or web address]  Date: 2023  Prepared by: Dev Torres     Exercises  - Long Sitting Calf Stretch with Strap  - 2-3 x daily - 7 x weekly - 1 sets - 4 reps - 30-45 hold  - Seated Plantar Fascia Mobilization with Small Ball  - 2-3 x daily - 7 x weekly - 1 sets - 3-5 reps - 60 hold        Assessment:  Pt presents for reassessment of balance problem and L ankle pain secondary to L ankle surgery. Since last RE, pt has maintained ankler ROM, LE strength and 5 times sit to stand/TUG test scores. At this time, the patient is no longer appropriate for a therapy program focused on making gains in functional ability because the pts goals have been met and/or the pt no longer has the potential to make gains. The pt is now being transitioned to skilled maintenance. Pts goals are being adjusted to reflect this change in treatment focus. The pt will require a therapists skills to carry out these interventions because the pts PMH including HTN, OP, fall risk, CA hx, and hx of L ankle surgery. The need for the skills of a therapist are required to safely and effectively carry out the complex treatment required including monitoring response to treatment, adjusting treatment as necessary for safety, providing cues to maintain proper form, and monitoring vitals as needed. Without the skills of the PT in providing these services, the patient would decline in function with resultant decrease in independent self care and transfers, have an increased risk for fall, and lose functional strength. Goals  Short term goals: to be met in 4-5 weeks  Pt independent with initial HEP, rationale, technique and frequency, for ROM and pain control. MET  Achieve 5 times sit to stand test score to <12 sec with BUE indicating improved fucntional LE strength and reduced fall risk. MET  Pt able to perform 1 sit to stand without UE indicating and improvement in functional LE strength. UNMET, DC goal  Achieve an improvement in L ankle DF AROM for improved ease of standing and walking and to reduce strain on the longitudinal arch.  MET  Pt will achieve an improvement in FOTO score indicating an improvement in pain and function. MET        Long term goals: to be met in 8-10 weeks   Pt will report a 75% or > reduction in subjective pain complaints/symptoms to better manage ADLs and functional mobility. IUNMET  Achieve a further improvement in L ankle DF AROM for improved ease of standing and walking and to reduce strain on the longitudinal arch. MET  Pt able to perform 3 sit to stands without UE indicating and improvement in functional LE strength. UNMET, DC goal  Pt will perform the TUG test in less than <14 seconds indicating decreased risk for falls. MET  Pt will improve FOTO score to = or better then expected outcome indicating an overall improvement in pain and function  MET  Pt independent with rationale, technique and plan for performance of advanced HEP to maintain gains made in therapy. MET  Achieve pts goal: strengthen legs, be able to manuever better, less discomfort  MET       New goals: to be reassessed every 10-12 visits  Maintain L ankle DF AROM lacking 20 (within 2 deg) to maximize joint mobility for transfers and gait  Maintain TUG test score <14 sec for reduced falls risk. Maintain 5 times sit to stand test score to <14 sec for functional LE strength and reduced fall risk. Plan: Continue per plan of care. Progress treatment as tolerated. Transition to skilled maintenance therapy. 1-2x/week for 6 mo.        Precautions: HTN, OP, fall risk, CA hx, hx of L ankle surgery        Manuals 9/21 9/27 10/2 10/9 10/11 10/16 10/19 10/23  9/18   L ankle TC PA and distraction and ST mobs DS Gr IV DS Gr IV DS Gr IV DS gr IV DS Gr IV DS Gr IV DS Gr IV DS Gr IV  DS Gr IV                STM PF DS DS DS DS DS DS DS DS  DS   Heel lift admin                          Neuro Re-Ed             [facility] mini march 3 min no UE close S 3 min no UE close S 3 min no UE close S 3 min no UE close S 3 min no UE close S 3 min no UE close S 3 min no UE close S 3 min no UE close S  3 min no UE close S   SLS on airex with contralat hip abd,  GMB x30 ea light UE GMB x30 ea light UE GMB x30 ea light UE GMB x30 ea light UE GMB x30 ea light UE GMB x30 ea light UE GMB x30 ea light UE GMB x30 ea light UE  GMB x30 ea light UE   [place] dune step ups 1 min ea LE light UE close S 1 min ea LE light UE close S 1 min ea LE light UE close S 1 min ea LE light UE close S 1 min ea LE light UE close S 1 min ea LE light UE close S 1 min ea LE light UE close S 1 min ea LE light UE close S  1 min ea LE light UE close S                                          Ther Ex             DF MWM 8" step PT overpress 2x15, with belt 8" step PT overpress 2x15, with belt 8" step PT overpress 2x15, with belt 8" step PT overpress 2x15, with belt 8" step PT overpress 2x15, with belt 8" step PT overpress 2x15, with belt 8" step PT overpress 2x15, 1 round with belt 8" step PT overpress 2x15, 1 round with belt  8" step PT overpress 2x15, 1 round with belt   VG for strength and ROM S/l L7 3x10 ea  S/l L7 3x10 ea  S/l L7 3x10 ea S/l L7 3x10 ea S/l L7 3x10 ea S/l L7 3x10 ea S/l L7 3x10 ea  S/l L7 3x10 ea   S/l L7 3x10 ea    Slant  45"x3 LLE only, standing calf stretch 45"x3 LLE only, standing calf stretch 45"x3 LLE only, standing calf stretch 45"x3 LLE only, standing calf stretch 45"x3 LLE only, standing calf stretch 45"x3 LLE only, standing calf stretch 45"x3 LLE only, standing calf stretch 45"x4 LLE only, standing calf stretch  45"x4 LLE only, standing calf stretch                                          Ther Activity             Sit to stands        5x STS  TUG x2                  Gait Training                                       Modalities

## 2023-10-25 ENCOUNTER — OFFICE VISIT (OUTPATIENT)
Dept: PHYSICAL THERAPY | Facility: REHABILITATION | Age: 73
End: 2023-10-25
Payer: COMMERCIAL

## 2023-10-25 ENCOUNTER — APPOINTMENT (OUTPATIENT)
Dept: PHYSICAL THERAPY | Facility: REHABILITATION | Age: 73
End: 2023-10-25
Payer: COMMERCIAL

## 2023-10-25 DIAGNOSIS — M72.2 PLANTAR FASCIITIS: Primary | ICD-10-CM

## 2023-10-25 DIAGNOSIS — R26.89 BALANCE PROBLEM: ICD-10-CM

## 2023-10-25 DIAGNOSIS — R26.9 GAIT ABNORMALITY: ICD-10-CM

## 2023-10-25 DIAGNOSIS — M79.672 LEFT FOOT PAIN: ICD-10-CM

## 2023-10-25 PROCEDURE — 97112 NEUROMUSCULAR REEDUCATION: CPT | Performed by: PHYSICAL THERAPIST

## 2023-10-25 PROCEDURE — 97110 THERAPEUTIC EXERCISES: CPT | Performed by: PHYSICAL THERAPIST

## 2023-10-25 PROCEDURE — 97140 MANUAL THERAPY 1/> REGIONS: CPT | Performed by: PHYSICAL THERAPIST

## 2023-10-25 NOTE — PROGRESS NOTES
Daily Note     Today's date: 10/25/2023  Patient name: Yobani Murguia  : 1950  MRN: [id number]  Referring provider: Celine Rodríguez MD  Dx:   Encounter Diagnosis     ICD-10-CM    1. Plantar fasciitis  M72.2       2. Left foot pain  M79.672       3. Balance problem  R26.89       4. Gait abnormality  R26.9           Start Time: 1108  Stop Time: 1150  Total time in clinic (min): 42 minutes    Subjective: Pt offers no new complaints      Objective: See treatment diary below      Assessment: Pt ambulated into the clinic with slow antalgic gait and dec LLE stance time. This improved following tx. Pt would benefit from additional skilled maintenance therapy in effort to prevent decline in function with resultant decrease in independent self care and transfers, and to prevent an increased risk for fall, and loss of functional strength. Plan: Continue per plan of care. Precautions: HTN, OP, fall risk, CA hx, hx of L ankle surgery    POC expires Unit limit Auth Expiration date PT/OT + Visit Limit?    24 N/a 24 BOMN                             Visit/Unit Tracking  AUTH Status:  Date 10/25              12 visits approved Used 1               Remaining  11                   Manuals 9/21 9/27 10/2 10/9 10/11 10/16 10/19 10/23 10/25 9/18   L ankle TC PA and distraction and ST mobs DS Gr IV DS Gr IV DS Gr IV DS gr IV DS Gr IV DS Gr IV DS Gr IV DS Gr IV DS Gr IV DS Gr IV                STM PF DS DS DS DS DS DS DS DS DS DS   Heel lift admin                          Neuro Re-Ed             [facility] march 3 min no UE close S 3 min no UE close S 3 min no UE close S 3 min no UE close S 3 min no UE close S 3 min no UE close S 3 min no UE close S 3 min no UE close S 3 min no UE close S 3 min no UE close S   SLS on airex with contralat hip abd,  GMB x30 ea light UE GMB x30 ea light UE GMB x30 ea light UE GMB x30 ea light UE GMB x30 ea light UE GMB x30 ea light UE GMB x30 ea light UE GMB x30 ea light UE GMB x30 ea light UE GMB x30 ea light UE   [place] step ups 1 min ea LE light UE close S 1 min ea LE light UE close S 1 min ea LE light UE close S 1 min ea LE light UE close S 1 min ea LE light UE close S 1 min ea LE light UE close S 1 min ea LE light UE close S 1 min ea LE light UE close S 1 min ea LE light UE close S 1 min ea LE light UE close S                                          Ther Ex             DF MWM 8" step PT overpress 2x15, with belt 8" step PT overpress 2x15, with belt 8" step PT overpress 2x15, with belt 8" step PT overpress 2x15, with belt 8" step PT overpress 2x15, with belt 8" step PT overpress 2x15, with belt 8" step PT overpress 2x15, 1 round with belt 8" step PT overpress 2x15, 1 round with belt 8" step PT overpress 2x15, 1 round with belt 8" step PT overpress 2x15, 1 round with belt   VG for strength and ROM S/l L7 3x10 ea  S/l L7 3x10 ea  S/l L7 3x10 ea S/l L7 3x10 ea S/l L7 3x10 ea S/l L7 3x10 ea S/l L7 3x10 ea  S/l L7 3x10 ea  S/l L7 3x10 ea  S/l L7 3x10 ea    Slant  45"x3 LLE only, standing calf stretch 45"x3 LLE only, standing calf stretch 45"x3 LLE only, standing calf stretch 45"x3 LLE only, standing calf stretch 45"x3 LLE only, standing calf stretch 45"x3 LLE only, standing calf stretch 45"x3 LLE only, standing calf stretch 45"x4 LLE only, standing calf stretch 45"x4 LLE only, standing calf stretch 45"x4 LLE only, standing calf stretch                                          Ther Activity             Sit to stands        5x STS  TUG x2                  Gait Training                                       Modalities

## 2023-10-30 ENCOUNTER — APPOINTMENT (OUTPATIENT)
Dept: PHYSICAL THERAPY | Facility: REHABILITATION | Age: 73
End: 2023-10-30
Payer: COMMERCIAL

## 2023-11-01 ENCOUNTER — VBI (OUTPATIENT)
Dept: ADMINISTRATIVE | Facility: OTHER | Age: 73
End: 2023-11-01

## 2023-11-01 ENCOUNTER — OFFICE VISIT (OUTPATIENT)
Dept: PHYSICAL THERAPY | Facility: REHABILITATION | Age: 73
End: 2023-11-01
Payer: COMMERCIAL

## 2023-11-01 DIAGNOSIS — M79.672 LEFT FOOT PAIN: ICD-10-CM

## 2023-11-01 DIAGNOSIS — R26.9 GAIT ABNORMALITY: ICD-10-CM

## 2023-11-01 DIAGNOSIS — M72.2 PLANTAR FASCIITIS: Primary | ICD-10-CM

## 2023-11-01 DIAGNOSIS — R26.89 BALANCE PROBLEM: ICD-10-CM

## 2023-11-01 PROCEDURE — 97140 MANUAL THERAPY 1/> REGIONS: CPT | Performed by: PHYSICAL THERAPIST

## 2023-11-01 PROCEDURE — 97112 NEUROMUSCULAR REEDUCATION: CPT | Performed by: PHYSICAL THERAPIST

## 2023-11-01 PROCEDURE — 97110 THERAPEUTIC EXERCISES: CPT | Performed by: PHYSICAL THERAPIST

## 2023-11-01 NOTE — PROGRESS NOTES
Daily Note     Today's date: 2023  Patient name: Steve Bergman  : 1950  MRN: [id number]  Referring provider: Crystal Mcintosh MD  Dx:   Encounter Diagnosis     ICD-10-CM    1. Plantar fasciitis  M72.2       2. Left foot pain  M79.672       3. Gait abnormality  R26.9       4. Balance problem  R26.89             Start Time: 1209  Stop Time: 1248  Total time in clinic (min): 39 minutes    Subjective: Pt offers no new complaints      Objective: See treatment diary below      Assessment: Pt responded well to the exercises with improvement in mobility and less pain. Pt would benefit from additional skilled maintenance therapy in effort to prevent decline in function with resultant decrease in independent self care and transfers, and to prevent an increased risk for fall, and loss of functional strength. Plan: Continue per plan of care. Precautions: HTN, OP, fall risk, CA hx, hx of L ankle surgery    POC expires Unit limit Auth Expiration date PT/OT + Visit Limit?    24 N/a 24 BOMN                             Visit/Unit Tracking  AUTH Status:  Date 10/25 11/1             12 visits approved Used 1 2              Remaining  11 10                  Manuals 9/21 9/27 10/2 10/9 10/11 10/16 10/19 10/23 10/25 11/1   L ankle TC PA and distraction and ST mobs DS Gr IV DS Gr IV DS Gr IV DS gr IV DS Gr IV DS Gr IV DS Gr IV DS Gr IV DS Gr IV DS Gr IV                STM PF DS DS DS DS DS DS DS DS DS DS   Heel lift admin                          Neuro Re-Ed             [employer] mini march 3 min no UE close S 3 min no UE close S 3 min no UE close S 3 min no UE close S 3 min no UE close S 3 min no UE close S 3 min no UE close S 3 min no UE close S 3 min no UE close S 3 min no UE close S   SLS on airex with contralat hip abd,  GMB x30 ea light UE GMB x30 ea light UE GMB x30 ea light UE GMB x30 ea light UE GMB x30 ea light UE GMB x30 ea light UE GMB x30 ea light UE GMB x30 ea light UE GMB x30 ea light UE GMB x30 ea light UE   [place] dune step ups 1 min ea LE light UE close S 1 min ea LE light UE close S 1 min ea LE light UE close S 1 min ea LE light UE close S 1 min ea LE light UE close S 1 min ea LE light UE close S 1 min ea LE light UE close S 1 min ea LE light UE close S 1 min ea LE light UE close S 1 min ea LE light UE close S                                          Ther Ex             DF MWM 8" step PT overpress 2x15, with belt 8" step PT overpress 2x15, with belt 8" step PT overpress 2x15, with belt 8" step PT overpress 2x15, with belt 8" step PT overpress 2x15, with belt 8" step PT overpress 2x15, with belt 8" step PT overpress 2x15, 1 round with belt 8" step PT overpress 2x15, 1 round with belt 8" step PT overpress 2x15, 1 round with belt 8" step PT overpress 2x15, 1 round with belt   VG for strength and ROM S/l L7 3x10 ea  S/l L7 3x10 ea  S/l L7 3x10 ea S/l L7 3x10 ea S/l L7 3x10 ea S/l L7 3x10 ea S/l L7 3x10 ea  S/l L7 3x10 ea  S/l L7 3x10 ea  S/l L7 3x10 ea    Slant  45"x3 LLE only, standing calf stretch 45"x3 LLE only, standing calf stretch 45"x3 LLE only, standing calf stretch 45"x3 LLE only, standing calf stretch 45"x3 LLE only, standing calf stretch 45"x3 LLE only, standing calf stretch 45"x3 LLE only, standing calf stretch 45"x4 LLE only, standing calf stretch 45"x4 LLE only, standing calf stretch 45"x4 LLE only, standing calf stretch                                          Ther Activity             Sit to stands        5x STS  TUG x2                  Gait Training                                       Modalities

## 2023-11-06 ENCOUNTER — APPOINTMENT (OUTPATIENT)
Dept: PHYSICAL THERAPY | Facility: REHABILITATION | Age: 73
End: 2023-11-06
Payer: COMMERCIAL

## 2023-11-07 ENCOUNTER — OFFICE VISIT (OUTPATIENT)
Dept: FAMILY MEDICINE CLINIC | Facility: CLINIC | Age: 73
End: 2023-11-07
Payer: COMMERCIAL

## 2023-11-07 VITALS
HEIGHT: 64 IN | OXYGEN SATURATION: 98 % | DIASTOLIC BLOOD PRESSURE: 80 MMHG | BODY MASS INDEX: 40.14 KG/M2 | TEMPERATURE: 98.2 F | HEART RATE: 72 BPM | WEIGHT: 235.13 LBS | SYSTOLIC BLOOD PRESSURE: 140 MMHG | RESPIRATION RATE: 18 BRPM

## 2023-11-07 DIAGNOSIS — I10 PRIMARY HYPERTENSION: ICD-10-CM

## 2023-11-07 DIAGNOSIS — Z00.00 MEDICARE ANNUAL WELLNESS VISIT, SUBSEQUENT: Primary | ICD-10-CM

## 2023-11-07 DIAGNOSIS — M72.2 PLANTAR FASCIITIS: ICD-10-CM

## 2023-11-07 DIAGNOSIS — R73.03 PREDIABETES: ICD-10-CM

## 2023-11-07 PROCEDURE — 99213 OFFICE O/P EST LOW 20 MIN: CPT | Performed by: FAMILY MEDICINE

## 2023-11-07 PROCEDURE — G0439 PPPS, SUBSEQ VISIT: HCPCS | Performed by: FAMILY MEDICINE

## 2023-11-07 NOTE — PROGRESS NOTES
Assessment and Plan:     Problem List Items Addressed This Visit       Hypertension     Hypertension. The patient's blood pressure is stable at this time and he will continue current regimen of medications         Plantar fasciitis     Planter fasciitis. Patient continues to see physical therapist at least once weekly for treatment of her chronic left ankle arthritis and plantar fasciitis         Prediabetes     Prediabetes. Patient continues on metformin 500 mg daily for previous A1c of 6.3. She will follow-up with endocrinologist for reevaluation. Patient has been able to lose 10 pounds over the last few months. Medicare annual wellness visit, subsequent - Primary       Depression Screening and Follow-up Plan: Patient was screened for depression during today's encounter. They screened negative with a PHQ-2 score of 0. Preventive health issues were discussed with patient, and age appropriate screening tests were ordered as noted in patient's After Visit Summary. Personalized health advice and appropriate referrals for health education or preventive services given if needed, as noted in patient's After Visit Summary. History of Present Illness:     Patient presents for a Medicare Wellness Visit    Patient in the office to review chronic medical conditions. She continues to see her physical therapist for her chronic left ankle pain but overall feels she has more mobility in her ankle. She at times develops significant discomfort with weather changes especially in the winter months. She tried 1/2 tablet of her 's hydrocodone which seemed to help significantly. Patient requested new prescription for her to have her own supply for periodic use.        Patient Care Team:  María Elena Pathak MD as PCP - Dorian Alvarez MD as PCP - [address] [place] [address] (RTE)  María Elena Pathak MD as PCP - PCP-Kirti (RTE)  Lala Jasmine MD as PCP - Endocrinology (Endocrinology)  Corina Masters PA-C (Obstetrics and Gynecology)  Lyle Carlton as Nurse Practitioner (Nurse Practitioner)     Review of Systems:     Review of Systems   Constitutional: Negative. HENT: Negative. Eyes: Negative. Respiratory: Negative. Cardiovascular: Negative. Gastrointestinal: Negative. Endocrine: Negative. Genitourinary: Negative. Musculoskeletal:  Positive for arthralgias and gait problem. Skin: Negative. Psychiatric/Behavioral: Negative.           Problem List:     Patient Active Problem List   Diagnosis    Pain, joint, ankle and foot, left    DJD (degenerative joint disease), ankle and foot, left    Allergic rhinitis    Esophageal reflux    Hyperlipidemia    Hypertension    Hypothyroidism    Rosacea    Pain of right lower extremity    Female genital prolapse    Fracture of ankle    Osteopenia    Varicose veins of lower extremity    Pain of left heel    Body mass index (BMI) 40.0-44.9, adult    Acute midline low back pain without sciatica    Neuralgia    Otitis externa of left ear    Stage 3a chronic kidney disease (HCC)    Peripheral vascular disease (HCC)    Plantar fasciitis    Prediabetes    Medicare annual wellness visit, subsequent      Past Medical and Surgical History:     Past Medical History:   Diagnosis Date    Allergic rhinitis     Arthritis     Cataract     brock    Disease of thyroid gland     had total thyroidectomy    Diverticulosis     Hyperlipidemia     Hypertension     Mild acid reflux     Osteoporosis     Plantar fasciitis     b/l  l worse uses orthotics in shoes    Rosacea     Squamous cell skin cancer     Use of cane as ambulatory aid     Uterovaginal prolapse      Past Surgical History:   Procedure Laterality Date    ANKLE SURGERY Left 2011    Fracture / hardware removed    COLONOSCOPY      fiberoptic    COLPORRHAPHY N/A 06/17/2019    Procedure: POSTERIOR COLPORRHAPHY;  Surgeon: Madalyn Huerta MD;  Location: [facility];  Service: UroGynecology           OTHER SURGICAL HISTORY      thyroid biopsy core needle     KY COLPOPEXY VAGINAL EXTRAPERITONEAL APPROACH N/A 06/17/2019    Procedure: VE COLPOSUSPENSION (neugide);   Surgeon: Lalo Main MD;  Location: [facility];  Service: UroGynecology           KY PERINEOPLASTY RPR PERINEUM NONOBSTETRICAL [facility] N/A 06/17/2019    Procedure: Ulysees Camera;  Surgeon: Lalo Main MD;  Location: [facility];  Service: UroGynecology           KY REMOVAL IMPLANT DEEP Left 09/18/2017    Procedure: REMOVAL HARDWARE ANKLE (1 plate, 8 screws.);  Surgeon: Khloe Lopez MD;  Location: [facility];  Service: Orthopedics    SKIN BIOPSY  2021    SKIN CANCER EXCISION Left     THYROIDECTOMY  2012    Total     TUBAL LIGATION        Family History:     Family History   Problem Relation Age of Onset    COPD Mother     Diabetes Mother     Heart disease Mother     Hypertension Mother     Diabetes type II Mother     Thyroid disease unspecified Mother         Goiter removed by radioactive tx    Bone cancer Father     No Known Problems Daughter     Diabetes type II Maternal Grandmother     No Known Problems Maternal Grandfather     No Known Problems Paternal Grandmother     Diabetes type II Paternal Grandfather     No Known Problems Maternal Aunt     No Known Problems Maternal Aunt     No Known Problems Paternal Aunt     Diabetes type II Brother       Social History:     Social History     Socioeconomic History    Marital status: /Civil Union     Spouse name: None    Number of children: None    Years of education: None    Highest education level: None   Occupational History    None   Tobacco Use    Smoking status: Never    Smokeless tobacco: Never   Vaping Use    Vaping Use: Never used   Substance and Sexual Activity    Alcohol use: No     Comment: Never drank alcohol denied    Drug use: No    Sexual activity: Not Currently     Partners: Male     Birth control/protection: None   Other Topics Concern    None   Social History Narrative    Exercies moderately 3 or more times a week      Social Determinants of Health     Financial Resource Strain: Low Risk  (11/6/2023)    Overall Financial Resource Strain (CARDIA)     Difficulty of Paying Living Expenses: Not very hard   Food Insecurity: Not on file   Transportation Needs: No Transportation Needs (11/6/2023)    PRAPARE - Transportation     Lack of Transportation (Medical): No     Lack of Transportation (Non-Medical): No   Physical Activity: Not on file   Stress: Not on file   Social Connections: Not on file   Intimate Partner Violence: Not on file   Housing Stability: Not on file      Medications and Allergies:     Current Outpatient Medications   Medication Sig Dispense Refill    atorvastatin (LIPITOR) 10 mg tablet TAKE ONE TABLET BY MOUTH EVERY DAY 90 tablet 1    Azelaic Acid 15 % cream Apply topically daily at bedtime        Calcium Carb-Cholecalciferol 600-800 MG-UNIT TABS Take 2 tablets by mouth daily      Denosumab (PROLIA SC) Inject under the skin 2x year       docusate sodium (COLACE) 100 mg capsule       doxycycline (PERIOSTAT) 20 MG tablet Take 20 mg by mouth 2 (two) times a day      gabapentin (NEURONTIN) 300 mg capsule TAKE ONE CAPSULE IN AM, ONE CAPSULE IN AFT. , 2 CAPSULES AT BEDTIME 360 capsule 0    Glucosamine-Chondroitin (COSAMIN DS PO) Take by mouth      levothyroxine 175 mcg tablet TAKE ONE TABLET BY MOUTH EVERY DAY 90 tablet 1    loratadine (CLARITIN) 10 mg tablet Take 10 mg by mouth daily as needed       metFORMIN (GLUCOPHAGE-XR) 500 mg 24 hr tablet TAKE 1 TABLET BY MOUTH EVERY DAY WITH DINNER 30 tablet 2    methocarbamol (ROBAXIN) 500 mg tablet TAKE 1 TABLET (500 MG TOTAL) BY MOUTH IN THE MORNING AND 1 TABLET (500 MG TOTAL) IN THE EVENING AND 1 TABLET (500 MG TOTAL) BEFORE BEDTIME.  30 tablet 3    metoprolol tartrate (LOPRESSOR) 50 mg tablet Take 1 tablet (50 mg total) by mouth daily 90 tablet 3    metroNIDAZOLE (METROGEL) 1 % gel Apply 1 application topically daily       Multiple Vitamins-Minerals (Centrum Silver 50+Women) TABS       naproxen (NAPROSYN) 500 mg tablet TAKE ONE TABLET BY MOUTH TWICE A DAY WITH FOOD (Patient taking differently: Take 500 mg by mouth 2 (two) times a day) 180 tablet 3    Probiotic Product (PROBIOTIC-10 PO)       psyllium (METAMUCIL) 0.52 g capsule       Triamcinolone Acetonide (NASACORT ALLERGY 24HR NA) 2 sprays into each nostril as needed         No current facility-administered medications for this visit. No Known Allergies   Immunizations:     Immunization History   Administered Date(s) Administered    COVID-19 PFIZER VACCINE 0.3 ML IM 02/24/2021, 03/15/2021, 10/09/2021    COVID-19 Pfizer vac (Ilir-sucrose, gray cap) 12 yr+ IM 04/23/2022    Fluzone Split Quad 0.5 mL 09/26/2014    INFLUENZA 09/26/2014, 10/10/2015, 10/03/2016, 10/17/2017, 10/29/2022    Influenza Quadrivalent Preservative Free 3 years and older IM 09/26/2014    Influenza Split High Dose Preservative Free IM 10/10/2015, 10/03/2016, 10/17/2017    Influenza, high dose seasonal 0.7 mL 09/27/2018, 09/30/2019, 09/21/2020, 09/15/2021    Influenza, seasonal, injectable 10/11/2012, 10/17/2013    Pneumococcal Conjugate 13-Valent 12/05/2016    Pneumococcal Polysaccharide PPV23 09/27/2018      Health Maintenance:         Topic Date Due    Colorectal Cancer Screening  12/31/2023 (Originally 1/23/1995)    Breast Cancer Screening: Mammogram  07/12/2025    Hepatitis C Screening  Completed         Topic Date Due    COVID-19 Vaccine (5 - Pfizer series) 06/18/2022      Medicare Screening Tests and Risk Assessments:         Health Risk Assessment:   Patient rates overall health as fair. Patient feels that their physical health rating is same. Patient is satisfied with their life. Eyesight was rated as slightly worse. Hearing was rated as same. Patient feels that their emotional and mental health rating is slightly worse. Patients states they are never, rarely angry. Patient states they are sometimes unusually tired/fatigued.  Pain experienced in the last 7 days has been some. Patient's pain rating has been 7/10. Patient states that she has experienced weight loss or gain in last 6 months. Depression Screening:   PHQ-2 Score: 0      Fall Risk Screening: In the past year, patient has experienced: history of falling in past year      Urinary Incontinence Screening:   Patient has not leaked urine accidently in the last six months. Home Safety:  Patient does not have trouble with stairs inside or outside of their home. Patient has working smoke alarms and has working carbon monoxide detector. Home safety hazards include: none. Nutrition:   Current diet is Low Carb and No Added Salt. Medications:   Patient is currently taking over-the-counter supplements. OTC medications include: see medication list. Patient is able to manage medications. Activities of Daily Living (ADLs)/Instrumental Activities of Daily Living (IADLs):   Walk and transfer into and out of bed and chair?: Yes  Dress and groom yourself?: Yes    Bathe or shower yourself?: Yes    Feed yourself? Yes  Do your laundry/housekeeping?: Yes  Manage your money, pay your bills and track your expenses?: Yes  Make your own meals?: Yes    Do your own shopping?: Yes    ADL comments: I always shop with someone    Previous Hospitalizations:   Any hospitalizations or ED visits within the last 12 months?: No      Advance Care Planning:   Living will: Yes    Durable POA for healthcare:  Yes    Advanced directive: Yes      PREVENTIVE SCREENINGS      Cardiovascular Screening:    General: Screening Not Indicated and History Lipid Disorder      Diabetes Screening:     General: Screening Current      Colorectal Cancer Screening:     General: Patient Declines      Breast Cancer Screening:     General: Screening Current      Cervical Cancer Screening:    General: Screening Not Indicated      Osteoporosis Screening:    General: Screening Not Indicated and History Osteoporosis      Lung Cancer Screening:     General: Screening Not Indicated      Hepatitis C Screening:    General: Screening Current    Screening, Brief Intervention, and Referral to Treatment (SBIRT)    Screening  Typical number of drinks in a day: 0  Typical number of drinks in a week: 0  Interpretation: Low risk drinking behavior. AUDIT-C Screenin) How often did you have a drink containing alcohol in the past year? never  2) How many drinks did you have on a typical day when you were drinking in the past year? 0  3) How often did you have 6 or more drinks on one occasion in the past year? never    AUDIT-C Score: 0  Interpretation: Score 0-2 (female): Negative screen for alcohol misuse    Single Item Drug Screening:  How often have you used an illegal drug (including marijuana) or a prescription medication for non-medical reasons in the past year? never    Single Item Drug Screen Score: 0  Interpretation: Negative screen for possible drug use disorder    No results found. Physical Exam:     /80   Pulse 72   Temp 98.2 °F (36.8 °C)   Resp 18   Ht 5' 4" (1.626 m)   Wt 107 kg (235 lb 2 oz)   LMP  (LMP Unknown)   SpO2 98%   BMI 40.36 kg/m²     Physical Exam  Vitals and nursing note reviewed. Constitutional:       General: She is not in acute distress. Appearance: Normal appearance. She is well-developed. She is not ill-appearing. HENT:      Head: Normocephalic and atraumatic. Right Ear: Tympanic membrane, ear canal and external ear normal. There is no impacted cerumen. Left Ear: Tympanic membrane, ear canal and external ear normal. There is no impacted cerumen. Eyes:      General:         Right eye: No discharge. Left eye: No discharge. Extraocular Movements: Extraocular movements intact. Conjunctiva/sclera: Conjunctivae normal.      Pupils: Pupils are equal, round, and reactive to light. Neck:      Vascular: No carotid bruit.    Cardiovascular:      Rate and Rhythm: Normal rate and regular rhythm. Heart sounds: No murmur heard. Pulmonary:      Effort: Pulmonary effort is normal. No respiratory distress. Breath sounds: Normal breath sounds. Abdominal:      General: Abdomen is flat. Bowel sounds are normal.      Palpations: Abdomen is soft. Tenderness: There is no abdominal tenderness. There is no guarding or rebound. Musculoskeletal:         General: No swelling. Cervical back: Neck supple. Right lower leg: No edema. Left lower leg: Edema present. Comments: Chronic trace to +1 peripheral edema around ankle. Lymphadenopathy:      Cervical: No cervical adenopathy. Skin:     General: Skin is warm and dry. Capillary Refill: Capillary refill takes less than 2 seconds. Neurological:      Mental Status: She is alert. Mental status is at baseline. Psychiatric:         Mood and Affect: Mood normal.         Behavior: Behavior normal.         Thought Content:  Thought content normal.         Judgment: Judgment normal.          María Elena Pathak MD

## 2023-11-07 NOTE — PATIENT INSTRUCTIONS
Medicare Preventive Visit Patient Instructions  Thank you for completing your Welcome to Medicare Visit or Medicare Annual Wellness Visit today. Your next wellness visit will be due in one year (11/7/2024). The screening/preventive services that you may require over the next 5-10 years are detailed below. Some tests may not apply to you based off risk factors and/or age. Screening tests ordered at today's visit but not completed yet may show as past due. Also, please note that scanned in results may not display below. Preventive Screenings:  Service Recommendations Previous Testing/Comments   Colorectal Cancer Screening  * Colonoscopy    * Fecal Occult Blood Test (FOBT)/Fecal Immunochemical Test (FIT)  * Fecal DNA/Cologuard Test  * Flexible Sigmoidoscopy Age: 43-73 years old   Colonoscopy: every 10 years (may be performed more frequently if at higher risk)  OR  FOBT/FIT: every 1 year  OR  Cologuard: every 3 years  OR  Sigmoidoscopy: every 5 years  Screening may be recommended earlier than age 39 if at higher risk for colorectal cancer. Also, an individualized decision between you and your healthcare provider will decide whether screening between the ages of 77-80 would be appropriate. Colonoscopy: 08/13/2004  FOBT/FIT: Not on file  Cologuard: Not on file  Sigmoidoscopy: Not on file          Breast Cancer Screening Age: 36 years old  Frequency: every 1-2 years  Not required if history of left and right mastectomy Mammogram: 07/12/2023    Screening Current   Cervical Cancer Screening Between the ages of 21-29, pap smear recommended once every 3 years. Between the ages of 32-69, can perform pap smear with HPV co-testing every 5 years.    Recommendations may differ for women with a history of total hysterectomy, cervical cancer, or abnormal pap smears in past. Pap Smear: 11/11/2020    Screening Not Indicated   Hepatitis C Screening Once for adults born between 1945 and 1965  More frequently in patients at high risk for Hepatitis C Hep C Antibody: 05/07/2019    Screening Current   Diabetes Screening 1-2 times per year if you're at risk for diabetes or have pre-diabetes Fasting glucose: 110 mg/dL (5/22/2023)  A1C: 6.3 % (7/25/2023)  Screening Current   Cholesterol Screening Once every 5 years if you don't have a lipid disorder. May order more often based on risk factors. Lipid panel: 01/16/2023    Screening Not Indicated  History Lipid Disorder     Other Preventive Screenings Covered by Medicare:  Abdominal Aortic Aneurysm (AAA) Screening: covered once if your at risk. You're considered to be at risk if you have a family history of AAA. Lung Cancer Screening: covers low dose CT scan once per year if you meet all of the following conditions: (1) Age 48-67; (2) No signs or symptoms of lung cancer; (3) Current smoker or have quit smoking within the last 15 years; (4) You have a tobacco smoking history of at least 20 pack years (packs per day multiplied by number of years you smoked); (5) You get a written order from a healthcare provider. Glaucoma Screening: covered annually if you're considered high risk: (1) You have diabetes OR (2) Family history of glaucoma OR (3)  aged 48 and older OR (3)  American aged 72 and older  Osteoporosis Screening: covered every 2 years if you meet one of the following conditions: (1) You're estrogen deficient and at risk for osteoporosis based off medical history and other findings; (2) Have a vertebral abnormality; (3) On glucocorticoid therapy for more than 3 months; (4) Have primary hyperparathyroidism; (5) On osteoporosis medications and need to assess response to drug therapy. Last bone density test (DXA Scan): 07/20/2023. HIV Screening: covered annually if you're between the age of 14-79. Also covered annually if you are younger than 13 and older than 72 with risk factors for HIV infection.  For pregnant patients, it is covered up to 3 times per pregnancy. Immunizations:  Immunization Recommendations   Influenza Vaccine Annual influenza vaccination during flu season is recommended for all persons aged >= 6 months who do not have contraindications   Pneumococcal Vaccine   * Pneumococcal conjugate vaccine = PCV13 (Prevnar 13), PCV15 (Vaxneuvance), PCV20 (Prevnar 20)  * Pneumococcal polysaccharide vaccine = PPSV23 (Pneumovax) Adults 78-27 yo with certain risk factors or if 69+ yo  If never received any pneumonia vaccine: recommend Prevnar 20 (PCV20)  Give PCV20 if previously received 1 dose of PCV13 or PPSV23   Hepatitis B Vaccine 3 dose series if at intermediate or high risk (ex: diabetes, end stage renal disease, liver disease)   Respiratory syncytial virus (RSV) Vaccine - COVERED BY MEDICARE PART D  * RSVPreF3 (Arexvy) CDC recommends that adults 61years of age and older may receive a single dose of RSV vaccine using shared clinical decision-making (SCDM)   Tetanus (Td) Vaccine - COST NOT COVERED BY MEDICARE PART B Following completion of primary series, a booster dose should be given every 10 years to maintain immunity against tetanus. Td may also be given as tetanus wound prophylaxis. Tdap Vaccine - COST NOT COVERED BY MEDICARE PART B Recommended at least once for all adults. For pregnant patients, recommended with each pregnancy. Shingles Vaccine (Shingrix) - COST NOT COVERED BY MEDICARE PART B  2 shot series recommended in those 19 years and older who have or will have weakened immune systems or those 50 years and older     Health Maintenance Due:      Topic Date Due   • Colorectal Cancer Screening  12/31/2023 (Originally 1/23/1995)   • Breast Cancer Screening: Mammogram  07/12/2025   • Hepatitis C Screening  Completed     Immunizations Due:      Topic Date Due   • COVID-19 Vaccine (5 - Pfizer series) 06/18/2022     Advance Directives   What are advance directives?   Advance directives are legal documents that state your wishes and plans for medical care. These plans are made ahead of time in case you lose your ability to make decisions for yourself. Advance directives can apply to any medical decision, such as the treatments you want, and if you want to donate organs. What are the types of advance directives? There are many types of advance directives, and each state has rules about how to use them. You may choose a combination of any of the following:  Living will: This is a written record of the treatment you want. You can also choose which treatments you do not want, which to limit, and which to stop at a certain time. This includes surgery, medicine, IV fluid, and tube feedings. Durable power of  for healthcare [facility]): This is a written record that states who you want to make healthcare choices for you when you are unable to make them for yourself. This person, called a proxy, is usually a family member or a friend. You may choose more than 1 proxy. Do not resuscitate (DNR) order:  A DNR order is used in case your heart stops beating or you stop breathing. It is a request not to have certain forms of treatment, such as CPR. A DNR order may be included in other types of advance directives. Medical directive: This covers the care that you want if you are in a coma, near death, or unable to make decisions for yourself. You can list the treatments you want for each condition. Treatment may include pain medicine, surgery, blood transfusions, dialysis, IV or tube feedings, and a ventilator (breathing machine). Values history: This document has questions about your views, beliefs, and how you feel and think about life. This information can help others choose the care that you would choose. Why are advance directives important? An advance directive helps you control your care. Although spoken wishes may be used, it is better to have your wishes written down. Spoken wishes can be misunderstood, or not followed.  Treatments may be given even if you do not want them. An advance directive may make it easier for your family to make difficult choices about your care. Fall Prevention    Fall prevention  includes ways to make your home and other areas safer. It also includes ways you can move more carefully to prevent a fall. Health conditions that cause changes in your blood pressure, vision, or muscle strength and coordination may increase your risk for falls. Medicines may also increase your risk for falls if they make you dizzy, weak, or sleepy. Fall prevention tips:   Stand or sit up slowly. Use assistive devices as directed. Wear shoes that fit well and have soles that . Wear a personal alarm. Stay active. Manage your medical conditions. Home Safety Tips:  Add items to prevent falls in the bathroom. Keep paths clear. Install bright lights in your home. Keep items you use often on shelves within reach. Paint or place reflective tape on the edges of your stairs. Weight Management   Why it is important to manage your weight:  Being overweight increases your risk of health conditions such as heart disease, high blood pressure, type 2 diabetes, and certain types of cancer. It can also increase your risk for osteoarthritis, sleep apnea, and other respiratory problems. Aim for a slow, steady weight loss. Even a small amount of weight loss can lower your risk of health problems. How to lose weight safely:  A safe and healthy way to lose weight is to eat fewer calories and get regular exercise. You can lose up about 1 pound a week by decreasing the number of calories you eat by 500 calories each day. Healthy meal plan for weight management:  A healthy meal plan includes a variety of foods, contains fewer calories, and helps you stay healthy. A healthy meal plan includes the following:  Eat whole-grain foods more often. A healthy meal plan should contain fiber.  Fiber is the part of grains, fruits, and vegetables that is not broken down by your body. Whole-grain foods are healthy and provide extra fiber in your diet. Some examples of whole-grain foods are whole-wheat breads and pastas, oatmeal, brown rice, and bulgur. Eat a variety of vegetables every day. Include dark, leafy greens such as spinach, kale, jenae greens, and mustard greens. Eat yellow and orange vegetables such as carrots, sweet potatoes, and winter squash. Eat a variety of fruits every day. Choose fresh or canned fruit (canned in its own juice or light syrup) instead of juice. Fruit juice has very little or no fiber. Eat low-fat dairy foods. Drink fat-free (skim) milk or 1% milk. Eat fat-free yogurt and low-fat cottage cheese. Try low-fat cheeses such as mozzarella and other reduced-fat cheeses. Choose meat and other protein foods that are low in fat. Choose beans or other legumes such as split peas or lentils. Choose fish, skinless poultry (chicken or turkey), or lean cuts of red meat (beef or pork). Before you cook meat or poultry, cut off any visible fat. Use less fat and oil. Try baking foods instead of frying them. Add less fat, such as margarine, sour cream, regular salad dressing and mayonnaise to foods. Eat fewer high-fat foods. Some examples of high-fat foods include french fries, doughnuts, ice cream, and cakes. Eat fewer sweets. Limit foods and drinks that are high in sugar. This includes candy, cookies, regular soda, and sweetened drinks. Exercise:  Exercise at least 30 minutes per day on most days of the week. Some examples of exercise include walking, biking, dancing, and swimming. You can also fit in more physical activity by taking the stairs instead of the elevator or parking farther away from stores. Ask your healthcare provider about the best exercise plan for you. © Copyright [employer] 2018 Information is for End User's use only and may not be sold, redistributed or otherwise used for commercial purposes.  All illustrations and images included in CareNotes® are the copyrighted property of [email or web address] or [address] [place] [address]

## 2023-11-07 NOTE — ASSESSMENT & PLAN NOTE
Planter fasciitis.   Patient continues to see physical therapist at least once weekly for treatment of her chronic left ankle arthritis and plantar fasciitis

## 2023-11-07 NOTE — ASSESSMENT & PLAN NOTE
Prediabetes. Patient continues on metformin 500 mg daily for previous A1c of 6.3. She will follow-up with endocrinologist for reevaluation. Patient has been able to lose 10 pounds over the last few months.

## 2023-11-08 ENCOUNTER — TELEPHONE (OUTPATIENT)
Dept: FAMILY MEDICINE CLINIC | Facility: CLINIC | Age: 73
End: 2023-11-08

## 2023-11-08 ENCOUNTER — OFFICE VISIT (OUTPATIENT)
Dept: PHYSICAL THERAPY | Facility: REHABILITATION | Age: 73
End: 2023-11-08
Payer: COMMERCIAL

## 2023-11-08 DIAGNOSIS — M25.572 CHRONIC PAIN OF LEFT ANKLE: Primary | ICD-10-CM

## 2023-11-08 DIAGNOSIS — M72.2 PLANTAR FASCIITIS: Primary | ICD-10-CM

## 2023-11-08 DIAGNOSIS — M79.672 LEFT FOOT PAIN: ICD-10-CM

## 2023-11-08 DIAGNOSIS — R26.9 GAIT ABNORMALITY: ICD-10-CM

## 2023-11-08 DIAGNOSIS — G89.29 CHRONIC PAIN OF LEFT ANKLE: Primary | ICD-10-CM

## 2023-11-08 DIAGNOSIS — R26.89 BALANCE PROBLEM: ICD-10-CM

## 2023-11-08 PROCEDURE — 97112 NEUROMUSCULAR REEDUCATION: CPT | Performed by: PHYSICAL THERAPIST

## 2023-11-08 PROCEDURE — 97110 THERAPEUTIC EXERCISES: CPT | Performed by: PHYSICAL THERAPIST

## 2023-11-08 PROCEDURE — 97140 MANUAL THERAPY 1/> REGIONS: CPT | Performed by: PHYSICAL THERAPIST

## 2023-11-08 RX ORDER — HYDROCODONE BITARTRATE AND ACETAMINOPHEN 5; 325 MG/1; MG/1
TABLET ORAL
Qty: 30 TABLET | Refills: 0 | Status: SHIPPED | OUTPATIENT
Start: 2023-11-08 | End: 2023-11-10 | Stop reason: SDUPTHER

## 2023-11-08 NOTE — TELEPHONE ENCOUNTER
Pt seen yesterday, states Dr. Rolly Montiel was going to send hydrocodone to pharmacy but nothing sent. Please advise.

## 2023-11-08 NOTE — PROGRESS NOTES
Daily Note     Today's date: 2023  Patient name: Josh Mena  : 1950  MRN: [id number]  Referring provider: Cassie Johnson MD  Dx:   Encounter Diagnosis     ICD-10-CM    1. Plantar fasciitis  M72.2       2. Left foot pain  M79.672       3. Gait abnormality  R26.9       4. Balance problem  R26.89               Start Time: 1212  Stop Time: 1256  Total time in clinic (min): 44 minutes    Subjective: Pt reports her ankle joint has been more painful lately with the colder weather. Saw her PCP and got a script for pain meds to use as needed to help control pain. Objective: See treatment diary below      Assessment: Pt responded well to the exercises with an improvement in ankle/foot mobility and decrease in pain and discomfort. Pt would benefit from additional skilled maintenance therapy in effort to prevent decline in function with resultant decrease in independent self care and transfers, and to prevent an increased risk for fall, and loss of functional strength. Plan: Continue per plan of care. Precautions: HTN, OP, fall risk, CA hx, hx of L ankle surgery    POC expires Unit limit Auth Expiration date PT/OT + Visit Limit?    24 N/a 24 BOMN                             Visit/Unit Tracking  AUTH Status:  Date 10/25 11/1 11/8            12 visits approved Used 1 2 3             Remaining  11 10 9                 Manuals 11/8 9/27 10/2 10/9 10/11 10/16 10/19 10/23 10/25 11/1   L ankle TC PA and distraction and ST mobs DS Gr IV DS Gr IV DS Gr IV DS gr IV DS Gr IV DS Gr IV DS Gr IV DS Gr IV DS Gr IV DS Gr IV                STM PF DS DS DS DS DS DS DS DS DS DS                             Neuro Re-Ed             [employer] mini march 3 min no UE close S 3 min no UE close S 3 min no UE close S 3 min no UE close S 3 min no UE close S 3 min no UE close S 3 min no UE close S 3 min no UE close S 3 min no UE close S 3 min no UE close S   SLS on airex with contralat hip abd,  GMB x30 ea light UE GMB x30 ea light UE GMB x30 ea light UE GMB x30 ea light UE GMB x30 ea light UE GMB x30 ea light UE GMB x30 ea light UE GMB x30 ea light UE GMB x30 ea light UE GMB x30 ea light UE   Sand dune step ups 1 min ea LE light UE close S 1 min ea LE light UE close S 1 min ea LE light UE close S 1 min ea LE light UE close S 1 min ea LE light UE close S 1 min ea LE light UE close S 1 min ea LE light UE close S 1 min ea LE light UE close S 1 min ea LE light UE close S 1 min ea LE light UE close S                                          Ther Ex             DF MWM 8" step PT overpress 2x15, with belt 8" step PT overpress 2x15, with belt 8" step PT overpress 2x15, with belt 8" step PT overpress 2x15, with belt 8" step PT overpress 2x15, with belt 8" step PT overpress 2x15, with belt 8" step PT overpress 2x15, 1 round with belt 8" step PT overpress 2x15, 1 round with belt 8" step PT overpress 2x15, 1 round with belt 8" step PT overpress 2x15, 1 round with belt   VG for strength and ROM S/l L7 3x10 ea  S/l L7 3x10 ea  S/l L7 3x10 ea S/l L7 3x10 ea S/l L7 3x10 ea S/l L7 3x10 ea S/l L7 3x10 ea  S/l L7 3x10 ea  S/l L7 3x10 ea  S/l L7 3x10 ea    Slant  45"x4 LLE only, standing calf stretch 45"x3 LLE only, standing calf stretch 45"x3 LLE only, standing calf stretch 45"x3 LLE only, standing calf stretch 45"x3 LLE only, standing calf stretch 45"x3 LLE only, standing calf stretch 45"x3 LLE only, standing calf stretch 45"x4 LLE only, standing calf stretch 45"x4 LLE only, standing calf stretch 45"x4 LLE only, standing calf stretch                                          Ther Activity             Sit to stands        5x STS  TUG x2                  Gait Training                                       Modalities

## 2023-11-10 DIAGNOSIS — M25.572 CHRONIC PAIN OF LEFT ANKLE: ICD-10-CM

## 2023-11-10 DIAGNOSIS — G89.29 CHRONIC PAIN OF LEFT ANKLE: ICD-10-CM

## 2023-11-10 RX ORDER — HYDROCODONE BITARTRATE AND ACETAMINOPHEN 5; 325 MG/1; MG/1
TABLET ORAL
Qty: 30 TABLET | Refills: 0 | Status: SHIPPED | OUTPATIENT
Start: 2023-11-10

## 2023-11-10 NOTE — TELEPHONE ENCOUNTER
This prescription was originally sent to [facility] in [facility] but they are not HYDROcodone-acetaminophen at this medication at this time. I change the pharmacy to [facility].

## 2023-11-13 ENCOUNTER — APPOINTMENT (OUTPATIENT)
Dept: PHYSICAL THERAPY | Facility: REHABILITATION | Age: 73
End: 2023-11-13
Payer: COMMERCIAL

## 2023-11-15 ENCOUNTER — OFFICE VISIT (OUTPATIENT)
Dept: PHYSICAL THERAPY | Facility: REHABILITATION | Age: 73
End: 2023-11-15
Payer: COMMERCIAL

## 2023-11-15 DIAGNOSIS — R26.9 GAIT ABNORMALITY: ICD-10-CM

## 2023-11-15 DIAGNOSIS — M72.2 PLANTAR FASCIITIS: Primary | ICD-10-CM

## 2023-11-15 DIAGNOSIS — M79.672 LEFT FOOT PAIN: ICD-10-CM

## 2023-11-15 DIAGNOSIS — R26.89 BALANCE PROBLEM: ICD-10-CM

## 2023-11-15 PROCEDURE — 97110 THERAPEUTIC EXERCISES: CPT | Performed by: PHYSICAL THERAPIST

## 2023-11-15 PROCEDURE — 97140 MANUAL THERAPY 1/> REGIONS: CPT | Performed by: PHYSICAL THERAPIST

## 2023-11-15 NOTE — PROGRESS NOTES
Daily Note     Today's date: 11/15/2023  Patient name: Arturo Baumann  : 1950  MRN: [id number]  Referring provider: Colten Nagel MD  Dx:   Encounter Diagnosis     ICD-10-CM    1. Plantar fasciitis  M72.2       2. Left foot pain  M79.672       3. Gait abnormality  R26.9       4. Balance problem  R26.89                 Start Time: 1215  Stop Time: 1301  Total time in clinic (min): 46 minutes    Subjective: Pt reports her ankle was very painful on Monday      Objective: See treatment diary below      Assessment: Favorable response to tx. Pt continues to response well to tx with reduction in pain and temporary improvement in mobility. Pt would benefit from additional skilled maintenance therapy in effort to prevent decline in function with resultant decrease in independent self care and transfers, and to prevent an increased risk for fall, and loss of functional strength. Plan: Continue per plan of care. Precautions: HTN, OP, fall risk, CA hx, hx of L ankle surgery    POC expires Unit limit Auth Expiration date PT/OT + Visit Limit?    24 N/a 24 BOMN                             Visit/Unit Tracking  AUTH Status:  Date 10/25 11/1 11/8 11/15           12 visits approved Used 1 2 3 4            Remaining  11 10 9 8                Manuals 11/8 11/15 10/2 10/9 10/11 10/16 10/19 10/23 10/25 11/1   L ankle TC PA and distraction and ST mobs DS Gr IV DS Gr IV DS Gr IV DS gr IV DS Gr IV DS Gr IV DS Gr IV DS Gr IV DS Gr IV DS Gr IV                STM PF DS DS DS DS DS DS DS DS DS DS                             Neuro Re-Ed             [employer] mini march 3 min no UE close S 3 min no UE close S 3 min no UE close S 3 min no UE close S 3 min no UE close S 3 min no UE close S 3 min no UE close S 3 min no UE close S 3 min no UE close S 3 min no UE close S   SLS on airex with contralat hip abd,  GMB x30 ea light UE GMB x30 ea light UE GMB x30 ea light UE GMB x30 ea light UE GMB x30 ea light UE GMB x30 ea light UE GMB x30 ea light UE GMB x30 ea light UE GMB x30 ea light UE GMB x30 ea light UE   Sand dune step ups 1 min ea LE light UE close S 1 min ea LE light UE close S 1 min ea LE light UE close S 1 min ea LE light UE close S 1 min ea LE light UE close S 1 min ea LE light UE close S 1 min ea LE light UE close S 1 min ea LE light UE close S 1 min ea LE light UE close S 1 min ea LE light UE close S                                          Ther Ex             DF MWM 8" step PT overpress 2x15, with belt 8" step PT overpress 2x15, with belt 8" step PT overpress 2x15, with belt 8" step PT overpress 2x15, with belt 8" step PT overpress 2x15, with belt 8" step PT overpress 2x15, with belt 8" step PT overpress 2x15, 1 round with belt 8" step PT overpress 2x15, 1 round with belt 8" step PT overpress 2x15, 1 round with belt 8" step PT overpress 2x15, 1 round with belt   VG for strength and ROM S/l L7 3x10 ea  S/l L7 3x10 ea  S/l L7 3x10 ea S/l L7 3x10 ea S/l L7 3x10 ea S/l L7 3x10 ea S/l L7 3x10 ea  S/l L7 3x10 ea  S/l L7 3x10 ea  S/l L7 3x10 ea    Slant  45"x4 LLE only, standing calf stretch 45"x3 LLE only, standing calf stretch 45"x3 LLE only, standing calf stretch 45"x3 LLE only, standing calf stretch 45"x3 LLE only, standing calf stretch 45"x3 LLE only, standing calf stretch 45"x3 LLE only, standing calf stretch 45"x4 LLE only, standing calf stretch 45"x4 LLE only, standing calf stretch 45"x4 LLE only, standing calf stretch                                          Ther Activity             Sit to stands        5x STS  TUG x2                  Gait Training                                       Modalities

## 2023-11-21 ENCOUNTER — OFFICE VISIT (OUTPATIENT)
Dept: PHYSICAL THERAPY | Facility: REHABILITATION | Age: 73
End: 2023-11-21
Payer: COMMERCIAL

## 2023-11-21 DIAGNOSIS — M72.2 PLANTAR FASCIITIS: Primary | ICD-10-CM

## 2023-11-21 DIAGNOSIS — R26.89 BALANCE PROBLEM: ICD-10-CM

## 2023-11-21 DIAGNOSIS — R26.9 GAIT ABNORMALITY: ICD-10-CM

## 2023-11-21 DIAGNOSIS — M79.672 LEFT FOOT PAIN: ICD-10-CM

## 2023-11-21 PROCEDURE — 97140 MANUAL THERAPY 1/> REGIONS: CPT | Performed by: PHYSICAL THERAPIST

## 2023-11-21 PROCEDURE — 97110 THERAPEUTIC EXERCISES: CPT | Performed by: PHYSICAL THERAPIST

## 2023-11-21 NOTE — PROGRESS NOTES
Daily Note     Today's date: 2023  Patient name: Fannie Barajas  : 1950  MRN: [id number]  Referring provider: Laverne Armas MD  Dx:   Encounter Diagnosis     ICD-10-CM    1. Plantar fasciitis  M72.2       2. Left foot pain  M79.672       3. Gait abnormality  R26.9       4. Balance problem  R26.89               Start Time: 1231  Stop Time: 1320  Total time in clinic (min): 49 minutes    Subjective: Pt offers no new complaints. Objective: See treatment diary below      Assessment: Favorable response to tx. Sx's improved with tx. She noted improved mobility and pain. Pt would benefit from additional skilled maintenance therapy in effort to prevent decline in function with resultant decrease in independent self care and transfers, and to prevent an increased risk for fall, and loss of functional strength. Plan: Continue per plan of care. Precautions: HTN, OP, fall risk, CA hx, hx of L ankle surgery    POC expires Unit limit Auth Expiration date PT/OT + Visit Limit?    24 N/a 24 BOMN                             Visit/Unit Tracking  AUTH Status:  Date 10/25 11/1 11/8 11/15 11/21          12 visits approved Used 1 2 3 4 5           Remaining  11 10 9 8 7               Manuals 11/8 11/15 11/21 10/9 10/11 10/16 10/19 10/23 10/25 11/1   L ankle TC PA and distraction and ST mobs DS Gr IV DS Gr IV DS Gr IV DS gr IV DS Gr IV DS Gr IV DS Gr IV DS Gr IV DS Gr IV DS Gr IV                STM PF DS DS DS DS DS DS DS DS DS DS                             Neuro Re-Ed             [employer] mini march 3 min no UE close S 3 min no UE close S 3 min no UE close S 3 min no UE close S 3 min no UE close S 3 min no UE close S 3 min no UE close S 3 min no UE close S 3 min no UE close S 3 min no UE close S   SLS on airex with contralat hip abd,  GMB x30 ea light UE GMB x30 ea light UE GMB x30 ea light UE GMB x30 ea light UE GMB x30 ea light UE GMB x30 ea light UE GMB x30 ea light UE GMB x30 ea light UE GMB x30 ea light UE GMB x30 ea light UE   Sand dune step ups 1 min ea LE light UE close S 1 min ea LE light UE close S 1 min ea LE light UE close S 1 min ea LE light UE close S 1 min ea LE light UE close S 1 min ea LE light UE close S 1 min ea LE light UE close S 1 min ea LE light UE close S 1 min ea LE light UE close S 1 min ea LE light UE close S                                          Ther Ex             DF MWM 8" step PT overpress 2x15, with belt 8" step PT overpress 2x15, with belt 8" step PT overpress 2x15, with belt 8" step PT overpress 2x15, with belt 8" step PT overpress 2x15, with belt 8" step PT overpress 2x15, with belt 8" step PT overpress 2x15, 1 round with belt 8" step PT overpress 2x15, 1 round with belt 8" step PT overpress 2x15, 1 round with belt 8" step PT overpress 2x15, 1 round with belt   VG for strength and ROM S/l L7 3x10 ea  S/l L7 3x10 ea  S/l L7 3x10 ea S/l L7 3x10 ea S/l L7 3x10 ea S/l L7 3x10 ea S/l L7 3x10 ea  S/l L7 3x10 ea  S/l L7 3x10 ea  S/l L7 3x10 ea    Slant  45"x4 LLE only, standing calf stretch 45"x3 LLE only, standing calf stretch 45"x4 LLE only, standing calf stretch 45"x3 LLE only, standing calf stretch 45"x3 LLE only, standing calf stretch 45"x3 LLE only, standing calf stretch 45"x3 LLE only, standing calf stretch 45"x4 LLE only, standing calf stretch 45"x4 LLE only, standing calf stretch 45"x4 LLE only, standing calf stretch                                          Ther Activity             Sit to stands        5x STS  TUG x2                  Gait Training                                       Modalities

## 2023-11-29 ENCOUNTER — OFFICE VISIT (OUTPATIENT)
Dept: PHYSICAL THERAPY | Facility: REHABILITATION | Age: 73
End: 2023-11-29
Payer: COMMERCIAL

## 2023-11-29 DIAGNOSIS — M72.2 PLANTAR FASCIITIS: Primary | ICD-10-CM

## 2023-11-29 DIAGNOSIS — R26.9 GAIT ABNORMALITY: ICD-10-CM

## 2023-11-29 DIAGNOSIS — M79.672 LEFT FOOT PAIN: ICD-10-CM

## 2023-11-29 DIAGNOSIS — R26.89 BALANCE PROBLEM: ICD-10-CM

## 2023-11-29 PROCEDURE — 97112 NEUROMUSCULAR REEDUCATION: CPT | Performed by: PHYSICAL THERAPIST

## 2023-11-29 PROCEDURE — 97140 MANUAL THERAPY 1/> REGIONS: CPT | Performed by: PHYSICAL THERAPIST

## 2023-11-29 PROCEDURE — 97110 THERAPEUTIC EXERCISES: CPT | Performed by: PHYSICAL THERAPIST

## 2023-11-29 NOTE — PROGRESS NOTES
Daily Note     Today's date: 2023  Patient name: Vidya Pennington  : 1950  MRN: [id number]  Referring provider: Dasia Mabry MD  Dx:   Encounter Diagnosis     ICD-10-CM    1. Plantar fasciitis  M72.2       2. Left foot pain  M79.672       3. Gait abnormality  R26.9       4. Balance problem  R26.89           Start Time: 1215  Stop Time: 1255  Total time in clinic (min): 40 minutes    Subjective: Pt offers no new complaints. Objective: See treatment diary below      Assessment: Favorable response to tx. Pt would benefit from additional skilled maintenance therapy in effort to prevent decline in function with resultant decrease in independent self care and transfers, and to prevent an increased risk for fall, and loss of functional strength. Plan: Continue per plan of care. Precautions: HTN, OP, fall risk, CA hx, hx of L ankle surgery    POC expires Unit limit Auth Expiration date PT/OT + Visit Limit?    24 N/a 24 BOMN                             Visit/Unit Tracking  AUTH Status:  Date 10/25 11/1 11/8 11/15 11/21 11/29         12 visits approved Used 1 2 3 4 5 6          Remaining  11 10 9 8 7 6              Manuals 11/8 11/15 11/21 11/29 10/11 10/16 10/19 10/23 10/25 11/1   L ankle TC PA and distraction and ST mobs DS Gr IV DS Gr IV DS Gr IV DS gr IV DS Gr IV DS Gr IV DS Gr IV DS Gr IV DS Gr IV DS Gr IV                STM PF DS DS DS DS DS DS DS DS DS DS                             Neuro Re-Ed             [employer] mini march 3 min no UE close S 3 min no UE close S 3 min no UE close S 3 min no UE close S 3 min no UE close S 3 min no UE close S 3 min no UE close S 3 min no UE close S 3 min no UE close S 3 min no UE close S   SLS on airex with contralat hip abd,  GMB x30 ea light UE GMB x30 ea light UE GMB x30 ea light UE GMB x30 ea light UE GMB x30 ea light UE GMB x30 ea light UE GMB x30 ea light UE GMB x30 ea light UE GMB x30 ea light UE GMB x30 ea light UE   [facility] step ups 1 min ea LE light UE close S 1 min ea LE light UE close S 1 min ea LE light UE close S 1 min ea LE light UE close S 1 min ea LE light UE close S 1 min ea LE light UE close S 1 min ea LE light UE close S 1 min ea LE light UE close S 1 min ea LE light UE close S 1 min ea LE light UE close S                                          Ther Ex             DF MWM 8" step PT overpress 2x15, with belt 8" step PT overpress 2x15, with belt 8" step PT overpress 2x15, with belt 8" step PT overpress 2x15, with belt 8" step PT overpress 2x15, with belt 8" step PT overpress 2x15, with belt 8" step PT overpress 2x15, 1 round with belt 8" step PT overpress 2x15, 1 round with belt 8" step PT overpress 2x15, 1 round with belt 8" step PT overpress 2x15, 1 round with belt   VG for strength and ROM S/l L7 3x10 ea  S/l L7 3x10 ea  S/l L7 3x10 ea S/l L7 3x10 ea S/l L7 3x10 ea S/l L7 3x10 ea S/l L7 3x10 ea  S/l L7 3x10 ea  S/l L7 3x10 ea  S/l L7 3x10 ea    Slant  45"x4 LLE only, standing calf stretch 45"x3 LLE only, standing calf stretch 45"x4 LLE only, standing calf stretch 45"x3 LLE only, standing calf stretch 45"x3 LLE only, standing calf stretch 45"x3 LLE only, standing calf stretch 45"x3 LLE only, standing calf stretch 45"x4 LLE only, standing calf stretch 45"x4 LLE only, standing calf stretch 45"x4 LLE only, standing calf stretch                                          Ther Activity             Sit to stands        5x STS  TUG x2                  Gait Training                                       Modalities

## 2023-11-30 DIAGNOSIS — Z12.11 SCREENING FOR COLORECTAL CANCER: Primary | ICD-10-CM

## 2023-11-30 DIAGNOSIS — Z12.12 SCREENING FOR COLORECTAL CANCER: Primary | ICD-10-CM

## 2023-12-06 ENCOUNTER — OFFICE VISIT (OUTPATIENT)
Dept: PHYSICAL THERAPY | Facility: REHABILITATION | Age: 73
End: 2023-12-06
Payer: COMMERCIAL

## 2023-12-06 DIAGNOSIS — M72.2 PLANTAR FASCIITIS: Primary | ICD-10-CM

## 2023-12-06 DIAGNOSIS — R26.89 BALANCE PROBLEM: ICD-10-CM

## 2023-12-06 DIAGNOSIS — M79.672 LEFT FOOT PAIN: ICD-10-CM

## 2023-12-06 DIAGNOSIS — R26.9 GAIT ABNORMALITY: ICD-10-CM

## 2023-12-06 PROCEDURE — 97110 THERAPEUTIC EXERCISES: CPT | Performed by: PHYSICAL THERAPIST

## 2023-12-06 PROCEDURE — 97112 NEUROMUSCULAR REEDUCATION: CPT | Performed by: PHYSICAL THERAPIST

## 2023-12-06 PROCEDURE — 97140 MANUAL THERAPY 1/> REGIONS: CPT | Performed by: PHYSICAL THERAPIST

## 2023-12-06 NOTE — PROGRESS NOTES
Daily Note     Today's date: 2023  Patient name: Crow Wan  : 1950  MRN: [id number]  Referring provider: Audrey Maza MD  Dx:   Encounter Diagnosis     ICD-10-CM    1. Plantar fasciitis  M72.2       2. Left foot pain  M79.672       3. Gait abnormality  R26.9       4. Balance problem  R26.89             Start Time: 1535  Stop Time: 1620  Total time in clinic (min): 45 minutes    Subjective: Pt offers no new complaints. Objective: See treatment diary below      Assessment: Favorable response to tx. She noted some discomfort during calf stretching, but sx's improved following manuals. Pt would benefit from additional skilled maintenance therapy in effort to prevent decline in function with resultant decrease in independent self care and transfers, and to prevent an increased risk for fall, and loss of functional strength. Plan: Continue per plan of care. Precautions: HTN, OP, fall risk, CA hx, hx of L ankle surgery    POC expires Unit limit Auth Expiration date PT/OT + Visit Limit?    24 N/a 24 BOMN                             Visit/Unit Tracking  AUTH Status:  Date 10/25 11/1 11/8 11/15 11/21 11/29 12/6        12 visits approved Used 1 2 3 4 5 6 7         Remaining  11 10 9 8 7 6 5             Manuals 11/8 11/15 11/21 11/29 12/6 10/16 10/19 10/23 10/25 11/1   L ankle TC PA and distraction and ST mobs DS Gr IV DS Gr IV DS Gr IV DS gr IV DS Gr IV DS Gr IV DS Gr IV DS Gr IV DS Gr IV DS Gr IV                STM PF DS DS DS DS DS DS DS DS DS DS                             Neuro Re-Ed             [employer] mini march 3 min no UE close S 3 min no UE close S 3 min no UE close S 3 min no UE close S 3 min no UE close S 3 min no UE close S 3 min no UE close S 3 min no UE close S 3 min no UE close S 3 min no UE close S   SLS on airex with contralat hip abd,  GMB x30 ea light UE GMB x30 ea light UE GMB x30 ea light UE GMB x30 ea light UE GMB x30 ea light UE GMB x30 ea light UE GMB x30 ea light UE GMB x30 ea light UE GMB x30 ea light UE GMB x30 ea light UE   Sand dune step ups 1 min ea LE light UE close S 1 min ea LE light UE close S 1 min ea LE light UE close S 1 min ea LE light UE close S 1 min ea LE light UE close S 1 min ea LE light UE close S 1 min ea LE light UE close S 1 min ea LE light UE close S 1 min ea LE light UE close S 1 min ea LE light UE close S                                          Ther Ex             DF MWM 8" step PT overpress 2x15, with belt 8" step PT overpress 2x15, with belt 8" step PT overpress 2x15, with belt 8" step PT overpress 2x15, with belt 8" step PT overpress 2x15, with belt 8" step PT overpress 2x15, with belt 8" step PT overpress 2x15, 1 round with belt 8" step PT overpress 2x15, 1 round with belt 8" step PT overpress 2x15, 1 round with belt 8" step PT overpress 2x15, 1 round with belt   VG for strength and ROM S/l L7 3x10 ea  S/l L7 3x10 ea  S/l L7 3x10 ea S/l L7 3x10 ea S/l L7 3x10 ea S/l L7 3x10 ea S/l L7 3x10 ea  S/l L7 3x10 ea  S/l L7 3x10 ea  S/l L7 3x10 ea    Slant  45"x4 LLE only, standing calf stretch 45"x3 LLE only, standing calf stretch 45"x4 LLE only, standing calf stretch 45"x3 LLE only, standing calf stretch 45"x3 LLE only, standing calf stretch 45"x3 LLE only, standing calf stretch 45"x3 LLE only, standing calf stretch 45"x4 LLE only, standing calf stretch 45"x4 LLE only, standing calf stretch 45"x4 LLE only, standing calf stretch                                          Ther Activity             Sit to stands        5x STS  TUG x2                  Gait Training                                       Modalities

## 2023-12-07 DIAGNOSIS — M54.16 RADICULOPATHY, LUMBAR REGION: ICD-10-CM

## 2023-12-07 RX ORDER — GABAPENTIN 300 MG/1
CAPSULE ORAL
Qty: 360 CAPSULE | Refills: 1 | Status: SHIPPED | OUTPATIENT
Start: 2023-12-07

## 2023-12-08 DIAGNOSIS — E78.5 HYPERLIPIDEMIA, UNSPECIFIED HYPERLIPIDEMIA TYPE: ICD-10-CM

## 2023-12-08 RX ORDER — ATORVASTATIN CALCIUM 10 MG/1
10 TABLET, FILM COATED ORAL DAILY
Qty: 90 TABLET | Refills: 1 | Status: SHIPPED | OUTPATIENT
Start: 2023-12-08

## 2023-12-10 ENCOUNTER — NURSE TRIAGE (OUTPATIENT)
Dept: FAMILY MEDICINE CLINIC | Facility: CLINIC | Age: 73
End: 2023-12-10

## 2023-12-10 DIAGNOSIS — U07.1 COVID-19: Primary | ICD-10-CM

## 2023-12-10 DIAGNOSIS — U07.1 COVID-19: ICD-10-CM

## 2023-12-10 RX ORDER — NIRMATRELVIR AND RITONAVIR 150-100 MG
2 KIT ORAL 2 TIMES DAILY
Qty: 20 TABLET | Refills: 0 | Status: SHIPPED | OUTPATIENT
Start: 2023-12-10 | End: 2023-12-15

## 2023-12-10 RX ORDER — NIRMATRELVIR AND RITONAVIR 150-100 MG
2 KIT ORAL 2 TIMES DAILY
Qty: 20 TABLET | Refills: 0 | Status: SHIPPED | OUTPATIENT
Start: 2023-12-10 | End: 2023-12-10 | Stop reason: SDUPTHER

## 2023-12-10 NOTE — TELEPHONE ENCOUNTER
Reason for Disposition  • HIGH RISK for severe COVID complications (e.g., weak immune system, age > 59 years, obesity with BMI > 22, pregnant, chronic lung disease or other chronic medical condition)  (Exception: Already seen by PCP and no new or worsening symptoms.)    Answer Assessment - Initial Assessment Questions  1. COVID-19 DIAGNOSIS: "Who made your COVID-19 diagnosis?" "Was it confirmed by a positive lab test or self-test?" If not diagnosed by a doctor (or NP/PA), ask "Are there lots of cases (community spread) where you live?" Note: See [facility] health department website, if unsure. Home test positive today    2. COVID-19 EXPOSURE: "Was there any known exposure to COVID before the symptoms began?" CDC Definition of close contact: within 6 feet (2 meters) for a total of 15 minutes or more over a 24-hour period. Daughter    3. ONSET: "When did the COVID-19 symptoms start?"       Yesterday     4. WORST SYMPTOM: "What is your worst symptom?" (e.g., cough, fever, shortness of breath, muscle aches)      Throat irritation    5. COUGH: "Do you have a cough?" If Yes, ask: "How bad is the cough?"        Mild cough    6. FEVER: "Do you have a fever?" If Yes, ask: "What is your temperature, how was it measured, and when did it start?"      Denies    7. RESPIRATORY STATUS: "Describe your breathing?" (e.g., shortness of breath, wheezing, unable to speak)       Denies SOB    8. BETTER-SAME-WORSE: "Are you getting better, staying the same or getting worse compared to yesterday?"  If getting worse, ask, "In what way?"      Same to a little worse    9. HIGH RISK DISEASE: "Do you have any chronic medical problems?" (e.g., asthma, heart or lung disease, weak immune system, obesity, etc.)      BMI >40, HTN, CKD    10. VACCINE: "Have you had the COVID-19 vaccine?" If Yes, ask: "Which one, how many shots, when did you get it?"        Yes    11.  BOOSTER: "Have you received your COVID-19 booster?" If Yes, ask: "Which one and when did you get it?"        3    12. PREGNANCY: "Is there any chance you are pregnant?" "When was your last menstrual period?"        N/a    13. OTHER SYMPTOMS: "Do you have any other symptoms?"  (e.g., chills, fatigue, headache, loss of smell or taste, muscle pain, sore throat)        Throat irritation/congestion, blocked ears, runny nose, HA base of head. Felt very dizzy this am- since subsided    14.  O2 SATURATION MONITOR:  "Do you use an oxygen saturation monitor (pulse oximeter) at home?" If Yes, ask "What is your reading (oxygen level) today?" "What is your usual oxygen saturation reading?" (e.g., 95%)        LILIANA    Protocols used: Coronavirus (COVID-19) Diagnosed or Suspected-ADULT-

## 2023-12-10 NOTE — TELEPHONE ENCOUNTER
Regarding: covid +  ----- Message from Corbin Alves sent at 12/10/2023 12:46 PM EST -----  " My sister has covid and now my mom is positive for covid as well.  She is very weak and tired, she is throwing up. "

## 2023-12-10 NOTE — TELEPHONE ENCOUNTER
On call provider contacted. Paxlovid sent to pharmacy. Pt instructed to hold atorvastatin while on Paxlovid. Also if taking hydrocodone-acetaminophen should only take 0.5 tablet.

## 2023-12-12 ENCOUNTER — TELEPHONE (OUTPATIENT)
Dept: FAMILY MEDICINE CLINIC | Facility: CLINIC | Age: 73
End: 2023-12-12

## 2023-12-12 NOTE — TELEPHONE ENCOUNTER
Marliss Edelson called to state she was diagnosed with and started Paxlovid treatment on December 10th and has developed a sore throat so severe she is having trouble swallowing. Please advise.

## 2023-12-13 ENCOUNTER — APPOINTMENT (OUTPATIENT)
Dept: PHYSICAL THERAPY | Facility: REHABILITATION | Age: 73
End: 2023-12-13
Payer: COMMERCIAL

## 2023-12-13 NOTE — TELEPHONE ENCOUNTER
Unfortunately, covid symptoms may last a few weeks even after treatment.  She may try warm salt water gargling 4-5X/day, throat lozenges etc. If symptoms worsen she should have ER eval

## 2023-12-15 ENCOUNTER — TELEPHONE (OUTPATIENT)
Dept: FAMILY MEDICINE CLINIC | Facility: CLINIC | Age: 73
End: 2023-12-15

## 2023-12-15 NOTE — TELEPHONE ENCOUNTER
Patient's son called to state that Gayatri just finished her last day of Paxlovid and now her  is diagnosed (started Paxlovid treatment today after virtual with Dr Knapp) and he was wondering if this is going to re-infect her or would she be building up immunity now that she has it or if she should have any concerns. Please advise.

## 2023-12-15 NOTE — TELEPHONE ENCOUNTER
She would not be reinfected by COVID if she recently had infection.  Patient with COVID usually developed antibody protection for at least 4 to 5 months after infection.

## 2023-12-20 ENCOUNTER — APPOINTMENT (OUTPATIENT)
Dept: PHYSICAL THERAPY | Facility: REHABILITATION | Age: 73
End: 2023-12-20
Payer: COMMERCIAL

## 2023-12-26 DIAGNOSIS — E03.9 HYPOTHYROIDISM, UNSPECIFIED TYPE: ICD-10-CM

## 2023-12-26 RX ORDER — LEVOTHYROXINE SODIUM 175 UG/1
175 TABLET ORAL DAILY
Qty: 90 TABLET | Refills: 1 | Status: SHIPPED | OUTPATIENT
Start: 2023-12-26 | End: 2024-01-03

## 2023-12-27 ENCOUNTER — OFFICE VISIT (OUTPATIENT)
Dept: PHYSICAL THERAPY | Facility: REHABILITATION | Age: 73
End: 2023-12-27
Payer: COMMERCIAL

## 2023-12-27 ENCOUNTER — APPOINTMENT (OUTPATIENT)
Dept: PHYSICAL THERAPY | Facility: REHABILITATION | Age: 73
End: 2023-12-27
Payer: COMMERCIAL

## 2023-12-27 DIAGNOSIS — M79.672 LEFT FOOT PAIN: ICD-10-CM

## 2023-12-27 DIAGNOSIS — R26.89 BALANCE PROBLEM: ICD-10-CM

## 2023-12-27 DIAGNOSIS — M72.2 PLANTAR FASCIITIS: Primary | ICD-10-CM

## 2023-12-27 DIAGNOSIS — R26.9 GAIT ABNORMALITY: ICD-10-CM

## 2023-12-27 PROCEDURE — 97110 THERAPEUTIC EXERCISES: CPT | Performed by: PHYSICAL THERAPIST

## 2023-12-27 PROCEDURE — 97140 MANUAL THERAPY 1/> REGIONS: CPT | Performed by: PHYSICAL THERAPIST

## 2023-12-27 PROCEDURE — 97112 NEUROMUSCULAR REEDUCATION: CPT | Performed by: PHYSICAL THERAPIST

## 2023-12-27 NOTE — PROGRESS NOTES
Daily Note     Today's date: 2023  Patient name: Estephania Brown  : 1950  MRN: [id number]  Referring provider: Rosalino Purvis MD  Dx:   Encounter Diagnosis     ICD-10-CM    1. Plantar fasciitis  M72.2       2. Left foot pain  M79.672       3. Gait abnormality  R26.9       4. Balance problem  R26.89               Start Time: 1100  Stop Time: 1140  Total time in clinic (min): 40 minutes    Subjective: Pt reports she has been sick the past 3 weeks so the ankle has been stiff and painful. States she can tell she hasn't been in PT.      Objective: See treatment diary below      Assessment: Pt fatigued more with sand dune marching today, likely due to not being in PT for the past few weeks. Her ankle was also more stiff today but mobility improved with manuals and stretching. Pt would benefit from additional skilled maintenance therapy in effort to prevent decline in function with resultant decrease in independent self care and transfers, and to prevent an increased risk for fall, and loss of functional strength.        Plan: Continue per plan of care.      Precautions: HTN, OP, fall risk, CA hx, hx of L ankle surgery    POC expires Unit limit Auth Expiration date PT/OT + Visit Limit?   24 N/a 24 BOMN                             Visit/Unit Tracking  AUTH Status:  Date 10/25 11/1 11/8 11/15 11/21 11/29 12/6 12/27       12 visits approved Used 1 2 3 4 5 6 7 8        Remaining  11 10 9 8 7 6 5 4            Manuals 11/8 11/15 11/21 11/29 12/6 12/27 10/19 10/23 10/25 11/1   L ankle TC PA and distraction and ST mobs DS Gr IV DS Gr IV DS Gr IV DS gr IV DS Gr IV DS Gr IV DS Gr IV DS Gr IV DS Gr IV DS Gr IV                STM PF DS DS DS DS DS DS DS DS DS DS                             Neuro Re-Ed             [employer] mini march 3 min no UE close S 3 min no UE close S 3 min no UE close S 3 min no UE close S 3 min no UE close S 3 min no UE close S 3 min no UE close S 3 min no UE close S 3 min no UE  "close S 3 min no UE close S   SLS on airex with contralat hip abd,  GMB x30 ea light UE GMB x30 ea light UE GMB x30 ea light UE GMB x30 ea light UE GMB x30 ea light UE GMB x30 ea light UE GMB x30 ea light UE GMB x30 ea light UE GMB x30 ea light UE GMB x30 ea light UE   Sand dune step ups 1 min ea LE light UE close S 1 min ea LE light UE close S 1 min ea LE light UE close S 1 min ea LE light UE close S 1 min ea LE light UE close S 1 min ea LE light UE close S 1 min ea LE light UE close S 1 min ea LE light UE close S 1 min ea LE light UE close S 1 min ea LE light UE close S                                          Ther Ex             DF MWM 8\" step PT overpress 2x15, with belt 8\" step PT overpress 2x15, with belt 8\" step PT overpress 2x15, with belt 8\" step PT overpress 2x15, with belt 8\" step PT overpress 2x15, with belt 8\" step PT overpress 2x15, with belt 8\" step PT overpress 2x15, 1 round with belt 8\" step PT overpress 2x15, 1 round with belt 8\" step PT overpress 2x15, 1 round with belt 8\" step PT overpress 2x15, 1 round with belt   VG for strength and ROM S/l L7 3x10 ea  S/l L7 3x10 ea  S/l L7 3x10 ea S/l L7 3x10 ea S/l L7 3x10 ea S/l L7 3x10 ea S/l L7 3x10 ea  S/l L7 3x10 ea  S/l L7 3x10 ea  S/l L7 3x10 ea    Slant  45\"x4 LLE only, standing calf stretch 45\"x3 LLE only, standing calf stretch 45\"x4 LLE only, standing calf stretch 45\"x3 LLE only, standing calf stretch 45\"x3 LLE only, standing calf stretch 45\"x4 LLE only, standing calf stretch 45\"x3 LLE only, standing calf stretch 45\"x4 LLE only, standing calf stretch 45\"x4 LLE only, standing calf stretch 45\"x4 LLE only, standing calf stretch                                          Ther Activity             Sit to stands        5x STS  TUG x2                  Gait Training                                       Modalities                                              "

## 2023-12-28 ENCOUNTER — CLINICAL SUPPORT (OUTPATIENT)
Dept: FAMILY MEDICINE CLINIC | Facility: CLINIC | Age: 73
End: 2023-12-28
Payer: COMMERCIAL

## 2023-12-28 DIAGNOSIS — M81.0 OSTEOPOROSIS, UNSPECIFIED OSTEOPOROSIS TYPE, UNSPECIFIED PATHOLOGICAL FRACTURE PRESENCE: Primary | ICD-10-CM

## 2023-12-28 PROCEDURE — 96372 THER/PROPH/DIAG INJ SC/IM: CPT | Performed by: FAMILY MEDICINE

## 2024-01-02 ENCOUNTER — APPOINTMENT (OUTPATIENT)
Dept: LAB | Facility: CLINIC | Age: 74
End: 2024-01-02
Payer: COMMERCIAL

## 2024-01-02 DIAGNOSIS — I10 PRIMARY HYPERTENSION: ICD-10-CM

## 2024-01-02 DIAGNOSIS — E03.9 HYPOTHYROIDISM, UNSPECIFIED TYPE: ICD-10-CM

## 2024-01-02 LAB
ALBUMIN SERPL BCP-MCNC: 4.2 G/DL (ref 3.5–5)
ALP SERPL-CCNC: 58 U/L (ref 34–104)
ALT SERPL W P-5'-P-CCNC: 17 U/L (ref 7–52)
ANION GAP SERPL CALCULATED.3IONS-SCNC: 9 MMOL/L
AST SERPL W P-5'-P-CCNC: 23 U/L (ref 13–39)
BILIRUB SERPL-MCNC: 0.53 MG/DL (ref 0.2–1)
BUN SERPL-MCNC: 19 MG/DL (ref 5–25)
CALCIUM SERPL-MCNC: 9.9 MG/DL (ref 8.4–10.2)
CHLORIDE SERPL-SCNC: 102 MMOL/L (ref 96–108)
CO2 SERPL-SCNC: 27 MMOL/L (ref 21–32)
CREAT SERPL-MCNC: 0.88 MG/DL (ref 0.6–1.3)
GFR SERPL CREATININE-BSD FRML MDRD: 65 ML/MIN/1.73SQ M
GLUCOSE P FAST SERPL-MCNC: 100 MG/DL (ref 65–99)
POTASSIUM SERPL-SCNC: 4.4 MMOL/L (ref 3.5–5.3)
PROT SERPL-MCNC: 7.3 G/DL (ref 6.4–8.4)
SODIUM SERPL-SCNC: 138 MMOL/L (ref 135–147)
TSH SERPL DL<=0.05 MIU/L-ACNC: 0.31 UIU/ML (ref 0.45–4.5)

## 2024-01-02 PROCEDURE — 80053 COMPREHEN METABOLIC PANEL: CPT

## 2024-01-02 PROCEDURE — 84443 ASSAY THYROID STIM HORMONE: CPT

## 2024-01-02 PROCEDURE — 36415 COLL VENOUS BLD VENIPUNCTURE: CPT

## 2024-01-03 ENCOUNTER — OFFICE VISIT (OUTPATIENT)
Dept: PHYSICAL THERAPY | Facility: REHABILITATION | Age: 74
End: 2024-01-03
Payer: COMMERCIAL

## 2024-01-03 ENCOUNTER — TELEPHONE (OUTPATIENT)
Dept: FAMILY MEDICINE CLINIC | Facility: CLINIC | Age: 74
End: 2024-01-03

## 2024-01-03 DIAGNOSIS — M72.2 PLANTAR FASCIITIS: Primary | ICD-10-CM

## 2024-01-03 DIAGNOSIS — R26.9 GAIT ABNORMALITY: ICD-10-CM

## 2024-01-03 DIAGNOSIS — E03.9 HYPOTHYROIDISM, UNSPECIFIED TYPE: ICD-10-CM

## 2024-01-03 DIAGNOSIS — M79.672 LEFT FOOT PAIN: ICD-10-CM

## 2024-01-03 DIAGNOSIS — R26.89 BALANCE PROBLEM: ICD-10-CM

## 2024-01-03 PROCEDURE — 97110 THERAPEUTIC EXERCISES: CPT | Performed by: PHYSICAL THERAPIST

## 2024-01-03 PROCEDURE — 97112 NEUROMUSCULAR REEDUCATION: CPT | Performed by: PHYSICAL THERAPIST

## 2024-01-03 PROCEDURE — 97140 MANUAL THERAPY 1/> REGIONS: CPT | Performed by: PHYSICAL THERAPIST

## 2024-01-03 RX ORDER — LEVOTHYROXINE SODIUM 175 UG/1
TABLET ORAL
Qty: 90 TABLET | Refills: 1 | Status: SHIPPED | OUTPATIENT
Start: 2024-01-03

## 2024-01-03 NOTE — PROGRESS NOTES
Daily Note     Today's date: 1/3/2024  Patient name: Estephania Brown  : 1950  MRN: [id number]  Referring provider: Rosalino Purvis MD  Dx:   Encounter Diagnosis     ICD-10-CM    1. Plantar fasciitis  M72.2       2. Left foot pain  M79.672       3. Gait abnormality  R26.9       4. Balance problem  R26.89                 Start Time: 1216  Stop Time: 1303  Total time in clinic (min): 47 minutes    Subjective: Pt reports the L ankle is feeling pretty good today but states the R lateral/prox calf is hurting. States she stepped weird and felt a pop.      Objective: See treatment diary below  Assessment:  (+) slump on the R  (-) pain with resisted PF, ankle eversion, or knee flexionAccess Code: [email or web address]    URL: [email or web address]  Date: 2024  Prepared by: Lisa Casillas    Exercises  - Seated Long Arc Quad  - 4 x daily - 7 x weekly - 1 sets - 15 reps      Assessment: Assessed the R ankle/lower leg. Signs and sxs are consistent with R sciatic n irritation. Added nerve glides to address this. Pt responded well. HEP provided. Pt would benefit from additional skilled maintenance therapy in effort to prevent decline in function with resultant decrease in independent self care and transfers, and to prevent an increased risk for fall, and loss of functional strength.        Plan: Continue per plan of care.      Precautions: HTN, OP, fall risk, CA hx, hx of L ankle surgery    POC expires Unit limit Auth Expiration date PT/OT + Visit Limit?   24 N/a 24 BOMN                             Visit/Unit Tracking  AUTH Status:  Date 10/25 11/1 11/8 11/15 11/21 11/29 12/6 12/27 1/3      12 visits approved Used 1 2 3 4 5 6 7 8 9       Remaining  11 10 9 8 7 6 5 4 3           Manuals 11/8 11/15 11/21 11/29 12/6 12/27 1/3 10/23 10/25 11/1   L ankle TC PA and distraction and ST mobs DS Gr IV DS Gr IV DS Gr IV DS gr IV DS Gr IV DS Gr IV DS Gr IV DS Gr IV DS Gr IV DS Gr IV                STM PF DS DS DS  "DS DS DS DS DS DS DS                             Neuro Re-Ed             [employer] mini march 3 min no UE close S 3 min no UE close S 3 min no UE close S 3 min no UE close S 3 min no UE close S 3 min no UE close S 3 min no UE close S 3 min no UE close S 3 min no UE close S 3 min no UE close S   SLS on airex with contralat hip abd,  GMB x30 ea light UE GMB x30 ea light UE GMB x30 ea light UE GMB x30 ea light UE GMB x30 ea light UE GMB x30 ea light UE GMB x30 ea light UE GMB x30 ea light UE GMB x30 ea light UE GMB x30 ea light UE   Sand dune step ups 1 min ea LE light UE close S 1 min ea LE light UE close S 1 min ea LE light UE close S 1 min ea LE light UE close S 1 min ea LE light UE close S 1 min ea LE light UE close S 1 min ea LE light UE close S 1 min ea LE light UE close S 1 min ea LE light UE close S 1 min ea LE light UE close S   assess       DS                                Ther Ex             DF MWM 8\" step PT overpress 2x15, with belt 8\" step PT overpress 2x15, with belt 8\" step PT overpress 2x15, with belt 8\" step PT overpress 2x15, with belt 8\" step PT overpress 2x15, with belt 8\" step PT overpress 2x15, with belt 8\" step PT overpress 2x15, 1 round with belt 8\" step PT overpress 2x15, 1 round with belt 8\" step PT overpress 2x15, 1 round with belt 8\" step PT overpress 2x15, 1 round with belt   VG for strength and ROM S/l L7 3x10 ea  S/l L7 3x10 ea  S/l L7 3x10 ea S/l L7 3x10 ea S/l L7 3x10 ea S/l L7 3x10 ea S/l L7 3x10 ea  S/l L7 3x10 ea  S/l L7 3x10 ea  S/l L7 3x10 ea    Slant  45\"x4 LLE only, standing calf stretch 45\"x3 LLE only, standing calf stretch 45\"x4 LLE only, standing calf stretch 45\"x3 LLE only, standing calf stretch 45\"x3 LLE only, standing calf stretch 45\"x4 LLE only, standing calf stretch 45\"x3 LLE only, standing calf stretch 45\"x4 LLE only, standing calf stretch 45\"x4 LLE only, standing calf stretch 45\"x4 LLE only, standing calf stretch   Sciatic n kourtney RLE       X15  hep                 "                Ther Activity             Sit to stands        5x STS  TUG x2                  Gait Training                                       Modalities

## 2024-01-03 NOTE — TELEPHONE ENCOUNTER
----- Message from Rosalino Purvis MD sent at 1/3/2024  6:09 AM EST -----  Recent blood work stable for patient with the exception of thyroid level.  It appears she is getting a little too much levothyroxine.  I would recommend taking levothyroxine at her current dose 6 out of 7 days a week and skipping 1 day.  We will recheck blood work on follow-up office visit in May 2024

## 2024-01-06 PROBLEM — Z00.00 MEDICARE ANNUAL WELLNESS VISIT, SUBSEQUENT: Status: RESOLVED | Noted: 2023-11-07 | Resolved: 2024-01-06

## 2024-01-10 ENCOUNTER — EVALUATION (OUTPATIENT)
Dept: PHYSICAL THERAPY | Facility: REHABILITATION | Age: 74
End: 2024-01-10
Payer: COMMERCIAL

## 2024-01-10 DIAGNOSIS — M79.672 LEFT FOOT PAIN: ICD-10-CM

## 2024-01-10 DIAGNOSIS — M72.2 PLANTAR FASCIITIS: Primary | ICD-10-CM

## 2024-01-10 DIAGNOSIS — R26.9 GAIT ABNORMALITY: ICD-10-CM

## 2024-01-10 PROCEDURE — 97110 THERAPEUTIC EXERCISES: CPT | Performed by: PHYSICAL THERAPIST

## 2024-01-10 PROCEDURE — 97112 NEUROMUSCULAR REEDUCATION: CPT | Performed by: PHYSICAL THERAPIST

## 2024-01-10 PROCEDURE — 97140 MANUAL THERAPY 1/> REGIONS: CPT | Performed by: PHYSICAL THERAPIST

## 2024-01-10 NOTE — PROGRESS NOTES
Daily Note     Today's date: 1/10/2024  Patient name: Estephania Brown  : 1950  MRN: [id number]  Referring provider: Rosalino Purvis MD  Dx:   Encounter Diagnosis     ICD-10-CM    1. Plantar fasciitis  M72.2       2. Left foot pain  M79.672       3. Gait abnormality  R26.9                   Start Time: 1216  Stop Time: 1302  Total time in clinic (min): 46 minutes    Subjective: Pt reports the ankle has been bothersome the last day or so    Objective: See treatment diary below        Access Code: [email or web address]  URL: [email or web address]  Date: 2023  Prepared by: Lisa Casillas     Exercises  - Long Sitting Calf Stretch with Strap  - 2-3 x daily - 7 x weekly - 1 sets - 4 reps - 30-45 hold  - Seated Plantar Fascia Mobilization with Small Ball  - 2-3 x daily - 7 x weekly - 1 sets - 3-5 reps - 60 hold        Assessment:  Pt responded well to tx with improvement in pain and subjective improvement in ankle stiffness and mobility.  Pt would benefit from additional skilled maintenance therapy in effort to prevent decline in function with resultant decrease in independent self care and transfers, and to prevent an increased risk for fall, and loss of functional strength.        Plan: Continue per plan of care.      Precautions: HTN, OP, fall risk, CA hx, hx of L ankle surgery    POC expires Unit limit Auth Expiration date PT/OT + Visit Limit?   24 N/a 24 BOMN                             Visit/Unit Tracking  AUTH Status:  Date 10/25 11/1 11/8 11/15 11/21 11/29 12/6 12/27 1/3 1/10     12 visits approved Used 1 2 3 4 5 6 7 8 9 10      Remaining  11 10 9 8 7 6 5 4 3 2          Manuals 11/8 11/15 11/21 11/29 12/6 12/27 1/3 1/10 10/25 11/1   L ankle TC PA and distraction and ST mobs DS Gr IV DS Gr IV DS Gr IV DS gr IV DS Gr IV DS Gr IV DS Gr IV DS Gr IV DS Gr IV DS Gr IV                STM PF DS DS DS DS DS DS DS DS DS DS                             Neuro Re-Ed             [employer] d[employer] mini march 3  "min no UE close S 3 min no UE close S 3 min no UE close S 3 min no UE close S 3 min no UE close S 3 min no UE close S 3 min no UE close S 3 min no UE close S 3 min no UE close S 3 min no UE close S   SLS on airex with contralat hip abd,  GMB x30 ea light UE GMB x30 ea light UE GMB x30 ea light UE GMB x30 ea light UE GMB x30 ea light UE GMB x30 ea light UE GMB x30 ea light UE GMB x30 ea light UE GMB x30 ea light UE GMB x30 ea light UE   Sand dune step ups 1 min ea LE light UE close S 1 min ea LE light UE close S 1 min ea LE light UE close S 1 min ea LE light UE close S 1 min ea LE light UE close S 1 min ea LE light UE close S 1 min ea LE light UE close S 1 min ea LE light UE close S 1 min ea LE light UE close S 1 min ea LE light UE close S   assess       DS                                Ther Ex             DF MWM 8\" step PT overpress 2x15, with belt 8\" step PT overpress 2x15, with belt 8\" step PT overpress 2x15, with belt 8\" step PT overpress 2x15, with belt 8\" step PT overpress 2x15, with belt 8\" step PT overpress 2x15, with belt 8\" step PT overpress 2x15, 1 round with belt 8\" step PT overpress 2x15, 1 round with belt 8\" step PT overpress 2x15, 1 round with belt 8\" step PT overpress 2x15, 1 round with belt   VG for strength and ROM S/l L7 3x10 ea  S/l L7 3x10 ea  S/l L7 3x10 ea S/l L7 3x10 ea S/l L7 3x10 ea S/l L7 3x10 ea S/l L7 3x10 ea  S/l L7 3x10 ea  S/l L7 3x10 ea  S/l L7 3x10 ea    Slant  45\"x4 LLE only, standing calf stretch 45\"x3 LLE only, standing calf stretch 45\"x4 LLE only, standing calf stretch 45\"x3 LLE only, standing calf stretch 45\"x3 LLE only, standing calf stretch 45\"x4 LLE only, standing calf stretch 45\"x3 LLE only, standing calf stretch 45\"x4 LLE only, standing calf stretch 45\"x4 LLE only, standing calf stretch 45\"x4 LLE only, standing calf stretch   Sciatic n kourtney RLE       X15  hep hep                               Ther Activity             Sit to stands                          Gait Training "                                       Modalities

## 2024-01-14 NOTE — ASSESSMENT & PLAN NOTE
2/6/2019 - [facility] - continue with 380 mg Vivitrol, IM Injection, q28d, per Dr. Leatha Saldana.Deborah Blanchard LPN     Hypothyroidism    Patient will check TSH blood work and continue with current dose of levothyroxine No English

## 2024-01-16 NOTE — RESULT NOTES
Discussion/Summary   i will discuss on Jan 2018 ov     Verified Results  (1) COMPREHENSIVE METABOLIC PANEL [id number] 67:62MW Dinesh Macias     Test Name Result Flag Reference   GLUCOSE 99 mg/dL  65-99   Fasting reference interval   UREA NITROGEN (BUN) 20 mg/dL  7-25   CREATININE 0 91 mg/dL  0 50-0 99   For patients >52years of age, the reference limit  for Creatinine is approximately 13% higher for people  identified as -American  eGFR NON-AFR  AMERICAN 65 mL/min/1 73m2  > OR = 60   eGFR AFRICAN AMERICAN 76 mL/min/1 73m2  > OR = 60   BUN/CREATININE RATIO   0-44   NOT APPLICABLE (calc)   SODIUM 139 mmol/L  135-146   POTASSIUM 4 2 mmol/L  3 5-5 3   CHLORIDE 103 mmol/L     CARBON DIOXIDE 27 mmol/L  20-31   CALCIUM 9 6 mg/dL  8 6-10 4   PROTEIN, TOTAL 6 6 g/dL  6 1-8 1   ALBUMIN 4 0 g/dL  3 6-5 1   GLOBULIN 2 6 g/dL (calc)  1 9-3 7   ALBUMIN/GLOBULIN RATIO 1 5 (calc)  1 0-2 5   BILIRUBIN, TOTAL 0 6 mg/dL  0 2-1 2   ALKALINE PHOSPHATASE 74 U/L     AST 13 U/L  10-35   ALT 10 U/L  6-29     (1) LIPID PANEL, FASTING [id number] 74:54TJ Rosalino Vale     Test Name Result Flag Reference   CHOLESTEROL, TOTAL 229 mg/dL H <200   HDL CHOLESTEROL 83 mg/dL  >44   TRIGLICERIDES 86 mg/dL  <331   LDL-CHOLESTEROL 127 mg/dL (calc) H    Reference range: <100     Desirable range <100 mg/dL for patients with CHD or  diabetes and <70 mg/dL for diabetic patients with  known heart disease  LDL-C is now calculated using the Justyn-Briggs   calculation, which is a validated novel method providing   better accuracy than the Friedewald equation in the   estimation of LDL-C  Amirah George et al  [place] [id number];460(07): [phone]   (http://[employer]/faq/HPB164)   CHOL/HDLC RATIO 2 8 (calc)  <5 0   NON HDL CHOLESTEROL 146 mg/dL (calc) H <130   For patients with diabetes plus 1 major ASCVD risk   factor, treating to a non-HDL-C goal of <100 mg/dL   (LDL-C of <70 mg/dL) is considered a therapeutic   option  (Q) CBC (H/H, RBC, INDICES, WBC, PLT) [id number] 32:19YB Rosalino Purvis     Test Name Result Flag Reference   WHITE BLOOD CELL COUNT 5 6 Thousand/uL  3 8-10 8   RED BLOOD CELL COUNT 4 09 Million/uL  3 80-5 10   HEMOGLOBIN 12 5 g/dL  11 7-15 5   HEMATOCRIT 38 6 %  35 0-45 0   MCV 94 4 fL  80 0-100 0   MCH 30 6 pg  27 0-33 0   MCHC 32 4 g/dL  32 0-36 0   RDW 12 6 %  11 0-15 0   PLATELET COUNT 942 Thousand/uL  140-400   MPV 10 3 fL  7 5-12 5     (Q) TSH, 3RD GENERATION [id number] 94:98AS Rosalino Purvis   REPORT COMMENT:  FASTING:YES     Test Name Result Flag Reference   TSH 0 97 mIU/L  0 40-4 50 2-4 times/mo

## 2024-01-17 ENCOUNTER — APPOINTMENT (OUTPATIENT)
Dept: PHYSICAL THERAPY | Facility: REHABILITATION | Age: 74
End: 2024-01-17
Payer: COMMERCIAL

## 2024-01-24 ENCOUNTER — EVALUATION (OUTPATIENT)
Dept: PHYSICAL THERAPY | Facility: REHABILITATION | Age: 74
End: 2024-01-24
Payer: COMMERCIAL

## 2024-01-24 DIAGNOSIS — M72.2 PLANTAR FASCIITIS: Primary | ICD-10-CM

## 2024-01-24 DIAGNOSIS — R26.89 BALANCE PROBLEM: ICD-10-CM

## 2024-01-24 DIAGNOSIS — M79.672 LEFT FOOT PAIN: ICD-10-CM

## 2024-01-24 DIAGNOSIS — R26.9 GAIT ABNORMALITY: ICD-10-CM

## 2024-01-24 PROCEDURE — 97140 MANUAL THERAPY 1/> REGIONS: CPT | Performed by: PHYSICAL THERAPIST

## 2024-01-24 PROCEDURE — 97112 NEUROMUSCULAR REEDUCATION: CPT | Performed by: PHYSICAL THERAPIST

## 2024-01-24 PROCEDURE — 97110 THERAPEUTIC EXERCISES: CPT | Performed by: PHYSICAL THERAPIST

## 2024-01-24 NOTE — PROGRESS NOTES
Daily Note /re-eval    Today's date: 2024  Patient name: Estephania Brown  : 1950  MRN: [id number]  Referring provider: Rosalino Purvis MD  Dx:   Encounter Diagnosis     ICD-10-CM    1. Plantar fasciitis  M72.2       2. Left foot pain  M79.672       3. Gait abnormality  R26.9       4. Balance problem  R26.89             Start Time: 1212          Subjective: Pt reports the ankle has been bothersome the last day or so  Pain  Current pain ratin  At best pain ratin  At worst pain ratin in the past week, 8 at eval, 4 last RE  Location: arch of foot and TC joint  Quality: sharp     Objective: See treatment diary below     Palpation:  TPP: Proximal Longitudinal arch (L)     Joint Assessment:  Hypomobile throughout subtalar and talocrural      Lower Extremity Strength    MMT   AROM   PROM     Knee Left Right Left Right Left Right   Flex 5 5           Ext 4+ 4+                           Hip               flex 4 4           ext               abd                               Ankle               DF 5 5 lacking 17 (prev 20) lacking 12       PF               inversion 5(prev 4+) 5           Eversion  5 (prev 4+) 5                 Dynamic Balance Tests  5x sit to stand (sec)  >14 sec indicates high risk for falls IE = 14.22 with BUE, unable without UE   = 12.12, unable without UE   = 13.06,  unable without UE   = 11.70, unable without UE  10/23 = 11.74, unable without UE   TUG (sec)  >14 sec indicates high risk for falls IE = 19 sec with SPC  / = 16.95 with SPC   = 17.48 with SPC   = 12.50 with SPC  10/23 = 13.41 with SPC      Gait:  ambulates with SPC, L foot turned out, wide KEILY, L foot plantar flexed and heel inverted, unsteady        Access Code: [email or web address]  URL: [email or web address]  Date: 2023  Prepared by: Lisa Casillas     Exercises  - Long Sitting Calf Stretch with Strap  - 2-3 x daily - 7 x weekly - 1 sets - 4 reps - 30-45 hold  - Seated Plantar Fascia  Mobilization with Small Ball  - 2-3 x daily - 7 x weekly - 1 sets - 3-5 reps - 60 hold        Assessment:  Pt presents for reassessment of balance problem and L ankle pain secondary to L ankle surgery. Since last RE, pt has maintained ankler ROM, LE strength and 5 times sit to stand/TUG test scores.  At this time, the patient is no longer appropriate for a therapy program focused on making gains in functional ability because the pts goals have been met and/or the pt no longer has the potential to make gains. The pt is now being transitioned to skilled maintenance. Pts goals are being adjusted to reflect this change in treatment focus. The pt will require a therapists skills to carry out these interventions because the pts PMH including HTN, OP, fall risk, CA hx, and hx of L ankle surgery. The need for the skills of a therapist are required to safely and effectively carry out the complex treatment required including monitoring response to treatment, adjusting treatment as necessary for safety, providing cues to maintain proper form, and monitoring vitals as needed. Without the skills of the PT in providing these services, the patient would decline in function with resultant decrease in independent self care and transfers, have an increased risk for fall, and lose functional strength.             Goals  Short term goals: to be met in 4-5 weeks  Pt independent with initial HEP, rationale, technique and frequency, for ROM and pain control.  MET  Achieve 5 times sit to stand test score to <12 sec with BUE indicating improved fucntional LE strength and reduced fall risk. MET  Pt able to perform 1 sit to stand without UE indicating and improvement in functional LE strength. UNMET, DC goal  Achieve an improvement in L ankle DF AROM for improved ease of standing and walking and to reduce strain on the longitudinal arch. MET  Pt will achieve an improvement in FOTO score indicating an improvement in pain and function.  MET        Long term goals: to be met in 8-10 weeks   Pt will report a 75% or > reduction in subjective pain complaints/symptoms to better manage ADLs and functional mobility. IUNMET  Achieve a further improvement in L ankle DF AROM for improved ease of standing and walking and to reduce strain on the longitudinal arch. MET  Pt able to perform 3 sit to stands without UE indicating and improvement in functional LE strength. UNMET, DC goal  Pt will perform the TUG test in less than <14 seconds indicating decreased risk for falls. MET  Pt will improve FOTO score to = or better then expected outcome indicating an overall improvement in pain and function  MET  Pt independent with rationale, technique and plan for performance of advanced HEP to maintain gains made in therapy.  MET  Achieve pts goal: strengthen legs, be able to manuever better, less discomfort  MET        New goals: to be reassessed every 10-12 visits  Maintain L ankle DF AROM lacking 20 (within 2 deg) to maximize joint mobility for transfers and gait  Maintain TUG test score <14 sec for reduced falls risk.  Maintain 5 times sit to stand test score to <14 sec for functional LE strength and reduced fall risk.        Plan: Continue per plan of care.      Precautions: HTN, OP, fall risk, CA hx, hx of L ankle surgery    POC expires Unit limit Auth Expiration date PT/OT + Visit Limit?   4/23/24 N/a 1/21/24 BOMN                             Visit/Unit Tracking  AUTH Status:  Date 10/25 11/1 11/8 11/15 11/21 11/29 12/6 12/27 1/3 1/10     12 visits approved Used 1 2 3 4 5 6 7 8 9 10      Remaining  11 10 9 8 7 6 5 4 3 2          Manuals 11/8 11/15 11/21 11/29 12/6 12/27 1/3 1/10 10/25 11/1   L ankle TC PA and distraction and ST mobs DS Gr IV DS Gr IV DS Gr IV DS gr IV DS Gr IV DS Gr IV DS Gr IV DS Gr IV DS Gr IV DS Gr IV                STM PF DS DS DS DS DS DS DS DS DS DS                             Neuro Re-Ed             Dianne bazzi mini march 3 min no UE close S 3  "min no UE close S 3 min no UE close S 3 min no UE close S 3 min no UE close S 3 min no UE close S 3 min no UE close S 3 min no UE close S 3 min no UE close S 3 min no UE close S   SLS on airex with contralat hip abd,  GMB x30 ea light UE GMB x30 ea light UE GMB x30 ea light UE GMB x30 ea light UE GMB x30 ea light UE GMB x30 ea light UE GMB x30 ea light UE GMB x30 ea light UE GMB x30 ea light UE GMB x30 ea light UE   Sand dune step ups 1 min ea LE light UE close S 1 min ea LE light UE close S 1 min ea LE light UE close S 1 min ea LE light UE close S 1 min ea LE light UE close S 1 min ea LE light UE close S 1 min ea LE light UE close S 1 min ea LE light UE close S 1 min ea LE light UE close S 1 min ea LE light UE close S   assess       DS                                Ther Ex             DF MWM 8\" step PT overpress 2x15, with belt 8\" step PT overpress 2x15, with belt 8\" step PT overpress 2x15, with belt 8\" step PT overpress 2x15, with belt 8\" step PT overpress 2x15, with belt 8\" step PT overpress 2x15, with belt 8\" step PT overpress 2x15, 1 round with belt 8\" step PT overpress 2x15, 1 round with belt 8\" step PT overpress 2x15, 1 round with belt 8\" step PT overpress 2x15, 1 round with belt   VG for strength and ROM S/l L7 3x10 ea  S/l L7 3x10 ea  S/l L7 3x10 ea S/l L7 3x10 ea S/l L7 3x10 ea S/l L7 3x10 ea S/l L7 3x10 ea  S/l L7 3x10 ea  S/l L7 3x10 ea  S/l L7 3x10 ea    Slant  45\"x4 LLE only, standing calf stretch 45\"x3 LLE only, standing calf stretch 45\"x4 LLE only, standing calf stretch 45\"x3 LLE only, standing calf stretch 45\"x3 LLE only, standing calf stretch 45\"x4 LLE only, standing calf stretch 45\"x3 LLE only, standing calf stretch 45\"x4 LLE only, standing calf stretch 45\"x4 LLE only, standing calf stretch 45\"x4 LLE only, standing calf stretch   Sciatic n kourtney RLE       X15  hep hep                               Ther Activity             Sit to stands                          Gait Training                     "                   Modalities

## 2024-01-24 NOTE — PROGRESS NOTES
Daily Note     Today's date: 2024  Patient name: Estephania Brown  : 1950  MRN: [id number]  Referring provider: Rosalino Purvis MD  Dx:   Encounter Diagnosis     ICD-10-CM    1. Plantar fasciitis  M72.2       2. Left foot pain  M79.672       3. Gait abnormality  R26.9       4. Balance problem  R26.89           Start Time: 1215  Stop Time: 1305  Total time in clinic (min): 50 minutes    Subjective: Pt states the ankle has been bothersome with the cold weather but always feels better after PT      Objective: See treatment diary below      Assessment: Pt reported notably improvement in pain and stiffness following manuals. Pt would benefit from additional skilled maintenance therapy in effort to prevent decline in function with resultant decrease in independent self care and transfers, and to prevent an increased risk for fall, and loss of functional strength.         Plan: Continue per plan of care.      Precautions: HTN, OP, fall risk, CA hx, hx of L ankle surgery    POC expires Unit limit Auth Expiration date PT/OT + Visit Limit?   24 N/a 24 BOMN                             Visit/Unit Tracking  AUTH Status:  Date 10/25 11/1 11/8 11/15 11/21 11/29 12/6 12/27 1/3 1/10 1/24    12 visits approved Used 1 2 3 4 5 6 7 8 9 10 11     Remaining  11 10 9 8 7 6 5 4 3 2 1         Manuals 11/8 11/15 11/21 11/29 12/6 12/27 1/3 1/10 1/24 11/1   L ankle TC PA and distraction and ST mobs DS Gr IV DS Gr IV DS Gr IV DS gr IV DS Gr IV DS Gr IV DS Gr IV DS Gr IV DS Gr IV DS Gr IV                STM PF DS DS DS DS DS DS DS DS DS DS                             Neuro Re-Ed             Sand dune mini march 3 min no UE close S 3 min no UE close S 3 min no UE close S 3 min no UE close S 3 min no UE close S 3 min no UE close S 3 min no UE close S 3 min no UE close S 3 min no UE close S 3 min no UE close S   SLS on airex with contralat hip abd,  GMB x30 ea light UE GMB x30 ea light UE GMB x30 ea light UE GMB x30 ea light  "UE GMB x30 ea light UE GMB x30 ea light UE GMB x30 ea light UE GMB x30 ea light UE GMB x30 ea light UE GMB x30 ea light UE   Sand dune step ups 1 min ea LE light UE close S 1 min ea LE light UE close S 1 min ea LE light UE close S 1 min ea LE light UE close S 1 min ea LE light UE close S 1 min ea LE light UE close S 1 min ea LE light UE close S 1 min ea LE light UE close S 1 min ea LE light UE close S 1 min ea LE light UE close S   assess       DS                                Ther Ex             DF MWM 8\" step PT overpress 2x15, with belt 8\" step PT overpress 2x15, with belt 8\" step PT overpress 2x15, with belt 8\" step PT overpress 2x15, with belt 8\" step PT overpress 2x15, with belt 8\" step PT overpress 2x15, with belt 8\" step PT overpress 2x15, 1 round with belt 8\" step PT overpress 2x15, 1 round with belt 8\" step PT overpress 2x15, 1 round with belt 8\" step PT overpress 2x15, 1 round with belt   VG for strength and ROM S/l L7 3x10 ea  S/l L7 3x10 ea  S/l L7 3x10 ea S/l L7 3x10 ea S/l L7 3x10 ea S/l L7 3x10 ea S/l L7 3x10 ea  S/l L7 3x10 ea  S/l L7 3x10 ea  S/l L7 3x10 ea    Slant  45\"x4 LLE only, standing calf stretch 45\"x3 LLE only, standing calf stretch 45\"x4 LLE only, standing calf stretch 45\"x3 LLE only, standing calf stretch 45\"x3 LLE only, standing calf stretch 45\"x4 LLE only, standing calf stretch 45\"x3 LLE only, standing calf stretch 45\"x4 LLE only, standing calf stretch 45\"x4 LLE only, standing calf stretch 45\"x4 LLE only, standing calf stretch   Sciatic n gliders RLE       X15  hep hep                               Ther Activity             Sit to stands                          Gait Training                                       Modalities                                                "

## 2024-01-29 DIAGNOSIS — G89.29 CHRONIC PAIN OF LEFT ANKLE: ICD-10-CM

## 2024-01-29 DIAGNOSIS — M25.572 CHRONIC PAIN OF LEFT ANKLE: ICD-10-CM

## 2024-01-29 RX ORDER — HYDROCODONE BITARTRATE AND ACETAMINOPHEN 5; 325 MG/1; MG/1
TABLET ORAL
Qty: 30 TABLET | Refills: 0 | Status: SHIPPED | OUTPATIENT
Start: 2024-01-29

## 2024-01-31 ENCOUNTER — EVALUATION (OUTPATIENT)
Dept: PHYSICAL THERAPY | Facility: REHABILITATION | Age: 74
End: 2024-01-31
Payer: COMMERCIAL

## 2024-01-31 DIAGNOSIS — R26.89 BALANCE PROBLEM: ICD-10-CM

## 2024-01-31 DIAGNOSIS — M79.672 LEFT FOOT PAIN: ICD-10-CM

## 2024-01-31 DIAGNOSIS — M72.2 PLANTAR FASCIITIS: Primary | ICD-10-CM

## 2024-01-31 DIAGNOSIS — R26.9 GAIT ABNORMALITY: ICD-10-CM

## 2024-01-31 PROCEDURE — 97110 THERAPEUTIC EXERCISES: CPT | Performed by: PHYSICAL THERAPIST

## 2024-01-31 PROCEDURE — 97140 MANUAL THERAPY 1/> REGIONS: CPT | Performed by: PHYSICAL THERAPIST

## 2024-01-31 PROCEDURE — 97112 NEUROMUSCULAR REEDUCATION: CPT | Performed by: PHYSICAL THERAPIST

## 2024-01-31 NOTE — PROGRESS NOTES
Daily Note/re-eval     Today's date: 2024  Patient name: Estephania Brown  : 1950  MRN: [id number]  Referring provider: Rosalino Purvis MD  Dx:   Encounter Diagnosis     ICD-10-CM    1. Plantar fasciitis  M72.2       2. Left foot pain  M79.672       3. Gait abnormality  R26.9       4. Balance problem  R26.89             Start Time: 1215  Stop Time: 1303  Total time in clinic (min): 48 minutes    Subjective: Pt reports 85% improvement overall.   Pain  Current pain ratin  At best pain ratin  At worst pain ratin in the past week, 8 at eval, 6 last RE  Location: arch of foot and TC joint  Quality: sharp     Objective: See treatment diary below     Palpation:  TPP: Proximal Longitudinal arch (L)     Joint Assessment:  Hypomobile throughout subtalar and talocrural      Lower Extremity Strength    MMT   AROM   PROM     Knee Left Right Left Right Left Right   Flex 5 5           Ext 4+ 4+                           Hip               flex 4 4           ext               abd                               Ankle               DF 5 5 lacking 18  (prev 20) lacking 12       PF               inversion 5  (prev 4+) 5           Eversion  5   (prev 4+) 5                 Dynamic Balance Tests  5x sit to stand (sec)  >14 sec indicates high risk for falls IE = 14.22 with BUE, unable without UE   = 12.12, unable without UE   = 13.06,  unable without UE   = 11.70, unable without UE  10/23 = 11.74, unable without UE   = 10.63, unable without UE   TUG (sec)  >14 sec indicates high risk for falls IE = 19 sec with SPC  / = 16.95 with SPC   = 17.48 with SPC   = 12.50 with SPC  10/23 = 13.41 with SPC   = 12.47 with SPC      Gait:  ambulates with SPC, L foot turned out, wide KEILY, L foot plantar flexed and heel inverted, unsteady        Access Code: [email or web address]  URL: [email or web address]  Date: 2023  Prepared by: Lisa Casillas     Exercises  - Long Sitting Calf Stretch with  Strap  - 2-3 x daily - 7 x weekly - 1 sets - 4 reps - 30-45 hold  - Seated Plantar Fascia Mobilization with Small Ball  - 2-3 x daily - 7 x weekly - 1 sets - 3-5 reps - 60 hold        Assessment:  Pt is currently participating in skilled maintenance therapy. She presents today for her reassessment for balance problem and L ankle pain secondary to L ankle surgery. Since last RE, pt has maintained/slightly improved her ankle ROM, LE strength and 5 times sit to stand/TUG test scores. Recommend continued skilled maintenance therapy. The pt will require a therapists skills to carry out these interventions because the pts PMH including HTN, OP, fall risk, CA hx, and hx of L ankle surgery. The need for the skills of a therapist are required to safely and effectively carry out the complex treatment required including monitoring response to treatment, adjusting treatment as necessary for safety, providing cues to maintain proper form, and monitoring vitals as needed. Without the skills of the PT in providing these services, the patient would decline in function with resultant decrease in independent self care and transfers, have an increased risk for fall, and lose functional strength.             Goals: to be reassessed every 10-12 visits  Maintain L ankle DF AROM lacking 20 (within 2 deg) to maximize joint mobility for transfers and gait. MET  Maintain TUG test score <14 sec for reduced falls risk.   Maintain 5 times sit to stand test score to <14 sec for functional LE strength and reduced fall risk.          Plan: Continue per plan of care.  1x/week for 12 weeks     Precautions: HTN, OP, fall risk, CA hx, hx of L ankle surgery    POC expires Unit limit Auth Expiration date PT/OT + Visit Limit?   4/23/24 N/a 1/21/24 BOMN                             Visit/Unit Tracking  AUTH Status:  Date 10/25 11/1 11/8 11/15 11/21 11/29 12/6 12/27 1/3 1/10 1/24 1/31   12 visits approved Used 1 2 3 4 5 6 7 8 9 10 11 12    Remaining  11 10  "9 8 7 6 5 4 3 2 1 0      POC exp 4/24/24    Manuals 11/8 11/15 11/21 11/29 12/6 12/27 1/3 1/10 1/24 1/31   L ankle TC PA and distraction and ST mobs DS Gr IV DS Gr IV DS Gr IV DS gr IV DS Gr IV DS Gr IV DS Gr IV DS Gr IV DS Gr IV DS Gr IV                STM PF DS DS DS DS DS DS DS DS DS DS             RE - DS                Neuro Re-Ed             Sand dune mini march 3 min no UE close S 3 min no UE close S 3 min no UE close S 3 min no UE close S 3 min no UE close S 3 min no UE close S 3 min no UE close S 3 min no UE close S 3 min no UE close S 3 min no UE close S   SLS on airex with contralat hip abd,  GMB x30 ea light UE GMB x30 ea light UE GMB x30 ea light UE GMB x30 ea light UE GMB x30 ea light UE GMB x30 ea light UE GMB x30 ea light UE GMB x30 ea light UE GMB x30 ea light UE GMB x30 ea light UE   Sand dune step ups 1 min ea LE light UE close S 1 min ea LE light UE close S 1 min ea LE light UE close S 1 min ea LE light UE close S 1 min ea LE light UE close S 1 min ea LE light UE close S 1 min ea LE light UE close S 1 min ea LE light UE close S 1 min ea LE light UE close S 1 min ea LE light UE close S   assess       DS                                Ther Ex             DF MWM 8\" step PT overpress 2x15, with belt 8\" step PT overpress 2x15, with belt 8\" step PT overpress 2x15, with belt 8\" step PT overpress 2x15, with belt 8\" step PT overpress 2x15, with belt 8\" step PT overpress 2x15, with belt 8\" step PT overpress 2x15, 1 round with belt 8\" step PT overpress 2x15, 1 round with belt 8\" step PT overpress 2x15, 1 round with belt 8\" step PT overpress 2x15, 1 round with belt   VG for strength and ROM S/l L7 3x10 ea  S/l L7 3x10 ea  S/l L7 3x10 ea S/l L7 3x10 ea S/l L7 3x10 ea S/l L7 3x10 ea S/l L7 3x10 ea  S/l L7 3x10 ea  S/l L7 3x10 ea  S/l L7 3x10 ea    Slant  45\"x4 LLE only, standing calf stretch 45\"x3 LLE only, standing calf stretch 45\"x4 LLE only, standing calf stretch 45\"x3 LLE only, standing calf stretch 45\"x3 " "LLE only, standing calf stretch 45\"x4 LLE only, standing calf stretch 45\"x3 LLE only, standing calf stretch 45\"x4 LLE only, standing calf stretch 45\"x4 LLE only, standing calf stretch 45\"x4 LLE only, standing calf stretch   Sciatic n kourtney RLE       X15  hep hep                               Ther Activity                       5xSTS  TUGx2                Gait Training                                       Modalities                                                "

## 2024-02-07 ENCOUNTER — OFFICE VISIT (OUTPATIENT)
Dept: PHYSICAL THERAPY | Facility: REHABILITATION | Age: 74
End: 2024-02-07
Payer: COMMERCIAL

## 2024-02-07 DIAGNOSIS — R26.89 BALANCE PROBLEM: ICD-10-CM

## 2024-02-07 DIAGNOSIS — R26.9 GAIT ABNORMALITY: ICD-10-CM

## 2024-02-07 DIAGNOSIS — M79.672 LEFT FOOT PAIN: ICD-10-CM

## 2024-02-07 DIAGNOSIS — M72.2 PLANTAR FASCIITIS: Primary | ICD-10-CM

## 2024-02-07 PROCEDURE — 97140 MANUAL THERAPY 1/> REGIONS: CPT | Performed by: PHYSICAL THERAPIST

## 2024-02-07 PROCEDURE — 97110 THERAPEUTIC EXERCISES: CPT | Performed by: PHYSICAL THERAPIST

## 2024-02-07 PROCEDURE — 97112 NEUROMUSCULAR REEDUCATION: CPT | Performed by: PHYSICAL THERAPIST

## 2024-02-07 NOTE — PROGRESS NOTES
Daily Note     Today's date: 2024  Patient name: Estephania Brown  : 1950  MRN: [id number]  Referring provider: Rosalino Purvis MD  Dx:   Encounter Diagnosis     ICD-10-CM    1. Plantar fasciitis  M72.2       2. Left foot pain  M79.672       3. Gait abnormality  R26.9       4. Balance problem  R26.89           Start Time: 1216  Stop Time: 1256  Total time in clinic (min): 40 minutes    Subjective: Pt states the ankle always feels better after therapy.      Objective: See treatment diary below      Assessment: Tolerated treatment well. Patient demonstrated fatigue post treatment and exhibited good technique with therapeutic exercises. Pt would benefit from additional skilled maintenance therapy in effort to prevent decline in function with resultant decrease in independent self care and transfers, and to prevent an increased risk for fall, and loss of functional strength.         Plan: Continue per plan of care.      Precautions: HTN, OP, fall risk, CA hx, hx of L ankle surgery    POC expires Unit limit Auth Expiration date PT/OT + Visit Limit?   24 N/a 24 BOMN                             Visit/Unit Tracking  AUTH Status:  Date               12 visits approved Used 1               Remaining  11                 POC exp 24    Manuals 2/7 11/15 11/21 11/29 12/6 12/27 1/3 1/10 1/24 1/31   L ankle TC PA and distraction and ST mobs DS Gr IV DS Gr IV DS Gr IV DS gr IV DS Gr IV DS Gr IV DS Gr IV DS Gr IV DS Gr IV DS Gr IV                STM PF DS DS DS DS DS DS DS DS DS DS             RE - DS                Neuro Re-Ed             [facility] march 3 min no UE close S 3 min no UE close S 3 min no UE close S 3 min no UE close S 3 min no UE close S 3 min no UE close S 3 min no UE close S 3 min no UE close S 3 min no UE close S 3 min no UE close S   SLS on airex with contralat hip abd,  GMB x30 ea light UE GMB x30 ea light UE GMB x30 ea light UE GMB x30 ea light UE GMB x30 ea light UE GMB x30  "ea light UE GMB x30 ea light UE GMB x30 ea light UE GMB x30 ea light UE GMB x30 ea light UE   Sand dune step ups 1 min ea LE light UE close S 1 min ea LE light UE close S 1 min ea LE light UE close S 1 min ea LE light UE close S 1 min ea LE light UE close S 1 min ea LE light UE close S 1 min ea LE light UE close S 1 min ea LE light UE close S 1 min ea LE light UE close S 1 min ea LE light UE close S   assess       DS                                Ther Ex             DF MWM 8\" step PT overpress 2x15, with belt 8\" step PT overpress 2x15, with belt 8\" step PT overpress 2x15, with belt 8\" step PT overpress 2x15, with belt 8\" step PT overpress 2x15, with belt 8\" step PT overpress 2x15, with belt 8\" step PT overpress 2x15, 1 round with belt 8\" step PT overpress 2x15, 1 round with belt 8\" step PT overpress 2x15, 1 round with belt 8\" step PT overpress 2x15, 1 round with belt   VG for strength and ROM S/l L7 3x10 ea  S/l L7 3x10 ea  S/l L7 3x10 ea S/l L7 3x10 ea S/l L7 3x10 ea S/l L7 3x10 ea S/l L7 3x10 ea  S/l L7 3x10 ea  S/l L7 3x10 ea  S/l L7 3x10 ea    Slant  45\"x3 LLE only, standing calf stretch 45\"x3 LLE only, standing calf stretch 45\"x4 LLE only, standing calf stretch 45\"x3 LLE only, standing calf stretch 45\"x3 LLE only, standing calf stretch 45\"x4 LLE only, standing calf stretch 45\"x3 LLE only, standing calf stretch 45\"x4 LLE only, standing calf stretch 45\"x4 LLE only, standing calf stretch 45\"x4 LLE only, standing calf stretch   Sciatic n gliders RLE       X15  hep hep                               Ther Activity                       5xSTS  TUGx2                Gait Training                                       Modalities                                                  "

## 2024-02-14 ENCOUNTER — APPOINTMENT (OUTPATIENT)
Dept: PHYSICAL THERAPY | Facility: REHABILITATION | Age: 74
End: 2024-02-14
Payer: COMMERCIAL

## 2024-02-14 NOTE — PROGRESS NOTES
Daily Note     Today's date: 2024  Patient name: Estephania Brown  : 1950  MRN: [id number]  Referring provider: Rosalino Purvis MD  Dx:   No diagnosis found.                 Subjective: Pt states the ankle always feels better after therapy.      Objective: See treatment diary below      Assessment: Tolerated treatment well. Patient demonstrated fatigue post treatment and exhibited good technique with therapeutic exercises. Pt would benefit from additional skilled maintenance therapy in effort to prevent decline in function with resultant decrease in independent self care and transfers, and to prevent an increased risk for fall, and loss of functional strength.         Plan: Continue per plan of care.      Precautions: HTN, OP, fall risk, CA hx, hx of L ankle surgery    POC expires Unit limit Auth Expiration date PT/OT + Visit Limit?   24 N/a 24 BOMN                             Visit/Unit Tracking  AUTH Status:  Date               12 visits approved Used 1               Remaining  11                 POC exp 24    Manuals 2/7 11/15 11/21 11/29 12/6 12/27 1/3 1/10 1/24 1/31   L ankle TC PA and distraction and ST mobs DS Gr IV DS Gr IV DS Gr IV DS gr IV DS Gr IV DS Gr IV DS Gr IV DS Gr IV DS Gr IV DS Gr IV                STM PF DS DS DS DS DS DS DS DS DS DS             RE - DS                Neuro Re-Ed             [employer] [place] march 3 min no UE close S 3 min no UE close S 3 min no UE close S 3 min no UE close S 3 min no UE close S 3 min no UE close S 3 min no UE close S 3 min no UE close S 3 min no UE close S 3 min no UE close S   SLS on airex with contralat hip abd,  GMB x30 ea light UE GMB x30 ea light UE GMB x30 ea light UE GMB x30 ea light UE GMB x30 ea light UE GMB x30 ea light UE GMB x30 ea light UE GMB x30 ea light UE GMB x30 ea light UE GMB x30 ea light UE   Sand dune step ups 1 min ea LE light UE close S 1 min ea LE light UE close S 1 min ea LE light UE close S 1 min ea LE  "light UE close S 1 min ea LE light UE close S 1 min ea LE light UE close S 1 min ea LE light UE close S 1 min ea LE light UE close S 1 min ea LE light UE close S 1 min ea LE light UE close S   assess       DS                                Ther Ex             DF MWM 8\" step PT overpress 2x15, with belt 8\" step PT overpress 2x15, with belt 8\" step PT overpress 2x15, with belt 8\" step PT overpress 2x15, with belt 8\" step PT overpress 2x15, with belt 8\" step PT overpress 2x15, with belt 8\" step PT overpress 2x15, 1 round with belt 8\" step PT overpress 2x15, 1 round with belt 8\" step PT overpress 2x15, 1 round with belt 8\" step PT overpress 2x15, 1 round with belt   VG for strength and ROM S/l L7 3x10 ea  S/l L7 3x10 ea  S/l L7 3x10 ea S/l L7 3x10 ea S/l L7 3x10 ea S/l L7 3x10 ea S/l L7 3x10 ea  S/l L7 3x10 ea  S/l L7 3x10 ea  S/l L7 3x10 ea    Slant  45\"x3 LLE only, standing calf stretch 45\"x3 LLE only, standing calf stretch 45\"x4 LLE only, standing calf stretch 45\"x3 LLE only, standing calf stretch 45\"x3 LLE only, standing calf stretch 45\"x4 LLE only, standing calf stretch 45\"x3 LLE only, standing calf stretch 45\"x4 LLE only, standing calf stretch 45\"x4 LLE only, standing calf stretch 45\"x4 LLE only, standing calf stretch   Sciatic n gliders RLE       X15  hep hep                               Ther Activity                       5xSTS  TUGx2                Gait Training                                       Modalities                                                  "

## 2024-02-18 DIAGNOSIS — R73.03 PREDIABETES: ICD-10-CM

## 2024-02-20 RX ORDER — METFORMIN HYDROCHLORIDE 500 MG/1
500 TABLET, EXTENDED RELEASE ORAL
Qty: 30 TABLET | Refills: 3 | Status: SHIPPED | OUTPATIENT
Start: 2024-02-20 | End: 2024-02-27 | Stop reason: SDUPTHER

## 2024-02-21 ENCOUNTER — OFFICE VISIT (OUTPATIENT)
Dept: PHYSICAL THERAPY | Facility: REHABILITATION | Age: 74
End: 2024-02-21
Payer: COMMERCIAL

## 2024-02-21 DIAGNOSIS — R26.89 BALANCE PROBLEM: ICD-10-CM

## 2024-02-21 DIAGNOSIS — M79.672 LEFT FOOT PAIN: ICD-10-CM

## 2024-02-21 DIAGNOSIS — R26.9 GAIT ABNORMALITY: ICD-10-CM

## 2024-02-21 DIAGNOSIS — M72.2 PLANTAR FASCIITIS: Primary | ICD-10-CM

## 2024-02-21 PROCEDURE — 97112 NEUROMUSCULAR REEDUCATION: CPT | Performed by: PHYSICAL THERAPIST

## 2024-02-21 PROCEDURE — 97110 THERAPEUTIC EXERCISES: CPT | Performed by: PHYSICAL THERAPIST

## 2024-02-21 PROCEDURE — 97140 MANUAL THERAPY 1/> REGIONS: CPT | Performed by: PHYSICAL THERAPIST

## 2024-02-21 NOTE — PROGRESS NOTES
Daily Note     Today's date: 2024  Patient name: Estephania Brown  : 1950  MRN: [id number]  Referring provider: Rosalino Purvis MD  Dx:   Encounter Diagnosis     ICD-10-CM    1. Plantar fasciitis  M72.2       2. Left foot pain  M79.672       3. Gait abnormality  R26.9       4. Balance problem  R26.89             Start Time: 1212  Stop Time: 1252  Total time in clinic (min): 40 minutes    Subjective: Pt reports the ankle is very stiff today      Objective: See treatment diary below      Assessment: Pts TC and ST mobility was more hypomobile today, but improved with mobilizations. Pt would benefit from additional skilled maintenance therapy in effort to prevent decline in function with resultant decrease in independent self care and transfers, and to prevent an increased risk for fall, and loss of functional strength.         Plan: Continue per plan of care.      Precautions: HTN, OP, fall risk, CA hx, hx of L ankle surgery    POC expires Unit limit Auth Expiration date PT/OT + Visit Limit?   24 N/a 24 BOMN                             Visit/Unit Tracking  AUTH Status:  Date              12 visits approved Used 1 2              Remaining  11 10                POC exp 24    Manuals 2/7 2/21 11/21 11/29 12/6 12/27 1/3 1/10 1/24 1/31   L ankle TC PA and distraction and ST mobs DS Gr IV DS Gr IV DS Gr IV DS gr IV DS Gr IV DS Gr IV DS Gr IV DS Gr IV DS Gr IV DS Gr IV                STM PF DS DS DS DS DS DS DS DS DS DS             RE - DS                Neuro Re-Ed             [facility] march 3 min no UE close S 3 min no UE close S 3 min no UE close S 3 min no UE close S 3 min no UE close S 3 min no UE close S 3 min no UE close S 3 min no UE close S 3 min no UE close S 3 min no UE close S   SLS on airex with contralat hip abd,  GMB x30 ea light UE GMB x30 ea light UE GMB x30 ea light UE GMB x30 ea light UE GMB x30 ea light UE GMB x30 ea light UE GMB x30 ea light UE GMB x30 ea light  "UE GMB x30 ea light UE GMB x30 ea light UE   Sand dune step ups 1 min ea LE light UE close S 1 min ea LE light UE close S 1 min ea LE light UE close S 1 min ea LE light UE close S 1 min ea LE light UE close S 1 min ea LE light UE close S 1 min ea LE light UE close S 1 min ea LE light UE close S 1 min ea LE light UE close S 1 min ea LE light UE close S   assess       DS                                Ther Ex             DF MWM 8\" step PT overpress 2x15, with belt 8\" step PT overpress 2x15, with belt 8\" step PT overpress 2x15, with belt 8\" step PT overpress 2x15, with belt 8\" step PT overpress 2x15, with belt 8\" step PT overpress 2x15, with belt 8\" step PT overpress 2x15, 1 round with belt 8\" step PT overpress 2x15, 1 round with belt 8\" step PT overpress 2x15, 1 round with belt 8\" step PT overpress 2x15, 1 round with belt   VG for strength and ROM S/l L7 3x10 ea  S/l L7 3x10 ea  S/l L7 3x10 ea S/l L7 3x10 ea S/l L7 3x10 ea S/l L7 3x10 ea S/l L7 3x10 ea  S/l L7 3x10 ea  S/l L7 3x10 ea  S/l L7 3x10 ea    Slant  45\"x3 LLE only, standing calf stretch 45\"x3 LLE only, standing calf stretch 45\"x4 LLE only, standing calf stretch 45\"x3 LLE only, standing calf stretch 45\"x3 LLE only, standing calf stretch 45\"x4 LLE only, standing calf stretch 45\"x3 LLE only, standing calf stretch 45\"x4 LLE only, standing calf stretch 45\"x4 LLE only, standing calf stretch 45\"x4 LLE only, standing calf stretch   Sciatic n gliders RLE       X15  hep hep                               Ther Activity                       5xSTS  TUGx2                Gait Training                                       Modalities                                                  "

## 2024-02-22 ENCOUNTER — APPOINTMENT (OUTPATIENT)
Dept: PHYSICAL THERAPY | Facility: REHABILITATION | Age: 74
End: 2024-02-22
Payer: COMMERCIAL

## 2024-02-27 ENCOUNTER — OFFICE VISIT (OUTPATIENT)
Dept: ENDOCRINOLOGY | Facility: CLINIC | Age: 74
End: 2024-02-27
Payer: COMMERCIAL

## 2024-02-27 VITALS
DIASTOLIC BLOOD PRESSURE: 82 MMHG | SYSTOLIC BLOOD PRESSURE: 136 MMHG | BODY MASS INDEX: 39.4 KG/M2 | WEIGHT: 230.8 LBS | HEIGHT: 64 IN

## 2024-02-27 DIAGNOSIS — E03.9 HYPOTHYROIDISM, UNSPECIFIED TYPE: Primary | ICD-10-CM

## 2024-02-27 DIAGNOSIS — R73.03 PREDIABETES: ICD-10-CM

## 2024-02-27 PROCEDURE — 99214 OFFICE O/P EST MOD 30 MIN: CPT | Performed by: INTERNAL MEDICINE

## 2024-02-27 RX ORDER — METFORMIN HYDROCHLORIDE 500 MG/1
500 TABLET, EXTENDED RELEASE ORAL
Qty: 90 TABLET | Refills: 3 | Status: SHIPPED | OUTPATIENT
Start: 2024-02-27

## 2024-02-27 NOTE — PROGRESS NOTES
"Chief Complaint   Patient presents with    Hypothyroidism      Referring Provider  No referring provider defined for this encounter.     History of Present Illness:   Estephania Brown is a 74 y.o. female with hypothyroidism seen in f/u. She was last in the office in 7/2023 with Staci DARBY.  At that time, she was concerned about her prediabetes and Staci prescribed metformin.    Of note, she had COVID and took Paxlovid from 12/1-12/15/2023. This helped her significantly.     Levothyroxine 175mcg six days per week. The TSH from 1/2/2024  Overall she feels well with occasional heart \"flutter\" and some fatigue. She denies diarrhea or tremor.    She is now taking metformin ER 500mg at dinner. She has also been working on her diet by reducing her carbs and \"sweets\" and riding her stationary bike at home. Of note, she has lost 17# since her July 2023 visit.     Patient Active Problem List   Diagnosis    Pain, joint, ankle and foot, left    DJD (degenerative joint disease), ankle and foot, left    Allergic rhinitis    Esophageal reflux    Hyperlipidemia    Hypertension    Hypothyroidism    Rosacea    Pain of right lower extremity    Female genital prolapse    Fracture of ankle    Osteopenia    Varicose veins of lower extremity    Pain of left heel    Body mass index (BMI) 40.0-44.9, adult    Acute midline low back pain without sciatica    Neuralgia    Otitis externa of left ear    Stage 3a chronic kidney disease (HCC)    Peripheral vascular disease (HCC)    Plantar fasciitis    Prediabetes      Past Medical History:   Diagnosis Date    Allergic rhinitis     Arthritis     Cataract     brock    Disease of thyroid gland     had total thyroidectomy    Diverticulosis     Hyperlipidemia     Hypertension     Mild acid reflux     Osteoporosis     Plantar fasciitis     b/l  l worse uses orthotics in shoes    Rosacea     Squamous cell skin cancer     Use of cane as ambulatory aid     Uterovaginal prolapse       Past Surgical " History:   Procedure Laterality Date    ANKLE SURGERY Left 2011    Fracture / hardware removed    COLONOSCOPY      fiberoptic    COLPORRHAPHY N/A 06/17/2019    Procedure: POSTERIOR COLPORRHAPHY;  Surgeon: Kai Napoles MD;  Location: [facility];  Service: UroGynecology           OTHER SURGICAL HISTORY      thyroid biopsy core needle     IA COLPOPEXY VAGINAL EXTRAPERITONEAL APPROACH N/A 06/17/2019    Procedure: VE COLPOSUSPENSION (neugide);  Surgeon: Kai Napoles MD;  Location: [facility];  Service: UroGynecology           IA PERINEOPLASTY RPR PERINEUM NONOBSTETRICAL SPX N/A 06/17/2019    Procedure: PERINEORROPHY;  Surgeon: Kai Napoles MD;  Location: [facility];  Service: UroGynecology           IA REMOVAL IMPLANT DEEP Left 09/18/2017    Procedure: REMOVAL HARDWARE ANKLE (1 plate, 8 screws.);  Surgeon: Saúl Iniguez MD;  Location: [facility];  Service: Orthopedics    SKIN BIOPSY  2021    SKIN CANCER EXCISION Left     THYROIDECTOMY  2012    Total     TUBAL LIGATION        Family History   Problem Relation Age of Onset    COPD Mother     Diabetes Mother     Heart disease Mother     Hypertension Mother     Diabetes type II Mother     Thyroid disease unspecified Mother         Goiter removed by radioactive tx    Bone cancer Father     No Known Problems Daughter     Diabetes type II Maternal Grandmother     No Known Problems Maternal Grandfather     No Known Problems Paternal Grandmother     Diabetes type II Paternal Grandfather     No Known Problems Maternal Aunt     No Known Problems Maternal Aunt     No Known Problems Paternal Aunt     Diabetes type II Brother      Social History     Tobacco Use    Smoking status: Never    Smokeless tobacco: Never   Substance Use Topics    Alcohol use: No     Comment: Never drank alcohol denied     No Known Allergies      Current Outpatient Medications:     metFORMIN (GLUCOPHAGE-XR) 500 mg 24 hr tablet, Take 1 tablet (500 mg total) by mouth daily with dinner, Disp: 90  tablet, Rfl: 3    atorvastatin (LIPITOR) 10 mg tablet, Take 1 tablet (10 mg total) by mouth daily, Disp: 90 tablet, Rfl: 1    Azelaic Acid 15 % cream, Apply topically daily at bedtime  , Disp: , Rfl:     Calcium Carb-Cholecalciferol 600-800 MG-UNIT TABS, Take 2 tablets by mouth daily, Disp: , Rfl:     Denosumab (PROLIA SC), Inject under the skin 2x year , Disp: , Rfl:     docusate sodium (COLACE) 100 mg capsule, , Disp: , Rfl:     doxycycline (PERIOSTAT) 20 MG tablet, Take 20 mg by mouth 2 (two) times a day, Disp: , Rfl:     gabapentin (NEURONTIN) 300 mg capsule, TAKE ONE CAPSULE IN THE MORNING, ONE CAPSULE IN AFTERNOON., AND 2 CAPSULES AT BEDTIME, Disp: 360 capsule, Rfl: 1    Glucosamine-Chondroitin (COSAMIN DS PO), Take by mouth, Disp: , Rfl:     HYDROcodone-acetaminophen (Norco) 5-325 mg per tablet, One po q day prn, Disp: 30 tablet, Rfl: 0    levothyroxine 175 mcg tablet, Take one tablet 6 out of 7 days a week, Disp: 90 tablet, Rfl: 1    loratadine (CLARITIN) 10 mg tablet, Take 10 mg by mouth daily as needed , Disp: , Rfl:     methocarbamol (ROBAXIN) 500 mg tablet, TAKE 1 TABLET (500 MG TOTAL) BY MOUTH IN THE MORNING AND 1 TABLET (500 MG TOTAL) IN THE EVENING AND 1 TABLET (500 MG TOTAL) BEFORE BEDTIME., Disp: 30 tablet, Rfl: 3    metoprolol tartrate (LOPRESSOR) 50 mg tablet, Take 1 tablet (50 mg total) by mouth daily, Disp: 90 tablet, Rfl: 3    metroNIDAZOLE (METROGEL) 1 % gel, Apply 1 application topically daily , Disp: , Rfl:     Multiple Vitamins-Minerals (Centrum Silver 50+Women) TABS, , Disp: , Rfl:     naproxen (NAPROSYN) 500 mg tablet, TAKE ONE TABLET BY MOUTH TWICE A DAY WITH FOOD (Patient taking differently: Take 500 mg by mouth 2 (two) times a day), Disp: 180 tablet, Rfl: 3    Probiotic Product (PROBIOTIC-10 PO), , Disp: , Rfl:     psyllium (METAMUCIL) 0.52 g capsule, , Disp: , Rfl:     Triamcinolone Acetonide (NASACORT ALLERGY 24HR NA), 2 sprays into each nostril as needed  , Disp: , Rfl:   Review of  "Systems   Constitutional:  Negative for unexpected weight change.   HENT:  Negative for trouble swallowing and voice change.    Cardiovascular:  Negative for palpitations.   Gastrointestinal:  Negative for diarrhea.   Musculoskeletal:  Positive for gait problem.   Neurological:  Negative for tremors.   Psychiatric/Behavioral:  The patient is not nervous/anxious.        Physical Exam:  Body mass index is 39.62 kg/m².  /82 (BP Location: Right arm, Patient Position: Sitting, Cuff Size: Adult)   Ht 5' 4\" (1.626 m)   Wt 105 kg (230 lb 12.8 oz)   LMP  (LMP Unknown)   BMI 39.62 kg/m²    Wt Readings from Last 3 Encounters:   02/27/24 105 kg (230 lb 12.8 oz)   11/07/23 107 kg (235 lb 2 oz)   07/20/23 112 kg (247 lb)         Physical Exam   Gen: appears well-developed and well-nourished. No apparent distress.   Head: Normocephalic and atraumatic.   Eyes: no stare or proptosis, no periorbital edema  E/N/M nl facies, hearing grossly intact  Neck: range of motion nl.   Pulmonary/Chest: breathing  comfortably, no accessory muscle use, effort normal.   Musculoskeletal: moves upper extremities  Neurological: alert and oriented to person, place, and time. No upper ext tremor appreciated  Skin: does not appear diaphoretic, no facial plethora  Psychiatric: normal mood and affect; behavior is normal; no gross lapses in memory, answer questions appropriately      DATA:  Labs:      Lab Results   Component Value Date    GPM7YYLOJGWU 0.314 (L) 01/02/2024    TSH 1.82 10/02/2018       Lab Results   Component Value Date    HGBA1C 6.3 (H) 07/25/2023             Radiology    Impression:  1. Hypothyroidism, unspecified type    2. Prediabetes           Plan:    Gayatri was seen today for hypothyroidism.    Diagnoses and all orders for this visit:    Hypothyroidism, unspecified type  -     TSH, 3rd generation; Future    Prediabetes  -     Hemoglobin A1C; Future  -     metFORMIN (GLUCOPHAGE-XR) 500 mg 24 hr tablet; Take 1 tablet (500 mg " total) by mouth daily with dinner        Hypothyroidism: She had been sick prior to her 1/2/24 TSH. She also has lost weight and it is a good time to reassess her dose. Will repeat this now and she will continue levothyroxine 175mcg six days per week until this results.     Pre-diabetes: she feels well with metformin 500mg ER. She will continue this. Check A1c with above labs.     Plan rtc in 6mos    Discussed with the patient and all questioned fully answered. She will call me if any problems arise.        Becky Pierre MD

## 2024-02-28 ENCOUNTER — OFFICE VISIT (OUTPATIENT)
Dept: PHYSICAL THERAPY | Facility: REHABILITATION | Age: 74
End: 2024-02-28
Payer: COMMERCIAL

## 2024-02-28 DIAGNOSIS — M72.2 PLANTAR FASCIITIS: Primary | ICD-10-CM

## 2024-02-28 DIAGNOSIS — R26.89 BALANCE PROBLEM: ICD-10-CM

## 2024-02-28 DIAGNOSIS — M79.672 LEFT FOOT PAIN: ICD-10-CM

## 2024-02-28 DIAGNOSIS — R26.9 GAIT ABNORMALITY: ICD-10-CM

## 2024-02-28 PROCEDURE — 97140 MANUAL THERAPY 1/> REGIONS: CPT | Performed by: PHYSICAL THERAPIST

## 2024-02-28 PROCEDURE — 97112 NEUROMUSCULAR REEDUCATION: CPT | Performed by: PHYSICAL THERAPIST

## 2024-02-28 PROCEDURE — 97110 THERAPEUTIC EXERCISES: CPT | Performed by: PHYSICAL THERAPIST

## 2024-02-28 NOTE — PROGRESS NOTES
Daily Note     Today's date: 2024  Patient name: Estephania Brown  : 1950  MRN: [id number]  Referring provider: Rosalino Purvis MD  Dx:   Encounter Diagnosis     ICD-10-CM    1. Plantar fasciitis  M72.2       2. Gait abnormality  R26.9       3. Balance problem  R26.89       4. Left foot pain  M79.672               Start Time: 1212  Stop Time: 1255  Total time in clinic (min): 43 minutes    Subjective: Pt reports her ankle doesn't feel nearly as bad today since it is warmer out.      Objective: See treatment diary below  Access Code: [email or web address]  URL: [email or web address]  Date: 2023  Prepared by: Lisa Casillas     Exercises  - Long Sitting Calf Stretch with Strap  - 2-3 x daily - 7 x weekly - 1 sets - 4 reps - 30-45 hold  - Seated Plantar Fascia Mobilization with Small Ball  - 2-3 x daily - 7 x weekly - 1 sets - 3-5 reps - 60 hold    Assessment: Pt had less TTP in the plantar fascia today. She completed the exercises without issue. She continues to note less pain following tx and improved ankle and foot mobitliy. Pt would benefit from additional skilled maintenance therapy in effort to prevent decline in function with resultant decrease in independent self care and transfers, and to prevent an increased risk for fall, and loss of functional strength.         Plan: Continue per plan of care.      Precautions: HTN, OP, fall risk, CA hx, hx of L ankle surgery    POC expires Unit limit Auth Expiration date PT/OT + Visit Limit?   24 N/a 24 BOMN                             Visit/Unit Tracking  AUTH Status:  Date             12 visits approved Used 1 2 3             Remaining  11 10 9               POC exp 24    Manuals 2/7 2/21 2/28 11/29 12/6 12/27 1/3 1/10 1/24 1/31   L ankle TC PA and distraction and ST mobs DS Gr IV DS Gr IV DS Gr IV DS gr IV DS Gr IV DS Gr IV DS Gr IV DS Gr IV DS Gr IV DS Gr IV                STM PF DS DS DS DS DS DS DS DS DS DS              "RE - DS                Neuro Re-Ed             [place] march 3 min no UE close S 3 min no UE close S 3 min no UE close S 3 min no UE close S 3 min no UE close S 3 min no UE close S 3 min no UE close S 3 min no UE close S 3 min no UE close S 3 min no UE close S   SLS on airex with contralat hip abd,  GMB x30 ea light UE GMB x30 ea light UE GMB x30 ea light UE GMB x30 ea light UE GMB x30 ea light UE GMB x30 ea light UE GMB x30 ea light UE GMB x30 ea light UE GMB x30 ea light UE GMB x30 ea light UE   Sand dune step ups 1 min ea LE light UE close S 1 min ea LE light UE close S 1 min ea LE light UE close S 1 min ea LE light UE close S 1 min ea LE light UE close S 1 min ea LE light UE close S 1 min ea LE light UE close S 1 min ea LE light UE close S 1 min ea LE light UE close S 1 min ea LE light UE close S   assess       DS                                Ther Ex             DF MWM 8\" step PT overpress 2x15, with belt 8\" step PT overpress 2x15, with belt 8\" step PT overpress 2x15, with belt 8\" step PT overpress 2x15, with belt 8\" step PT overpress 2x15, with belt 8\" step PT overpress 2x15, with belt 8\" step PT overpress 2x15, 1 round with belt 8\" step PT overpress 2x15, 1 round with belt 8\" step PT overpress 2x15, 1 round with belt 8\" step PT overpress 2x15, 1 round with belt   VG for strength and ROM S/l L7 3x10 ea  S/l L7 3x10 ea  S/l L7 3x10 ea S/l L7 3x10 ea S/l L7 3x10 ea S/l L7 3x10 ea S/l L7 3x10 ea  S/l L7 3x10 ea  S/l L7 3x10 ea  S/l L7 3x10 ea    Slant  45\"x3 LLE only, standing calf stretch 45\"x3 LLE only, standing calf stretch 45\"x4 LLE only, standing calf stretch 45\"x3 LLE only, standing calf stretch 45\"x3 LLE only, standing calf stretch 45\"x4 LLE only, standing calf stretch 45\"x3 LLE only, standing calf stretch 45\"x4 LLE only, standing calf stretch 45\"x4 LLE only, standing calf stretch 45\"x4 LLE only, standing calf stretch   Sciatic n kourtney RLE       X15  hep hep                               Ther " Activity                       5xSTS  TUGx2                Gait Training                                       Modalities

## 2024-02-29 DIAGNOSIS — G89.29 CHRONIC PAIN OF LEFT ANKLE: ICD-10-CM

## 2024-02-29 DIAGNOSIS — M25.572 CHRONIC PAIN OF LEFT ANKLE: ICD-10-CM

## 2024-02-29 RX ORDER — NAPROXEN 500 MG/1
500 TABLET ORAL 2 TIMES DAILY WITH MEALS
Qty: 180 TABLET | Refills: 1 | Status: SHIPPED | OUTPATIENT
Start: 2024-02-29

## 2024-03-01 ENCOUNTER — APPOINTMENT (OUTPATIENT)
Dept: LAB | Facility: CLINIC | Age: 74
End: 2024-03-01
Payer: COMMERCIAL

## 2024-03-01 DIAGNOSIS — E03.9 HYPOTHYROIDISM, UNSPECIFIED TYPE: ICD-10-CM

## 2024-03-01 DIAGNOSIS — R73.03 PREDIABETES: ICD-10-CM

## 2024-03-01 LAB
EST. AVERAGE GLUCOSE BLD GHB EST-MCNC: 126 MG/DL
HBA1C MFR BLD: 6 %
TSH SERPL DL<=0.05 MIU/L-ACNC: 0.69 UIU/ML (ref 0.45–4.5)

## 2024-03-01 PROCEDURE — 84443 ASSAY THYROID STIM HORMONE: CPT

## 2024-03-01 PROCEDURE — 36415 COLL VENOUS BLD VENIPUNCTURE: CPT

## 2024-03-01 PROCEDURE — 83036 HEMOGLOBIN GLYCOSYLATED A1C: CPT

## 2024-03-06 ENCOUNTER — APPOINTMENT (OUTPATIENT)
Dept: PHYSICAL THERAPY | Facility: REHABILITATION | Age: 74
End: 2024-03-06
Payer: COMMERCIAL

## 2024-03-06 NOTE — PROGRESS NOTES
Daily Note     Today's date: 3/6/2024  Patient name: Estephania Brown  : 1950  MRN: [id number]  Referring provider: Rosalino Purvis MD  Dx:   No diagnosis found.                     Subjective: Pt reports her ankle doesn't feel nearly as bad today since it is warmer out.      Objective: See treatment diary below  Access Code: [email or web address]  URL: [email or web address]  Date: 2023  Prepared by: Lisa Casillas     Exercises  - Long Sitting Calf Stretch with Strap  - 2-3 x daily - 7 x weekly - 1 sets - 4 reps - 30-45 hold  - Seated Plantar Fascia Mobilization with Small Ball  - 2-3 x daily - 7 x weekly - 1 sets - 3-5 reps - 60 hold    Assessment: Pt had less TTP in the plantar fascia today. She completed the exercises without issue. She continues to note less pain following tx and improved ankle and foot mobitliy. Pt would benefit from additional skilled maintenance therapy in effort to prevent decline in function with resultant decrease in independent self care and transfers, and to prevent an increased risk for fall, and loss of functional strength.         Plan: Continue per plan of care.      Precautions: HTN, OP, fall risk, CA hx, hx of L ankle surgery    POC expires Unit limit Auth Expiration date PT/OT + Visit Limit?   24 N/a 24 BOMN                             Visit/Unit Tracking  AUTH Status:  Date             12 visits approved Used 1 2 3             Remaining  11 10 9               POC exp 24    Manuals 2/7 2/21 2/28 11/29 12/6 12/27 1/3 1/10 1/24 1/31   L ankle TC PA and distraction and ST mobs DS Gr IV DS Gr IV DS Gr IV DS gr IV DS Gr IV DS Gr IV DS Gr IV DS Gr IV DS Gr IV DS Gr IV                STM PF DS DS DS DS DS DS DS DS DS DS             RE - DS                Neuro Re-Ed             [place] mini march 3 min no UE close S 3 min no UE close S 3 min no UE close S 3 min no UE close S 3 min no UE close S 3 min no UE close S 3 min no UE close S 3 min  "no UE close S 3 min no UE close S 3 min no UE close S   SLS on airex with contralat hip abd,  GMB x30 ea light UE GMB x30 ea light UE GMB x30 ea light UE GMB x30 ea light UE GMB x30 ea light UE GMB x30 ea light UE GMB x30 ea light UE GMB x30 ea light UE GMB x30 ea light UE GMB x30 ea light UE   [place] d[place] step ups 1 min ea LE light UE close S 1 min ea LE light UE close S 1 min ea LE light UE close S 1 min ea LE light UE close S 1 min ea LE light UE close S 1 min ea LE light UE close S 1 min ea LE light UE close S 1 min ea LE light UE close S 1 min ea LE light UE close S 1 min ea LE light UE close S   assess       DS                                Ther Ex             DF MWM 8\" step PT overpress 2x15, with belt 8\" step PT overpress 2x15, with belt 8\" step PT overpress 2x15, with belt 8\" step PT overpress 2x15, with belt 8\" step PT overpress 2x15, with belt 8\" step PT overpress 2x15, with belt 8\" step PT overpress 2x15, 1 round with belt 8\" step PT overpress 2x15, 1 round with belt 8\" step PT overpress 2x15, 1 round with belt 8\" step PT overpress 2x15, 1 round with belt   VG for strength and ROM S/l L7 3x10 ea  S/l L7 3x10 ea  S/l L7 3x10 ea S/l L7 3x10 ea S/l L7 3x10 ea S/l L7 3x10 ea S/l L7 3x10 ea  S/l L7 3x10 ea  S/l L7 3x10 ea  S/l L7 3x10 ea    Slant  45\"x3 LLE only, standing calf stretch 45\"x3 LLE only, standing calf stretch 45\"x4 LLE only, standing calf stretch 45\"x3 LLE only, standing calf stretch 45\"x3 LLE only, standing calf stretch 45\"x4 LLE only, standing calf stretch 45\"x3 LLE only, standing calf stretch 45\"x4 LLE only, standing calf stretch 45\"x4 LLE only, standing calf stretch 45\"x4 LLE only, standing calf stretch   Sciatic n kourtney RLE       X15  hep hep                               Ther Activity                       5xSTS  TUGx2                Gait Training                                       Modalities                                                  "

## 2024-03-13 ENCOUNTER — OFFICE VISIT (OUTPATIENT)
Dept: PHYSICAL THERAPY | Facility: REHABILITATION | Age: 74
End: 2024-03-13
Payer: COMMERCIAL

## 2024-03-13 DIAGNOSIS — M79.672 LEFT FOOT PAIN: ICD-10-CM

## 2024-03-13 DIAGNOSIS — R26.9 GAIT ABNORMALITY: ICD-10-CM

## 2024-03-13 DIAGNOSIS — M72.2 PLANTAR FASCIITIS: Primary | ICD-10-CM

## 2024-03-13 DIAGNOSIS — R26.89 BALANCE PROBLEM: ICD-10-CM

## 2024-03-13 PROCEDURE — 97140 MANUAL THERAPY 1/> REGIONS: CPT | Performed by: PHYSICAL THERAPIST

## 2024-03-13 PROCEDURE — 97110 THERAPEUTIC EXERCISES: CPT | Performed by: PHYSICAL THERAPIST

## 2024-03-13 NOTE — PROGRESS NOTES
Daily Note     Today's date: 3/13/2024  Patient name: Estephania Brown  : 1950  MRN: [id number]  Referring provider: Rosalino Purvis MD  Dx:   Encounter Diagnosis     ICD-10-CM    1. Plantar fasciitis  M72.2       2. Gait abnormality  R26.9       3. Balance problem  R26.89       4. Left foot pain  M79.672                 Start Time: 1213  Stop Time: 1258  Total time in clinic (min): 45 minutes    Subjective: Pt reports the ankle joint is stiff and has been worse with the damp weather.      Objective: See treatment diary below  Access Code: [email or web address]  URL: [email or web address]  Date: 2023  Prepared by: Lisa Casillas     Exercises  - Long Sitting Calf Stretch with Strap  - 2-3 x daily - 7 x weekly - 1 sets - 4 reps - 30-45 hold  - Seated Plantar Fascia Mobilization with Small Ball  - 2-3 x daily - 7 x weekly - 1 sets - 3-5 reps - 60 hold    Assessment: Pt continues to respond well to manual intervention with temporary reduction in pain and stiffness which is allowing her to tolerate more standing and walking, and performing activities such as grocery shopping and household chores. Pt would benefit from additional skilled maintenance therapy in effort to prevent decline in function with resultant decrease in independent self care and transfers, and to prevent an increased risk for fall, and loss of functional strength.         Plan: Continue per plan of care.      Precautions: HTN, OP, fall risk, CA hx, hx of L ankle surgery    POC expires Unit limit Auth Expiration date PT/OT + Visit Limit?   24 N/a 24 BOMN                             Visit/Unit Tracking  AUTH Status:  Date 2/7 2/21 2/28 3/13           12 visits approved Used 1 2 3 4            Remaining  11 10 9 8              POC exp 24    Manuals 2/7 2/21 2/28 3/13 12/6 12/27 1/3 1/10 1/24 1/31   L ankle TC PA and distraction and ST mobs DS Gr IV DS Gr IV DS Gr IV DS gr IV DS Gr IV DS Gr IV DS Gr IV DS Gr IV DS Gr IV DS  "Gr IV                STM PF DS DS DS DS DS DS DS DS DS DS             RE - DS                Neuro Re-Ed             [place] mini march 3 min no UE close S 3 min no UE close S 3 min no UE close S 3 min no UE close S 3 min no UE close S 3 min no UE close S 3 min no UE close S 3 min no UE close S 3 min no UE close S 3 min no UE close S   SLS on airex with contralat hip abd,  GMB x30 ea light UE GMB x30 ea light UE GMB x30 ea light UE GMB x30 ea light UE GMB x30 ea light UE GMB x30 ea light UE GMB x30 ea light UE GMB x30 ea light UE GMB x30 ea light UE GMB x30 ea light UE   [place] d[place] step ups 1 min ea LE light UE close S 1 min ea LE light UE close S 1 min ea LE light UE close S 1 min ea LE light UE close S 1 min ea LE light UE close S 1 min ea LE light UE close S 1 min ea LE light UE close S 1 min ea LE light UE close S 1 min ea LE light UE close S 1 min ea LE light UE close S   assess       DS                                Ther Ex             DF MWM 8\" step PT overpress 2x15, with belt 8\" step PT overpress 2x15, with belt 8\" step PT overpress 2x15, with belt 8\" step PT overpress 2x15, with belt 8\" step PT overpress 2x15, with belt 8\" step PT overpress 2x15, with belt 8\" step PT overpress 2x15, 1 round with belt 8\" step PT overpress 2x15, 1 round with belt 8\" step PT overpress 2x15, 1 round with belt 8\" step PT overpress 2x15, 1 round with belt   VG for strength and ROM S/l L7 3x10 ea  S/l L7 3x10 ea  S/l L7 3x10 ea S/l L7 3x10 ea S/l L7 3x10 ea S/l L7 3x10 ea S/l L7 3x10 ea  S/l L7 3x10 ea  S/l L7 3x10 ea  S/l L7 3x10 ea    Slant  45\"x3 LLE only, standing calf stretch 45\"x3 LLE only, standing calf stretch 45\"x4 LLE only, standing calf stretch 45\"x3 LLE only, standing calf stretch 45\"x3 LLE only, standing calf stretch 45\"x4 LLE only, standing calf stretch 45\"x3 LLE only, standing calf stretch 45\"x4 LLE only, standing calf stretch 45\"x4 LLE only, standing calf stretch 45\"x4 LLE only, standing calf stretch   Sciatic n " kourtney RLE       X15  hep hep                               Ther Activity                       5xSTS  TUGx2                Gait Training                                       Modalities

## 2024-03-20 ENCOUNTER — OFFICE VISIT (OUTPATIENT)
Dept: PHYSICAL THERAPY | Facility: REHABILITATION | Age: 74
End: 2024-03-20
Payer: COMMERCIAL

## 2024-03-20 DIAGNOSIS — M79.672 LEFT FOOT PAIN: ICD-10-CM

## 2024-03-20 DIAGNOSIS — R26.89 BALANCE PROBLEM: ICD-10-CM

## 2024-03-20 DIAGNOSIS — M72.2 PLANTAR FASCIITIS: Primary | ICD-10-CM

## 2024-03-20 DIAGNOSIS — R26.9 GAIT ABNORMALITY: ICD-10-CM

## 2024-03-20 PROCEDURE — 97110 THERAPEUTIC EXERCISES: CPT | Performed by: PHYSICAL THERAPIST

## 2024-03-20 PROCEDURE — 97140 MANUAL THERAPY 1/> REGIONS: CPT | Performed by: PHYSICAL THERAPIST

## 2024-03-20 PROCEDURE — 97112 NEUROMUSCULAR REEDUCATION: CPT | Performed by: PHYSICAL THERAPIST

## 2024-03-20 NOTE — PROGRESS NOTES
Daily Note     Today's date: 3/20/2024  Patient name: Estephania Brown  : 1950  MRN: [id number]  Referring provider: Rosalino Purvis MD  Dx:   Encounter Diagnosis     ICD-10-CM    1. Plantar fasciitis  M72.2       2. Gait abnormality  R26.9       3. Balance problem  R26.89       4. Left foot pain  M79.672                 Start Time: 1132  Stop Time: 1216  Total time in clinic (min): 44 minutes    Subjective: Pt reports the ankle is stiff today with the damper weather.      Objective: See treatment diary below  Access Code: [email or web address]  URL: [email or web address]  Date: 2023  Prepared by: Lisa Casillas     Exercises  - Long Sitting Calf Stretch with Strap  - 2-3 x daily - 7 x weekly - 1 sets - 4 reps - 30-45 hold  - Seated Plantar Fascia Mobilization with Small Ball  - 2-3 x daily - 7 x weekly - 1 sets - 3-5 reps - 60 hold    Assessment:    Pt would benefit from additional skilled maintenance therapy in effort to prevent decline in function with resultant decrease in independent self care and transfers, and to prevent an increased risk for fall, and loss of functional strength.         Plan: Continue per plan of care.      Precautions: HTN, OP, fall risk, CA hx, hx of L ankle surgery    POC expires Unit limit Auth Expiration date PT/OT + Visit Limit?   24 N/a 24 BOMN                             Visit/Unit Tracking  AUTH Status:  Date 2/7 2/21 2/28 3/13 3/20          12 visits approved Used 1 2 3 4 5           Remaining  11 10 9 8 7             POC exp 24    Manuals 2/7 2/21 2/28 3/13 3/20 12/27 1/3 1/10 1/24 1/31   L ankle TC PA and distraction and ST mobs DS Gr IV DS Gr IV DS Gr IV DS gr IV DS Gr IV DS Gr IV DS Gr IV DS Gr IV DS Gr IV DS Gr IV                STM PF DS DS DS DS DS DS DS DS DS DS             RE - DS                Neuro Re-Ed             [place] mini march 3 min no UE close S 3 min no UE close S 3 min no UE close S 3 min no UE close S 3 min no UE close S 3  "min no UE close S 3 min no UE close S 3 min no UE close S 3 min no UE close S 3 min no UE close S   SLS on airex with contralat hip abd,  GMB x30 ea light UE GMB x30 ea light UE GMB x30 ea light UE GMB x30 ea light UE GMB x30 ea light UE GMB x30 ea light UE GMB x30 ea light UE GMB x30 ea light UE GMB x30 ea light UE GMB x30 ea light UE   Sand dune step ups 1 min ea LE light UE close S 1 min ea LE light UE close S 1 min ea LE light UE close S 1 min ea LE light UE close S 1 min ea LE light UE close S 1 min ea LE light UE close S 1 min ea LE light UE close S 1 min ea LE light UE close S 1 min ea LE light UE close S 1 min ea LE light UE close S   assess       DS                                Ther Ex             DF MWM 8\" step PT overpress 2x15, with belt 8\" step PT overpress 2x15, with belt 8\" step PT overpress 2x15, with belt 8\" step PT overpress 2x15, with belt 8\" step PT overpress 2x15, with belt 8\" step PT overpress 2x15, with belt 8\" step PT overpress 2x15, 1 round with belt 8\" step PT overpress 2x15, 1 round with belt 8\" step PT overpress 2x15, 1 round with belt 8\" step PT overpress 2x15, 1 round with belt   VG for strength and ROM S/l L7 3x10 ea  S/l L7 3x10 ea  S/l L7 3x10 ea S/l L7 3x10 ea S/l L7 3x10 ea S/l L7 3x10 ea S/l L7 3x10 ea  S/l L7 3x10 ea  S/l L7 3x10 ea  S/l L7 3x10 ea    Slant  45\"x3 LLE only, standing calf stretch 45\"x3 LLE only, standing calf stretch 45\"x4 LLE only, standing calf stretch 45\"x3 LLE only, standing calf stretch 45\"x3 LLE only, standing calf stretch 45\"x4 LLE only, standing calf stretch 45\"x3 LLE only, standing calf stretch 45\"x4 LLE only, standing calf stretch 45\"x4 LLE only, standing calf stretch 45\"x4 LLE only, standing calf stretch   Sciatic n kourtney RLE       X15  hep hep                               Ther Activity                       5xSTS  TUGx2                Gait Training                                       Modalities                                                  "

## 2024-03-25 ENCOUNTER — OFFICE VISIT (OUTPATIENT)
Dept: PHYSICAL THERAPY | Facility: REHABILITATION | Age: 74
End: 2024-03-25
Payer: COMMERCIAL

## 2024-03-25 DIAGNOSIS — M79.672 LEFT FOOT PAIN: ICD-10-CM

## 2024-03-25 DIAGNOSIS — M72.2 PLANTAR FASCIITIS: Primary | ICD-10-CM

## 2024-03-25 DIAGNOSIS — R26.9 GAIT ABNORMALITY: ICD-10-CM

## 2024-03-25 DIAGNOSIS — R26.89 BALANCE PROBLEM: ICD-10-CM

## 2024-03-25 PROCEDURE — 97110 THERAPEUTIC EXERCISES: CPT | Performed by: PHYSICAL THERAPIST

## 2024-03-25 PROCEDURE — 97140 MANUAL THERAPY 1/> REGIONS: CPT | Performed by: PHYSICAL THERAPIST

## 2024-03-25 NOTE — PROGRESS NOTES
Daily Note     Today's date: 3/25/2024  Patient name: Estephania Brown  : 1950  MRN: [id number]  Referring provider: Rosalino Purvis MD  Dx:   Encounter Diagnosis     ICD-10-CM    1. Plantar fasciitis  M72.2       2. Gait abnormality  R26.9       3. Balance problem  R26.89       4. Left foot pain  M79.672             Start Time: 1530  Stop Time: 1615  Total time in clinic (min): 45 minutes    Subjective: Pt offers no new complaints.      Objective: See treatment diary below  Access Code: [email or web address]  URL: [email or web address]  Date: 2023  Prepared by: Lisa Casillas     Exercises  - Long Sitting Calf Stretch with Strap  - 2-3 x daily - 7 x weekly - 1 sets - 4 reps - 30-45 hold  - Seated Plantar Fascia Mobilization with Small Ball  - 2-3 x daily - 7 x weekly - 1 sets - 3-5 reps - 60 hold    Assessment: Pt responded well to tx. She noted less stiffness in the ankle and foot post tx.  Pt would benefit from additional skilled maintenance therapy in effort to prevent decline in function with resultant decrease in independent self care and transfers, and to prevent an increased risk for fall, and loss of functional strength.         Plan: Continue per plan of care.      Precautions: HTN, OP, fall risk, CA hx, hx of L ankle surgery    POC expires Unit limit Auth Expiration date PT/OT + Visit Limit?   24 N/a 24 BOMN                             Visit/Unit Tracking  AUTH Status:  Date 2/7 2/21 2/28 3/13 3/20 3/25         12 visits approved Used 1 2 3 4 5 6          Remaining  11 10 9 8 7 6            POC exp 24    Manuals 2/7 2/21 2/28 3/13 3/20 3/25 1/3 1/10 1/24 1/31   L ankle TC PA and distraction and ST mobs DS Gr IV DS Gr IV DS Gr IV DS gr IV DS Gr IV DS Gr IV DS Gr IV DS Gr IV DS Gr IV DS Gr IV                STM PF DS DS DS DS DS DS DS DS DS DS             RE - DS                Neuro Re-Ed             [facility] march 3 min no UE close S 3 min no UE close S 3 min no UE  "close S 3 min no UE close S 3 min no UE close S 3 min no UE close S 3 min no UE close S 3 min no UE close S 3 min no UE close S 3 min no UE close S   SLS on airex with contralat hip abd,  GMB x30 ea light UE GMB x30 ea light UE GMB x30 ea light UE GMB x30 ea light UE GMB x30 ea light UE GMB x30 ea light UE GMB x30 ea light UE GMB x30 ea light UE GMB x30 ea light UE GMB x30 ea light UE   [place] d[place] step ups 1 min ea LE light UE close S 1 min ea LE light UE close S 1 min ea LE light UE close S 1 min ea LE light UE close S 1 min ea LE light UE close S 1 min ea LE light UE close S 1 min ea LE light UE close S 1 min ea LE light UE close S 1 min ea LE light UE close S 1 min ea LE light UE close S   assess       DS                                Ther Ex             DF MWM 8\" step PT overpress 2x15, with belt 8\" step PT overpress 2x15, with belt 8\" step PT overpress 2x15, with belt 8\" step PT overpress 2x15, with belt 8\" step PT overpress 2x15, with belt 8\" step PT overpress 2x15, with belt 8\" step PT overpress 2x15, 1 round with belt 8\" step PT overpress 2x15, 1 round with belt 8\" step PT overpress 2x15, 1 round with belt 8\" step PT overpress 2x15, 1 round with belt   VG for strength and ROM S/l L7 3x10 ea  S/l L7 3x10 ea  S/l L7 3x10 ea S/l L7 3x10 ea S/l L7 3x10 ea S/l L7 3x10 ea S/l L7 3x10 ea  S/l L7 3x10 ea  S/l L7 3x10 ea  S/l L7 3x10 ea    Slant  45\"x3 LLE only, standing calf stretch 45\"x3 LLE only, standing calf stretch 45\"x4 LLE only, standing calf stretch 45\"x3 LLE only, standing calf stretch 45\"x3 LLE only, standing calf stretch 45\"x4 LLE only, standing calf stretch 45\"x3 LLE only, standing calf stretch 45\"x4 LLE only, standing calf stretch 45\"x4 LLE only, standing calf stretch 45\"x4 LLE only, standing calf stretch   Sciatic n kourtney RLE       X15  hep hep                               Ther Activity                       5xSTS  TUGx2                Gait Training                                       Modalities   "

## 2024-03-27 ENCOUNTER — APPOINTMENT (OUTPATIENT)
Dept: PHYSICAL THERAPY | Facility: REHABILITATION | Age: 74
End: 2024-03-27
Payer: COMMERCIAL

## 2024-04-02 ENCOUNTER — OFFICE VISIT (OUTPATIENT)
Dept: PHYSICAL THERAPY | Facility: REHABILITATION | Age: 74
End: 2024-04-02
Payer: COMMERCIAL

## 2024-04-02 DIAGNOSIS — M72.2 PLANTAR FASCIITIS: Primary | ICD-10-CM

## 2024-04-02 DIAGNOSIS — M79.672 LEFT FOOT PAIN: ICD-10-CM

## 2024-04-02 DIAGNOSIS — R26.9 GAIT ABNORMALITY: ICD-10-CM

## 2024-04-02 DIAGNOSIS — R26.89 BALANCE PROBLEM: ICD-10-CM

## 2024-04-02 PROCEDURE — 97110 THERAPEUTIC EXERCISES: CPT

## 2024-04-02 PROCEDURE — 97140 MANUAL THERAPY 1/> REGIONS: CPT

## 2024-04-02 PROCEDURE — 97112 NEUROMUSCULAR REEDUCATION: CPT

## 2024-04-02 NOTE — PROGRESS NOTES
Daily Note     Today's date: 2024  Patient name: Estephania Brown  : 1950  MRN: [id number]  Referring provider: Rosalino Purvis MD  Dx:   Encounter Diagnosis     ICD-10-CM    1. Plantar fasciitis  M72.2       2. Gait abnormality  R26.9       3. Balance problem  R26.89       4. Left foot pain  M79.672           Start Time: 1115  Stop Time: 1155  Total time in clinic (min): 40 minutes    Subjective: Patient reports she feels the maintenance PT every week is helping her stay active.      Objective: See treatment diary below      Assessment: Tolerated treatment well today. No reports of symptoms throughout session. Stiffness noted in TCJ and STJ with manuals. Patient would benefit from continued PT      Plan: Continue per plan of care.      Precautions: HTN, OP, fall risk, CA hx, hx of L ankle surgery    POC expires Unit limit Auth Expiration date PT/OT + Visit Limit?   24 N/a 24 BOMN                             Visit/Unit Tracking  AUTH Status:  Date 2/7 2/21 2/28 3/13 3/20 3/25 4/2        12 visits approved Used 1 2 3 4 5 6 7         Remaining  11 10 9 8 7 6 5           POC exp 24    Manuals 2/7 2/21 2/28 3/13 3/20 3/25 4/2  1/24 1/31   L ankle TC PA and distraction and ST mobs DS Gr IV DS Gr IV DS Gr IV DS gr IV DS Gr IV DS Gr IV OR Gr IV  DS Gr IV DS Gr IV                STM PF DS DS DS DS DS DS OR  DS DS             RE - DS                Neuro Re-Ed             [facility] mini march 3 min no UE close S 3 min no UE close S 3 min no UE close S 3 min no UE close S 3 min no UE close S 3 min no UE close S 3 min no UE close S  3 min no UE close S 3 min no UE close S   SLS on airex with contralat hip abd,  GMB x30 ea light UE GMB x30 ea light UE GMB x30 ea light UE GMB x30 ea light UE GMB x30 ea light UE GMB x30 ea light UE GMB x30 ea light UE  GMB x30 ea light UE GMB x30 ea light UE   Sand dune step ups 1 min ea LE light UE close S 1 min ea LE light UE close S 1 min ea LE light UE close S 1  "min ea LE light UE close S 1 min ea LE light UE close S 1 min ea LE light UE close S 1 min ea LE light UE close S  1 min ea LE light UE close S 1 min ea LE light UE close S   assess                                       Ther Ex             DF MWM 8\" step PT overpress 2x15, with belt 8\" step PT overpress 2x15, with belt 8\" step PT overpress 2x15, with belt 8\" step PT overpress 2x15, with belt 8\" step PT overpress 2x15, with belt 8\" step PT overpress 2x15, with belt 8\" step PT overpress 2x15, with belt  8\" step PT overpress 2x15, 1 round with belt 8\" step PT overpress 2x15, 1 round with belt   VG for strength and ROM S/l L7 3x10 ea  S/l L7 3x10 ea  S/l L7 3x10 ea S/l L7 3x10 ea S/l L7 3x10 ea S/l L7 3x10 ea S/l L7 3x10 ea  S/l L7 3x10 ea  S/l L7 3x10 ea    Slant  45\"x3 LLE only, standing calf stretch 45\"x3 LLE only, standing calf stretch 45\"x4 LLE only, standing calf stretch 45\"x3 LLE only, standing calf stretch 45\"x3 LLE only, standing calf stretch 45\"x4 LLE only, standing calf stretch 45\"x4 LLE only, standing calf stretch  45\"x4 LLE only, standing calf stretch 45\"x4 LLE only, standing calf stretch   Sciatic n gliders RLE                                       Ther Activity                       5xSTS  TUGx2                Gait Training                                       Modalities                                                    "

## 2024-04-03 ENCOUNTER — APPOINTMENT (OUTPATIENT)
Dept: PHYSICAL THERAPY | Facility: REHABILITATION | Age: 74
End: 2024-04-03
Payer: COMMERCIAL

## 2024-04-10 ENCOUNTER — OFFICE VISIT (OUTPATIENT)
Dept: PHYSICAL THERAPY | Facility: REHABILITATION | Age: 74
End: 2024-04-10
Payer: COMMERCIAL

## 2024-04-10 DIAGNOSIS — R26.89 BALANCE PROBLEM: ICD-10-CM

## 2024-04-10 DIAGNOSIS — R26.9 GAIT ABNORMALITY: ICD-10-CM

## 2024-04-10 DIAGNOSIS — M72.2 PLANTAR FASCIITIS: Primary | ICD-10-CM

## 2024-04-10 DIAGNOSIS — M79.672 LEFT FOOT PAIN: ICD-10-CM

## 2024-04-10 PROCEDURE — 97140 MANUAL THERAPY 1/> REGIONS: CPT | Performed by: PHYSICAL THERAPIST

## 2024-04-10 PROCEDURE — 97110 THERAPEUTIC EXERCISES: CPT | Performed by: PHYSICAL THERAPIST

## 2024-04-10 PROCEDURE — 97112 NEUROMUSCULAR REEDUCATION: CPT | Performed by: PHYSICAL THERAPIST

## 2024-04-10 NOTE — PROGRESS NOTES
Daily Note     Today's date: 4/10/2024  Patient name: Estephania rBown  : 1950  MRN: [id number]  Referring provider: Rosalino Purvis MD  Dx:   Encounter Diagnosis     ICD-10-CM    1. Plantar fasciitis  M72.2       2. Gait abnormality  R26.9       3. Balance problem  R26.89       4. Left foot pain  M79.672             Start Time: 1215  Stop Time: 1255  Total time in clinic (min): 40 minutes    Subjective: Patient reports her ankle was very achy when she woke up this morning.      Objective: See treatment diary below      Assessment: Tolerated treatment well. Stiffness noted in TCJ and STJ with manuals that improved with tx. Patient would benefit from continued PT      Plan: Continue per plan of care.      Precautions: HTN, OP, fall risk, CA hx, hx of L ankle surgery    POC expires Unit limit Auth Expiration date PT/OT + Visit Limit?   24 N/a 24 BOMN                             Visit/Unit Tracking  AUTH Status:  Date 2/7 2/21 2/28 3/13 3/20 3/25 4/2 4/10       12 visits approved Used 1 2 3 4 5 6 7 8        Remaining  11 10 9 8 7 6 5 4          POC exp 24    Manuals 2/7 2/21 2/28 3/13 3/20 3/25 4/2 4/10 1/24 1/31   L ankle TC PA and distraction and ST mobs DS Gr IV DS Gr IV DS Gr IV DS gr IV DS Gr IV DS Gr IV WV Gr IV DS Gr IV DS Gr IV DS Gr IV                STM PF DS DS DS DS DS DS WV DS DS DS             RE - DS                Neuro Re-Ed             [facility] mini march 3 min no UE close S 3 min no UE close S 3 min no UE close S 3 min no UE close S 3 min no UE close S 3 min no UE close S 3 min no UE close S 3 min no UE close S 3 min no UE close S 3 min no UE close S   SLS on airex with contralat hip abd,  GMB x30 ea light UE GMB x30 ea light UE GMB x30 ea light UE GMB x30 ea light UE GMB x30 ea light UE GMB x30 ea light UE GMB x30 ea light UE GMB x30 ea light UE GMB x30 ea light UE GMB x30 ea light UE   Sand dune step ups 1 min ea LE light UE close S 1 min ea LE light UE close S 1 min ea LE  "light UE close S 1 min ea LE light UE close S 1 min ea LE light UE close S 1 min ea LE light UE close S 1 min ea LE light UE close S 1 min ea LE light UE close S 1 min ea LE light UE close S 1 min ea LE light UE close S   assess                                       Ther Ex             DF MWM 8\" step PT overpress 2x15, with belt 8\" step PT overpress 2x15, with belt 8\" step PT overpress 2x15, with belt 8\" step PT overpress 2x15, with belt 8\" step PT overpress 2x15, with belt 8\" step PT overpress 2x15, with belt 8\" step PT overpress 2x15, with belt 8\" step PT overpress 2x15, with belt 8\" step PT overpress 2x15, 1 round with belt 8\" step PT overpress 2x15, 1 round with belt   VG for strength and ROM S/l L7 3x10 ea  S/l L7 3x10 ea  S/l L7 3x10 ea S/l L7 3x10 ea S/l L7 3x10 ea S/l L7 3x10 ea S/l L7 3x10 ea S/l L7 3x10 ea S/l L7 3x10 ea  S/l L7 3x10 ea    Slant  45\"x3 LLE only, standing calf stretch 45\"x3 LLE only, standing calf stretch 45\"x4 LLE only, standing calf stretch 45\"x3 LLE only, standing calf stretch 45\"x3 LLE only, standing calf stretch 45\"x4 LLE only, standing calf stretch 45\"x4 LLE only, standing calf stretch 45\"x4 LLE only, standing calf stretch 45\"x4 LLE only, standing calf stretch 45\"x4 LLE only, standing calf stretch   Sciatic n gliders RLE                                       Ther Activity                       5xSTS  TUGx2                Gait Training                                       Modalities                                                    "

## 2024-04-17 ENCOUNTER — OFFICE VISIT (OUTPATIENT)
Dept: PHYSICAL THERAPY | Facility: REHABILITATION | Age: 74
End: 2024-04-17
Payer: COMMERCIAL

## 2024-04-17 DIAGNOSIS — R26.9 GAIT ABNORMALITY: ICD-10-CM

## 2024-04-17 DIAGNOSIS — R26.89 BALANCE PROBLEM: ICD-10-CM

## 2024-04-17 DIAGNOSIS — M79.672 LEFT FOOT PAIN: ICD-10-CM

## 2024-04-17 DIAGNOSIS — M72.2 PLANTAR FASCIITIS: Primary | ICD-10-CM

## 2024-04-17 PROCEDURE — 97140 MANUAL THERAPY 1/> REGIONS: CPT | Performed by: PHYSICAL THERAPIST

## 2024-04-17 PROCEDURE — 97110 THERAPEUTIC EXERCISES: CPT | Performed by: PHYSICAL THERAPIST

## 2024-04-17 PROCEDURE — 97112 NEUROMUSCULAR REEDUCATION: CPT | Performed by: PHYSICAL THERAPIST

## 2024-04-17 NOTE — PROGRESS NOTES
Daily Note     Today's date: 2024  Patient name: Estephania Brown  : 1950  MRN: [id number]  Referring provider: Rosalino Purvis MD  Dx:   Encounter Diagnosis     ICD-10-CM    1. Plantar fasciitis  M72.2       2. Gait abnormality  R26.9       3. Balance problem  R26.89       4. Left foot pain  M79.672             Start Time: 1220  Stop Time: 1305  Total time in clinic (min): 45 minutes    Subjective: Patient reports her arch is achy and sore today.      Objective: See treatment diary below      Assessment: Tolerated treatment well. Favorable response to tx with improvement in sx's post tx. Pt would benefit from additional skilled maintenance therapy in effort to prevent decline in function with resultant decrease in independent self care and transfers, and to prevent an increased risk for fall, and loss of functional strength.       Plan: Continue per plan of care.      Precautions: HTN, OP, fall risk, CA hx, hx of L ankle surgery    POC expires Unit limit Auth Expiration date PT/OT + Visit Limit?   24 N/a 24 BOMN                             Visit/Unit Tracking  AUTH Status:  Date 2/7 2/21 2/28 3/13 3/20 3/25 4/2 4/10 4/17      12 visits approved Used 1 2 3 4 5 6 7 8 9       Remaining  11 10 9 8 7 6 5 4 3         POC exp 24    Manuals 2/7 2/21 2/28 3/13 3/20 3/25 4/2 4/10 4/17 1/31   L ankle TC PA and distraction and ST mobs DS Gr IV DS Gr IV DS Gr IV DS gr IV DS Gr IV DS Gr IV DE Gr IV DS Gr IV DS Gr IV DS Gr IV                STM PF DS DS DS DS DS DS DE DS DS DS             RE - DS                Neuro Re-Ed             Sand dune mini march 3 min no UE close S 3 min no UE close S 3 min no UE close S 3 min no UE close S 3 min no UE close S 3 min no UE close S 3 min no UE close S 3 min no UE close S 3 min no UE close S 3 min no UE close S   SLS on airex with contralat hip abd,  GMB x30 ea light UE GMB x30 ea light UE GMB x30 ea light UE GMB x30 ea light UE GMB x30 ea light UE GMB x30 ea  "light UE GMB x30 ea light UE GMB x30 ea light UE GMB x30 ea light UE GMB x30 ea light UE   Sand dune step ups 1 min ea LE light UE close S 1 min ea LE light UE close S 1 min ea LE light UE close S 1 min ea LE light UE close S 1 min ea LE light UE close S 1 min ea LE light UE close S 1 min ea LE light UE close S 1 min ea LE light UE close S 1 min ea LE light UE close S 1 min ea LE light UE close S   assess                                       Ther Ex             DF MWM 8\" step PT overpress 2x15, with belt 8\" step PT overpress 2x15, with belt 8\" step PT overpress 2x15, with belt 8\" step PT overpress 2x15, with belt 8\" step PT overpress 2x15, with belt 8\" step PT overpress 2x15, with belt 8\" step PT overpress 2x15, with belt 8\" step PT overpress 2x15, with belt 8\" step PT overpress 2x15, 1 round with belt 8\" step PT overpress 2x15, 1 round with belt   VG for strength and ROM S/l L7 3x10 ea  S/l L7 3x10 ea  S/l L7 3x10 ea S/l L7 3x10 ea S/l L7 3x10 ea S/l L7 3x10 ea S/l L7 3x10 ea S/l L7 3x10 ea S/l L7 3x10 ea  S/l L7 3x10 ea    Slant  45\"x3 LLE only, standing calf stretch 45\"x3 LLE only, standing calf stretch 45\"x4 LLE only, standing calf stretch 45\"x3 LLE only, standing calf stretch 45\"x3 LLE only, standing calf stretch 45\"x4 LLE only, standing calf stretch 45\"x4 LLE only, standing calf stretch 45\"x4 LLE only, standing calf stretch 45\"x4 LLE only, standing calf stretch 45\"x4 LLE only, standing calf stretch   Sciatic n gliders RLE                                       Ther Activity                       5xSTS  TUGx2                Gait Training                                       Modalities                                                    "

## 2024-04-22 ENCOUNTER — RA CDI HCC (OUTPATIENT)
Dept: OTHER | Facility: HOSPITAL | Age: 74
End: 2024-04-22

## 2024-04-22 DIAGNOSIS — M25.572 CHRONIC PAIN OF LEFT ANKLE: ICD-10-CM

## 2024-04-22 DIAGNOSIS — G89.29 CHRONIC PAIN OF LEFT ANKLE: ICD-10-CM

## 2024-04-22 RX ORDER — HYDROCODONE BITARTRATE AND ACETAMINOPHEN 5; 325 MG/1; MG/1
TABLET ORAL
Qty: 30 TABLET | Refills: 0 | Status: SHIPPED | OUTPATIENT
Start: 2024-04-22

## 2024-04-24 ENCOUNTER — OFFICE VISIT (OUTPATIENT)
Dept: PHYSICAL THERAPY | Facility: REHABILITATION | Age: 74
End: 2024-04-24
Payer: COMMERCIAL

## 2024-04-24 DIAGNOSIS — M79.672 LEFT FOOT PAIN: ICD-10-CM

## 2024-04-24 DIAGNOSIS — M72.2 PLANTAR FASCIITIS: Primary | ICD-10-CM

## 2024-04-24 DIAGNOSIS — R26.9 GAIT ABNORMALITY: ICD-10-CM

## 2024-04-24 DIAGNOSIS — R26.89 BALANCE PROBLEM: ICD-10-CM

## 2024-04-24 PROCEDURE — 97140 MANUAL THERAPY 1/> REGIONS: CPT | Performed by: PHYSICAL THERAPIST

## 2024-04-24 PROCEDURE — 97110 THERAPEUTIC EXERCISES: CPT

## 2024-04-24 PROCEDURE — 97112 NEUROMUSCULAR REEDUCATION: CPT

## 2024-04-25 ENCOUNTER — RA CDI HCC (OUTPATIENT)
Dept: OTHER | Facility: HOSPITAL | Age: 74
End: 2024-04-25

## 2024-04-25 PROBLEM — E66.01 MORBID (SEVERE) OBESITY DUE TO EXCESS CALORIES (HCC): Status: ACTIVE | Noted: 2024-04-25

## 2024-04-25 PROBLEM — I12.9 HYPERTENSIVE KIDNEY DISEASE WITH CHRONIC KIDNEY DISEASE: Status: ACTIVE | Noted: 2024-04-25

## 2024-04-25 NOTE — PROGRESS NOTES
HCC coding opportunities     E66.01     Chart Reviewed number of suggestions sent to Provider: 1   GR    Patients Insurance     Medicare Insurance: [employer]

## 2024-05-01 ENCOUNTER — OFFICE VISIT (OUTPATIENT)
Dept: PHYSICAL THERAPY | Facility: REHABILITATION | Age: 74
End: 2024-05-01
Payer: COMMERCIAL

## 2024-05-01 DIAGNOSIS — R26.9 GAIT ABNORMALITY: ICD-10-CM

## 2024-05-01 DIAGNOSIS — M72.2 PLANTAR FASCIITIS: Primary | ICD-10-CM

## 2024-05-01 DIAGNOSIS — M79.672 LEFT FOOT PAIN: ICD-10-CM

## 2024-05-01 DIAGNOSIS — R26.89 BALANCE PROBLEM: ICD-10-CM

## 2024-05-01 PROCEDURE — 97140 MANUAL THERAPY 1/> REGIONS: CPT | Performed by: PHYSICAL THERAPIST

## 2024-05-01 PROCEDURE — 97110 THERAPEUTIC EXERCISES: CPT | Performed by: PHYSICAL THERAPIST

## 2024-05-01 PROCEDURE — 97112 NEUROMUSCULAR REEDUCATION: CPT | Performed by: PHYSICAL THERAPIST

## 2024-05-01 NOTE — PROGRESS NOTES
Daily Note     Today's date: 2024  Patient name: Estephania Brown  : 1950  MRN: [id number]  Referring provider: Rosalino Purvis MD  Dx:   Encounter Diagnosis     ICD-10-CM    1. Plantar fasciitis  M72.2       2. Gait abnormality  R26.9       3. Balance problem  R26.89       4. Left foot pain  M79.672             Start Time: 1218  Stop Time: 1302  Total time in clinic (min): 44 minutes      Subjective: Patient reports the L foot and ankle are really hurting today.      Objective: See treatment diary below      Assessment: Favorable response to tx with improvement in sxs post session. Planning for DC next visit due to insurance limitations. Pt then to transition to HEP.    Plan: Continue per plan of care.      Precautions: HTN, OP, fall risk, CA hx, hx of L ankle surgery    POC expires Unit limit Auth Expiration date PT/OT + Visit Limit?   24 N/a 24 BOMN                             Visit/Unit Tracking  AUTH Status:  Date 2/7 2/21 2/28 3/13 3/20 3/25 4/2 4/10 4/17 4/24 5/1    12 visits approved Used 1 2 3 4 5 6 7 8 9 10 11     Remaining  11 10 9 8 7 6 5 4 3 2 1       POC exp 24    Manuals 5/1 2/21 2/28 3/13 3/20 3/25 4/2 4/10 4/17 4/24   L ankle TC PA and distraction and ST mobs DS Gr IV DS Gr IV DS Gr IV DS gr IV DS Gr IV DS Gr IV DE Gr IV DS Gr IV DS Gr IV DE Gr IV                STM PF DS DS DS DS DS DS DE DS DS KR                             Neuro Re-Ed             [facility] mini march 3 min no UE close S 3 min no UE close S 3 min no UE close S 3 min no UE close S 3 min no UE close S 3 min no UE close S 3 min no UE close S 3 min no UE close S 3 min no UE close S 3 min no UE close S   SLS on airex with contralat hip abd,  GMB x30 ea light UE GMB x30 ea light UE GMB x30 ea light UE GMB x30 ea light UE GMB x30 ea light UE GMB x30 ea light UE GMB x30 ea light UE GMB x30 ea light UE GMB x30 ea light UE GMB x30 ea light UE   Sand dune step ups 1 min ea LE light UE close S 1 min ea LE light UE  "close S 1 min ea LE light UE close S 1 min ea LE light UE close S 1 min ea LE light UE close S 1 min ea LE light UE close S 1 min ea LE light UE close S 1 min ea LE light UE close S 1 min ea LE light UE close S 1 min ea LE light UE close S   assess                                       Ther Ex             DF MWM 8\" step PT overpress 2x15, with belt 8\" step PT overpress 2x15, with belt 8\" step PT overpress 2x15, with belt 8\" step PT overpress 2x15, with belt 8\" step PT overpress 2x15, with belt 8\" step PT overpress 2x15, with belt 8\" step PT overpress 2x15, with belt 8\" step PT overpress 2x15, with belt 8\" step PT overpress 2x15, 1 round with belt 8\" step PT overpress 2x15, 1 round with belt   VG for strength and ROM S/l L7 3x10 ea  S/l L7 3x10 ea  S/l L7 3x10 ea S/l L7 3x10 ea S/l L7 3x10 ea S/l L7 3x10 ea S/l L7 3x10 ea S/l L7 3x10 ea S/l L7 3x10 ea  S/l L7 3x10 ea    Slant  45\"x3 LLE only, standing calf stretch 45\"x3 LLE only, standing calf stretch 45\"x4 LLE only, standing calf stretch 45\"x3 LLE only, standing calf stretch 45\"x3 LLE only, standing calf stretch 45\"x4 LLE only, standing calf stretch 45\"x4 LLE only, standing calf stretch 45\"x4 LLE only, standing calf stretch 45\"x4 LLE only, standing calf stretch 45\"x4 LLE only, standing calf stretch   Sciatic n asiaders RLE                                       Ther Activity                                       Gait Training                                       Modalities                                                    "

## 2024-05-03 ENCOUNTER — OFFICE VISIT (OUTPATIENT)
Dept: FAMILY MEDICINE CLINIC | Facility: CLINIC | Age: 74
End: 2024-05-03
Payer: COMMERCIAL

## 2024-05-03 VITALS
DIASTOLIC BLOOD PRESSURE: 72 MMHG | BODY MASS INDEX: 39.09 KG/M2 | RESPIRATION RATE: 18 BRPM | SYSTOLIC BLOOD PRESSURE: 130 MMHG | WEIGHT: 229 LBS | HEART RATE: 80 BPM | OXYGEN SATURATION: 98 % | TEMPERATURE: 98 F | HEIGHT: 64 IN

## 2024-05-03 DIAGNOSIS — E78.5 HYPERLIPIDEMIA, UNSPECIFIED HYPERLIPIDEMIA TYPE: Primary | ICD-10-CM

## 2024-05-03 DIAGNOSIS — E03.9 HYPOTHYROIDISM, UNSPECIFIED TYPE: ICD-10-CM

## 2024-05-03 DIAGNOSIS — I10 PRIMARY HYPERTENSION: ICD-10-CM

## 2024-05-03 PROCEDURE — G2211 COMPLEX E/M VISIT ADD ON: HCPCS | Performed by: FAMILY MEDICINE

## 2024-05-03 PROCEDURE — 99214 OFFICE O/P EST MOD 30 MIN: CPT | Performed by: FAMILY MEDICINE

## 2024-05-03 NOTE — PROGRESS NOTES
FAMILY PRACTICE OFFICE VISIT       NAME: Estephania Brown  AGE: 74 y.o. SEX: female       : 1950        MRN: [id number]    DATE: 2024  TIME: 6:29 AM    Assessment and Plan     Problem List Items Addressed This Visit       Hyperlipidemia - Primary     Hyperlipidemia.  Lipid panel is stable on current dose of statin therapy         Hypertension     Hypertension.  The patient's blood pressure is stable at this time and he will continue current regimen of medications         Hypothyroidism     Hypothyroidism.  TSH is stable on current dose of levothyroxine                Chief Complaint     Chief Complaint   Patient presents with    Follow-up     6 months        History of Present Illness     Patient in the office to review chronic medical conditions.  She continues to perform her home physical therapy for her chronic left ankle pain.  She denies any recent illness.        Review of Systems   Review of Systems   Constitutional: Negative.    HENT: Negative.     Eyes: Negative.    Respiratory: Negative.     Cardiovascular: Negative.    Gastrointestinal: Negative.    Genitourinary: Negative.    Musculoskeletal:  Positive for arthralgias.   Neurological: Negative.    Psychiatric/Behavioral: Negative.         Active Problem List     Patient Active Problem List   Diagnosis    Pain, joint, ankle and foot, left    DJD (degenerative joint disease), ankle and foot, left    Allergic rhinitis    Esophageal reflux    Hyperlipidemia    Hypertension    Hypothyroidism    Rosacea    Pain of right lower extremity    Female genital prolapse    Fracture of ankle    Osteopenia    Varicose veins of lower extremity    Pain of left heel    Acute midline low back pain without sciatica    Neuralgia    Otitis externa of left ear    Stage 3a chronic kidney disease (HCC)    Peripheral vascular disease (HCC)    Plantar fasciitis    Prediabetes    Morbid (severe) obesity due to excess calories (HCC)    Hypertensive kidney disease  with chronic kidney disease       Past Medical History:  Past Medical History:   Diagnosis Date    Allergic rhinitis     Arthritis     Body mass index (BMI) 40.0-44.9, adult 09/21/2020    Cataract     brock    Disease of thyroid gland     had total thyroidectomy    Diverticulosis     Hyperlipidemia     Hypertension     Mild acid reflux     Osteoporosis     Plantar fasciitis     b/l  l worse uses orthotics in shoes    Rosacea     Squamous cell skin cancer     Use of cane as ambulatory aid     Uterovaginal prolapse        Past Surgical History:  Past Surgical History:   Procedure Laterality Date    ANKLE SURGERY Left 2011    Fracture / hardware removed    COLONOSCOPY      fiberoptic    COLPORRHAPHY N/A 06/17/2019    Procedure: POSTERIOR COLPORRHAPHY;  Surgeon: Kai Napoles MD;  Location: [facility];  Service: UroGynecology           OTHER SURGICAL HISTORY      thyroid biopsy core needle     PA COLPOPEXY VAGINAL EXTRAPERITONEAL APPROACH N/A 06/17/2019    Procedure: VE COLPOSUSPENSION (neugide);  Surgeon: Kai Napoles MD;  Location: [facility];  Service: UroGynecology           PA PERINEOPLASTY RPR PERINEUM NONOBSTETRICAL SPX N/A 06/17/2019    Procedure: PERINEORROPHY;  Surgeon: Kai Napoles MD;  Location: [facility];  Service: UroGynecology           PA REMOVAL IMPLANT DEEP Left 09/18/2017    Procedure: REMOVAL HARDWARE ANKLE (1 plate, 8 screws.);  Surgeon: Saúl Iniguez MD;  Location: [facility];  Service: Orthopedics    SKIN BIOPSY  2021    SKIN CANCER EXCISION Left     THYROIDECTOMY  2012    Total     TUBAL LIGATION         Family History:  Family History   Problem Relation Age of Onset    COPD Mother     Diabetes Mother     Heart disease Mother     Hypertension Mother     Diabetes type II Mother     Thyroid disease unspecified Mother         Goiter removed by radioactive tx    Bone cancer Father     No Known Problems Daughter     Diabetes type II Maternal Grandmother     No Known Problems Maternal  Grandfather     No Known Problems Paternal Grandmother     Diabetes type II Paternal Grandfather     No Known Problems Maternal Aunt     No Known Problems Maternal Aunt     No Known Problems Paternal Aunt     Diabetes type II Brother        Social History:  Social History     Socioeconomic History    Marital status: /Civil Union     Spouse name: Not on file    Number of children: Not on file    Years of education: Not on file    Highest education level: Not on file   Occupational History    Not on file   Tobacco Use    Smoking status: Never    Smokeless tobacco: Never   Vaping Use    Vaping status: Never Used   Substance and Sexual Activity    Alcohol use: No     Comment: Never drank alcohol denied    Drug use: No    Sexual activity: Not Currently     Partners: Male     Birth control/protection: None   Other Topics Concern    Not on file   Social History Narrative    Exercies moderately 3 or more times a week      Social Determinants of Health     Financial Resource Strain: Low Risk  (11/6/2023)    Overall Financial Resource Strain (CARDIA)     Difficulty of Paying Living Expenses: Not very hard   Food Insecurity: Not on file   Transportation Needs: No Transportation Needs (11/6/2023)    PRAPARE - Transportation     Lack of Transportation (Medical): No     Lack of Transportation (Non-Medical): No   Physical Activity: Not on file   Stress: Not on file   Social Connections: Not on file   Intimate Partner Violence: Not on file   Housing Stability: Not on file       Objective     Vitals:    05/03/24 1301   BP: 130/72   Pulse: 80   Resp: 18   Temp: 98 °F (36.7 °C)   SpO2: 98%     Wt Readings from Last 3 Encounters:   05/03/24 104 kg (229 lb)   02/27/24 105 kg (230 lb 12.8 oz)   11/07/23 107 kg (235 lb 2 oz)       Physical Exam  Constitutional:       General: She is not in acute distress.     Appearance: Normal appearance. She is not ill-appearing.   HENT:      Head: Normocephalic and atraumatic.   Eyes:       General:         Right eye: No discharge.         Left eye: No discharge.      Extraocular Movements: Extraocular movements intact.      Conjunctiva/sclera: Conjunctivae normal.      Pupils: Pupils are equal, round, and reactive to light.   Neck:      Vascular: No carotid bruit.   Cardiovascular:      Rate and Rhythm: Normal rate and regular rhythm.      Heart sounds: Normal heart sounds. No murmur heard.  Pulmonary:      Effort: Pulmonary effort is normal.      Breath sounds: Normal breath sounds. No wheezing, rhonchi or rales.   Musculoskeletal:      Right lower leg: No edema.      Left lower leg: No edema.   Lymphadenopathy:      Cervical: No cervical adenopathy.   Skin:     Findings: No rash.   Neurological:      General: No focal deficit present.      Mental Status: She is alert and oriented to person, place, and time.      Cranial Nerves: No cranial nerve deficit.   Psychiatric:         Mood and Affect: Mood normal.         Behavior: Behavior normal.         Thought Content: Thought content normal.         Judgment: Judgment normal.         Pertinent Laboratory/Diagnostic Studies:  Lab Results   Component Value Date    GLUCOSE 99 10/22/2014    BUN 19 01/02/2024    CREATININE 0.88 01/02/2024    CALCIUM 9.9 01/02/2024     12/20/2017    K 4.4 01/02/2024    CO2 27 01/02/2024     01/02/2024     Lab Results   Component Value Date    ALT 17 01/02/2024    AST 23 01/02/2024    ALKPHOS 58 01/02/2024    BILITOT 0.6 12/20/2017       Lab Results   Component Value Date    WBC 6.83 05/22/2023    HGB 12.2 05/22/2023    HCT 39.1 05/22/2023    MCV 99 (H) 05/22/2023     05/22/2023       Lab Results   Component Value Date    TSH 1.82 10/02/2018       Lab Results   Component Value Date    CHOL 229 (H) 12/20/2017     Lab Results   Component Value Date    TRIG 72 01/16/2023     Lab Results   Component Value Date    HDL 86 01/16/2023     Lab Results   Component Value Date    LDLCALC 66 01/16/2023     Lab Results    Component Value Date    HGBA1C 6.0 (H) 03/01/2024       Results for orders placed or performed in visit on 03/01/24   TSH, 3rd generation   Result Value Ref Range    TSH 3RD GENERATON 0.689 0.450 - 4.500 uIU/mL   Hemoglobin A1C   Result Value Ref Range    Hemoglobin A1C 6.0 (H) Normal 4.0-5.6%; PreDiabetic 5.7-6.4%; Diabetic >=6.5%; Glycemic control for adults with diabetes <7.0% %     mg/dl       No orders of the defined types were placed in this encounter.      ALLERGIES:  No Known Allergies    Current Medications     Current Outpatient Medications   Medication Sig Dispense Refill    atorvastatin (LIPITOR) 10 mg tablet Take 1 tablet (10 mg total) by mouth daily 90 tablet 1    Azelaic Acid 15 % cream Apply topically daily at bedtime        Calcium Carb-Cholecalciferol 600-800 MG-UNIT TABS Take 2 tablets by mouth daily      Denosumab (PROLIA SC) Inject under the skin 2x year       docusate sodium (COLACE) 100 mg capsule       doxycycline (PERIOSTAT) 20 MG tablet Take 20 mg by mouth 2 (two) times a day      gabapentin (NEURONTIN) 300 mg capsule TAKE ONE CAPSULE IN THE MORNING, ONE CAPSULE IN AFTERNOON., AND 2 CAPSULES AT BEDTIME 360 capsule 1    Glucosamine-Chondroitin (COSAMIN DS PO) Take by mouth      HYDROcodone-acetaminophen (Norco) 5-325 mg per tablet One po q day prn 30 tablet 0    levothyroxine 175 mcg tablet Take one tablet 6 out of 7 days a week 90 tablet 1    loratadine (CLARITIN) 10 mg tablet Take 10 mg by mouth daily as needed       metFORMIN (GLUCOPHAGE-XR) 500 mg 24 hr tablet Take 1 tablet (500 mg total) by mouth daily with dinner 90 tablet 3    methocarbamol (ROBAXIN) 500 mg tablet TAKE 1 TABLET (500 MG TOTAL) BY MOUTH IN THE MORNING AND 1 TABLET (500 MG TOTAL) IN THE EVENING AND 1 TABLET (500 MG TOTAL) BEFORE BEDTIME. 30 tablet 3    metoprolol tartrate (LOPRESSOR) 50 mg tablet Take 1 tablet (50 mg total) by mouth daily 90 tablet 3    metroNIDAZOLE (METROGEL) 1 % gel Apply 1 application  topically daily       Multiple Vitamins-Minerals (Centrum Silver 50+Women) TABS       naproxen (NAPROSYN) 500 mg tablet Take 1 tablet (500 mg total) by mouth 2 (two) times a day with meals 180 tablet 1    Probiotic Product (PROBIOTIC-10 PO)       psyllium (METAMUCIL) 0.52 g capsule       Triamcinolone Acetonide (NASACORT ALLERGY 24HR NA) 2 sprays into each nostril as needed         No current facility-administered medications for this visit.         Health Maintenance     Health Maintenance   Topic Date Due    Zoster Vaccine (1 of 2) Never done    Colorectal Cancer Screening  08/13/2014    COVID-19 Vaccine (6 - 2023-24 season) 09/01/2023    PT PLAN OF CARE  03/01/2024    Medicare Annual Wellness Visit (AWV)  11/07/2024    Fall Risk  05/03/2025    Depression Screening  05/03/2025    Urinary Incontinence Screening  05/03/2025    Breast Cancer Screening: Mammogram  07/12/2025    Hepatitis C Screening  Completed    Osteoporosis Screening  Completed    Pneumococcal Vaccine: 65+ Years  Completed    Influenza Vaccine  Completed    HIB Vaccine  Aged Out    IPV Vaccine  Aged Out    Hepatitis A Vaccine  Aged Out    Meningococcal ACWY Vaccine  Aged Out    HPV Vaccine  Aged Out     Immunization History   Administered Date(s) Administered    COVID-19 PFIZER VACCINE 0.3 ML IM 02/24/2021, 03/15/2021, 10/09/2021    COVID-19 Pfizer Vac BIVALENT Ilir-sucrose 12 Yr+ IM 10/30/2022    COVID-19 Pfizer vac (Ilir-sucrose, gray cap) 12 yr+ IM 04/23/2022    Fluzone Split Quad 0.5 mL 09/26/2014    INFLUENZA 09/26/2014, 10/10/2015, 10/03/2016, 10/17/2017, 10/16/2022, 10/29/2022, 10/08/2023    Influenza Quadrivalent Preservative Free 3 years and older IM 09/26/2014    Influenza Split High Dose Preservative Free IM 10/10/2015, 10/03/2016, 10/17/2017    Influenza, high dose seasonal 0.7 mL 09/27/2018, 09/30/2019, 09/21/2020, 09/15/2021    Influenza, seasonal, injectable 10/11/2012, 10/17/2013    Pneumococcal Conjugate 13-Valent 12/05/2016     Pneumococcal Polysaccharide PPV23 09/27/2018       Rosalino Purvis MD    I spent 30 minutes with this patient of which greater than 50% was spent counseling or reviewing chart     Skyrizi Pregnancy And Lactation Text: The risk during pregnancy and breastfeeding is uncertain with this medication.

## 2024-05-08 ENCOUNTER — OFFICE VISIT (OUTPATIENT)
Dept: PHYSICAL THERAPY | Facility: REHABILITATION | Age: 74
End: 2024-05-08
Payer: COMMERCIAL

## 2024-05-08 DIAGNOSIS — R26.9 GAIT ABNORMALITY: ICD-10-CM

## 2024-05-08 DIAGNOSIS — R26.89 BALANCE PROBLEM: ICD-10-CM

## 2024-05-08 DIAGNOSIS — M79.672 LEFT FOOT PAIN: ICD-10-CM

## 2024-05-08 DIAGNOSIS — M72.2 PLANTAR FASCIITIS: Primary | ICD-10-CM

## 2024-05-08 PROCEDURE — 97140 MANUAL THERAPY 1/> REGIONS: CPT | Performed by: PHYSICAL THERAPIST

## 2024-05-08 PROCEDURE — 97112 NEUROMUSCULAR REEDUCATION: CPT | Performed by: PHYSICAL THERAPIST

## 2024-05-08 PROCEDURE — 97110 THERAPEUTIC EXERCISES: CPT | Performed by: PHYSICAL THERAPIST

## 2024-05-08 NOTE — PROGRESS NOTES
Daily Note/discharge summary     Today's date: 2024  Patient name: Estephania Brown  : 1950  MRN: [id number]  Referring provider: Rosalino Purvis MD  Dx:   Encounter Diagnosis     ICD-10-CM    1. Plantar fasciitis  M72.2       2. Gait abnormality  R26.9       3. Balance problem  R26.89       4. Left foot pain  M79.672               Start Time: 1217  Stop Time: 1255  Total time in clinic (min): 38 minutes      Subjective: Patient reports the ankle has been hurting a lot the past few days.      Objective: See treatment diary below      Assessment: Pt is currently participating in skilled maintenance therapy. She feels therapy is helping to manage her sx's. However her insurance has not approved additional visits. Pt to DC to HEP at this time. Pt agreed to this plan and will return if sx's worsen.    Plan:  DC to HEP     Precautions: HTN, OP, fall risk, CA hx, hx of L ankle surgery       Manuals 5/1 5/8 2/28 3/13 3/20 3/25 4/2 4/10 4/17 4/24   L ankle TC PA and distraction and ST mobs DS Gr IV DS Gr IV DS Gr IV DS gr IV DS Gr IV DS Gr IV GA Gr IV DS Gr IV DS Gr IV GA Gr IV                STM PF DS DS DS DS DS DS GA DS DS KR                             Neuro Re-Ed             Sand dune mini march 3 min no UE close S 3 min no UE close S 3 min no UE close S 3 min no UE close S 3 min no UE close S 3 min no UE close S 3 min no UE close S 3 min no UE close S 3 min no UE close S 3 min no UE close S   SLS on airex with contralat hip abd,  GMB x30 ea light UE GMB x30 ea light UE GMB x30 ea light UE GMB x30 ea light UE GMB x30 ea light UE GMB x30 ea light UE GMB x30 ea light UE GMB x30 ea light UE GMB x30 ea light UE GMB x30 ea light UE   Sand dune step ups 1 min ea LE light UE close S 1 min ea LE light UE close S 1 min ea LE light UE close S 1 min ea LE light UE close S 1 min ea LE light UE close S 1 min ea LE light UE close S 1 min ea LE light UE close S 1 min ea LE light UE close S 1 min ea LE light UE close S 1  "min ea LE light UE close S   assess                                       Ther Ex             DF MWM 8\" step PT overpress 2x15, with belt 8\" step PT overpress 2x15, with belt 8\" step PT overpress 2x15, with belt 8\" step PT overpress 2x15, with belt 8\" step PT overpress 2x15, with belt 8\" step PT overpress 2x15, with belt 8\" step PT overpress 2x15, with belt 8\" step PT overpress 2x15, with belt 8\" step PT overpress 2x15, 1 round with belt 8\" step PT overpress 2x15, 1 round with belt   VG for strength and ROM S/l L7 3x10 ea  S/l L7 3x10 ea  S/l L7 3x10 ea S/l L7 3x10 ea S/l L7 3x10 ea S/l L7 3x10 ea S/l L7 3x10 ea S/l L7 3x10 ea S/l L7 3x10 ea  S/l L7 3x10 ea    Slant  45\"x3 LLE only, standing calf stretch 45\"x4 LLE only, standing calf stretch 45\"x4 LLE only, standing calf stretch 45\"x3 LLE only, standing calf stretch 45\"x3 LLE only, standing calf stretch 45\"x4 LLE only, standing calf stretch 45\"x4 LLE only, standing calf stretch 45\"x4 LLE only, standing calf stretch 45\"x4 LLE only, standing calf stretch 45\"x4 LLE only, standing calf stretch   Sciatic n kourtney RLE                                       Ther Activity                                       Gait Training                                       Modalities                                                    "

## 2024-05-15 ENCOUNTER — APPOINTMENT (OUTPATIENT)
Dept: PHYSICAL THERAPY | Facility: REHABILITATION | Age: 74
End: 2024-05-15
Payer: COMMERCIAL

## 2024-05-22 ENCOUNTER — APPOINTMENT (OUTPATIENT)
Dept: PHYSICAL THERAPY | Facility: REHABILITATION | Age: 74
End: 2024-05-22
Payer: COMMERCIAL

## 2024-05-29 ENCOUNTER — APPOINTMENT (OUTPATIENT)
Dept: PHYSICAL THERAPY | Facility: REHABILITATION | Age: 74
End: 2024-05-29
Payer: COMMERCIAL

## 2024-06-02 DIAGNOSIS — E78.5 HYPERLIPIDEMIA, UNSPECIFIED HYPERLIPIDEMIA TYPE: ICD-10-CM

## 2024-06-03 RX ORDER — ATORVASTATIN CALCIUM 10 MG/1
10 TABLET, FILM COATED ORAL DAILY
Qty: 90 TABLET | Refills: 1 | Status: SHIPPED | OUTPATIENT
Start: 2024-06-03

## 2024-07-03 ENCOUNTER — CLINICAL SUPPORT (OUTPATIENT)
Dept: FAMILY MEDICINE CLINIC | Facility: CLINIC | Age: 74
End: 2024-07-03
Payer: COMMERCIAL

## 2024-07-03 DIAGNOSIS — M81.0 OSTEOPOROSIS, UNSPECIFIED OSTEOPOROSIS TYPE, UNSPECIFIED PATHOLOGICAL FRACTURE PRESENCE: Primary | ICD-10-CM

## 2024-07-03 DIAGNOSIS — M25.572 CHRONIC PAIN OF LEFT ANKLE: ICD-10-CM

## 2024-07-03 DIAGNOSIS — G89.29 CHRONIC PAIN OF LEFT ANKLE: ICD-10-CM

## 2024-07-03 PROCEDURE — 96372 THER/PROPH/DIAG INJ SC/IM: CPT | Performed by: FAMILY MEDICINE

## 2024-07-03 NOTE — TELEPHONE ENCOUNTER
Reason for call:   [x] Refill   [] Prior Auth  [] Other:     Office:   [x] PCP/Provider -  Rosalino Purvis MD   [] Specialty/Provider -     Medication: HYDROcodone-acetaminophen (Norco) 5-325 mg per tablet     Dose/Frequency:  One po q day prn,     Quantity: 30    Pharmacy: [facility] - [place] - [address] [phone]    Does the patient have enough for 3 days?   [x] Yes   [] No - Send as HP to POD

## 2024-07-05 RX ORDER — HYDROCODONE BITARTRATE AND ACETAMINOPHEN 5; 325 MG/1; MG/1
TABLET ORAL
Qty: 30 TABLET | Refills: 0 | Status: SHIPPED | OUTPATIENT
Start: 2024-07-05

## 2024-07-17 ENCOUNTER — OFFICE VISIT (OUTPATIENT)
Dept: PHYSICAL THERAPY | Facility: REHABILITATION | Age: 74
End: 2024-07-17
Payer: COMMERCIAL

## 2024-07-17 DIAGNOSIS — G89.29 CHRONIC ANKLE PAIN, UNSPECIFIED LATERALITY: ICD-10-CM

## 2024-07-17 DIAGNOSIS — R26.89 BALANCE DISORDER: ICD-10-CM

## 2024-07-17 DIAGNOSIS — M25.579 CHRONIC ANKLE PAIN, UNSPECIFIED LATERALITY: ICD-10-CM

## 2024-07-17 DIAGNOSIS — R26.9 GAIT ABNORMALITY: Primary | ICD-10-CM

## 2024-07-17 PROCEDURE — 97110 THERAPEUTIC EXERCISES: CPT | Performed by: PHYSICAL THERAPIST

## 2024-07-17 PROCEDURE — 97163 PT EVAL HIGH COMPLEX 45 MIN: CPT | Performed by: PHYSICAL THERAPIST

## 2024-07-17 NOTE — PROGRESS NOTES
PT Evaluation     Today's date: 2024  Patient name: Estephania Brown  : 1950  MRN: [id number]  Referring provider: Rosalino Purvis MD  Dx:   Encounter Diagnosis     ICD-10-CM    1. Gait abnormality  R26.9       2. Balance disorder  R26.89       3. Chronic ankle pain, unspecified laterality  M25.579     G89.29           Start Time: 1405  Stop Time: 1450  Total time in clinic (min): 45 minutes    Assessment  Impairments: abnormal gait, abnormal or restricted ROM, activity intolerance, impaired balance, impaired physical strength, lacks appropriate home exercise program, pain with function, safety issue and weight-bearing intolerance  Symptom irritability: high    Assessment details: Pt is a pleasant 75 yo F who presents with L>R foot pain, gait abnormalities, and balance problem with relevant PMH including complex ankle fracture over 10 years ago. She presents with very limited ankle ROM and ankle joint mobility restrictions, decreased balance (TUG and 5 times sit to stand scores place her at fall risk), decreased tolerace to standing and walking, and foot pain that is worse first thing in the morning and after prolonged sitting. Pt is an excellent candidate for skilled PT to address these impairments, reduce falls risk and maximize function.  Understanding of Dx/Px/POC: good     Prognosis: good    Goals  Short term goals: to be met in 6 weeks  Pt independent with initial HEP, rationale, technique and frequency, for ROM and pain control.   Pt able to perform 1 sit to stand without UE indicating and improvement in functional LE strength.  Improve hip abd MMT for improved lumbopelvic stability which is required for walking and balance  Pt will achieve an improvement in FOTO score indicating an improvement in pain and function.    Long term goals: to be met in 12 weeks  Pt will report a 75% or > reduction in subjective pain complaints/symptoms to better manage ADLs and functional mobility.  Pt able to  perform 3 sit to stands without UE indicating and improvement in functional LE strength.  Further improve hip abd MMT for improved lumbopelvic stability which is required for walking and balance  Pt will improve FOTO score to = or better then expected outcome indicating an overall improvement in pain and function   Pt independent with rationale, technique and plan for performance of advanced HEP to maintain gains made in therapy.  Achieve pts goal: to get stronger, better balance, relieve ankle pain and foot pain      Plan  Patient would benefit from: skilled physical therapy  Planned modality interventions: TENS, cryotherapy, ultrasound, low level laser therapy and thermotherapy: hydrocollator packs    Planned therapy interventions: manual therapy, patient education, strengthening, stretching, therapeutic activities, therapeutic exercise, home exercise program, functional ROM exercises, flexibility, neuromuscular re-education, body mechanics training, balance and joint mobilization    Frequency: 1-3x/week.  Duration in weeks: 12  Treatment plan discussed with: patient        Subjective Evaluation    History of Present Illness  Mechanism of injury: Pt presents chronic L>R foot pain. Pt broke her ankle in 2011 and had to have plates and pins put in her foot, then later removed. Plate and screws were in the distal fibula as well as the medial malleolus. Pt walks with a SPC, has been using for years. States she doesn't use it all the time, but most of the time.    Pt states at this point the mornings are the worst when first getting out of the bed. States her foot is stuck in PF on the L and she has to use 2 cane first thing in the morning. The pain is in the arch of the foot. Also has pain after sitting for awhile when she goes up to try to stand and walk again. Feels more unsteady when she gets up rather than pain. Feels her balance is off. Does wear orthotics. Pain is better once up and moving. States the foot  doesn't want to go flat. Denies n/t in the feet. States she does have a history of plantar fasciitis. States her balance is not the greatest and she feels unsteady.   Patient Goals  Patient goals for therapy: improved balance, increased strength, independence with ADLs/IADLs, increased motion and decreased pain  Patient goal: to get stronger, better balance, relieve ankle pain and foot pain  Pain  Current pain ratin  At best pain ratin  At worst pain ratin  Location: arch of foot  Quality: sharp  Alleviating factors: stretching, PT in the past.  Aggravating factors: standing and walking  Progression: worsening    Social Support  Lives in: multiple-level home  Lives with: spouse    Employment status: not working  Treatments  Previous treatment: physical therapy        Objective  Palpation:  TPP: Proximal Longitudinal arch (L)     Joint Assessment:  Hypomobile throughout subtalar and talocrural      Lower Extremity Strength    MMT   AROM   PROM     Knee Left Right Left Right Left Right   Flex 5 5           Ext 4+ 4+                           Hip               flex 4 4           ext  3- 3-           abd  3- 3-                           Ankle               DF 5 5 lacking 18 lacking 12       PF  3-  3-  59  63       inversion 5 5           Eversion  5  5                 Dynamic Balance Tests  5x sit to stand (sec)  >14 sec indicates high risk for falls 23 = 12.12, unable without UE  23 = 13.06,  unable without UE  23 = 11.70, unable without UE  10/23/23 = 11.74, unable without UE  24 = 10.63, unable without UE  24 = 9.78, unable without UE   TUG (sec)  >14 sec indicates high risk for falls 23 = 16.95 with SPC  23 = 17.48 with SPC  23 = 12.50 with SPC  10/23/23 = 13.41 with SPC  24 = 12.47 with SPC  24 = 11.47 with SPC      Gait:  ambulates with SPC, L foot turned out, wide KEILY, L foot plantar flexed and heel inverted, unsteady            Precautions: HTN, OP,  fall risk, CA hx, hx of L ankle surgery     FOTO  7/17 =     POC exp =  10/9/24      Manuals 7/17            L ankle TC and ST mobs DS Gr II, IV            DF MWM                                       Neuro Re-Ed             Tandem balance             Static balance on airex             Sand dune march                                                                 Ther Ex             VG for ROM L5 5'            bridges             Resisted side stepping                                                                              Ther Activity             Sit to stands                           Gait Training                                       Modalities

## 2024-07-21 DIAGNOSIS — E03.9 HYPOTHYROIDISM, UNSPECIFIED TYPE: ICD-10-CM

## 2024-07-21 RX ORDER — LEVOTHYROXINE SODIUM 175 UG/1
TABLET ORAL
Qty: 90 TABLET | Refills: 1 | Status: SHIPPED | OUTPATIENT
Start: 2024-07-21

## 2024-07-25 ENCOUNTER — OFFICE VISIT (OUTPATIENT)
Dept: PHYSICAL THERAPY | Facility: REHABILITATION | Age: 74
End: 2024-07-25
Payer: COMMERCIAL

## 2024-07-25 DIAGNOSIS — R26.9 GAIT ABNORMALITY: Primary | ICD-10-CM

## 2024-07-25 DIAGNOSIS — G89.29 CHRONIC ANKLE PAIN, UNSPECIFIED LATERALITY: ICD-10-CM

## 2024-07-25 DIAGNOSIS — M25.579 CHRONIC ANKLE PAIN, UNSPECIFIED LATERALITY: ICD-10-CM

## 2024-07-25 DIAGNOSIS — R26.89 BALANCE DISORDER: ICD-10-CM

## 2024-07-25 PROCEDURE — 97140 MANUAL THERAPY 1/> REGIONS: CPT | Performed by: PHYSICAL THERAPIST

## 2024-07-25 PROCEDURE — 97530 THERAPEUTIC ACTIVITIES: CPT | Performed by: PHYSICAL THERAPIST

## 2024-07-25 PROCEDURE — 97110 THERAPEUTIC EXERCISES: CPT | Performed by: PHYSICAL THERAPIST

## 2024-07-25 NOTE — PROGRESS NOTES
"Daily Note     Today's date: 2024  Patient name: Estephania Brown  : 1950  MRN: [id number]  Referring provider: Rosalino Purvis MD  Dx:   Encounter Diagnosis     ICD-10-CM    1. Gait abnormality  R26.9       2. Balance disorder  R26.89       3. Chronic ankle pain, unspecified laterality  M25.579     G89.29           Start Time: 1100  Stop Time: 1145  Total time in clinic (min): 45 minutes    Subjective: Pt states she was a little sore from the VG last visit, but not bad.       Objective: See treatment diary below      Assessment: Tolerated treatment well. She was hesitant to perform sit to stands, but able to perform without UE support with 1 airex and with encouragement and cues for foot placement.       Plan: Continue per plan of care.  Progress treatment as tolerated.       Precautions: HTN, OP, fall risk, CA hx, hx of L ankle surgery     FOTO   =     POC exp =  10/9/24      Manuals            L ankle TC and ST mobs DS Gr II, IV DS Gr II, IV           DF MWM                                       Neuro Re-Ed             Tandem balance             Static balance on airex  30\"x4           Sand dune march                                                                 Ther Ex             VG for ROM L5 5' L5 10' alt s/l b/l           bridges             Resisted side stepping                                                                              Ther Activity             Sit to stands   Chair + airex no UE 4x5                        Gait Training                                       Modalities                                            "

## 2024-07-26 DIAGNOSIS — M54.16 RADICULOPATHY, LUMBAR REGION: ICD-10-CM

## 2024-07-26 RX ORDER — GABAPENTIN 300 MG/1
CAPSULE ORAL
Qty: 360 CAPSULE | Refills: 1 | Status: SHIPPED | OUTPATIENT
Start: 2024-07-26

## 2024-07-29 ENCOUNTER — OFFICE VISIT (OUTPATIENT)
Dept: PHYSICAL THERAPY | Facility: REHABILITATION | Age: 74
End: 2024-07-29
Payer: COMMERCIAL

## 2024-07-29 DIAGNOSIS — R26.9 GAIT ABNORMALITY: Primary | ICD-10-CM

## 2024-07-29 DIAGNOSIS — R26.89 BALANCE DISORDER: ICD-10-CM

## 2024-07-29 DIAGNOSIS — G89.29 CHRONIC ANKLE PAIN, UNSPECIFIED LATERALITY: ICD-10-CM

## 2024-07-29 DIAGNOSIS — M25.579 CHRONIC ANKLE PAIN, UNSPECIFIED LATERALITY: ICD-10-CM

## 2024-07-29 PROCEDURE — 97110 THERAPEUTIC EXERCISES: CPT | Performed by: PHYSICAL THERAPIST

## 2024-07-29 PROCEDURE — 97140 MANUAL THERAPY 1/> REGIONS: CPT | Performed by: PHYSICAL THERAPIST

## 2024-07-29 NOTE — PROGRESS NOTES
"Daily Note     Today's date: 2024  Patient name: Estephania Brown  : 1950  MRN: [id number]  Referring provider: Rosalino Purvis MD  Dx:   Encounter Diagnosis     ICD-10-CM    1. Gait abnormality  R26.9       2. Chronic ankle pain, unspecified laterality  M25.579     G89.29       3. Balance disorder  R26.89             Start Time: 09  Stop Time: 1013  Total time in clinic (min): 43 minutes    Subjective: Pt reports she was reaching into the cabinet this morning and pulled something in her R mid back. States her heel was really sore after last visit as well.      Objective: See treatment diary below      Assessment: Tolerated treatment well. Held on sit to stands today due to increase in heel pain after last visit. Also added MHP to low back during VG for low back pain relief. Pt noted some relief following MHP.  Pt will benefit from continued skilled outpatient PT to improve strength, to address therapy goals, to reduce pain, and improve function.        Plan: Continue per plan of care.  Progress treatment as tolerated.       Precautions: HTN, OP, fall risk, CA hx, hx of L ankle surgery     FOTO   =     POC exp =  10/9/24      Manuals           L ankle TC and ST mobs DS Gr II, IV DS Gr II, IV DS Gr II, IV          DF MWM                                       Neuro Re-Ed             Tandem balance             Static balance on airex  30\"x4 nv          Sand dune march                                                                 Ther Ex             VG for ROM L5 5' L5 10' alt s/l b/l L5 12' alt s/l b/l          bridges             Resisted side stepping             Standing calf stretch   60\"x4                                                              Ther Activity             Sit to stands   Chair + airex no UE 4x5 hold                       Gait Training                                       Modalities                                            "

## 2024-07-31 ENCOUNTER — OFFICE VISIT (OUTPATIENT)
Dept: PHYSICAL THERAPY | Facility: REHABILITATION | Age: 74
End: 2024-07-31
Payer: COMMERCIAL

## 2024-07-31 DIAGNOSIS — R26.9 GAIT ABNORMALITY: Primary | ICD-10-CM

## 2024-07-31 DIAGNOSIS — G89.29 CHRONIC ANKLE PAIN, UNSPECIFIED LATERALITY: ICD-10-CM

## 2024-07-31 DIAGNOSIS — R26.89 BALANCE DISORDER: ICD-10-CM

## 2024-07-31 DIAGNOSIS — M25.579 CHRONIC ANKLE PAIN, UNSPECIFIED LATERALITY: ICD-10-CM

## 2024-07-31 PROCEDURE — 97110 THERAPEUTIC EXERCISES: CPT | Performed by: PHYSICAL THERAPIST

## 2024-07-31 PROCEDURE — 97140 MANUAL THERAPY 1/> REGIONS: CPT | Performed by: PHYSICAL THERAPIST

## 2024-07-31 NOTE — PROGRESS NOTES
"Daily Note     Today's date: 2024  Patient name: Estephania Brown  : 1950  MRN: [id number]  Referring provider: Rosalino Purvis MD  Dx:   Encounter Diagnosis     ICD-10-CM    1. Gait abnormality  R26.9       2. Chronic ankle pain, unspecified laterality  M25.579     G89.29       3. Balance disorder  R26.89             Start Time: 1215  Stop Time: 1257  Total time in clinic (min): 42 minutes    Subjective: Pt reports her back feels better than earlier this week, but still bothersome. States her arch is very sore and painful today      Objective: See treatment diary below      Assessment: Tolerated treatment well. MHP was effective at providing some back pain relief. She also noted improvement in foot pain following interventions performed today. Pt will benefit from continued skilled outpatient PT to improve strength, to address therapy goals, to reduce pain, and improve function.        Plan: Continue per plan of care.  Progress treatment as tolerated.       Precautions: HTN, OP, fall risk, CA hx, hx of L ankle surgery     FOTO   = 40/57    POC exp =  10/9/24      Manuals          L ankle TC and ST mobs DS Gr II, IV DS Gr II, IV DS Gr II, IV DS Gr II, IV         DF MWM    3x10 on 8\" step         STM plantar fascia    DS                      Neuro Re-Ed             Tandem balance             Static balance on airex  30\"x4 nv          Sand dune march                                                                 Ther Ex             VG for ROM L5 5' L5 10' alt s/l b/l L5 12' alt s/l b/l L5 10' alt s/l b/l         bridges             Resisted side stepping             Standing calf stretch   60\"x4 45\"x4                                                             Ther Activity             Sit to stands   Chair + airex no UE 4x5 hold                       Gait Training                                       Modalities                                            "

## 2024-08-05 ENCOUNTER — OFFICE VISIT (OUTPATIENT)
Dept: PHYSICAL THERAPY | Facility: REHABILITATION | Age: 74
End: 2024-08-05
Payer: COMMERCIAL

## 2024-08-05 DIAGNOSIS — G89.29 CHRONIC ANKLE PAIN, UNSPECIFIED LATERALITY: ICD-10-CM

## 2024-08-05 DIAGNOSIS — M25.579 CHRONIC ANKLE PAIN, UNSPECIFIED LATERALITY: ICD-10-CM

## 2024-08-05 DIAGNOSIS — R26.9 GAIT ABNORMALITY: Primary | ICD-10-CM

## 2024-08-05 DIAGNOSIS — R26.89 BALANCE DISORDER: ICD-10-CM

## 2024-08-05 PROCEDURE — 97140 MANUAL THERAPY 1/> REGIONS: CPT | Performed by: PHYSICAL THERAPIST

## 2024-08-05 PROCEDURE — 97110 THERAPEUTIC EXERCISES: CPT | Performed by: PHYSICAL THERAPIST

## 2024-08-05 NOTE — PROGRESS NOTES
"Daily Note     Today's date: 2024  Patient name: Estephania Brown  : 1950  MRN: [id number]  Referring provider: Rosalino Purvis MD  Dx:   Encounter Diagnosis     ICD-10-CM    1. Gait abnormality  R26.9       2. Chronic ankle pain, unspecified laterality  M25.579     G89.29       3. Balance disorder  R26.89             Start Time: 1015  Stop Time: 1100  Total time in clinic (min): 45 minutes    Subjective: Pt reports her back is feeling better and her foot pain was relieved quite a bit after last visit. States she started performing her plantar fascia rolling on a frozen water bottle again too which is also helping.      Objective: See treatment diary below      Assessment: Tolerated treatment well. She tolerated progression in VG well noting only mild quad fatigue. Pt will benefit from continued skilled outpatient PT to improve strength, to address therapy goals, to reduce pain, and improve function.        Plan: Continue per plan of care.  Progress treatment as tolerated.       Precautions: HTN, OP, fall risk, CA hx, hx of L ankle surgery     FOTO   = 40/57    POC exp =  10/9/24      Manuals         L ankle TC and ST mobs DS Gr II, IV DS Gr II, IV DS Gr II, IV DS Gr II, IV DS Gr II, IV        DF MWM    3x10 on 8\" step 3x10 on 8\" step        STM plantar fascia    DS DS                     Neuro Re-Ed             Tandem balance             Static balance on airex  30\"x4 nv          Sand dune march                                                                 Ther Ex             VG for ROM L5 5' L5 10' alt s/l b/l L5 12' alt s/l b/l L5 10' alt s/l b/l L6 10' alt s/l b/l        bridges             Resisted side stepping             Standing calf stretch   60\"x4 45\"x4 45\"x4                                                            Ther Activity             Sit to stands   Chair + airex no UE 4x5 hold                       Gait Training                                     "   Modalities

## 2024-08-07 ENCOUNTER — APPOINTMENT (OUTPATIENT)
Dept: PHYSICAL THERAPY | Facility: REHABILITATION | Age: 74
End: 2024-08-07
Payer: COMMERCIAL

## 2024-08-08 ENCOUNTER — APPOINTMENT (OUTPATIENT)
Dept: PHYSICAL THERAPY | Facility: REHABILITATION | Age: 74
End: 2024-08-08
Payer: COMMERCIAL

## 2024-08-08 NOTE — PROGRESS NOTES
"Daily Note     Today's date: 2024  Patient name: Estephania Brown  : 1950  MRN: [id number]  Referring provider: Rosalino Purvis MD  Dx:   Encounter Diagnosis     ICD-10-CM    1. Gait abnormality  R26.9       2. Chronic ankle pain, unspecified laterality  M25.579     G89.29       3. Balance disorder  R26.89               Start Time: 09  Stop Time: 1030  Total time in clinic (min): 47 minutes    Subjective: Pt reports her ankle felt tight this morning, but better now.      Objective: See treatment diary below      Assessment: Tolerated treatment well. She required BUE support during sand dune marching to prevent LOB. She had less palpable restrictions in the plantar fascia today. Pt will benefit from continued skilled outpatient PT to improve strength, to address therapy goals, to reduce pain, and improve function.        Plan: Continue per plan of care.  Progress treatment as tolerated.       Precautions: HTN, OP, fall risk, CA hx, hx of L ankle surgery     FOTO   = 40/57    POC exp =  10/9/24      Manuals        L ankle TC and ST mobs DS Gr II, IV DS Gr II, IV DS Gr II, IV DS Gr II, IV DS Gr II, IV DS Gr II, IV       DF MWM    3x10 on 8\" step 3x10 on 8\" step 3x10 on 8\" step       STM plantar fascia    DS DS DS                    Neuro Re-Ed             Tandem balance             Static balance on airex  30\"x4 nv          Sand dune march      2 min BUE                                                           Ther Ex             VG for ROM L5 5' L5 10' alt s/l b/l L5 12' alt s/l b/l L5 10' alt s/l b/l L6 10' alt s/l b/l L6 10' alt s/l b/l       bridges             Resisted side stepping             Standing calf stretch   60\"x4 45\"x4 45\"x4 45\"x4                                                           Ther Activity             Sit to stands   Chair + airex no UE 4x5 hold                       Gait Training                                       Modalities                 "

## 2024-08-09 ENCOUNTER — OFFICE VISIT (OUTPATIENT)
Dept: PHYSICAL THERAPY | Facility: REHABILITATION | Age: 74
End: 2024-08-09
Payer: COMMERCIAL

## 2024-08-09 DIAGNOSIS — R26.89 BALANCE DISORDER: ICD-10-CM

## 2024-08-09 DIAGNOSIS — G89.29 CHRONIC ANKLE PAIN, UNSPECIFIED LATERALITY: ICD-10-CM

## 2024-08-09 DIAGNOSIS — R26.9 GAIT ABNORMALITY: Primary | ICD-10-CM

## 2024-08-09 DIAGNOSIS — M25.579 CHRONIC ANKLE PAIN, UNSPECIFIED LATERALITY: ICD-10-CM

## 2024-08-09 PROCEDURE — 97140 MANUAL THERAPY 1/> REGIONS: CPT | Performed by: PHYSICAL THERAPIST

## 2024-08-09 PROCEDURE — 97110 THERAPEUTIC EXERCISES: CPT | Performed by: PHYSICAL THERAPIST

## 2024-08-12 ENCOUNTER — OFFICE VISIT (OUTPATIENT)
Dept: PHYSICAL THERAPY | Facility: REHABILITATION | Age: 74
End: 2024-08-12
Payer: COMMERCIAL

## 2024-08-12 DIAGNOSIS — R26.9 GAIT ABNORMALITY: Primary | ICD-10-CM

## 2024-08-12 DIAGNOSIS — R26.89 BALANCE DISORDER: ICD-10-CM

## 2024-08-12 DIAGNOSIS — G89.29 CHRONIC ANKLE PAIN, UNSPECIFIED LATERALITY: ICD-10-CM

## 2024-08-12 DIAGNOSIS — M25.579 CHRONIC ANKLE PAIN, UNSPECIFIED LATERALITY: ICD-10-CM

## 2024-08-12 PROCEDURE — 97140 MANUAL THERAPY 1/> REGIONS: CPT | Performed by: PHYSICAL THERAPIST

## 2024-08-12 PROCEDURE — 97110 THERAPEUTIC EXERCISES: CPT | Performed by: PHYSICAL THERAPIST

## 2024-08-12 NOTE — PROGRESS NOTES
"Daily Note     Today's date: 2024  Patient name: Estephania Brown  : 1950  MRN: [id number]  Referring provider: Rosalino Purvis MD  Dx:   Encounter Diagnosis     ICD-10-CM    1. Gait abnormality  R26.9       2. Chronic ankle pain, unspecified laterality  M25.579     G89.29       3. Balance disorder  R26.89                 Start Time: 1212  Stop Time: 1300  Total time in clinic (min): 48 minutes    Subjective: Pt reports she felt good over the weekend but it took about an hour for her foot to loosen up and feel better this morning.      Objective: See treatment diary below      Assessment: Tolerated treatment well. She required a little less UE support during sand dune marching today. Pt will benefit from continued skilled outpatient PT to improve strength, to address therapy goals, to reduce pain, and improve function.        Plan: Continue per plan of care.  Progress treatment as tolerated.       Precautions: HTN, OP, fall risk, CA hx, hx of L ankle surgery     FOTO   = 40/57    POC exp =  10/9/24      Manuals       L ankle TC and ST mobs DS Gr II, IV DS Gr II, IV DS Gr II, IV DS Gr II, IV DS Gr II, IV DS Gr II, IV DS Gr II, IV      DF MWM    3x10 on 8\" step 3x10 on 8\" step 3x10 on 8\" step 3x10 on 8\" step      STM plantar fascia    DS DS DS DS                   Neuro Re-Ed             Tandem balance             Static balance on airex  30\"x4 nv          Sand dune march      2 min BUE 2 min light UE prn                                                          Ther Ex             VG for ROM L5 5' L5 10' alt s/l b/l L5 12' alt s/l b/l L5 10' alt s/l b/l L6 10' alt s/l b/l L6 10' alt s/l b/l L6 10' alt s/l b/l      bridges             Resisted side stepping             Standing calf stretch   60\"x4 45\"x4 45\"x4 45\"x4 45\"x4                                                          Ther Activity             Sit to stands   Chair + airex no UE 4x5 hold                     "   Gait Training                                       Modalities

## 2024-08-14 ENCOUNTER — APPOINTMENT (OUTPATIENT)
Dept: PHYSICAL THERAPY | Facility: REHABILITATION | Age: 74
End: 2024-08-14
Payer: COMMERCIAL

## 2024-08-15 ENCOUNTER — OFFICE VISIT (OUTPATIENT)
Dept: PHYSICAL THERAPY | Facility: REHABILITATION | Age: 74
End: 2024-08-15
Payer: COMMERCIAL

## 2024-08-15 DIAGNOSIS — G89.29 CHRONIC ANKLE PAIN, UNSPECIFIED LATERALITY: ICD-10-CM

## 2024-08-15 DIAGNOSIS — M25.579 CHRONIC ANKLE PAIN, UNSPECIFIED LATERALITY: ICD-10-CM

## 2024-08-15 DIAGNOSIS — R26.9 GAIT ABNORMALITY: Primary | ICD-10-CM

## 2024-08-15 DIAGNOSIS — R26.89 BALANCE DISORDER: ICD-10-CM

## 2024-08-15 PROCEDURE — 97110 THERAPEUTIC EXERCISES: CPT | Performed by: PHYSICAL THERAPIST

## 2024-08-15 PROCEDURE — 97140 MANUAL THERAPY 1/> REGIONS: CPT | Performed by: PHYSICAL THERAPIST

## 2024-08-15 NOTE — PROGRESS NOTES
"Daily Note     Today's date: 8/15/2024  Patient name: Estephania Brown  : 1950  MRN: [id number]  Referring provider: Rosalino Purvis MD  Dx:   Encounter Diagnosis     ICD-10-CM    1. Gait abnormality  R26.9       2. Chronic ankle pain, unspecified laterality  M25.579     G89.29       3. Balance disorder  R26.89             Start Time: 1145  Stop Time: 1230  Total time in clinic (min): 45 minutes    Subjective: Pt reports her ankle was a little stiff this morning but pain wasn't as bad.       Objective: See treatment diary below      Assessment: Tolerated treatment well. She required a little less UE support during sand dune marching today. Pt will benefit from continued skilled outpatient PT to improve strength, to address therapy goals, to reduce pain, and improve function.        Plan: Continue per plan of care.  Progress treatment as tolerated.       Precautions: HTN, OP, fall risk, CA hx, hx of L ankle surgery     FOTO   = 40/57    POC exp =  10/9/24      Manuals 7/17 7/25 7/29 7/31 8/5 8/9 8/12 8/15     L ankle TC and ST mobs DS Gr II, IV DS Gr II, IV DS Gr II, IV DS Gr II, IV DS Gr II, IV DS Gr II, IV DS Gr II, IV DS Gr II, IV     DF MWM    3x10 on 8\" step 3x10 on 8\" step 3x10 on 8\" step 3x10 on 8\" step 3x10 on 8\" step     STM plantar fascia    DS DS DS DS DS                  Neuro Re-Ed             Tandem balance             Static balance on airex  30\"x4 nv          Sand dune march      2 min BUE 2 min light UE prn 2 min light UE prn                                                         Ther Ex             VG for ROM L5 5' L5 10' alt s/l b/l L5 12' alt s/l b/l L5 10' alt s/l b/l L6 10' alt s/l b/l L6 10' alt s/l b/l L6 10' alt s/l b/l L7 7' alt s/l b/l     bridges             Resisted side stepping             Standing calf stretch   60\"x4 45\"x4 45\"x4 45\"x4 45\"x4 45\"x4                                                         Ther Activity             Sit to stands   Chair + airex no UE 4x5 " hold                       Gait Training                                       Modalities

## 2024-08-19 ENCOUNTER — OFFICE VISIT (OUTPATIENT)
Dept: PHYSICAL THERAPY | Facility: REHABILITATION | Age: 74
End: 2024-08-19
Payer: COMMERCIAL

## 2024-08-19 DIAGNOSIS — R26.9 GAIT ABNORMALITY: Primary | ICD-10-CM

## 2024-08-19 DIAGNOSIS — G89.29 CHRONIC ANKLE PAIN, UNSPECIFIED LATERALITY: ICD-10-CM

## 2024-08-19 DIAGNOSIS — M25.579 CHRONIC ANKLE PAIN, UNSPECIFIED LATERALITY: ICD-10-CM

## 2024-08-19 DIAGNOSIS — R26.89 BALANCE DISORDER: ICD-10-CM

## 2024-08-19 PROCEDURE — 97110 THERAPEUTIC EXERCISES: CPT | Performed by: PHYSICAL THERAPIST

## 2024-08-19 PROCEDURE — 97140 MANUAL THERAPY 1/> REGIONS: CPT | Performed by: PHYSICAL THERAPIST

## 2024-08-19 NOTE — PROGRESS NOTES
"Daily Note     Today's date: 2024  Patient name: Estephania Brown  : 1950  MRN: [id number]  Referring provider: Rosalino Purvis MD  Dx:   Encounter Diagnosis     ICD-10-CM    1. Gait abnormality  R26.9       2. Chronic ankle pain, unspecified laterality  M25.579     G89.29       3. Balance disorder  R26.89               Start Time: 1210  Stop Time: 1255  Total time in clinic (min): 45 minutes    Subjective: Pt reports her plantar fascia is sore today.      Objective: See treatment diary below      Assessment: Tolerated treatment well. Able resume sit to stands with 2 airex pads, no UE. Pt will benefit from continued skilled outpatient PT to improve strength, to address therapy goals, to reduce pain, and improve function.        Plan: Continue per plan of care.  Progress treatment as tolerated.       Precautions: HTN, OP, fall risk, CA hx, hx of L ankle surgery     FOTO   = 40/57   = 67/57    POC exp =  10/9/24      Manuals 7/17 7/25 7/29 7/31 8/5 8/9 8/12 8/15 8/19    L ankle TC and ST mobs DS Gr II, IV DS Gr II, IV DS Gr II, IV DS Gr II, IV DS Gr II, IV DS Gr II, IV DS Gr II, IV DS Gr II, IV DS Gr II, IV    DF MWM    3x10 on 8\" step 3x10 on 8\" step 3x10 on 8\" step 3x10 on 8\" step 3x10 on 8\" step 3x10 on 8\" step    STM plantar fascia    DS DS DS DS DS DS                 Neuro Re-Ed             Tandem balance             Static balance on airex  30\"x4 nv          Sand dune march      2 min BUE 2 min light UE prn 2 min light UE prn 2 min light UE prn                                                        Ther Ex             VG for ROM L5 5' L5 10' alt s/l b/l L5 12' alt s/l b/l L5 10' alt s/l b/l L6 10' alt s/l b/l L6 10' alt s/l b/l L6 10' alt s/l b/l L7 7' alt s/l b/l L7 7' alt s/l b/l    bridges             Resisted side stepping             Standing calf stretch   60\"x4 45\"x4 45\"x4 45\"x4 45\"x4 45\"x4 45\"x4                                                        Ther Activity             Sit to " stands   Chair + airex no UE 4x5 hold      Chiar + 2 airex 2x10 no UE                 Gait Training                                       Modalities

## 2024-08-21 ENCOUNTER — APPOINTMENT (OUTPATIENT)
Dept: PHYSICAL THERAPY | Facility: REHABILITATION | Age: 74
End: 2024-08-21
Payer: COMMERCIAL

## 2024-08-22 ENCOUNTER — OFFICE VISIT (OUTPATIENT)
Dept: PHYSICAL THERAPY | Facility: REHABILITATION | Age: 74
End: 2024-08-22
Payer: COMMERCIAL

## 2024-08-22 DIAGNOSIS — M25.579 CHRONIC ANKLE PAIN, UNSPECIFIED LATERALITY: ICD-10-CM

## 2024-08-22 DIAGNOSIS — G89.29 CHRONIC ANKLE PAIN, UNSPECIFIED LATERALITY: ICD-10-CM

## 2024-08-22 DIAGNOSIS — R26.89 BALANCE DISORDER: ICD-10-CM

## 2024-08-22 DIAGNOSIS — R26.9 GAIT ABNORMALITY: Primary | ICD-10-CM

## 2024-08-22 PROCEDURE — 97140 MANUAL THERAPY 1/> REGIONS: CPT | Performed by: PHYSICAL THERAPIST

## 2024-08-22 PROCEDURE — 97110 THERAPEUTIC EXERCISES: CPT | Performed by: PHYSICAL THERAPIST

## 2024-08-22 NOTE — PROGRESS NOTES
"Daily Note     Today's date: 2024  Patient name: Estephania Brown  : 1950  MRN: [id number]  Referring provider: Rosalino Purvis MD  Dx:   Encounter Diagnosis     ICD-10-CM    1. Gait abnormality  R26.9       2. Chronic ankle pain, unspecified laterality  M25.579     G89.29       3. Balance disorder  R26.89                 Start Time: 1055  Stop Time: 1140  Total time in clinic (min): 45 minutes    Subjective: Pt reports her anterior/medial ankle was stiff and sore this morning. Denies any stiffness following last visit from the sit to stands though.      Objective: See treatment diary below      Assessment: Tolerated treatment well.  She presented with more ankle stiffness today, but sx's improved with tx. Pt will benefit from continued skilled outpatient PT to improve strength, to address therapy goals, to reduce pain, and improve function.        Plan: Continue per plan of care.  Progress treatment as tolerated.       Precautions: HTN, OP, fall risk, CA hx, hx of L ankle surgery     FOTO   = 40/57   = 67/57    POC exp =  10/9/24      Manuals 7/17 7/25 7/29 7/31 8/5 8/9 8/12 8/15 8/19 8/22   L ankle TC and ST mobs DS Gr II, IV DS Gr II, IV DS Gr II, IV DS Gr II, IV DS Gr II, IV DS Gr II, IV DS Gr II, IV DS Gr II, IV DS Gr II, IV DS Gr II, IV   DF MWM    3x10 on 8\" step 3x10 on 8\" step 3x10 on 8\" step 3x10 on 8\" step 3x10 on 8\" step 3x10 on 8\" step 3x10 on 8\" step   STM plantar fascia    DS DS DS DS DS DS DS                Neuro Re-Ed             Tandem balance             Static balance on airex  30\"x4 nv          Sand dune march      2 min BUE 2 min light UE prn 2 min light UE prn 2 min light UE prn 2 min light UE prn                                                       Ther Ex             VG for ROM L5 5' L5 10' alt s/l b/l L5 12' alt s/l b/l L5 10' alt s/l b/l L6 10' alt s/l b/l L6 10' alt s/l b/l L6 10' alt s/l b/l L7 7' alt s/l b/l L7 7' alt s/l b/l L7 7' alt s/l b/l   bridges           " "  Resisted side stepping             Standing calf stretch   60\"x4 45\"x4 45\"x4 45\"x4 45\"x4 45\"x4 45\"x4 45\"x4                                                       Ther Activity             Sit to stands   Chair + airex no UE 4x5 hold      Chair + 2 airex 2x10 no UE Chair + 2 airex 2x10 no UE                Gait Training                                       Modalities                                            "

## 2024-08-26 ENCOUNTER — OFFICE VISIT (OUTPATIENT)
Dept: PHYSICAL THERAPY | Facility: REHABILITATION | Age: 74
End: 2024-08-26
Payer: COMMERCIAL

## 2024-08-26 DIAGNOSIS — R26.89 BALANCE DISORDER: ICD-10-CM

## 2024-08-26 DIAGNOSIS — R26.9 GAIT ABNORMALITY: Primary | ICD-10-CM

## 2024-08-26 DIAGNOSIS — G89.29 CHRONIC ANKLE PAIN, UNSPECIFIED LATERALITY: ICD-10-CM

## 2024-08-26 DIAGNOSIS — M25.579 CHRONIC ANKLE PAIN, UNSPECIFIED LATERALITY: ICD-10-CM

## 2024-08-26 PROCEDURE — 97110 THERAPEUTIC EXERCISES: CPT | Performed by: PHYSICAL THERAPIST

## 2024-08-26 PROCEDURE — 97140 MANUAL THERAPY 1/> REGIONS: CPT | Performed by: PHYSICAL THERAPIST

## 2024-08-26 NOTE — PROGRESS NOTES
"Daily Note     Today's date: 2024  Patient name: Estephania Brown  : 1950  MRN: [id number]  Referring provider: Rosalino Purvis MD  Dx:   Encounter Diagnosis     ICD-10-CM    1. Gait abnormality  R26.9       2. Chronic ankle pain, unspecified laterality  M25.579     G89.29       3. Balance disorder  R26.89               Start Time: 1215  Stop Time: 1300  Total time in clinic (min): 45 minutes    Subjective: Pt reports stiffness and soreness in her ankle today.      Objective: See treatment diary below      Assessment: Tolerated treatment well. Responded well to manuals with relief afterwards. Rest breaks throughout due to fatigue. Pt will benefit from continued skilled outpatient PT to improve strength, to address therapy goals, to reduce pain, and improve function.    I have directly supervised and participated in the care of this patient with the therapy student, Cecilia Jama, SPT. I agree with the details as outlined above as well as during today's session. Lisa Casillas, PT, DPT           Plan: Continue per plan of care.  Progress treatment as tolerated.       Precautions: HTN, OP, fall risk, CA hx, hx of L ankle surgery     FOTO   = 40/57   = 67/57    POC exp =  10/9/24      Manuals 8/26 7/25 7/29 7/31 8/5 8/9 8/12 8/15 8/19 8/22   L ankle TC and ST mobs DS Gr II, IV DS Gr II, IV DS Gr II, IV DS Gr II, IV DS Gr II, IV DS Gr II, IV DS Gr II, IV DS Gr II, IV DS Gr II, IV DS Gr II, IV   DF MWM 3x10 on 8\" step   3x10 on 8\" step 3x10 on 8\" step 3x10 on 8\" step 3x10 on 8\" step 3x10 on 8\" step 3x10 on 8\" step 3x10 on 8\" step   STM plantar fascia DS   DS DS DS DS DS DS DS                Neuro Re-Ed             Tandem balance             Static balance on airex  30\"x4 nv          Sand dune march 2 min light UE prn     2 min BUE 2 min light UE prn 2 min light UE prn 2 min light UE prn 2 min light UE prn                                                       Ther Ex             VG for ROM L7 7' " "alt s/l b/l L5 10' alt s/l b/l L5 12' alt s/l b/l L5 10' alt s/l b/l L6 10' alt s/l b/l L6 10' alt s/l b/l L6 10' alt s/l b/l L7 7' alt s/l b/l L7 7' alt s/l b/l L7 7' alt s/l b/l   bridges             Resisted side stepping             Standing calf stretch 45\"x4  60\"x4 45\"x4 45\"x4 45\"x4 45\"x4 45\"x4 45\"x4 45\"x4                                                       Ther Activity             Sit to stands  Chair + 2 airex 2x10 no UE Chair + airex no UE 4x5 hold      Chair + 2 airex 2x10 no UE Chair + 2 airex 2x10 no UE                Gait Training                                       Modalities                                            "

## 2024-08-27 ENCOUNTER — OFFICE VISIT (OUTPATIENT)
Dept: ENDOCRINOLOGY | Facility: CLINIC | Age: 74
End: 2024-08-27
Payer: COMMERCIAL

## 2024-08-27 VITALS
HEART RATE: 78 BPM | HEIGHT: 64 IN | OXYGEN SATURATION: 99 % | BODY MASS INDEX: 38.14 KG/M2 | WEIGHT: 223.4 LBS | DIASTOLIC BLOOD PRESSURE: 80 MMHG | SYSTOLIC BLOOD PRESSURE: 120 MMHG

## 2024-08-27 DIAGNOSIS — R73.03 PREDIABETES: ICD-10-CM

## 2024-08-27 DIAGNOSIS — E03.9 HYPOTHYROIDISM, UNSPECIFIED TYPE: Primary | ICD-10-CM

## 2024-08-27 LAB — SL AMB POCT HEMOGLOBIN AIC: 5.7 (ref ?–6.5)

## 2024-08-27 PROCEDURE — 99214 OFFICE O/P EST MOD 30 MIN: CPT

## 2024-08-27 PROCEDURE — 83036 HEMOGLOBIN GLYCOSYLATED A1C: CPT

## 2024-08-27 NOTE — ASSESSMENT & PLAN NOTE
Thyroid function normal in March 2024.  -Update thyroid lab work in September.  Order given  -Continue regimen

## 2024-08-27 NOTE — PROGRESS NOTES
Established Patient Progress Note    CC: Follow up for hypothyroidism and pre-diabetes    Impression & Plan:    Problem List Items Addressed This Visit       Hypothyroidism - Primary     Thyroid function normal in March 2024.  -Update thyroid lab work in September.  Order given  -Continue regimen         Relevant Orders    TSH + Free T4    TSH + Free T4    Prediabetes     controlled  - continue with metformin         Relevant Orders    POCT hemoglobin A1c (Completed)    Hemoglobin A1C       Orders Placed This Encounter   Procedures    TSH + Free T4     Standing Status:   Future     Standing Expiration Date:   8/27/2025    TSH + Free T4     Standing Status:   Future     Standing Expiration Date:   8/27/2025    Hemoglobin A1C     Standing Status:   Future     Standing Expiration Date:   8/27/2025    POCT hemoglobin A1c       History of Present Illness:   Estephania Brown is a 74 y.o. female with a history of hypothyroidism and pre-diabetes.      Hypothyroidism: She has a history of total thyroidectomy for multiple thyroid nodules which were benign in 2012. TSH from March is normal. She takes 175 mcg levothyroxine daily. She denies any symptoms of hypothyroidism.     Pre-diabetes: taking 500 mg ER metformin once daily. Most recent hgbA1C 5.7%.    Patient Active Problem List   Diagnosis    Pain, joint, ankle and foot, left    DJD (degenerative joint disease), ankle and foot, left    Allergic rhinitis    Esophageal reflux    Hyperlipidemia    Hypertension    Hypothyroidism    Rosacea    Pain of right lower extremity    Female genital prolapse    Fracture of ankle    Osteopenia    Varicose veins of lower extremity    Pain of left heel    Acute midline low back pain without sciatica    Neuralgia    Otitis externa of left ear    Stage 3a chronic kidney disease (HCC)    Peripheral vascular disease (HCC)    Plantar fasciitis    Prediabetes    Morbid (severe) obesity due to excess calories (HCC)    Hypertensive kidney  disease with chronic kidney disease      Past Medical History:   Diagnosis Date    Allergic rhinitis     Arthritis     Body mass index (BMI) 40.0-44.9, adult 09/21/2020    Cataract     brock    Disease of thyroid gland     had total thyroidectomy    Diverticulosis     Hyperlipidemia     Hypertension     Mild acid reflux     Osteoporosis     Plantar fasciitis     b/l  l worse uses orthotics in shoes    Rosacea     Squamous cell skin cancer     Use of cane as ambulatory aid     Uterovaginal prolapse       Past Surgical History:   Procedure Laterality Date    ANKLE SURGERY Left 2011    Fracture / hardware removed    COLONOSCOPY      fiberoptic    COLPORRHAPHY N/A 06/17/2019    Procedure: POSTERIOR COLPORRHAPHY;  Surgeon: Kai Napoles MD;  Location: [facility];  Service: UroGynecology           OTHER SURGICAL HISTORY      thyroid biopsy core needle     TN COLPOPEXY VAGINAL EXTRAPERITONEAL APPROACH N/A 06/17/2019    Procedure: VE COLPOSUSPENSION (neugide);  Surgeon: Kai Napoles MD;  Location: [facility];  Service: UroGynecology           TN PERINEOPLASTY RPR PERINEUM NONOBSTETRICAL SPX N/A 06/17/2019    Procedure: PERINEORROPHY;  Surgeon: Kai Napoles MD;  Location: [facility];  Service: UroGynecology           TN REMOVAL IMPLANT DEEP Left 09/18/2017    Procedure: REMOVAL HARDWARE ANKLE (1 plate, 8 screws.);  Surgeon: Saúl Iniguez MD;  Location: [facility];  Service: Orthopedics    SKIN BIOPSY  2021    SKIN CANCER EXCISION Left     THYROIDECTOMY  2012    Total     TUBAL LIGATION        Family History   Problem Relation Age of Onset    COPD Mother     Diabetes Mother     Heart disease Mother     Hypertension Mother     Diabetes type II Mother     Thyroid disease unspecified Mother         Goiter removed by radioactive tx    Bone cancer Father     No Known Problems Daughter     Diabetes type II Maternal Grandmother     No Known Problems Maternal Grandfather     No Known Problems Paternal Grandmother      Diabetes type II Paternal Grandfather     No Known Problems Maternal Aunt     No Known Problems Maternal Aunt     No Known Problems Paternal Aunt     Diabetes type II Brother      Social History     Tobacco Use    Smoking status: Never    Smokeless tobacco: Never   Substance Use Topics    Alcohol use: No     Comment: Never drank alcohol denied     No Known Allergies      Current Outpatient Medications:     atorvastatin (LIPITOR) 10 mg tablet, Take 1 tablet (10 mg total) by mouth daily, Disp: 90 tablet, Rfl: 1    Azelaic Acid 15 % cream, Apply topically daily at bedtime  , Disp: , Rfl:     Calcium Carb-Cholecalciferol 600-800 MG-UNIT TABS, Take 2 tablets by mouth daily, Disp: , Rfl:     Denosumab (PROLIA SC), Inject under the skin 2x year , Disp: , Rfl:     docusate sodium (COLACE) 100 mg capsule, , Disp: , Rfl:     doxycycline (PERIOSTAT) 20 MG tablet, Take 20 mg by mouth 2 (two) times a day, Disp: , Rfl:     gabapentin (NEURONTIN) 300 mg capsule, TAKE ONE CAPSULE BY MOUTH IN THE MORNING, ONE CAPSULE IN AFTERNOON., AND 2 CAPSULES AT BEDTIME, Disp: 360 capsule, Rfl: 1    Glucosamine-Chondroitin (COSAMIN DS PO), Take by mouth, Disp: , Rfl:     HYDROcodone-acetaminophen (Norco) 5-325 mg per tablet, One po q day prn, Disp: 30 tablet, Rfl: 0    levothyroxine 175 mcg tablet, Take 1 tablet (175 mcg total) by mouth daily, Disp: 90 tablet, Rfl: 1    loratadine (CLARITIN) 10 mg tablet, Take 10 mg by mouth daily as needed , Disp: , Rfl:     metFORMIN (GLUCOPHAGE-XR) 500 mg 24 hr tablet, Take 1 tablet (500 mg total) by mouth daily with dinner, Disp: 90 tablet, Rfl: 3    methocarbamol (ROBAXIN) 500 mg tablet, TAKE 1 TABLET (500 MG TOTAL) BY MOUTH IN THE MORNING AND 1 TABLET (500 MG TOTAL) IN THE EVENING AND 1 TABLET (500 MG TOTAL) BEFORE BEDTIME., Disp: 30 tablet, Rfl: 3    metoprolol tartrate (LOPRESSOR) 50 mg tablet, Take 1 tablet (50 mg total) by mouth daily, Disp: 90 tablet, Rfl: 3    metroNIDAZOLE (METROGEL) 1 % gel, Apply  "1 application topically daily , Disp: , Rfl:     Multiple Vitamins-Minerals (Centrum Silver 50+Women) TABS, , Disp: , Rfl:     naproxen (NAPROSYN) 500 mg tablet, Take 1 tablet (500 mg total) by mouth 2 (two) times a day with meals, Disp: 180 tablet, Rfl: 1    Probiotic Product (PROBIOTIC-10 PO), , Disp: , Rfl:     psyllium (METAMUCIL) 0.52 g capsule, , Disp: , Rfl:     Triamcinolone Acetonide (NASACORT ALLERGY 24HR NA), 2 sprays into each nostril as needed  , Disp: , Rfl:     Review of Systems   Constitutional:  Negative for chills and fever.   HENT:  Negative for ear pain and sore throat.    Eyes:  Negative for pain and visual disturbance.   Respiratory:  Negative for cough and shortness of breath.    Cardiovascular:  Negative for chest pain and palpitations.   Gastrointestinal:  Negative for abdominal pain and vomiting.   Genitourinary:  Negative for dysuria and hematuria.   Musculoskeletal:  Negative for arthralgias and back pain.   Skin:  Negative for color change and rash.   Neurological:  Negative for seizures and syncope.   All other systems reviewed and are negative.      Physical Exam:  Body mass index is 38.35 kg/m².  /80   Pulse 78   Ht 5' 4\" (1.626 m)   Wt 101 kg (223 lb 6.4 oz)   LMP  (LMP Unknown)   SpO2 99%   BMI 38.35 kg/m²    Wt Readings from Last 3 Encounters:   08/27/24 101 kg (223 lb 6.4 oz)   05/03/24 104 kg (229 lb)   02/27/24 105 kg (230 lb 12.8 oz)       Physical Exam  Vitals reviewed.   Constitutional:       Appearance: Normal appearance.   HENT:      Head: Normocephalic and atraumatic.   Cardiovascular:      Rate and Rhythm: Normal rate.   Pulmonary:      Effort: Pulmonary effort is normal.   Musculoskeletal:      Cervical back: Neck supple. No tenderness.   Lymphadenopathy:      Cervical: No cervical adenopathy.   Neurological:      Mental Status: She is alert and oriented to person, place, and time.   Psychiatric:         Mood and Affect: Mood normal.         Behavior: " "Behavior normal.         Labs:   Lab Results   Component Value Date    HGBA1C 5.7 08/27/2024    HGBA1C 6.0 (H) 03/01/2024    HGBA1C 6.3 (H) 07/25/2023     Lab Results   Component Value Date    CREATININE 0.88 01/02/2024    CREATININE 1.06 05/22/2023    CREATININE 0.98 01/16/2023    BUN 19 01/02/2024     12/20/2017    K 4.4 01/02/2024     01/02/2024    CO2 27 01/02/2024     eGFR   Date Value Ref Range Status   01/02/2024 65 ml/min/1.73sq m Final     Lab Results   Component Value Date    CHOL 229 (H) 12/20/2017    HDL 86 01/16/2023    TRIG 72 01/16/2023     Lab Results   Component Value Date    ALT 17 01/02/2024    AST 23 01/02/2024    ALKPHOS 58 01/02/2024    BILITOT 0.6 12/20/2017     Lab Results   Component Value Date    SGM5DMPNCWSU 0.689 03/01/2024    QBO1CSNLDPNN 0.314 (L) 01/02/2024    FJE1PMTDVQUZ 0.908 03/01/2023     No results found for: \"FREET4\", \"TSI\"      There are no Patient Instructions on file for this visit.      Discussed with the patient and all questioned fully answered. She will call me if any problems arise.    "

## 2024-08-28 ENCOUNTER — OFFICE VISIT (OUTPATIENT)
Dept: PHYSICAL THERAPY | Facility: REHABILITATION | Age: 74
End: 2024-08-28
Payer: COMMERCIAL

## 2024-08-28 DIAGNOSIS — R26.9 GAIT ABNORMALITY: Primary | ICD-10-CM

## 2024-08-28 DIAGNOSIS — R26.89 BALANCE DISORDER: ICD-10-CM

## 2024-08-28 DIAGNOSIS — G89.29 CHRONIC ANKLE PAIN, UNSPECIFIED LATERALITY: ICD-10-CM

## 2024-08-28 DIAGNOSIS — M25.579 CHRONIC ANKLE PAIN, UNSPECIFIED LATERALITY: ICD-10-CM

## 2024-08-28 PROCEDURE — 97110 THERAPEUTIC EXERCISES: CPT | Performed by: PHYSICAL THERAPIST

## 2024-08-28 PROCEDURE — 97140 MANUAL THERAPY 1/> REGIONS: CPT | Performed by: PHYSICAL THERAPIST

## 2024-08-28 NOTE — PROGRESS NOTES
Daily Note/re-eval     Today's date: 2024  Patient name: Estephania Brown  : 1950  MRN: [id number]  Referring provider: Rosalino Purvis MD  Dx:   Encounter Diagnosis     ICD-10-CM    1. Gait abnormality  R26.9       2. Chronic ankle pain, unspecified laterality  M25.579     G89.29       3. Balance disorder  R26.89               Start Time: 1425  Stop Time: 1515  Total time in clinic (min): 50 minutes    Subjective: Pt reports 50% improvement since starting therapy. Feels she has good and bad days and the ankle is stiffest in the morning.  Pain  Current pain ratin  At best pain ratin at eval, 2 in the past week  At worst pain ratin at eval, 3 in the past week  Location: arch of foot  Quality: sharp  Alleviating factors: stretching, PT in the past.  Aggravating factors: standing and walking  Progression: worsening    Objective: See treatment diary below    Joint Assessment:  Hypomobile throughout subtalar and talocrural      Lower Extremity Strength    MMT   AROM     Knee Left Right Left Right   Flex 5 5       Ext 4+ 4+                   Hip           flex 4 4       ext 3+ 3+       abd 3+ 3+                   Ankle           DF 5 5 lacking 18 at eval, lacking 14 today lacking 12   PF 3- 3-  59 at eval, 61 today  63   inversion 5 5       Eversion  5  5             Dynamic Balance Tests  5x sit to stand (sec)  >14 sec indicates high risk for falls 23 = 12.12, unable without UE  23 = 13.06,  unable without UE  23 = 11.70, unable without UE  10/23/23 = 11.74, unable without UE  24 = 10.63, unable without UE  24 = 9.78, unable without UE  24 = 10.96, unable without UE   TUG (sec)  >14 sec indicates high risk for falls 23 = 16.95 with SPC  23 = 17.48 with SPC  23 = 12.50 with SPC  10/23/23 = 13.41 with SPC  24 = 12.47 with SPC  24 = 11.47 with SPC  24 = 13.13 with SPC      Gait:  ambulates with SPC, L foot turned out, wide KEILY, L foot  plantar flexed and heel inverted, unsteady               Assessment: Pt presents for her 12th visit and reassessment for chronic ankle pain and balance/gait instability secondary to L ankle surgery years ago. Since starting this episode of therapy, pt has made gains in ankle ROM, hip strength, pain and fucntion. She is not yet able to perform a sit to stand from a chair without UE support, but is progressing towards this goal. She is highly motivated and participates in her HEP including her daily ankle DF stretches and sit to stands.  Pt will benefit from continued skilled outpatient PT to improve strength, to address therapy goals, to reduce pain, and improve function.        Goals  Short term goals: to be met in 6 weeks  Pt independent with initial HEP, rationale, technique and frequency, for ROM and pain control. MET  Pt able to perform 1 sit to stand without UE indicating and improvement in functional LE strength. UNMET  Improve hip abd MMT for improved lumbopelvic stability which is required for walking and balance MET  Pt will achieve an improvement in FOTO score indicating an improvement in pain and function. MET     Long term goals: to be met in 12 weeks  Pt will report a 75% or > reduction in subjective pain complaints/symptoms to better manage ADLs and functional mobility.  Pt able to perform 3 sit to stands without UE indicating and improvement in functional LE strength.  Further improve hip abd MMT for improved lumbopelvic stability which is required for walking and balance  Pt will improve FOTO score to = or better then expected outcome indicating an overall improvement in pain and function   Pt independent with rationale, technique and plan for performance of advanced HEP to maintain gains made in therapy.  Achieve pts goal: to get stronger, better balance, relieve ankle pain and foot pain    Plan: Continue per plan of care.  Progress treatment as tolerated.       Precautions: HTN, OP, fall risk, CA  "hx, hx of L ankle surgery     FOTO  7/17 = 40/57  8/19 = 67/57    POC exp =  10/9/24      Manuals 8/26 8/28 7/29 7/31 8/5 8/9 8/12 8/15 8/19 8/22   L ankle TC and ST mobs DS Gr II, IV DS Gr II, IV DS Gr II, IV DS Gr II, IV DS Gr II, IV DS Gr II, IV DS Gr II, IV DS Gr II, IV DS Gr II, IV DS Gr II, IV   DF MWM 3x10 on 8\" step 2x15 on 8\" step  3x10 on 8\" step 3x10 on 8\" step 3x10 on 8\" step 3x10 on 8\" step 3x10 on 8\" step 3x10 on 8\" step 3x10 on 8\" step   STM plantar fascia DS DS  DS DS DS DS DS DS DS                Neuro Re-Ed             Tandem balance             Static balance on airex  30\"x4 nv          Sand dune march 2 min light UE prn 2 min light UE prn    2 min BUE 2 min light UE prn 2 min light UE prn 2 min light UE prn 2 min light UE prn                                                       Ther Ex             VG for ROM L7 7' alt s/l b/l L7 7' alt s/l b/l L5 12' alt s/l b/l L5 10' alt s/l b/l L6 10' alt s/l b/l L6 10' alt s/l b/l L6 10' alt s/l b/l L7 7' alt s/l b/l L7 7' alt s/l b/l L7 7' alt s/l b/l   bridges             Resisted side stepping             Standing calf stretch 45\"x4 45\"x4 60\"x4 45\"x4 45\"x4 45\"x4 45\"x4 45\"x4 45\"x4 45\"x4                                                       Ther Activity             Sit to stands  Chair + 2 airex 2x10 no UE Hi lo 22\" 3x5 hold      Chair + 2 airex 2x10 no UE Chair + 2 airex 2x10 no UE     5xSTS  TUGx1           Gait Training                                       Modalities                                            "

## 2024-09-04 ENCOUNTER — OFFICE VISIT (OUTPATIENT)
Dept: PHYSICAL THERAPY | Facility: REHABILITATION | Age: 74
End: 2024-09-04
Payer: COMMERCIAL

## 2024-09-04 DIAGNOSIS — G89.29 CHRONIC ANKLE PAIN, UNSPECIFIED LATERALITY: ICD-10-CM

## 2024-09-04 DIAGNOSIS — R26.89 BALANCE DISORDER: ICD-10-CM

## 2024-09-04 DIAGNOSIS — R26.9 GAIT ABNORMALITY: Primary | ICD-10-CM

## 2024-09-04 DIAGNOSIS — M25.579 CHRONIC ANKLE PAIN, UNSPECIFIED LATERALITY: ICD-10-CM

## 2024-09-04 PROCEDURE — 97110 THERAPEUTIC EXERCISES: CPT | Performed by: PHYSICAL THERAPIST

## 2024-09-04 PROCEDURE — 97530 THERAPEUTIC ACTIVITIES: CPT | Performed by: PHYSICAL THERAPIST

## 2024-09-04 PROCEDURE — 97140 MANUAL THERAPY 1/> REGIONS: CPT | Performed by: PHYSICAL THERAPIST

## 2024-09-04 NOTE — PROGRESS NOTES
"Daily Note/re-eval     Today's date: 2024  Patient name: Estephania Brown  : 1950  MRN: [id number]  Referring provider: Rosalino Purvis MD  Dx:   Encounter Diagnosis     ICD-10-CM    1. Gait abnormality  R26.9       2. Chronic ankle pain, unspecified laterality  M25.579     G89.29       3. Balance disorder  R26.89               Start Time: 1212  Stop Time: 1300  Total time in clinic (min): 48 minutes    Subjective: Pt states she occasionally gets a severe pain along the medial foot and ankle that stops her in her tracks.      Objective: See treatment diary below         Assessment: Pt tolerated the exercises well and was appropriately challenged by the sit to stands. She fully extended the hips without cues.  Pt will benefit from continued skilled outpatient PT to improve strength, to address therapy goals, to reduce pain, and improve function.        Plan: Continue per plan of care.  Progress treatment as tolerated.       Precautions: HTN, OP, fall risk, CA hx, hx of L ankle surgery     FOTO   = 40/57   = 67/57    POC exp =  10/9/24      Manuals 8/26 8/28 9/4 7/31 8/5 8/9 8/12 8/15 8/19 8/22   L ankle TC and ST mobs DS Gr II, IV DS Gr II, IV DS Gr II, IV DS Gr II, IV DS Gr II, IV DS Gr II, IV DS Gr II, IV DS Gr II, IV DS Gr II, IV DS Gr II, IV   DF MWM 3x10 on 8\" step 2x15 on 8\" step 3x10 on 8\" step 3x10 on 8\" step 3x10 on 8\" step 3x10 on 8\" step 3x10 on 8\" step 3x10 on 8\" step 3x10 on 8\" step 3x10 on 8\" step   STM plantar fascia DS DS DS DS DS DS DS DS DS DS                Neuro Re-Ed             Tandem balance             Static balance on airex  30\"x4           Sand dune march 2 min light UE prn 2 min light UE prn 2 min light UE prn   2 min BUE 2 min light UE prn 2 min light UE prn 2 min light UE prn 2 min light UE prn                                                       Ther Ex             VG for ROM L7 7' alt s/l b/l L7 7' alt s/l b/l L7 7' alt s/l b/l L5 10' alt s/l b/l L6 10' alt s/l " "b/l L6 10' alt s/l b/l L6 10' alt s/l b/l L7 7' alt s/l b/l L7 7' alt s/l b/l L7 7' alt s/l b/l   bridges             Resisted side stepping             Standing calf stretch 45\"x4 45\"x4 45\"x4 45\"x4 45\"x4 45\"x4 45\"x4 45\"x4 45\"x4 45\"x4                                                       Ther Activity             Sit to stands  Chair + 2 airex 2x10 no UE Hi lo 22\" 3x5 Hi lo 22\" 3x5      Chair + 2 airex 2x10 no UE Chair + 2 airex 2x10 no UE     5xSTS  TUGx1           Gait Training                                       Modalities                                            "

## 2024-09-09 ENCOUNTER — OFFICE VISIT (OUTPATIENT)
Dept: PHYSICAL THERAPY | Facility: REHABILITATION | Age: 74
End: 2024-09-09
Payer: COMMERCIAL

## 2024-09-09 DIAGNOSIS — M25.579 CHRONIC ANKLE PAIN, UNSPECIFIED LATERALITY: ICD-10-CM

## 2024-09-09 DIAGNOSIS — R26.89 BALANCE DISORDER: ICD-10-CM

## 2024-09-09 DIAGNOSIS — R26.9 GAIT ABNORMALITY: Primary | ICD-10-CM

## 2024-09-09 DIAGNOSIS — G89.29 CHRONIC ANKLE PAIN, UNSPECIFIED LATERALITY: ICD-10-CM

## 2024-09-09 PROCEDURE — 97530 THERAPEUTIC ACTIVITIES: CPT | Performed by: PHYSICAL THERAPIST

## 2024-09-09 PROCEDURE — 97110 THERAPEUTIC EXERCISES: CPT | Performed by: PHYSICAL THERAPIST

## 2024-09-09 PROCEDURE — 97140 MANUAL THERAPY 1/> REGIONS: CPT | Performed by: PHYSICAL THERAPIST

## 2024-09-09 NOTE — PROGRESS NOTES
"Daily Note/re-eval     Today's date: 2024  Patient name: Estephania Brown  : 1950  MRN: [id number]  Referring provider: Rosalino Purvis MD  Dx:   Encounter Diagnosis     ICD-10-CM    1. Gait abnormality  R26.9       2. Chronic ankle pain, unspecified laterality  M25.579     G89.29       3. Balance disorder  R26.89                 Start Time: 1215  Stop Time: 1308  Total time in clinic (min): 53 minutes    Subjective: Pt reports the ankle is bothersome today.    Objective: See treatment diary below         Assessment: Pt was able to complete sit to stands at lower height without issue. Also discussed and provided pt info on a DF night splint to reduce ankle stiffness in the morning. Pt will benefit from continued skilled outpatient PT to improve strength, to address therapy goals, to reduce pain, and improve function.        Plan: Continue per plan of care.  Progress treatment as tolerated.       Precautions: HTN, OP, fall risk, CA hx, hx of L ankle surgery     FOTO   = 40/57   = 67/57    POC exp =  10/9/24      Manuals 8/26 8/28 9/4 9/9 8/5 8/9 8/12 8/15 8/19 8/22   L ankle TC and ST mobs DS Gr II, IV DS Gr II, IV DS Gr II, IV DS Gr II, IV DS Gr II, IV DS Gr II, IV DS Gr II, IV DS Gr II, IV DS Gr II, IV DS Gr II, IV   DF MWM 3x10 on 8\" step 2x15 on 8\" step 3x10 on 8\" step 3x10 on 8\" step 3x10 on 8\" step 3x10 on 8\" step 3x10 on 8\" step 3x10 on 8\" step 3x10 on 8\" step 3x10 on 8\" step   STM plantar fascia DS DS DS DS DS DS DS DS DS DS                Neuro Re-Ed             Tandem balance             Static balance on airex  30\"x4           Sand dune march 2 min light UE prn 2 min light UE prn 2 min light UE prn 2 min light UE prn  2 min BUE 2 min light UE prn 2 min light UE prn 2 min light UE prn 2 min light UE prn                                                       Ther Ex             VG for ROM L7 7' alt s/l b/l L7 7' alt s/l b/l L7 7' alt s/l b/l L7 7' alt s/l b/l L6 10' alt s/l b/l L6 10' " "alt s/l b/l L6 10' alt s/l b/l L7 7' alt s/l b/l L7 7' alt s/l b/l L7 7' alt s/l b/l   bridges             Resisted side stepping             Standing calf stretch 45\"x4 45\"x4 45\"x4 45\"x4 45\"x4 45\"x4 45\"x4 45\"x4 45\"x4 45\"x4                                                       Ther Activity             Sit to stands  Chair + 2 airex 2x10 no UE Hi lo 22\" 3x5 Hi lo 22\" 3x5 Hi lo 21.5\" 3x5     Chair + 2 airex 2x10 no UE Chair + 2 airex 2x10 no UE     5xSTS  TUGx1           Gait Training                                       Modalities                                            "

## 2024-09-11 ENCOUNTER — OFFICE VISIT (OUTPATIENT)
Dept: PHYSICAL THERAPY | Facility: REHABILITATION | Age: 74
End: 2024-09-11
Payer: COMMERCIAL

## 2024-09-11 ENCOUNTER — APPOINTMENT (OUTPATIENT)
Dept: LAB | Facility: CLINIC | Age: 74
End: 2024-09-11
Payer: COMMERCIAL

## 2024-09-11 DIAGNOSIS — G89.29 CHRONIC ANKLE PAIN, UNSPECIFIED LATERALITY: ICD-10-CM

## 2024-09-11 DIAGNOSIS — G89.29 CHRONIC PAIN OF LEFT ANKLE: ICD-10-CM

## 2024-09-11 DIAGNOSIS — M25.579 CHRONIC ANKLE PAIN, UNSPECIFIED LATERALITY: ICD-10-CM

## 2024-09-11 DIAGNOSIS — M25.572 CHRONIC PAIN OF LEFT ANKLE: ICD-10-CM

## 2024-09-11 DIAGNOSIS — R26.89 BALANCE DISORDER: ICD-10-CM

## 2024-09-11 DIAGNOSIS — R26.9 GAIT ABNORMALITY: Primary | ICD-10-CM

## 2024-09-11 DIAGNOSIS — E03.9 HYPOTHYROIDISM, UNSPECIFIED TYPE: ICD-10-CM

## 2024-09-11 LAB
T4 FREE SERPL-MCNC: 1.42 NG/DL (ref 0.61–1.12)
TSH SERPL DL<=0.05 MIU/L-ACNC: 1.55 UIU/ML (ref 0.45–4.5)

## 2024-09-11 PROCEDURE — 97110 THERAPEUTIC EXERCISES: CPT

## 2024-09-11 PROCEDURE — 36415 COLL VENOUS BLD VENIPUNCTURE: CPT

## 2024-09-11 PROCEDURE — 97530 THERAPEUTIC ACTIVITIES: CPT

## 2024-09-11 PROCEDURE — 84439 ASSAY OF FREE THYROXINE: CPT

## 2024-09-11 PROCEDURE — 84443 ASSAY THYROID STIM HORMONE: CPT

## 2024-09-11 PROCEDURE — 97140 MANUAL THERAPY 1/> REGIONS: CPT

## 2024-09-11 RX ORDER — HYDROCODONE BITARTRATE AND ACETAMINOPHEN 5; 325 MG/1; MG/1
1 TABLET ORAL AS NEEDED
Qty: 30 TABLET | Refills: 0 | Status: SHIPPED | OUTPATIENT
Start: 2024-09-11 | End: 2024-09-17 | Stop reason: SDUPTHER

## 2024-09-11 NOTE — PROGRESS NOTES
"Daily Note     Today's date: 2024  Patient name: Estephania Brown  : 1950  MRN: [id number]  Referring provider: Rosalino Purvis MD  Dx:   Encounter Diagnosis     ICD-10-CM    1. Gait abnormality  R26.9       2. Chronic ankle pain, unspecified laterality  M25.579     G89.29       3. Balance disorder  R26.89           Start Time: 1405  Stop Time: 1445  Total time in clinic (min): 40 minutes    Subjective: Patient reports it took her 2 hours to get loose this morning. Needed to use 2 SPCs       Objective: See treatment diary below      Assessment: Tolerated treatment well. Positive responses to manuals today. Patient self progressed activities without issue. Patient would benefit from continued PT      Plan: Continue per plan of care.      Precautions: HTN, OP, fall risk, CA hx, hx of L ankle surgery     FOTO   = 40/57   = 67/57    POC exp =  10/9/24      Manuals 8/26 8/28 9/4 9/9 9/11   8/15 8/19 8/22   L ankle TC and ST mobs DS Gr II, IV DS Gr II, IV DS Gr II, IV DS Gr II, IV NC Gr II, IV   DS Gr II, IV DS Gr II, IV DS Gr II, IV   DF MWM 3x10 on 8\" step 2x15 on 8\" step 3x10 on 8\" step 3x10 on 8\" step 3x10 on 8\" step    3x10 on 8\" step 3x10 on 8\" step 3x10 on 8\" step   STM plantar fascia DS DS DS DS NC   DS DS DS                Neuro Re-Ed             Tandem balance             Static balance on airex  30\"x4           Sand dune march 2 min light UE prn 2 min light UE prn 2 min light UE prn 2 min light UE prn 2 min light UE prn   2 min light UE prn 2 min light UE prn 2 min light UE prn                                                       Ther Ex             VG for ROM L7 7' alt s/l b/l L7 7' alt s/l b/l L7 7' alt s/l b/l L7 7' alt s/l b/l L7 7' alt s/l b/l   L7 7' alt s/l b/l L7 7' alt s/l b/l L7 7' alt s/l b/l   bridges             Resisted side stepping             Standing calf stretch 45\"x4 45\"x4 45\"x4 45\"x4 45\"x4   45\"x4 45\"x4 45\"x4                                                       Ther " "Activity             Sit to stands  Chair + 2 airex 2x10 no UE Hi lo 22\" 3x5 Hi lo 22\" 3x5 Hi lo 21.5\" 3x5 Hi lo 21.5\" 4x5    Chair + 2 airex 2x10 no UE Chair + 2 airex 2x10 no UE     5xSTS  TUGx1           Gait Training                                       Modalities                                              "

## 2024-09-11 NOTE — TELEPHONE ENCOUNTER
Reason for call:   [x] Refill   [] Prior Auth  [] Other:     Office:   [x] PCP/Provider -   [] Specialty/Provider -     Medication:       Pharmacy: [facility]    Does the patient have enough for 3 days?   [] Yes   [x] No - Send as HP to POD

## 2024-09-13 RX ORDER — HYDROCODONE BITARTRATE AND ACETAMINOPHEN 5; 325 MG/1; MG/1
1 TABLET ORAL AS NEEDED
Qty: 30 TABLET | Refills: 0 | OUTPATIENT
Start: 2024-09-13

## 2024-09-16 ENCOUNTER — APPOINTMENT (OUTPATIENT)
Dept: PHYSICAL THERAPY | Facility: REHABILITATION | Age: 74
End: 2024-09-16
Payer: COMMERCIAL

## 2024-09-16 ENCOUNTER — TELEPHONE (OUTPATIENT)
Age: 74
End: 2024-09-16

## 2024-09-16 DIAGNOSIS — G89.29 CHRONIC PAIN OF LEFT ANKLE: ICD-10-CM

## 2024-09-16 DIAGNOSIS — M25.572 CHRONIC PAIN OF LEFT ANKLE: ICD-10-CM

## 2024-09-16 RX ORDER — HYDROCODONE BITARTRATE AND ACETAMINOPHEN 5; 325 MG/1; MG/1
1 TABLET ORAL AS NEEDED
Qty: 30 TABLET | Refills: 0 | OUTPATIENT
Start: 2024-09-16

## 2024-09-16 NOTE — TELEPHONE ENCOUNTER
"Patient calling for lab results. Relayed the following message \"Thyroid function stable.  Continue regimen\"MEHNAZ Charles  9/13/2024  4:12 PM EDT .    Patient is asking if she should be concerned with the elevated free T4?  Please advise  "

## 2024-09-16 NOTE — TELEPHONE ENCOUNTER
Reason for call:   [x] Refill   [] Prior Auth  [] Other: NOT A DUPLICATE -  sent to incorrect Pharmacy    Office:   [x] PCP/Provider - YOHANA HINES- Rosalino Purvis MD   [] Specialty/Provider -     Medication:  HYDROcodone-acetaminophen (Norco) 5-325 mg per tablet    Dose/Frequency:  Take 1 tablet by mouth if needed for pain One po q day prn,     Quantity: 30 tablet     Pharmacy: [facility] ([place] - [address] [phone]    Does the patient have enough for 3 days?   [] Yes   [x] No - Send as HP to POD

## 2024-09-17 RX ORDER — HYDROCODONE BITARTRATE AND ACETAMINOPHEN 5; 325 MG/1; MG/1
1 TABLET ORAL AS NEEDED
Qty: 30 TABLET | Refills: 0 | Status: SHIPPED | OUTPATIENT
Start: 2024-09-17

## 2024-09-17 NOTE — TELEPHONE ENCOUNTER
Patient called the RX Refill Line. Message is being forwarded to the office.     Patient is requesting - Pt is requesting to please approve this medication that she requested and has been waiting for a few days now. Previous prescription was sent to the wrong pharmacy. Please review and resend if appropriate, Thank you.      [facility] ([place] - [place] - [address]

## 2024-09-18 ENCOUNTER — OFFICE VISIT (OUTPATIENT)
Dept: PHYSICAL THERAPY | Facility: REHABILITATION | Age: 74
End: 2024-09-18
Payer: COMMERCIAL

## 2024-09-18 DIAGNOSIS — R26.9 GAIT ABNORMALITY: Primary | ICD-10-CM

## 2024-09-18 DIAGNOSIS — G89.29 CHRONIC ANKLE PAIN, UNSPECIFIED LATERALITY: ICD-10-CM

## 2024-09-18 DIAGNOSIS — M25.579 CHRONIC ANKLE PAIN, UNSPECIFIED LATERALITY: ICD-10-CM

## 2024-09-18 DIAGNOSIS — R26.89 BALANCE DISORDER: ICD-10-CM

## 2024-09-18 PROCEDURE — 97140 MANUAL THERAPY 1/> REGIONS: CPT

## 2024-09-18 PROCEDURE — 97530 THERAPEUTIC ACTIVITIES: CPT

## 2024-09-18 PROCEDURE — 97110 THERAPEUTIC EXERCISES: CPT

## 2024-09-18 NOTE — PROGRESS NOTES
"Daily Note     Today's date: 2024  Patient name: Estephania Brown  : 1950  MRN: [id number]  Referring provider: Rosalino Purvis MD  Dx:   Encounter Diagnosis     ICD-10-CM    1. Gait abnormality  R26.9       2. Chronic ankle pain, unspecified laterality  M25.579     G89.29       3. Balance disorder  R26.89           Start Time: 1400  Stop Time: 1440  Total time in clinic (min): 40 minutes    Subjective: Patient reports her ankle feeling tight but better than it typically does.       Objective: See treatment diary below      Assessment: Tolerated treatment well. Responded well to new balance exercise today. Supervision during balance exercise to ensure safety. Rest breaks taken as needed. Patient exhibited good technique with therapeutic exercises and would benefit from continued PT      Plan: Continue per plan of care.  Progress treatment as tolerated.       Precautions: HTN, OP, fall risk, CA hx, hx of L ankle surgery     FOTO   = 40/57   = 67/57    POC exp =  10/9/24      Manuals 8/26 8/28 9/4 9/9 9/11 9/18  8/15 8/19 8/22   L ankle TC and ST mobs DS Gr II, IV DS Gr II, IV DS Gr II, IV DS Gr II, IV KS Gr II, IV SW Gr II, IV  DS Gr II, IV DS Gr II, IV DS Gr II, IV   DF MWM 3x10 on 8\" step 2x15 on 8\" step 3x10 on 8\" step 3x10 on 8\" step 3x10 on 8\" step  3x10 on 8\" step   3x10 on 8\" step 3x10 on 8\" step 3x10 on 8\" step   STM plantar fascia DS DS DS DS KS SW  DS DS DS                Neuro Re-Ed             Tandem balance             Static balance on airex  30\"x4    30\" x3       Sand dune march 2 min light UE prn 2 min light UE prn 2 min light UE prn 2 min light UE prn 2 min light UE prn 2 min light UE prn  2 min light UE prn 2 min light UE prn 2 min light UE prn                                                       Ther Ex             VG for ROM L7 7' alt s/l b/l L7 7' alt s/l b/l L7 7' alt s/l b/l L7 7' alt s/l b/l L7 7' alt s/l b/l L7 7' alt s/l b/l  L7 7' alt s/l b/l L7 7' alt s/l b/l L7 7' " "alt s/l b/l   bridges             Resisted side stepping             Standing calf stretch 45\"x4 45\"x4 45\"x4 45\"x4 45\"x4 45\"x4  45\"x4 45\"x4 45\"x4                                                       Ther Activity             Sit to stands  Chair + 2 airex 2x10 no UE Hi lo 22\" 3x5 Hi lo 22\" 3x5 Hi lo 21.5\" 3x5 Hi lo 21.5\" 4x5 Hi lo 21.5\" 4x5   Chair + 2 airex 2x10 no UE Chair + 2 airex 2x10 no UE     5xSTS  TUGx1           Gait Training                                       Modalities                                                "

## 2024-09-20 ENCOUNTER — APPOINTMENT (OUTPATIENT)
Dept: PHYSICAL THERAPY | Facility: REHABILITATION | Age: 74
End: 2024-09-20
Payer: COMMERCIAL

## 2024-09-23 ENCOUNTER — OFFICE VISIT (OUTPATIENT)
Dept: PHYSICAL THERAPY | Facility: REHABILITATION | Age: 74
End: 2024-09-23
Payer: COMMERCIAL

## 2024-09-23 DIAGNOSIS — G89.29 CHRONIC ANKLE PAIN, UNSPECIFIED LATERALITY: ICD-10-CM

## 2024-09-23 DIAGNOSIS — R26.89 BALANCE DISORDER: ICD-10-CM

## 2024-09-23 DIAGNOSIS — R26.9 GAIT ABNORMALITY: Primary | ICD-10-CM

## 2024-09-23 DIAGNOSIS — M25.579 CHRONIC ANKLE PAIN, UNSPECIFIED LATERALITY: ICD-10-CM

## 2024-09-23 PROCEDURE — 97140 MANUAL THERAPY 1/> REGIONS: CPT

## 2024-09-23 PROCEDURE — 97530 THERAPEUTIC ACTIVITIES: CPT

## 2024-09-23 PROCEDURE — 97110 THERAPEUTIC EXERCISES: CPT

## 2024-09-23 NOTE — PROGRESS NOTES
"Daily Note     Today's date: 2024  Patient name: Estephania Brown  : 1950  MRN: [id number]  Referring provider: Rosalino Purvis MD  Dx:   Encounter Diagnosis     ICD-10-CM    1. Gait abnormality  R26.9       2. Chronic ankle pain, unspecified laterality  M25.579     G89.29       3. Balance disorder  R26.89           Start Time: 1140  Stop Time: 1220  Total time in clinic (min): 40 minutes    Subjective: Patient reports she tried her night splint last night and felt less pain this morning. States she is feeling her usual PF pain.      Objective: See treatment diary below      Assessment: Tolerated treatment well today. Continues to respond well to manuals. Progressed activities without issue. Patient would benefit from continued PT      Plan: Continue per plan of care.      Precautions: HTN, OP, fall risk, CA hx, hx of L ankle surgery     FOTO   = 40/57   = 67/57    POC exp =  10/9/24      Manuals    L ankle TC and ST mobs DS Gr II, IV DS Gr II, IV DS Gr II, IV DS Gr II, IV MT Gr II, IV SW Gr II, IV MT Gr II, IV  DS Gr II, IV DS Gr II, IV   DF MWM 3x10 on 8\" step 2x15 on 8\" step 3x10 on 8\" step 3x10 on 8\" step 3x10 on 8\" step  3x10 on 8\" step  3x10 on 8\" step   3x10 on 8\" step 3x10 on 8\" step   STM plantar fascia DS DS DS DS MT SW MT  DS DS                Neuro Re-Ed             Tandem balance             Static balance on airex  30\"x4    30\" x3 30\" x2      Sand dune march 2 min light UE prn 2 min light UE prn 2 min light UE prn 2 min light UE prn 2 min light UE prn 2 min light UE prn 2 min light UE prn  2 min light UE prn 2 min light UE prn                                                       Ther Ex             VG for ROM L7 7' alt s/l b/l L7 7' alt s/l b/l L7 7' alt s/l b/l L7 7' alt s/l b/l L7 7' alt s/l b/l L7 7' alt s/l b/l L7 7' alt s/l b/l  L7 7' alt s/l b/l L7 7' alt s/l b/l   bridges             Resisted side stepping             Standing " "calf stretch 45\"x4 45\"x4 45\"x4 45\"x4 45\"x4 45\"x4 45\"x4  45\"x4 45\"x4                                                       Ther Activity             Sit to stands  Chair + 2 airex 2x10 no UE Hi lo 22\" 3x5 Hi lo 22\" 3x5 Hi lo 21.5\" 3x5 Hi lo 21.5\" 4x5 Hi lo 21.5\" 4x5 Hi Lo 20.5\" 3x5  Chair + 2 airex 2x10 no UE Chair + 2 airex 2x10 no UE     5xSTS  TUGx1           Gait Training                                       Modalities                                                  "

## 2024-09-23 NOTE — TELEPHONE ENCOUNTER
I was able to call the patient back and she stated that she would like to repeat the lab work and take the medication after blood work is done to confirm.  Please enter labs and I will call the patient and let her know when the orders are in the system.

## 2024-09-24 DIAGNOSIS — E03.9 HYPOTHYROIDISM, UNSPECIFIED TYPE: Primary | ICD-10-CM

## 2024-09-24 NOTE — TELEPHONE ENCOUNTER
I called and made the patient aware that the labs were entered into her chart and she stated that she probably going to go on Thursday for the retesting as discussed

## 2024-09-25 ENCOUNTER — OFFICE VISIT (OUTPATIENT)
Dept: PHYSICAL THERAPY | Facility: REHABILITATION | Age: 74
End: 2024-09-25
Payer: COMMERCIAL

## 2024-09-25 DIAGNOSIS — M25.579 CHRONIC ANKLE PAIN, UNSPECIFIED LATERALITY: ICD-10-CM

## 2024-09-25 DIAGNOSIS — G89.29 CHRONIC ANKLE PAIN, UNSPECIFIED LATERALITY: ICD-10-CM

## 2024-09-25 DIAGNOSIS — R26.9 GAIT ABNORMALITY: Primary | ICD-10-CM

## 2024-09-25 DIAGNOSIS — R26.89 BALANCE DISORDER: ICD-10-CM

## 2024-09-25 PROCEDURE — 97110 THERAPEUTIC EXERCISES: CPT | Performed by: PHYSICAL THERAPIST

## 2024-09-25 PROCEDURE — 97530 THERAPEUTIC ACTIVITIES: CPT | Performed by: PHYSICAL THERAPIST

## 2024-09-25 PROCEDURE — 97140 MANUAL THERAPY 1/> REGIONS: CPT | Performed by: PHYSICAL THERAPIST

## 2024-09-25 NOTE — PROGRESS NOTES
"Daily Note     Today's date: 2024  Patient name: Estephania Brown  : 1950  MRN: [id number]  Referring provider: Rosalino Purvis MD  Dx:   Encounter Diagnosis     ICD-10-CM    1. Gait abnormality  R26.9       2. Chronic ankle pain, unspecified laterality  M25.579     G89.29       3. Balance disorder  R26.89             Start Time: 1215  Stop Time: 1300  Total time in clinic (min): 45 minutes    Subjective: Patient reports  her plantar fascia is sore today. States she has been wearing the night splint more.       Objective: See treatment diary below      Assessment: Tolerated treatment well today. Continues to respond well to manuals with some reduction in sx;s. Patient would benefit from continued PT      Plan: Continue per plan of care.      Precautions: HTN, OP, fall risk, CA hx, hx of L ankle surgery     FOTO   = 40/57   = 67/57    POC exp =  10/9/24      Manuals    L ankle TC and ST mobs DS Gr II, IV DS Gr II, IV DS Gr II, IV DS Gr II, IV MD Gr II, IV SW Gr II, IV MD Gr II, IV DS Gr II, IV DS Gr II, IV DS Gr II, IV   DF MWM 3x10 on 8\" step 2x15 on 8\" step 3x10 on 8\" step 3x10 on 8\" step 3x10 on 8\" step  3x10 on 8\" step  3x10 on 8\" step  3x10 on 8\" step  3x10 on 8\" step 3x10 on 8\" step   STM plantar fascia DS DS DS DS MD SW MD DS DS DS                Neuro Re-Ed             Tandem balance             Static balance on airex  30\"x4    30\" x3 30\" x2 nv     Sand dune march 2 min light UE prn 2 min light UE prn 2 min light UE prn 2 min light UE prn 2 min light UE prn 2 min light UE prn 2 min light UE prn 2 min light UE prn 2 min light UE prn 2 min light UE prn                                                       Ther Ex             VG for ROM L7 7' alt s/l b/l L7 7' alt s/l b/l L7 7' alt s/l b/l L7 7' alt s/l b/l L7 7' alt s/l b/l L7 7' alt s/l b/l L7 7' alt s/l b/l L7 7' alt s/l b/l L7 7' alt s/l b/l L7 7' alt s/l b/l   bridges           " "  Resisted side stepping             Standing calf stretch 45\"x4 45\"x4 45\"x4 45\"x4 45\"x4 45\"x4 45\"x4 45\"x4 45\"x4 45\"x4                                                       Ther Activity             Sit to stands  Chair + 2 airex 2x10 no UE Hi lo 22\" 3x5 Hi lo 22\" 3x5 Hi lo 21.5\" 3x5 Hi lo 21.5\" 4x5 Hi lo 21.5\" 4x5 Hi Lo 20.5\" 3x5 Hi Lo 20.5\" 3x5 Chair + 2 airex 2x10 no UE Chair + 2 airex 2x10 no UE     5xSTS  TUGx1           Gait Training                                       Modalities                                                  "

## 2024-09-30 ENCOUNTER — OFFICE VISIT (OUTPATIENT)
Dept: PHYSICAL THERAPY | Facility: REHABILITATION | Age: 74
End: 2024-09-30
Payer: COMMERCIAL

## 2024-09-30 DIAGNOSIS — G89.29 CHRONIC ANKLE PAIN, UNSPECIFIED LATERALITY: ICD-10-CM

## 2024-09-30 DIAGNOSIS — R26.9 GAIT ABNORMALITY: Primary | ICD-10-CM

## 2024-09-30 DIAGNOSIS — M25.579 CHRONIC ANKLE PAIN, UNSPECIFIED LATERALITY: ICD-10-CM

## 2024-09-30 DIAGNOSIS — R26.89 BALANCE DISORDER: ICD-10-CM

## 2024-09-30 PROCEDURE — 97140 MANUAL THERAPY 1/> REGIONS: CPT | Performed by: PHYSICAL THERAPIST

## 2024-09-30 PROCEDURE — 97530 THERAPEUTIC ACTIVITIES: CPT | Performed by: PHYSICAL THERAPIST

## 2024-09-30 PROCEDURE — 97110 THERAPEUTIC EXERCISES: CPT | Performed by: PHYSICAL THERAPIST

## 2024-09-30 NOTE — PROGRESS NOTES
"Daily Note     Today's date: 2024  Patient name: Estephania Brown  : 1950  MRN: [id number]  Referring provider: Rosalino Purvis MD  Dx:   Encounter Diagnosis     ICD-10-CM    1. Gait abnormality  R26.9       2. Chronic ankle pain, unspecified laterality  M25.579     G89.29       3. Balance disorder  R26.89               Start Time: 1212  Stop Time: 1255  Total time in clinic (min): 43 minutes    Subjective: Patient reports the bottom of her foot is sore today. States she hasn't worn her DF night splint the past few nights.       Objective: See treatment diary below      Assessment: Tolerated treatment well today. Was able to increased to 4 sets of sit to stands.  Pt will benefit from continued skilled outpatient PT to improve strength, to address therapy goals, to reduce pain, and improve function.        Plan: Continue per plan of care.      Precautions: HTN, OP, fall risk, CA hx, hx of L ankle surgery     FOTO   = 40/57   = 67/57    POC exp =  10/9/24      Manuals    L ankle TC and ST mobs DS Gr II, IV DS Gr II, IV DS Gr II, IV DS Gr II, IV MD Gr II, IV SW Gr II, IV MD Gr II, IV DS Gr II, IV DS Gr II, IV DS Gr II, IV   DF MWM 3x10 on 8\" step 2x15 on 8\" step 3x10 on 8\" step 3x10 on 8\" step 3x10 on 8\" step  3x10 on 8\" step  3x10 on 8\" step  3x10 on 8\" step  3x10 on 8\" step 3x10 on 8\" step   STM plantar fascia DS DS DS DS MD SW MD DS DS DS                Neuro Re-Ed             Tandem balance             Static balance on airex  30\"x4    30\" x3 30\" x2 nv 30\"x4    Sand dune march 2 min light UE prn 2 min light UE prn 2 min light UE prn 2 min light UE prn 2 min light UE prn 2 min light UE prn 2 min light UE prn 2 min light UE prn 2 min light UE prn 2 min light UE prn                                                       Ther Ex             VG for ROM L7 7' alt s/l b/l L7 7' alt s/l b/l L7 7' alt s/l b/l L7 7' alt s/l b/l L7 7' alt s/l b/l L7 7' alt " "s/l b/l L7 7' alt s/l b/l L7 7' alt s/l b/l L7 7' alt s/l b/l L7 7' alt s/l b/l   bridges             Resisted side stepping             Standing calf stretch 45\"x4 45\"x4 45\"x4 45\"x4 45\"x4 45\"x4 45\"x4 45\"x4 45\"x4 45\"x4                                                       Ther Activity             Sit to stands  Chair + 2 airex 2x10 no UE Hi lo 22\" 3x5 Hi lo 22\" 3x5 Hi lo 21.5\" 3x5 Hi lo 21.5\" 4x5 Hi lo 21.5\" 4x5 Hi Lo 20.5\" 3x5 Hi Lo 20.5\" 3x5 H lo 20.5\" 4x5 Chair + 2 airex 2x10 no UE     5xSTS  TUGx1           Gait Training                                       Modalities                                                  "

## 2024-10-02 ENCOUNTER — OFFICE VISIT (OUTPATIENT)
Dept: PHYSICAL THERAPY | Facility: REHABILITATION | Age: 74
End: 2024-10-02
Payer: COMMERCIAL

## 2024-10-02 DIAGNOSIS — R26.89 BALANCE DISORDER: ICD-10-CM

## 2024-10-02 DIAGNOSIS — R26.9 GAIT ABNORMALITY: Primary | ICD-10-CM

## 2024-10-02 DIAGNOSIS — M25.579 CHRONIC ANKLE PAIN, UNSPECIFIED LATERALITY: ICD-10-CM

## 2024-10-02 DIAGNOSIS — G89.29 CHRONIC ANKLE PAIN, UNSPECIFIED LATERALITY: ICD-10-CM

## 2024-10-02 PROCEDURE — 97530 THERAPEUTIC ACTIVITIES: CPT | Performed by: PHYSICAL THERAPIST

## 2024-10-02 PROCEDURE — 97110 THERAPEUTIC EXERCISES: CPT | Performed by: PHYSICAL THERAPIST

## 2024-10-02 PROCEDURE — 97140 MANUAL THERAPY 1/> REGIONS: CPT | Performed by: PHYSICAL THERAPIST

## 2024-10-02 NOTE — PROGRESS NOTES
"Daily Note     Today's date: 10/2/2024  Patient name: Estephania Brown  : 1950  MRN: [id number]  Referring provider: Rosalino Purvis MD  Dx:   Encounter Diagnosis     ICD-10-CM    1. Gait abnormality  R26.9       2. Chronic ankle pain, unspecified laterality  M25.579     G89.29       3. Balance disorder  R26.89               Start Time: 1210  Stop Time: 1255  Total time in clinic (min): 45 minutes    Subjective: Patient reports the bottom of her foot is sore today. States she hasn't worn her DF night splint the past few nights.       Objective: See treatment diary below      Assessment: Tolerated treatment well today. She tolerated progression in sit to stands well. Pt will benefit from continued skilled outpatient PT to improve strength, to address therapy goals, to reduce pain, and improve function.        Plan: Continue per plan of care.      Precautions: HTN, OP, fall risk, CA hx, hx of L ankle surgery     FOTO   = 40/57   = 67/57    POC exp =  10/9/24      Manuals 8/26 8/28 9/4 9/9 9/11 9/18 9/23 9/25 9/30 10/2   L ankle TC and ST mobs DS Gr II, IV DS Gr II, IV DS Gr II, IV DS Gr II, IV OR Gr II, IV SW Gr II, IV OR Gr II, IV DS Gr II, IV DS Gr II, IV DS Gr II, IV   DF MWM 3x10 on 8\" step 2x15 on 8\" step 3x10 on 8\" step 3x10 on 8\" step 3x10 on 8\" step  3x10 on 8\" step  3x10 on 8\" step  3x10 on 8\" step  3x10 on 8\" step 3x10 on 8\" step   STM plantar fascia DS DS DS DS OR SW OR DS DS DS                Neuro Re-Ed             Tandem balance             Static balance on airex  30\"x4    30\" x3 30\" x2 nv 30\"x4 30\"x2   Sand dune march 2 min light UE prn 2 min light UE prn 2 min light UE prn 2 min light UE prn 2 min light UE prn 2 min light UE prn 2 min light UE prn 2 min light UE prn 2 min light UE prn 2 min light UE prn                                                       Ther Ex             VG for ROM L7 7' alt s/l b/l L7 7' alt s/l b/l L7 7' alt s/l b/l L7 7' alt s/l b/l L7 7' alt s/l b/l L7 7' " "alt s/l b/l L7 7' alt s/l b/l L7 7' alt s/l b/l L7 7' alt s/l b/l L7 7' alt s/l b/l   bridges             Resisted side stepping             Standing calf stretch 45\"x4 45\"x4 45\"x4 45\"x4 45\"x4 45\"x4 45\"x4 45\"x4 45\"x4 45\"x4                                                       Ther Activity             Sit to stands  Chair + 2 airex 2x10 no UE Hi lo 22\" 3x5 Hi lo 22\" 3x5 Hi lo 21.5\" 3x5 Hi lo 21.5\" 4x5 Hi lo 21.5\" 4x5 Hi Lo 20.5\" 3x5 Hi Lo 20.5\" 3x5 H lo 20.5\" 4x5 H lo 20\" 4x5     5xSTS  TUGx1           Gait Training                                       Modalities                                                  "

## 2024-10-07 ENCOUNTER — OFFICE VISIT (OUTPATIENT)
Dept: PHYSICAL THERAPY | Facility: REHABILITATION | Age: 74
End: 2024-10-07
Payer: COMMERCIAL

## 2024-10-07 DIAGNOSIS — R26.89 BALANCE DISORDER: ICD-10-CM

## 2024-10-07 DIAGNOSIS — M25.579 CHRONIC ANKLE PAIN, UNSPECIFIED LATERALITY: ICD-10-CM

## 2024-10-07 DIAGNOSIS — R26.9 GAIT ABNORMALITY: Primary | ICD-10-CM

## 2024-10-07 DIAGNOSIS — G89.29 CHRONIC ANKLE PAIN, UNSPECIFIED LATERALITY: ICD-10-CM

## 2024-10-07 PROCEDURE — 97140 MANUAL THERAPY 1/> REGIONS: CPT | Performed by: PHYSICAL THERAPIST

## 2024-10-07 PROCEDURE — 97110 THERAPEUTIC EXERCISES: CPT | Performed by: PHYSICAL THERAPIST

## 2024-10-07 PROCEDURE — 97530 THERAPEUTIC ACTIVITIES: CPT | Performed by: PHYSICAL THERAPIST

## 2024-10-07 NOTE — PROGRESS NOTES
"Daily Note     Today's date: 10/7/2024  Patient name: Estephania Brown  : 1950  MRN: [id number]  Referring provider: Rosalino Purvis MD  Dx:   Encounter Diagnosis     ICD-10-CM    1. Gait abnormality  R26.9       2. Chronic ankle pain, unspecified laterality  M25.579     G89.29       3. Balance disorder  R26.89                 Start Time: 1215  Stop Time: 1300  Total time in clinic (min): 45 minutes    Subjective: Patient reports her ankle is very painful today, unsure why. States she had to walk with two canes all morning due to the pain.    Objective: See treatment diary below      Assessment: Pt noted relief of pain following manuals. Also added MHP to the L ankle during VG for pain relief. Pt will benefit from continued skilled outpatient PT to improve strength, to address therapy goals, to reduce pain, and improve function.        Plan: Continue per plan of care.      Precautions: HTN, OP, fall risk, CA hx, hx of L ankle surgery     FOTO   = 40/57   = 67/57    POC exp =  10/9/24      Manuals 10/7 8/28 9/4 9/9 9/11 9/18 9/23 9/25 9/30 10/2   L ankle TC and ST mobs DS Gr II, IV DS Gr II, IV DS Gr II, IV DS Gr II, IV AK Gr II, IV SW Gr II, IV AK Gr II, IV DS Gr II, IV DS Gr II, IV DS Gr II, IV   DF MWM 3x10 on 8\" step 2x15 on 8\" step 3x10 on 8\" step 3x10 on 8\" step 3x10 on 8\" step  3x10 on 8\" step  3x10 on 8\" step  3x10 on 8\" step  3x10 on 8\" step 3x10 on 8\" step   STM plantar fascia DS DS DS DS AK SW AK DS DS DS                Neuro Re-Ed             Tandem balance             Static balance on airex 30\"x2 30\"x4    30\" x3 30\" x2 nv 30\"x4 30\"x2   Sand dune march 2 min light UE prn 2 min light UE prn 2 min light UE prn 2 min light UE prn 2 min light UE prn 2 min light UE prn 2 min light UE prn 2 min light UE prn 2 min light UE prn 2 min light UE prn                                                       Ther Ex             VG for ROM L7 7' alt s/l b/l with MHP to ankle L7 7' alt s/l b/l L7 7' alt " "s/l b/l L7 7' alt s/l b/l L7 7' alt s/l b/l L7 7' alt s/l b/l L7 7' alt s/l b/l L7 7' alt s/l b/l L7 7' alt s/l b/l L7 7' alt s/l b/l   bridges             Resisted side stepping             Standing calf stretch 45\"x4 45\"x4 45\"x4 45\"x4 45\"x4 45\"x4 45\"x4 45\"x4 45\"x4 45\"x4                                                       Ther Activity             Sit to stands  Hi lo 20\" 4x5 Hi lo 22\" 3x5 Hi lo 22\" 3x5 Hi lo 21.5\" 3x5 Hi lo 21.5\" 4x5 Hi lo 21.5\" 4x5 Hi Lo 20.5\" 3x5 Hi Lo 20.5\" 3x5 H lo 20.5\" 4x5 H lo 20\" 4x5     5xSTS  TUGx1           Gait Training                                       Modalities                                                  "

## 2024-10-09 ENCOUNTER — OFFICE VISIT (OUTPATIENT)
Dept: PHYSICAL THERAPY | Facility: REHABILITATION | Age: 74
End: 2024-10-09
Payer: COMMERCIAL

## 2024-10-09 DIAGNOSIS — R26.9 GAIT ABNORMALITY: Primary | ICD-10-CM

## 2024-10-09 DIAGNOSIS — M25.579 CHRONIC ANKLE PAIN, UNSPECIFIED LATERALITY: ICD-10-CM

## 2024-10-09 DIAGNOSIS — R26.89 BALANCE DISORDER: ICD-10-CM

## 2024-10-09 DIAGNOSIS — G89.29 CHRONIC ANKLE PAIN, UNSPECIFIED LATERALITY: ICD-10-CM

## 2024-10-09 PROCEDURE — 97110 THERAPEUTIC EXERCISES: CPT | Performed by: PHYSICAL THERAPIST

## 2024-10-09 PROCEDURE — 97140 MANUAL THERAPY 1/> REGIONS: CPT | Performed by: PHYSICAL THERAPIST

## 2024-10-09 PROCEDURE — 97530 THERAPEUTIC ACTIVITIES: CPT | Performed by: PHYSICAL THERAPIST

## 2024-10-09 NOTE — PROGRESS NOTES
"Daily Note     Today's date: 10/9/2024  Patient name: Estephania Brown  : 1950  MRN: [id number]  Referring provider: Rosalino Purvis MD  Dx:   Encounter Diagnosis     ICD-10-CM    1. Gait abnormality  R26.9       2. Chronic ankle pain, unspecified laterality  M25.579     G89.29       3. Balance disorder  R26.89             Start Time: 1212  Stop Time: 1258  Total time in clinic (min): 46 minutes    Subjective: Patient reports her ankle is much better today then last visit. States the MHP didn't seem to make a difference just the manuals and PT helped.    Objective: See treatment diary below      Assessment: Pt responded well to tx and had less TTP today.  Pt will benefit from continued skilled outpatient PT to improve strength, to address therapy goals, to reduce pain, and improve function.        Plan: Continue per plan of care.      Precautions: HTN, OP, fall risk, CA hx, hx of L ankle surgery     FOTO   = 40/57   = 67/57    POC exp =  10/9/24      Manuals 10/7 10/9 9/4 9/9 9/11 9/18 9/23 9/25 9/30 10/2   L ankle TC and ST mobs DS Gr II, IV DS Gr II, IV DS Gr II, IV DS Gr II, IV HI Gr II, IV SW Gr II, IV HI Gr II, IV DS Gr II, IV DS Gr II, IV DS Gr II, IV   DF MWM 3x10 on 8\" step 2x15 on 8\" step 3x10 on 8\" step 3x10 on 8\" step 3x10 on 8\" step  3x10 on 8\" step  3x10 on 8\" step  3x10 on 8\" step  3x10 on 8\" step 3x10 on 8\" step   STM plantar fascia DS DS DS DS HI SW HI DS DS DS                Neuro Re-Ed             Tandem balance             Static balance on airex 30\"x2 30\"30\"x2 tandem    30\" x3 30\" x2 nv 30\"x4 30\"x2   Sand dune march 2 min light UE prn 2 min light UE prn 2 min light UE prn 2 min light UE prn 2 min light UE prn 2 min light UE prn 2 min light UE prn 2 min light UE prn 2 min light UE prn 2 min light UE prn                                                       Ther Ex             VG for ROM L7 7' alt s/l b/l with MHP to ankle L7 7' alt s/l b/l L7 7' alt s/l b/l L7 7' alt s/l b/l L7 " "7' alt s/l b/l L7 7' alt s/l b/l L7 7' alt s/l b/l L7 7' alt s/l b/l L7 7' alt s/l b/l L7 7' alt s/l b/l   bridges             Resisted side stepping             Standing calf stretch 45\"x4 45\"x4 45\"x4 45\"x4 45\"x4 45\"x4 45\"x4 45\"x4 45\"x4 45\"x4                                                       Ther Activity             Sit to stands  Hi lo 20\" 4x5 Hi lo 20\" 3x5 Hi lo 22\" 3x5 Hi lo 21.5\" 3x5 Hi lo 21.5\" 4x5 Hi lo 21.5\" 4x5 Hi Lo 20.5\" 3x5 Hi Lo 20.5\" 3x5 H lo 20.5\" 4x5 H lo 20\" 4x5                Gait Training                                       Modalities                                                  "

## 2024-10-13 DIAGNOSIS — I10 ESSENTIAL HYPERTENSION: ICD-10-CM

## 2024-10-14 ENCOUNTER — TELEPHONE (OUTPATIENT)
Age: 74
End: 2024-10-14

## 2024-10-14 ENCOUNTER — IMMUNIZATIONS (OUTPATIENT)
Dept: FAMILY MEDICINE CLINIC | Facility: CLINIC | Age: 74
End: 2024-10-14
Payer: COMMERCIAL

## 2024-10-14 ENCOUNTER — OFFICE VISIT (OUTPATIENT)
Dept: PHYSICAL THERAPY | Facility: REHABILITATION | Age: 74
End: 2024-10-14
Payer: COMMERCIAL

## 2024-10-14 DIAGNOSIS — Z23 ENCOUNTER FOR IMMUNIZATION: Primary | ICD-10-CM

## 2024-10-14 DIAGNOSIS — R26.9 GAIT ABNORMALITY: Primary | ICD-10-CM

## 2024-10-14 DIAGNOSIS — R26.89 BALANCE DISORDER: ICD-10-CM

## 2024-10-14 DIAGNOSIS — G89.29 CHRONIC ANKLE PAIN, UNSPECIFIED LATERALITY: ICD-10-CM

## 2024-10-14 DIAGNOSIS — M25.579 CHRONIC ANKLE PAIN, UNSPECIFIED LATERALITY: ICD-10-CM

## 2024-10-14 PROCEDURE — 97110 THERAPEUTIC EXERCISES: CPT | Performed by: PHYSICAL THERAPIST

## 2024-10-14 PROCEDURE — 97530 THERAPEUTIC ACTIVITIES: CPT | Performed by: PHYSICAL THERAPIST

## 2024-10-14 PROCEDURE — 97140 MANUAL THERAPY 1/> REGIONS: CPT | Performed by: PHYSICAL THERAPIST

## 2024-10-14 PROCEDURE — G0008 ADMIN INFLUENZA VIRUS VAC: HCPCS

## 2024-10-14 PROCEDURE — 90662 IIV NO PRSV INCREASED AG IM: CPT

## 2024-10-14 NOTE — TELEPHONE ENCOUNTER
Patient is requesting a script for the RSV vaccine to have done at her pharmacy. She would like to  - please call when ready. Thank you.

## 2024-10-14 NOTE — PROGRESS NOTES
"Daily Note     Today's date: 10/14/2024  Patient name: Estephania Brown  : 1950  MRN: [id number]  Referring provider: Rosalino Purvis MD  Dx:   Encounter Diagnosis     ICD-10-CM    1. Gait abnormality  R26.9       2. Chronic ankle pain, unspecified laterality  M25.579     G89.29       3. Balance disorder  R26.89             Start Time: 1210  Stop Time: 1255  Total time in clinic (min): 45 minutes    Subjective: Patient reports she has been stretching her calf every morning which helps a bit.      Objective: See treatment diary below      Assessment: Pt is making slow but steady progress. Pt will benefit from continued skilled outpatient PT to improve strength, to address therapy goals, to reduce pain, and improve function.        Plan: Continue per plan of care.      Precautions: HTN, OP, fall risk, CA hx, hx of L ankle surgery     FOTO   = 40/57   = 67/57    POC exp =  10/9/24      Manuals 10/7 10/9 10/14 9/9 9/11 9/18 9/23 9/25 9/30 10/2   L ankle TC and ST mobs DS Gr II, IV DS Gr II, IV DS Gr II, IV DS Gr II, IV PA Gr II, IV SW Gr II, IV PA Gr II, IV DS Gr II, IV DS Gr II, IV DS Gr II, IV   DF MWM 3x10 on 8\" step 2x15 on 8\" step 3x10 on 8\" step 3x10 on 8\" step 3x10 on 8\" step  3x10 on 8\" step  3x10 on 8\" step  3x10 on 8\" step  3x10 on 8\" step 3x10 on 8\" step   STM plantar fascia DS DS DS DS PA SW PA DS DS DS                Neuro Re-Ed             Tandem balance             Static balance on airex 30\"x2 30\"x2 tandem 30\"x2 tandem   30\" x3 30\" x2 nv 30\"x4 30\"x2   Sand dune march 2 min light UE prn 2 min light UE prn 2 min light UE prn 2 min light UE prn 2 min light UE prn 2 min light UE prn 2 min light UE prn 2 min light UE prn 2 min light UE prn 2 min light UE prn                                                       Ther Ex             VG for ROM L7 7' alt s/l b/l with MHP to ankle L7 7' alt s/l b/l L7 7' alt s/l b/l L7 7' alt s/l b/l L7 7' alt s/l b/l L7 7' alt s/l b/l L7 7' alt s/l b/l L7 7' " "alt s/l b/l L7 7' alt s/l b/l L7 7' alt s/l b/l   bridges             Resisted side stepping             Standing calf stretch 45\"x4 45\"x4 45\"x4 45\"x4 45\"x4 45\"x4 45\"x4 45\"x4 45\"x4 45\"x4                                                       Ther Activity             Sit to stands  Hi lo 20\" 4x5 Hi lo 20\" 4x5 Hi lo 20\" 4x5 Hi lo 21.5\" 3x5 Hi lo 21.5\" 4x5 Hi lo 21.5\" 4x5 Hi Lo 20.5\" 3x5 Hi Lo 20.5\" 3x5 H lo 20.5\" 4x5 H lo 20\" 4x5                Gait Training                                       Modalities                                                  "

## 2024-10-15 RX ORDER — METOPROLOL TARTRATE 50 MG
50 TABLET ORAL DAILY
Qty: 90 TABLET | Refills: 1 | Status: SHIPPED | OUTPATIENT
Start: 2024-10-15

## 2024-10-16 ENCOUNTER — APPOINTMENT (OUTPATIENT)
Dept: PHYSICAL THERAPY | Facility: REHABILITATION | Age: 74
End: 2024-10-16
Payer: COMMERCIAL

## 2024-10-17 ENCOUNTER — EVALUATION (OUTPATIENT)
Dept: PHYSICAL THERAPY | Facility: REHABILITATION | Age: 74
End: 2024-10-17
Payer: COMMERCIAL

## 2024-10-17 DIAGNOSIS — M25.579 CHRONIC ANKLE PAIN, UNSPECIFIED LATERALITY: ICD-10-CM

## 2024-10-17 DIAGNOSIS — R26.89 BALANCE DISORDER: ICD-10-CM

## 2024-10-17 DIAGNOSIS — G89.29 CHRONIC ANKLE PAIN, UNSPECIFIED LATERALITY: ICD-10-CM

## 2024-10-17 DIAGNOSIS — R26.9 GAIT ABNORMALITY: Primary | ICD-10-CM

## 2024-10-17 PROCEDURE — 97530 THERAPEUTIC ACTIVITIES: CPT | Performed by: PHYSICAL THERAPIST

## 2024-10-17 PROCEDURE — 97140 MANUAL THERAPY 1/> REGIONS: CPT | Performed by: PHYSICAL THERAPIST

## 2024-10-17 PROCEDURE — 97110 THERAPEUTIC EXERCISES: CPT | Performed by: PHYSICAL THERAPIST

## 2024-10-17 NOTE — PROGRESS NOTES
Daily Note/Re-Eval     Today's date: 10/17/2024  Patient name: Estephania Brown  : 1950  MRN: [id number]  Referring provider: Rosalino Purvis MD  Dx:   Encounter Diagnosis     ICD-10-CM    1. Gait abnormality  R26.9       2. Balance disorder  R26.89       3. Chronic ankle pain, unspecified laterality  M25.579     G89.29           Start Time: 1130  Stop Time: 1215  Total time in clinic (min): 45 minutes    Subjective: Pt reports 45-50% improvement since starting therapy. Today, patient feels good now but was in pain this morning. States morning ankle pain and stiffness is always the worst.     Pain  Current pain ratin  At best pain ratin at eval, 0 during sitting   At worst pain ratin at eval, 9 random  Location: arch of foot  Quality: sharp  Alleviating factors: stretching, PT in the past.  Aggravating factors: standing and walking      Objective: See treatment diary below    Joint Assessment:  Hypomobile throughout subtalar and talocrural      Lower Extremity Strength    MMT   AROM     Knee Left Right Left Right   Flex 5 5       Ext 4+ 4+                   Hip           flex 4+ 4+       ext NT NT       abd 3 3+                   Ankle           DF 5 5 lacking 18 at eval, lacking 11 today lacking 10   PF 3+ 3+  59 at eval, 60 today  55   inversion 5 5       Eversion  5  5             Dynamic Balance Tests  5x sit to stand (sec)  >14 sec indicates high risk for falls 23 = 12.12, unable without UE  23 = 13.06,  unable without UE  23 = 11.70, unable without UE  10/23/23 = 11.74, unable without UE  24 = 10.63, unable without UE  24 = 9.78, unable without UE  24 = 10.96, unable without UE  10/17/24= 11.18 unable with UE   TUG (sec)  >14 sec indicates high risk for falls 23 = 16.95 with SPC  23 = 17.48 with SPC  23 = 12.50 with SPC  10/23/23 = 13.41 with SPC  24 = 12.47 with SPC  24 = 11.47 with SPC  24 = 13.13 with SPC  10/17/24=14.74 with  SPC         Assessment: Tolerated treatment and re-eval  well. Patient's ankle strength and ROM has overall continued to improve since starting therapy. Patient is able to perform more STS at lower levels but continues to need UE support from a typical chair. Patient randomly has days where ankle pain is increased, but then symptoms subside post rest and stretching. Walking and standing for longer periods of time increase the ankle pain. Patient demonstrated fatigue post treatment, exhibited good technique with therapeutic exercises, and would benefit from continued PT to improve ankle ROM and strength, decrease pain, increase functional mobility, and decrease fall risk.     Goals  Short term goals: to be met in 6 weeks  Pt independent with initial HEP, rationale, technique and frequency, for ROM and pain control. MET  Pt able to perform 1 sit to stand without UE indicating and improvement in functional LE strength. UNMET  Improve hip abd MMT for improved lumbopelvic stability which is required for walking and balance MET  Pt will achieve an improvement in FOTO score indicating an improvement in pain and function. MET     Long term goals: to be met in 12 weeks  Pt will report a 75% or > reduction in subjective pain complaints/symptoms to better manage ADLs and functional mobility. UNMET  Pt able to perform 3 sit to stands without UE indicating and improvement in functional LE strength. UNMET  Further improve hip abd MMT for improved lumbopelvic stability which is required for walking and balance UNMET  Pt will improve FOTO score to = or better then expected outcome indicating an overall improvement in pain and function MET  Pt independent with rationale, technique and plan for performance of advanced HEP to maintain gains made in therapy. IN PROGRESS  Achieve pts goal: to get stronger, better balance, relieve ankle pain and foot pain IN PROGRESS    Plan: Continue per plan of care.  1-2x a week 12 weeks    "  Precautions: HTN, OP, fall risk, CA hx, hx of L ankle surgery     FOTO  7/17 = 40/57  8/19 = 67/57    POC exp =  10/9/24      Manuals 10/7 10/9 10/14 10/17 9/11 9/18 9/23 9/25 9/30 10/2   L ankle TC and ST mobs DS Gr II, IV DS Gr II, IV DS Gr II, IV SW Gr II, IV CT Gr II, IV SW Gr II, IV CT Gr II, IV DS Gr II, IV DS Gr II, IV DS Gr II, IV   DF MWM 3x10 on 8\" step 2x15 on 8\" step 3x10 on 8\" step np 3x10 on 8\" step  3x10 on 8\" step  3x10 on 8\" step  3x10 on 8\" step  3x10 on 8\" step 3x10 on 8\" step   STM plantar fascia DS DS DS SW CT SW CT DS DS DS   reasses    SW         Neuro Re-Ed             Tandem balance             Static balance on airex 30\"x2 30\"x2 tandem 30\"x2 tandem 30\"x2 each tandem   30\" x3 30\" x2 nv 30\"x4 30\"x2   Sand dune march 2 min light UE prn 2 min light UE prn 2 min light UE prn 2 min light UE prn 2 min light UE prn 2 min light UE prn 2 min light UE prn 2 min light UE prn 2 min light UE prn 2 min light UE prn                                                       Ther Ex             VG for ROM L7 7' alt s/l b/l with MHP to ankle L7 7' alt s/l b/l L7 7' alt s/l b/l L7 8' alt s/l b/l L7 7' alt s/l b/l L7 7' alt s/l b/l L7 7' alt s/l b/l L7 7' alt s/l b/l L7 7' alt s/l b/l L7 7' alt s/l b/l   bridges             Resisted side stepping             Standing calf stretch 45\"x4 45\"x4 45\"x4 45\"x4 45\"x4 45\"x4 45\"x4 45\"x4 45\"x4 45\"x4                                                       Ther Activity             Sit to stands  Hi lo 20\" 4x5 Hi lo 20\" 4x5 Hi lo 20\" 4x5 Hi lo 20\" 4x5 Hi lo 21.5\" 4x5 Hi lo 21.5\" 4x5 Hi Lo 20.5\" 3x5 Hi Lo 20.5\" 3x5 H lo 20.5\" 4x5 H lo 20\" 4x5   TUG and 5x STS    SW         Gait Training                                       Modalities                                                    "

## 2024-10-21 ENCOUNTER — OFFICE VISIT (OUTPATIENT)
Dept: PHYSICAL THERAPY | Facility: REHABILITATION | Age: 74
End: 2024-10-21
Payer: COMMERCIAL

## 2024-10-21 DIAGNOSIS — R26.89 BALANCE DISORDER: ICD-10-CM

## 2024-10-21 DIAGNOSIS — R26.9 GAIT ABNORMALITY: Primary | ICD-10-CM

## 2024-10-21 PROCEDURE — 97140 MANUAL THERAPY 1/> REGIONS: CPT | Performed by: PHYSICAL THERAPIST

## 2024-10-21 PROCEDURE — 97530 THERAPEUTIC ACTIVITIES: CPT | Performed by: PHYSICAL THERAPIST

## 2024-10-21 PROCEDURE — 97110 THERAPEUTIC EXERCISES: CPT | Performed by: PHYSICAL THERAPIST

## 2024-10-21 NOTE — PROGRESS NOTES
"Daily Note     Today's date: 10/21/2024  Patient name: Estephania Brown  : 1950  MRN: [id number]  Referring provider: Rosalino Purvis MD  Dx:   Encounter Diagnosis     ICD-10-CM    1. Gait abnormality  R26.9       2. Balance disorder  R26.89           Start Time: 1215  Stop Time: 1255  Total time in clinic (min): 40 minutes    Subjective: Patient reports that her R arch is in pain today. No known JIMMY      Objective: See treatment diary below      Assessment: Tolerated treatment well. Slight increase in pain in the arch during TC mobs, but rest allowed for pain to decrease. Educated on strengthening foot intrinsics at home via toe crunches. Patient exhibited good technique with therapeutic exercises and would benefit from continued PT      Plan: Continue per plan of care.  Progress treatment as tolerated.       Precautions: HTN, OP, fall risk, CA hx, hx of L ankle surgery     FOTO   = 40/57   = 67/57    POC exp =  10/9/24      Manuals 10/7 10/9 10/14 10/17 10/21 9/18 9/23 9/25 9/30 10   L ankle TC and ST mobs DS Gr II, IV DS Gr II, IV DS Gr II, IV SW Gr II, IV SW    SW Gr II, IV WI Gr II, IV DS Gr II, IV DS Gr II, IV DS Gr II, IV   DF MWM 3x10 on 8\" step 2x15 on 8\" step 3x10 on 8\" step np 3x10 on 8\" step  3x10 on 8\" step  3x10 on 8\" step  3x10 on 8\" step  3x10 on 8\" step 3x10 on 8\" step   STM plantar fascia DS DS DS SW SW SW WI DS DS DS   reasses    SW         Neuro Re-Ed             Tandem balance             Static balance on airex 30\"x2 30\"x2 tandem 30\"x2 tandem 30\"x2 each tandem  30\"x2 each tandem  30\" x3 30\" x2 nv 30\"x4 30\"x2   Sand dune march 2 min light UE prn 2 min light UE prn 2 min light UE prn 2 min light UE prn 2 min light UE prn 2 min light UE prn 2 min light UE prn 2 min light UE prn 2 min light UE prn 2 min light UE prn                                                       Ther Ex             VG for ROM L7 7' alt s/l b/l with MHP to ankle L7 7' alt s/l b/l L7 7' alt s/l b/l L7 8' " "alt s/l b/l L7 7' alt s/l b/l L7 7' alt s/l b/l L7 7' alt s/l b/l L7 7' alt s/l b/l L7 7' alt s/l b/l L7 7' alt s/l b/l   bridges             Resisted side stepping             Standing calf stretch 45\"x4 45\"x4 45\"x4 45\"x4 45\"x4 45\"x4 45\"x4 45\"x4 45\"x4 45\"x4                                                       Ther Activity             Sit to stands  Hi lo 20\" 4x5 Hi lo 20\" 4x5 Hi lo 20\" 4x5 Hi lo 20\" 4x5 Hi lo 20\" 4x5 Hi lo 21.5\" 4x5 Hi Lo 20.5\" 3x5 Hi Lo 20.5\" 3x5 H lo 20.5\" 4x5 H lo 20\" 4x5   TUG and 5x STS    SW         Gait Training                                       Modalities                                                      "

## 2024-10-22 ENCOUNTER — VBI (OUTPATIENT)
Dept: ADMINISTRATIVE | Facility: OTHER | Age: 74
End: 2024-10-22

## 2024-10-22 NOTE — TELEPHONE ENCOUNTER
10/22/24 9:22 AM     Chart reviewed for CRC: Colonoscopy was/were not submitted to the patient's insurance.     Harper Oneil MA   [facility]

## 2024-10-23 ENCOUNTER — OFFICE VISIT (OUTPATIENT)
Dept: PHYSICAL THERAPY | Facility: REHABILITATION | Age: 74
End: 2024-10-23
Payer: COMMERCIAL

## 2024-10-23 DIAGNOSIS — G89.29 CHRONIC ANKLE PAIN, UNSPECIFIED LATERALITY: ICD-10-CM

## 2024-10-23 DIAGNOSIS — M25.579 CHRONIC ANKLE PAIN, UNSPECIFIED LATERALITY: ICD-10-CM

## 2024-10-23 DIAGNOSIS — R26.9 GAIT ABNORMALITY: Primary | ICD-10-CM

## 2024-10-23 DIAGNOSIS — R26.89 BALANCE DISORDER: ICD-10-CM

## 2024-10-23 PROCEDURE — 97140 MANUAL THERAPY 1/> REGIONS: CPT | Performed by: PHYSICAL THERAPIST

## 2024-10-23 PROCEDURE — 97110 THERAPEUTIC EXERCISES: CPT | Performed by: PHYSICAL THERAPIST

## 2024-10-23 PROCEDURE — 97530 THERAPEUTIC ACTIVITIES: CPT | Performed by: PHYSICAL THERAPIST

## 2024-10-23 NOTE — PROGRESS NOTES
"Daily Note     Today's date: 10/23/2024  Patient name: Estephania Brown  : 1950  MRN: [id number]  Referring provider: Rosalino Purvis MD  Dx:   Encounter Diagnosis     ICD-10-CM    1. Gait abnormality  R26.9       2. Balance disorder  R26.89       3. Chronic ankle pain, unspecified laterality  M25.579     G89.29             Start Time: 1212  Stop Time: 1254  Total time in clinic (min): 42 minutes    Subjective: Patient reports the arch of her foot is bothersome.       Objective: See treatment diary below      Assessment: Tolerated treatment well. Pt noted less discomfort post tx.  Pt will benefit from continued skilled outpatient PT to improve strength, to address therapy goals, to reduce pain, and improve function.        Plan: Continue per plan of care.  Progress treatment as tolerated.       Precautions: HTN, OP, fall risk, CA hx, hx of L ankle surgery     FOTO   = 40/57   = 67/57    POC exp =  25      Manuals 10/7 10/9 10/14 10/17 10/21 10/23 9/23 9/25 9/30 10   L ankle TC and ST mobs DS Gr II, IV DS Gr II, IV DS Gr II, IV SW Gr II, IV SW    DS Gr II, IV AK Gr II, IV DS Gr II, IV DS Gr II, IV DS Gr II, IV   DF MWM 3x10 on 8\" step 2x15 on 8\" step 3x10 on 8\" step np 3x10 on 8\" step  3x10 on 8\" step  3x10 on 8\" step  3x10 on 8\" step  3x10 on 8\" step 3x10 on 8\" step   STM plantar fascia DS DS DS SW SW DS AK DS DS DS   reasses    SW         Neuro Re-Ed             Tandem balance             Static balance on airex 30\"x2 30\"x2 tandem 30\"x2 tandem 30\"x2 each tandem  30\"x2 each tandem  30\"x2 ea 30\" x2 nv 30\"x4 30\"x2   Sand dune march 2 min light UE prn 2 min light UE prn 2 min light UE prn 2 min light UE prn 2 min light UE prn 2 min light UE prn 2 min light UE prn 2 min light UE prn 2 min light UE prn 2 min light UE prn                                                       Ther Ex             VG for ROM L7 7' alt s/l b/l with MHP to ankle L7 7' alt s/l b/l L7 7' alt s/l b/l L7 8' alt s/l b/l L7 " "7' alt s/l b/l L7 7' alt s/l b/l L7 7' alt s/l b/l L7 7' alt s/l b/l L7 7' alt s/l b/l L7 7' alt s/l b/l   bridges             Resisted side stepping             Standing calf stretch 45\"x4 45\"x4 45\"x4 45\"x4 45\"x4 45\"x4 45\"x4 45\"x4 45\"x4 45\"x4                                                       Ther Activity             Sit to stands  Hi lo 20\" 4x5 Hi lo 20\" 4x5 Hi lo 20\" 4x5 Hi lo 20\" 4x5 Hi lo 20\" 4x5 Hi lo 20\" 4x5 Hi Lo 20.5\" 3x5 Hi Lo 20.5\" 3x5 H lo 20.5\" 4x5 H lo 20\" 4x5   TUG and 5x STS    SW         Gait Training                                       Modalities                                                      "

## 2024-10-28 ENCOUNTER — OFFICE VISIT (OUTPATIENT)
Dept: PHYSICAL THERAPY | Facility: REHABILITATION | Age: 74
End: 2024-10-28
Payer: COMMERCIAL

## 2024-10-28 DIAGNOSIS — M25.579 CHRONIC ANKLE PAIN, UNSPECIFIED LATERALITY: ICD-10-CM

## 2024-10-28 DIAGNOSIS — R26.89 BALANCE DISORDER: ICD-10-CM

## 2024-10-28 DIAGNOSIS — G89.29 CHRONIC ANKLE PAIN, UNSPECIFIED LATERALITY: ICD-10-CM

## 2024-10-28 DIAGNOSIS — R26.9 GAIT ABNORMALITY: Primary | ICD-10-CM

## 2024-10-28 PROCEDURE — 97110 THERAPEUTIC EXERCISES: CPT

## 2024-10-28 PROCEDURE — 97112 NEUROMUSCULAR REEDUCATION: CPT

## 2024-10-28 PROCEDURE — 97140 MANUAL THERAPY 1/> REGIONS: CPT

## 2024-10-28 NOTE — PROGRESS NOTES
"Daily Note     Today's date: 10/28/2024  Patient name: Estephania Brown  : 1950  MRN: [id number]  Referring provider: Rosalino Purvis MD  Dx:   Encounter Diagnosis     ICD-10-CM    1. Gait abnormality  R26.9       2. Balance disorder  R26.89       3. Chronic ankle pain, unspecified laterality  M25.579     G89.29           Start Time: 1215  Stop Time: 1303  Total time in clinic (min): 48 minutes    Subjective: Pt reports her L ankle is very sore today.        Objective: See treatment diary below      Assessment: Tolerated treatment well. Responded well to manual treatment and assigned exercise with decreased soreness by end of treatment.  Challenged with STS but able to complete reps/sets.  Patient would benefit from continued PT to further improve strength, reduce soft tissue restrictions, and maximize overall function with ADL's.        Plan: Continue per plan of care.      Precautions: HTN, OP, fall risk, CA hx, hx of L ankle surgery     FOTO   = 40/57   = 67/57    POC exp =  25      Manuals 10/7 10/9 10/14 10/17 10/21 10/23 10/28  9/30 10   L ankle TC and ST mobs DS Gr II, IV DS Gr II, IV DS Gr II, IV SW Gr II, IV SW    DS Gr II, IV SW Gr II, IV  DS Gr II, IV DS Gr II, IV   DF MWM 3x10 on 8\" step 2x15 on 8\" step 3x10 on 8\" step np 3x10 on 8\" step  3x10 on 8\" step  3x10 on 8\" step SW  3x10 on 8\" step 3x10 on 8\" step   STM plantar fascia DS DS DS SW SW DS AFB  DS DS   reasses    SW         Neuro Re-Ed             Tandem balance             Static balance on airex 30\"x2 30\"x2 tandem 30\"x2 tandem 30\"x2 each tandem  30\"x2 each tandem  30\"x2 ea 30\"x2 ea  tandem  30\"x4 30\"x2   Sand dune march 2 min light UE prn 2 min light UE prn 2 min light UE prn 2 min light UE prn 2 min light UE prn 2 min light UE prn 2 min light UE prn  2 min light UE prn 2 min light UE prn                                                       Ther Ex             VG for ROM L7 7' alt s/l b/l with MHP to ankle L7 7' alt s/l " "b/l L7 7' alt s/l b/l L7 8' alt s/l b/l L7 7' alt s/l b/l L7 7' alt s/l b/l L7 7' alt s/l b/l  L7 7' alt s/l b/l L7 7' alt s/l b/l   bridges             Resisted side stepping             Standing calf stretch 45\"x4 45\"x4 45\"x4 45\"x4 45\"x4 45\"x4 45\"x4  45\"x4 45\"x4                                                       Ther Activity             Sit to stands  Hi lo 20\" 4x5 Hi lo 20\" 4x5 Hi lo 20\" 4x5 Hi lo 20\" 4x5 Hi lo 20\" 4x5 Hi lo 20\" 4x5 Hi lo 20\" 4x5  H lo 20.5\" 4x5 H lo 20\" 4x5   TUG and 5x STS    SW         Gait Training                                       Modalities                                                        "

## 2024-10-30 ENCOUNTER — OFFICE VISIT (OUTPATIENT)
Dept: PHYSICAL THERAPY | Facility: REHABILITATION | Age: 74
End: 2024-10-30
Payer: COMMERCIAL

## 2024-10-30 DIAGNOSIS — G89.29 CHRONIC ANKLE PAIN, UNSPECIFIED LATERALITY: ICD-10-CM

## 2024-10-30 DIAGNOSIS — R26.9 GAIT ABNORMALITY: Primary | ICD-10-CM

## 2024-10-30 DIAGNOSIS — M25.579 CHRONIC ANKLE PAIN, UNSPECIFIED LATERALITY: ICD-10-CM

## 2024-10-30 DIAGNOSIS — R26.89 BALANCE DISORDER: ICD-10-CM

## 2024-10-30 PROCEDURE — 97140 MANUAL THERAPY 1/> REGIONS: CPT | Performed by: PHYSICAL THERAPIST

## 2024-10-30 PROCEDURE — 97110 THERAPEUTIC EXERCISES: CPT | Performed by: PHYSICAL THERAPIST

## 2024-10-30 PROCEDURE — 97112 NEUROMUSCULAR REEDUCATION: CPT | Performed by: PHYSICAL THERAPIST

## 2024-10-30 NOTE — PROGRESS NOTES
"Daily Note     Today's date: 10/30/2024  Patient name: Estephania Brown  : 1950  MRN: [id number]  Referring provider: Rosalino Purvis MD  Dx:   Encounter Diagnosis     ICD-10-CM    1. Gait abnormality  R26.9       2. Balance disorder  R26.89       3. Chronic ankle pain, unspecified laterality  M25.579     G89.29           Start Time: 1530  Stop Time: 1620  Total time in clinic (min): 50 minutes    Subjective: Pt reports her ankle joint is very sore and bothersome today.      Objective: See treatment diary below      Assessment: Tolerated treatment well. She noted relief following treatment. Focused more so on TC joint mobility today. Pt will benefit from continued skilled outpatient PT to improve strength, to address therapy goals, to reduce pain, and improve function.        Plan: Continue per plan of care.      Precautions: HTN, OP, fall risk, CA hx, hx of L ankle surgery     FOTO   = 40/57   = 67/57    POC exp =  25      Manuals 10/7 10/9 10/14 10/17 10/21 10/23 10/28 10/30     L ankle TC and ST mobs DS Gr II, IV DS Gr II, IV DS Gr II, IV SW Gr II, IV SW    DS Gr II, IV SW Gr II, IV DS Gr II, IV     DF MWM 3x10 on 8\" step 2x15 on 8\" step 3x10 on 8\" step np 3x10 on 8\" step  3x10 on 8\" step  3x10 on 8\" step SW 3x10 on 8\" step     STM plantar fascia DS DS DS SW SW DS AFB DS     reasses    SW         Neuro Re-Ed             Tandem balance             Static balance on airex 30\"x2 30\"x2 tandem 30\"x2 tandem 30\"x2 each tandem  30\"x2 each tandem  30\"x2 ea 30\"x2 ea  tandem 30\"x2 ea  tandem     Sand dune march 2 min light UE prn 2 min light UE prn 2 min light UE prn 2 min light UE prn 2 min light UE prn 2 min light UE prn 2 min light UE prn 2 min light UE prn                                                         Ther Ex             VG for ROM L7 7' alt s/l b/l with MHP to ankle L7 7' alt s/l b/l L7 7' alt s/l b/l L7 8' alt s/l b/l L7 7' alt s/l b/l L7 7' alt s/l b/l L7 7' alt s/l b/l L7 7' alt s/l " "b/l     bridges             Resisted side stepping             Standing calf stretch 45\"x4 45\"x4 45\"x4 45\"x4 45\"x4 45\"x4 45\"x4 45\"x4                                                         Ther Activity             Sit to stands  Hi lo 20\" 4x5 Hi lo 20\" 4x5 Hi lo 20\" 4x5 Hi lo 20\" 4x5 Hi lo 20\" 4x5 Hi lo 20\" 4x5 Hi lo 20\" 4x5 Hi lo 20\" 4x5     TUG and 5x STS    SW         Gait Training                                       Modalities                                                        "

## 2024-11-04 ENCOUNTER — OFFICE VISIT (OUTPATIENT)
Dept: PHYSICAL THERAPY | Facility: REHABILITATION | Age: 74
End: 2024-11-04
Payer: COMMERCIAL

## 2024-11-04 DIAGNOSIS — G89.29 CHRONIC ANKLE PAIN, UNSPECIFIED LATERALITY: ICD-10-CM

## 2024-11-04 DIAGNOSIS — R26.9 GAIT ABNORMALITY: Primary | ICD-10-CM

## 2024-11-04 DIAGNOSIS — M25.579 CHRONIC ANKLE PAIN, UNSPECIFIED LATERALITY: ICD-10-CM

## 2024-11-04 DIAGNOSIS — R26.89 BALANCE DISORDER: ICD-10-CM

## 2024-11-04 PROCEDURE — 97110 THERAPEUTIC EXERCISES: CPT

## 2024-11-04 PROCEDURE — 97140 MANUAL THERAPY 1/> REGIONS: CPT

## 2024-11-04 PROCEDURE — 97112 NEUROMUSCULAR REEDUCATION: CPT

## 2024-11-04 NOTE — PROGRESS NOTES
"Daily Note     Today's date: 2024  Patient name: Estephania Brown  : 1950  MRN: [id number]  Referring provider: Rosalino Purvis MD  Dx:   Encounter Diagnosis     ICD-10-CM    1. Gait abnormality  R26.9       2. Balance disorder  R26.89       3. Chronic ankle pain, unspecified laterality  M25.579     G89.29                      Subjective: Pt reports her ankle joint is \"the normal amount of sore\" today.       Objective: See treatment diary below      Assessment: Tolerated treatment well. She noted relief following treatment. Focused more so on STM to plantar fascia as pt reports greater pain there today. Pt will benefit from continued skilled outpatient PT to improve strength, to address therapy goals, to reduce pain, and improve function.        Plan: Continue per plan of care.      Precautions: HTN, OP, fall risk, CA hx, hx of L ankle surgery     FOTO   = 40/57   = 67/57    POC exp =  25      Manuals 10/7 10/9 10/14 10/17 10/21 10/23 10/28 10/30 11/4    L ankle TC and ST mobs DS Gr II, IV DS Gr II, IV DS Gr II, IV SW Gr II, IV SW    DS Gr II, IV SW Gr II, IV DS Gr II, IV VR Gr II, IV    DF MWM 3x10 on 8\" step 2x15 on 8\" step 3x10 on 8\" step np 3x10 on 8\" step  3x10 on 8\" step  3x10 on 8\" step SW 3x10 on 8\" step     STM plantar fascia DS DS DS SW SW DS AFB DS VR 15'    reasses    SW         Neuro Re-Ed             Tandem balance             Static balance on airex 30\"x2 30\"x2 tandem 30\"x2 tandem 30\"x2 each tandem  30\"x2 each tandem  30\"x2 ea 30\"x2 ea  tandem 30\"x2 ea  tandem 30\"x2 ea  tandem    Sand dune march 2 min light UE prn 2 min light UE prn 2 min light UE prn 2 min light UE prn 2 min light UE prn 2 min light UE prn 2 min light UE prn 2 min light UE prn 2 min light UE prn                                                        Ther Ex             VG for ROM L7 7' alt s/l b/l with MHP to ankle L7 7' alt s/l b/l L7 7' alt s/l b/l L7 8' alt s/l b/l L7 7' alt s/l b/l L7 7' alt s/l b/l L7 " "7' alt s/l b/l L7 7' alt s/l b/l L7 7' alt s/l b/l    bridges             Resisted side stepping             Standing calf stretch 45\"x4 45\"x4 45\"x4 45\"x4 45\"x4 45\"x4 45\"x4 45\"x4 45\"x4                                                        Ther Activity             Sit to stands  Hi lo 20\" 4x5 Hi lo 20\" 4x5 Hi lo 20\" 4x5 Hi lo 20\" 4x5 Hi lo 20\" 4x5 Hi lo 20\" 4x5 Hi lo 20\" 4x5 Hi lo 20\" 4x5 Hi lo 20\" 5x5    TUG and 5x STS    SW         Gait Training                                       Modalities                                                        "

## 2024-11-06 ENCOUNTER — OFFICE VISIT (OUTPATIENT)
Dept: PHYSICAL THERAPY | Facility: REHABILITATION | Age: 74
End: 2024-11-06
Payer: COMMERCIAL

## 2024-11-06 DIAGNOSIS — M25.579 CHRONIC ANKLE PAIN, UNSPECIFIED LATERALITY: ICD-10-CM

## 2024-11-06 DIAGNOSIS — R26.9 GAIT ABNORMALITY: Primary | ICD-10-CM

## 2024-11-06 DIAGNOSIS — R26.89 BALANCE DISORDER: ICD-10-CM

## 2024-11-06 DIAGNOSIS — G89.29 CHRONIC ANKLE PAIN, UNSPECIFIED LATERALITY: ICD-10-CM

## 2024-11-06 PROCEDURE — 97140 MANUAL THERAPY 1/> REGIONS: CPT

## 2024-11-06 PROCEDURE — 97112 NEUROMUSCULAR REEDUCATION: CPT

## 2024-11-06 PROCEDURE — 97110 THERAPEUTIC EXERCISES: CPT

## 2024-11-06 NOTE — PROGRESS NOTES
"Daily Note     Today's date: 2024  Patient name: Estephania Brown  : 1950  MRN: [id number]  Referring provider: Rosalino Purvis MD  Dx:   Encounter Diagnosis     ICD-10-CM    1. Gait abnormality  R26.9       2. Balance disorder  R26.89       3. Chronic ankle pain, unspecified laterality  M25.579     G89.29                      Subjective: Pt reports her ankle joint is \"the normal amount of sore\" today.       Objective: See treatment diary below      Assessment: Tolerated treatment well. She noted relief following treatment. Focused more so on STM to plantar fascia as pt reports greater pain there today, most tender around the heel. Pt will benefit from continued skilled outpatient PT to improve strength, to address therapy goals, to reduce pain, and improve function.        Plan: Continue per plan of care.      Precautions: HTN, OP, fall risk, CA hx, hx of L ankle surgery     FOTO   = 40/57   = 67/57    POC exp =  25      Manuals 10/7 10/9 10/14 10/17 10/21 10/23 10/28 10/30 11/4 11/6   L ankle TC and ST mobs DS Gr II, IV DS Gr II, IV DS Gr II, IV SW Gr II, IV SW    DS Gr II, IV SW Gr II, IV DS Gr II, IV VR Gr II, IV VR Gr II, IV   DF MWM 3x10 on 8\" step 2x15 on 8\" step 3x10 on 8\" step np 3x10 on 8\" step  3x10 on 8\" step  3x10 on 8\" step SW 3x10 on 8\" step     STM plantar fascia DS DS DS SW SW DS AFB DS VR 15' VR 15'   reasses    SW         Neuro Re-Ed             Tandem balance             Static balance on airex 30\"x2 30\"x2 tandem 30\"x2 tandem 30\"x2 each tandem  30\"x2 each tandem  30\"x2 ea 30\"x2 ea  tandem 30\"x2 ea  tandem 30\"x2 ea  tandem 30\"x2 ea  tandem   Sand dune march 2 min light UE prn 2 min light UE prn 2 min light UE prn 2 min light UE prn 2 min light UE prn 2 min light UE prn 2 min light UE prn 2 min light UE prn 2 min light UE prn 2 min light UE prn                                                       Ther Ex             VG for ROM L7 7' alt s/l b/l with MHP to ankle L7 7' " "alt s/l b/l L7 7' alt s/l b/l L7 8' alt s/l b/l L7 7' alt s/l b/l L7 7' alt s/l b/l L7 7' alt s/l b/l L7 7' alt s/l b/l L7 7' alt s/l b/l L7 7' alt s/l b/l   bridges             Resisted side stepping             Standing calf stretch 45\"x4 45\"x4 45\"x4 45\"x4 45\"x4 45\"x4 45\"x4 45\"x4 45\"x4 45\"x4                                                       Ther Activity             Sit to stands  Hi lo 20\" 4x5 Hi lo 20\" 4x5 Hi lo 20\" 4x5 Hi lo 20\" 4x5 Hi lo 20\" 4x5 Hi lo 20\" 4x5 Hi lo 20\" 4x5 Hi lo 20\" 4x5 Hi lo 20\" 5x5 Hi lo 20\" 5x5   TUG and 5x STS    SW         Gait Training                                       Modalities                                                        "

## 2024-11-11 ENCOUNTER — OFFICE VISIT (OUTPATIENT)
Dept: PHYSICAL THERAPY | Facility: REHABILITATION | Age: 74
End: 2024-11-11
Payer: COMMERCIAL

## 2024-11-11 DIAGNOSIS — M25.579 CHRONIC ANKLE PAIN, UNSPECIFIED LATERALITY: ICD-10-CM

## 2024-11-11 DIAGNOSIS — R26.89 BALANCE DISORDER: ICD-10-CM

## 2024-11-11 DIAGNOSIS — G89.29 CHRONIC ANKLE PAIN, UNSPECIFIED LATERALITY: ICD-10-CM

## 2024-11-11 DIAGNOSIS — R26.9 GAIT ABNORMALITY: Primary | ICD-10-CM

## 2024-11-11 PROCEDURE — 97112 NEUROMUSCULAR REEDUCATION: CPT | Performed by: PHYSICAL THERAPIST

## 2024-11-11 PROCEDURE — 97110 THERAPEUTIC EXERCISES: CPT | Performed by: PHYSICAL THERAPIST

## 2024-11-11 PROCEDURE — 97140 MANUAL THERAPY 1/> REGIONS: CPT | Performed by: PHYSICAL THERAPIST

## 2024-11-11 NOTE — PROGRESS NOTES
"Daily Note     Today's date: 2024  Patient name: Estephania Brown  : 1950  MRN: [id number]  Referring provider: Rosalino Purvis MD  Dx:   Encounter Diagnosis     ICD-10-CM    1. Gait abnormality  R26.9       2. Balance disorder  R26.89       3. Chronic ankle pain, unspecified laterality  M25.579     G89.29             Start Time: 1215  Stop Time: 1300  Total time in clinic (min): 45 minutes    Subjective: Pt reports her ankle is sore today      Objective: See treatment diary below      Assessment: Tolerated treatment well. She continues to make slow gains in functional LE strength and always responds well to tx with reduction in subjective pain. Pt will benefit from continued skilled outpatient PT to improve strength, to address therapy goals, to reduce pain, and improve function.        Plan: Continue per plan of care.      Precautions: HTN, OP, fall risk, CA hx, hx of L ankle surgery       POC exp =  25      Manuals 11/11 10/9 10/14 10/17 10/21 10/23 10/28 10/30 11/4 11/6   L ankle TC and ST mobs DS Gr II, IV DS Gr II, IV DS Gr II, IV SW Gr II, IV SW    DS Gr II, IV SW Gr II, IV DS Gr II, IV VR Gr II, IV VR Gr II, IV   DF MWM 3x10 on 8\" step 2x15 on 8\" step 3x10 on 8\" step np 3x10 on 8\" step  3x10 on 8\" step  3x10 on 8\" step SW 3x10 on 8\" step     STM plantar fascia DS DS DS SW SW DS AFB DS VR 15' VR 15'   reasses    SW         Neuro Re-Ed             Static balance on airex 30\"x2 30\"x2 tandem 30\"x2 tandem 30\"x2 each tandem  30\"x2 each tandem  30\"x2 ea 30\"x2 ea  tandem 30\"x2 ea  tandem 30\"x2 ea  tandem 30\"x2 ea  tandem   Sand dune march 2 min light UE prn 2 min light UE prn 2 min light UE prn 2 min light UE prn 2 min light UE prn 2 min light UE prn 2 min light UE prn 2 min light UE prn 2 min light UE prn 2 min light UE prn                                                       Ther Ex             VG for ROM L7 7' alt s/l b/l  L7 7' alt s/l b/l L7 7' alt s/l b/l L7 8' alt s/l b/l L7 7' alt s/l " "b/l L7 7' alt s/l b/l L7 7' alt s/l b/l L7 7' alt s/l b/l L7 7' alt s/l b/l L7 7' alt s/l b/l                             Standing calf stretch 45\"x4 45\"x4 45\"x4 45\"x4 45\"x4 45\"x4 45\"x4 45\"x4 45\"x4 45\"x4                                                       Ther Activity             Sit to stands  Hi lo 20\" 5x5 Hi lo 20\" 4x5 Hi lo 20\" 4x5 Hi lo 20\" 4x5 Hi lo 20\" 4x5 Hi lo 20\" 4x5 Hi lo 20\" 4x5 Hi lo 20\" 4x5 Hi lo 20\" 5x5 Hi lo 20\" 5x5   TUG and 5x STS    SW         Gait Training                                       Modalities                                                        "

## 2024-11-12 ENCOUNTER — OFFICE VISIT (OUTPATIENT)
Dept: FAMILY MEDICINE CLINIC | Facility: CLINIC | Age: 74
End: 2024-11-12
Payer: COMMERCIAL

## 2024-11-12 VITALS
BODY MASS INDEX: 39.01 KG/M2 | WEIGHT: 228.5 LBS | TEMPERATURE: 96.4 F | HEIGHT: 64 IN | RESPIRATION RATE: 17 BRPM | HEART RATE: 62 BPM | SYSTOLIC BLOOD PRESSURE: 130 MMHG | DIASTOLIC BLOOD PRESSURE: 80 MMHG | OXYGEN SATURATION: 99 %

## 2024-11-12 DIAGNOSIS — S82.892S CLOSED FRACTURE OF LEFT ANKLE, SEQUELA: ICD-10-CM

## 2024-11-12 DIAGNOSIS — M19.072 DJD (DEGENERATIVE JOINT DISEASE), ANKLE AND FOOT, LEFT: ICD-10-CM

## 2024-11-12 DIAGNOSIS — Z00.00 MEDICARE ANNUAL WELLNESS VISIT, SUBSEQUENT: Primary | ICD-10-CM

## 2024-11-12 DIAGNOSIS — I10 PRIMARY HYPERTENSION: ICD-10-CM

## 2024-11-12 DIAGNOSIS — R73.03 PREDIABETES: ICD-10-CM

## 2024-11-12 DIAGNOSIS — E03.9 HYPOTHYROIDISM, UNSPECIFIED TYPE: ICD-10-CM

## 2024-11-12 PROCEDURE — 99213 OFFICE O/P EST LOW 20 MIN: CPT | Performed by: FAMILY MEDICINE

## 2024-11-12 PROCEDURE — G0439 PPPS, SUBSEQ VISIT: HCPCS | Performed by: FAMILY MEDICINE

## 2024-11-12 NOTE — PATIENT INSTRUCTIONS
Medicare Preventive Visit Patient Instructions  Thank you for completing your Welcome to Medicare Visit or Medicare Annual Wellness Visit today. Your next wellness visit will be due in one year (11/13/2025).  The screening/preventive services that you may require over the next 5-10 years are detailed below. Some tests may not apply to you based off risk factors and/or age. Screening tests ordered at today's visit but not completed yet may show as past due. Also, please note that scanned in results may not display below.  Preventive Screenings:  Service Recommendations Previous Testing/Comments   Colorectal Cancer Screening  * Colonoscopy    * Fecal Occult Blood Test (FOBT)/Fecal Immunochemical Test (FIT)  * Fecal DNA/Cologuard Test  * Flexible Sigmoidoscopy Age: 45-75 years old   Colonoscopy: every 10 years (may be performed more frequently if at higher risk)  OR  FOBT/FIT: every 1 year  OR  Cologuard: every 3 years  OR  Sigmoidoscopy: every 5 years  Screening may be recommended earlier than age 45 if at higher risk for colorectal cancer. Also, an individualized decision between you and your healthcare provider will decide whether screening between the ages of 76-85 would be appropriate. Colonoscopy: 08/13/2004  FOBT/FIT: Not on file  Cologuard: Not on file  Sigmoidoscopy: Not on file          Breast Cancer Screening Age: 40+ years old  Frequency: every 1-2 years  Not required if history of left and right mastectomy Mammogram: 07/12/2023    Screening Current   Cervical Cancer Screening Between the ages of 21-29, pap smear recommended once every 3 years.   Between the ages of 30-65, can perform pap smear with HPV co-testing every 5 years.   Recommendations may differ for women with a history of total hysterectomy, cervical cancer, or abnormal pap smears in past. Pap Smear: 11/11/2020    Screening Not Indicated   Hepatitis C Screening Once for adults born between 1945 and 1965  More frequently in patients at high risk  for Hepatitis C Hep C Antibody: 05/07/2019    Screening Current   Diabetes Screening 1-2 times per year if you're at risk for diabetes or have pre-diabetes Fasting glucose: 100 mg/dL (1/2/2024)  A1C: 5.7 (8/27/2024)  Screening Current   Cholesterol Screening Once every 5 years if you don't have a lipid disorder. May order more often based on risk factors. Lipid panel: 01/16/2023    Screening Not Indicated  History Lipid Disorder     Other Preventive Screenings Covered by Medicare:  Abdominal Aortic Aneurysm (AAA) Screening: covered once if your at risk. You're considered to be at risk if you have a family history of AAA.  Lung Cancer Screening: covers low dose CT scan once per year if you meet all of the following conditions: (1) Age 55-77; (2) No signs or symptoms of lung cancer; (3) Current smoker or have quit smoking within the last 15 years; (4) You have a tobacco smoking history of at least 20 pack years (packs per day multiplied by number of years you smoked); (5) You get a written order from a healthcare provider.  Glaucoma Screening: covered annually if you're considered high risk: (1) You have diabetes OR (2) Family history of glaucoma OR (3)  aged 50 and older OR (4)  American aged 65 and older  Osteoporosis Screening: covered every 2 years if you meet one of the following conditions: (1) You're estrogen deficient and at risk for osteoporosis based off medical history and other findings; (2) Have a vertebral abnormality; (3) On glucocorticoid therapy for more than 3 months; (4) Have primary hyperparathyroidism; (5) On osteoporosis medications and need to assess response to drug therapy.   Last bone density test (DXA Scan): 07/20/2023.  HIV Screening: covered annually if you're between the age of 15-65. Also covered annually if you are younger than 15 and older than 65 with risk factors for HIV infection. For pregnant patients, it is covered up to 3 times per  pregnancy.    Immunizations:  Immunization Recommendations   Influenza Vaccine Annual influenza vaccination during flu season is recommended for all persons aged >= 6 months who do not have contraindications   Pneumococcal Vaccine   * Pneumococcal conjugate vaccine = PCV13 (Prevnar 13), PCV15 (Vaxneuvance), PCV20 (Prevnar 20)  * Pneumococcal polysaccharide vaccine = PPSV23 (Pneumovax) Adults 19-63 yo with certain risk factors or if 65+ yo  If never received any pneumonia vaccine: recommend Prevnar 20 (PCV20)  Give PCV20 if previously received 1 dose of PCV13 or PPSV23   Hepatitis B Vaccine 3 dose series if at intermediate or high risk (ex: diabetes, end stage renal disease, liver disease)   Respiratory syncytial virus (RSV) Vaccine - COVERED BY MEDICARE PART D  * RSVPreF3 (Arexvy) CDC recommends that adults 60 years of age and older may receive a single dose of RSV vaccine using shared clinical decision-making (SCDM)   Tetanus (Td) Vaccine - COST NOT COVERED BY MEDICARE PART B Following completion of primary series, a booster dose should be given every 10 years to maintain immunity against tetanus. Td may also be given as tetanus wound prophylaxis.   Tdap Vaccine - COST NOT COVERED BY MEDICARE PART B Recommended at least once for all adults. For pregnant patients, recommended with each pregnancy.   Shingles Vaccine (Shingrix) - COST NOT COVERED BY MEDICARE PART B  2 shot series recommended in those 19 years and older who have or will have weakened immune systems or those 50 years and older     Health Maintenance Due:      Topic Date Due   • Colorectal Cancer Screening  08/13/2014   • Breast Cancer Screening: Mammogram  07/12/2025   • Hepatitis C Screening  Completed     Immunizations Due:      Topic Date Due   • COVID-19 Vaccine (6 - 2023-24 season) 09/01/2024     Advance Directives   What are advance directives?  Advance directives are legal documents that state your wishes and plans for medical care. These plans  are made ahead of time in case you lose your ability to make decisions for yourself. Advance directives can apply to any medical decision, such as the treatments you want, and if you want to donate organs.   What are the types of advance directives?  There are many types of advance directives, and each state has rules about how to use them. You may choose a combination of any of the following:  Living will:  This is a written record of the treatment you want. You can also choose which treatments you do not want, which to limit, and which to stop at a certain time. This includes surgery, medicine, IV fluid, and tube feedings.   Durable power of  for healthcare (DPAHC):  This is a written record that states who you want to make healthcare choices for you when you are unable to make them for yourself. This person, called a proxy, is usually a family member or a friend. You may choose more than 1 proxy.  Do not resuscitate (DNR) order:  A DNR order is used in case your heart stops beating or you stop breathing. It is a request not to have certain forms of treatment, such as CPR. A DNR order may be included in other types of advance directives.  Medical directive:  This covers the care that you want if you are in a coma, near death, or unable to make decisions for yourself. You can list the treatments you want for each condition. Treatment may include pain medicine, surgery, blood transfusions, dialysis, IV or tube feedings, and a ventilator (breathing machine).  Values history:  This document has questions about your views, beliefs, and how you feel and think about life. This information can help others choose the care that you would choose.  Why are advance directives important?  An advance directive helps you control your care. Although spoken wishes may be used, it is better to have your wishes written down. Spoken wishes can be misunderstood, or not followed. Treatments may be given even if you do not want  them. An advance directive may make it easier for your family to make difficult choices about your care.   Fall Prevention    Fall prevention  includes ways to make your home and other areas safer. It also includes ways you can move more carefully to prevent a fall. Health conditions that cause changes in your blood pressure, vision, or muscle strength and coordination may increase your risk for falls. Medicines may also increase your risk for falls if they make you dizzy, weak, or sleepy.   Fall prevention tips:   Stand or sit up slowly.    Use assistive devices as directed.    Wear shoes that fit well and have soles that .    Wear a personal alarm.    Stay active.    Manage your medical conditions.    Home Safety Tips:  Add items to prevent falls in the bathroom.    Keep paths clear.    Install bright lights in your home.    Keep items you use often on shelves within reach.    Paint or place reflective tape on the edges of your stairs.    Urinary Incontinence   Urinary incontinence (UI)  is when you lose control of your bladder. UI develops because your bladder cannot store or empty urine properly. The 3 most common types of UI are stress incontinence, urge incontinence, or both.  Medicines:   May be given to help strengthen your bladder control. Report any side effects of medication to your healthcare provider.  Do pelvic muscle exercises often:  Your pelvic muscles help you stop urinating. Squeeze these muscles tight for 5 seconds, then relax for 5 seconds. Gradually work up to squeezing for 10 seconds. Do 3 sets of 15 repetitions a day, or as directed. This will help strengthen your pelvic muscles and improve bladder control.  Train your bladder:  Go to the bathroom at set times, such as every 2 hours, even if you do not feel the urge to go. You can also try to hold your urine when you feel the urge to go. For example, hold your urine for 5 minutes when you feel the urge to go. As that becomes easier, hold  your urine for 10 minutes.   Self-care:   Keep a UI record.  Write down how often you leak urine and how much you leak. Make a note of what you were doing when you leaked urine.  Drink liquids as directed. You may need to limit the amount of liquid you drink to help control your urine leakage. Do not drink any liquid right before you go to bed. Limit or do not have drinks that contain caffeine or alcohol.   Prevent constipation.  Eat a variety of high-fiber foods. Good examples are high-fiber cereals, beans, vegetables, and whole-grain breads. Walking is the best way to trigger your intestines to have a bowel movement.  Exercise regularly and maintain a healthy weight.  Weight loss and exercise will decrease pressure on your bladder and help you control your leakage.   Use a catheter as directed  to help empty your bladder. A catheter is a tiny, plastic tube that is put into your bladder to drain your urine.   Go to behavior therapy as directed.  Behavior therapy may be used to help you learn to control your urge to urinate.    Weight Management   Why it is important to manage your weight:  Being overweight increases your risk of health conditions such as heart disease, high blood pressure, type 2 diabetes, and certain types of cancer. It can also increase your risk for osteoarthritis, sleep apnea, and other respiratory problems. Aim for a slow, steady weight loss. Even a small amount of weight loss can lower your risk of health problems.  How to lose weight safely:  A safe and healthy way to lose weight is to eat fewer calories and get regular exercise. You can lose up about 1 pound a week by decreasing the number of calories you eat by 500 calories each day.   Healthy meal plan for weight management:  A healthy meal plan includes a variety of foods, contains fewer calories, and helps you stay healthy. A healthy meal plan includes the following:  Eat whole-grain foods more often.  A healthy meal plan should contain  fiber. Fiber is the part of grains, fruits, and vegetables that is not broken down by your body. Whole-grain foods are healthy and provide extra fiber in your diet. Some examples of whole-grain foods are whole-wheat breads and pastas, oatmeal, brown rice, and bulgur.  Eat a variety of vegetables every day.  Include dark, leafy greens such as spinach, kale, jenae greens, and mustard greens. Eat yellow and orange vegetables such as carrots, sweet potatoes, and winter squash.   Eat a variety of fruits every day.  Choose fresh or canned fruit (canned in its own juice or light syrup) instead of juice. Fruit juice has very little or no fiber.  Eat low-fat dairy foods.  Drink fat-free (skim) milk or 1% milk. Eat fat-free yogurt and low-fat cottage cheese. Try low-fat cheeses such as mozzarella and other reduced-fat cheeses.  Choose meat and other protein foods that are low in fat.  Choose beans or other legumes such as split peas or lentils. Choose fish, skinless poultry (chicken or turkey), or lean cuts of red meat (beef or pork). Before you cook meat or poultry, cut off any visible fat.   Use less fat and oil.  Try baking foods instead of frying them. Add less fat, such as margarine, sour cream, regular salad dressing and mayonnaise to foods. Eat fewer high-fat foods. Some examples of high-fat foods include french fries, doughnuts, ice cream, and cakes.  Eat fewer sweets.  Limit foods and drinks that are high in sugar. This includes candy, cookies, regular soda, and sweetened drinks.  Exercise:  Exercise at least 30 minutes per day on most days of the week. Some examples of exercise include walking, biking, dancing, and swimming. You can also fit in more physical activity by taking the stairs instead of the elevator or parking farther away from stores. Ask your healthcare provider about the best exercise plan for you.   Narcotic (Opioid) Safety    Use narcotics safely:  Take prescribed narcotics exactly as directed  Do  not give narcotics to others or take narcotics that belong to someone else  Do not mix narcotics without medicines or alcohol  Do not drive or operate heavy machinery after you take the narcotic  Monitor for side effects and notify your healthcare provider if you experienced side effects such as nausea, sleepiness, itching, or trouble thinking clearly.    Manage constipation:    Constipation is the most common side effect of narcotic medicine. Constipation is when you have hard, dry bowel movements, or you go longer than usual between bowel movements. Tell your healthcare provider about all changes in your bowel movements while you are taking narcotics. He or she may recommend laxative medicine to help you have a bowel movement. He or she may also change the kind of narcotic you are taking, or change when you take it. The following are more ways you can prevent or relieve constipation:    Drink liquids as directed.  You may need to drink extra liquids to help soften and move your bowels. Ask how much liquid to drink each day and which liquids are best for you.  Eat high-fiber foods.  This may help decrease constipation by adding bulk to your bowel movements. High-fiber foods include fruits, vegetables, whole-grain breads and cereals, and beans. Your healthcare provider or dietitian can help you create a high-fiber meal plan. Your provider may also recommend a fiber supplement if you cannot get enough fiber from food.  Exercise regularly.  Regular physical activity can help stimulate your intestines. Walking is a good exercise to prevent or relieve constipation. Ask which exercises are best for you.  Schedule a time each day to have a bowel movement.  This may help train your body to have regular bowel movements. Bend forward while you are on the toilet to help move the bowel movement out. Sit on the toilet for at least 10 minutes, even if you do not have a bowel movement.    Store narcotics safely:   Store narcotics  where others cannot easily get them.  Keep them in a locked cabinet or secure area. Do not  keep them in a purse or other bag you carry with you. A person may be looking for something else and find the narcotics.  Make sure narcotics are stored out of the reach of children.  A child can easily overdose on narcotics. Narcotics may look like candy to a small child.    The best way to dispose of narcotics:      The laws vary by country and area. In the [place], the best way is to return the narcotics through a take-back program. This program is offered by the [place] Drug Enforcement Agency ([employer]). The following are options for using the program:  Take the narcotics to a [employer] collection site.  The site is often a law enforcement center. Call your local law enforcement center for scheduled take-back days in your area. You will be given information on where to go if the collection site is in a different location.  Take the narcotics to an approved pharmacy or hospital.  A pharmacy or hospital may be set up as a collection site. You will need to ask if it is a [employer] collection site if you were not directed there. A pharmacy or doctor's office may not be able to take back narcotics unless it is a [employer] site.  Use a mail-back system.  This means you are given containers to put the narcotics into. You will then mail them in the containers.  Use a take-back drop box.  This is a place to leave the narcotics at any time. People and animals will not be able to get into the box. Your local law enforcement agency can tell you where to find a drop box in your area.    Other ways to manage pain:   Ask your healthcare provider about non-narcotic medicines to control pain.  Nonprescription medicines include NSAIDs (such as ibuprofen) and acetaminophen. Prescription medicines include muscle relaxers, antidepressants, and steroids.  Pain may be managed without any medicines.  Some ways to relieve pain include massage, aromatherapy, or  meditation. Physical or occupational therapy may also help.    For more information:   Drug Enforcement Administration  [address] [place] [address]  Phone: 7- 008 - 300-2439  Web Address: https://www.deadiversion.[facility].gov/drug_disposal/     Food and Drug Administration  [address] [place] [address]  Phone: 1- 671 - 998-9459  Web Address: http://www.fda.gov     © Copyright [employer] 2018 Information is for End User's use only and may not be sold, redistributed or otherwise used for commercial purposes. All illustrations and images included in CareNotes® are the copyrighted property of [employer] or [employer]

## 2024-11-12 NOTE — PROGRESS NOTES
Ambulatory Visit  Name: Estephania Brown      : 1950      MRN: [id number]  Encounter Provider: Rosalino Purvis MD  Encounter Date: 2024   Encounter department: [facility]    Assessment & Plan  Medicare annual wellness visit, subsequent         Primary hypertension  Hypertension.  The patient's blood pressure is stable at this time and he will continue current regimen of medications       Hypothyroidism, unspecified type  Hypothyroidism.  TSH is stable on current dose of levothyroxine       DJD (degenerative joint disease), ankle and foot, left         Closed fracture of left ankle, sequela  History of left ankle fracture.  Patient continues with physical therapy to try and maximize her ambulating ability         Prediabetes  Prediabetes.  Patient continues on current dose of metformin.  Latest A1c is stable at 5.7.  She continues to see endocrinology for monitoring of condition            Preventive health issues were discussed with patient, and age appropriate screening tests were ordered as noted in patient's After Visit Summary. Personalized health advice and appropriate referrals for health education or preventive services given if needed, as noted in patient's After Visit Summary.    History of Present Illness     Patient the office to review chronic medical conditions.  She continues to attend physical therapy for chronic left ankle discomfort and decreased mobility after sustaining a fracture many years ago.  She is able to use a stationary bicycle but cannot walk for long distances due to discomfort.  In the mornings she does use walking canes until her ankle loosens up throughout the day.  She denies any recent illness.       Patient Care Team:  Rosalino Purvis MD as PCP - General  Rosalino Purvis MD as PCP - PCP-[facility] ([facility])  Rosalino Purvis MD as PCP - PCP-[facility] ([facility])  Becky Pierre MD as PCP - Endocrinology (Endocrinology)  Latha Clayton PA-C (Obstetrics  and Gynecology)  MEHNAZ Charles as Nurse Practitioner (Endocrinology)    Review of Systems   Constitutional: Negative.    HENT: Negative.     Eyes: Negative.    Respiratory: Negative.     Cardiovascular: Negative.    Gastrointestinal: Negative.    Genitourinary: Negative.    Musculoskeletal:  Positive for arthralgias and gait problem.   Skin: Negative.    Psychiatric/Behavioral: Negative.       Medical History Reviewed by provider this encounter:  Meds       Annual Wellness Visit Questionnaire       Health Risk Assessment:   Patient rates overall health as good. Patient feels that their physical health rating is slightly worse. Patient is satisfied with their life. Eyesight was rated as slightly worse. Hearing was rated as slightly worse. Patient feels that their emotional and mental health rating is same. Patients states they are never, rarely angry. Patient states they are sometimes unusually tired/fatigued. Pain experienced in the last 7 days has been some. Patient's pain rating has been 5/10. Patient states that she has experienced no weight loss or gain in last 6 months.     Depression Screening:   PHQ-2 Score: 0      Fall Risk Screening:   In the past year, patient has experienced: history of falling in past year      Urinary Incontinence Screening:   Patient has leaked urine accidently in the last six months.     Home Safety:  Patient does not have trouble with stairs inside or outside of their home. Patient has working smoke alarms and has working carbon monoxide detector. Home safety hazards include: none.     Nutrition:   Current diet is Regular, No Added Salt and Limited junk food.     Medications:   Patient is currently taking over-the-counter supplements. OTC medications include: see medication list. Patient is able to manage medications.     Activities of Daily Living (ADLs)/Instrumental Activities of Daily Living (IADLs):   Walk and transfer into and out of bed and chair?: Yes  Dress and  groom yourself?: Yes    Bathe or shower yourself?: Yes    Feed yourself? Yes  Do your laundry/housekeeping?: Yes  Manage your money, pay your bills and track your expenses?: Yes  Make your own meals?: Yes    Do your own shopping?: Yes    Previous Hospitalizations:   Any hospitalizations or ED visits within the last 12 months?: No      Advance Care Planning:   Living will: Yes    Durable POA for healthcare: Yes    Advanced directive: Yes      PREVENTIVE SCREENINGS      Cardiovascular Screening:    General: History Lipid Disorder and Risks and Benefits Discussed    Due for: Lipid Panel      Diabetes Screening:     General: Screening Current      Colorectal Cancer Screening:     General: Patient Declines      Breast Cancer Screening:     General: Screening Current      Cervical Cancer Screening:    General: Screening Not Indicated      Osteoporosis Screening:    General: Screening Not Indicated and History Osteoporosis      Lung Cancer Screening:     General: Screening Not Indicated      Hepatitis C Screening:    General: Screening Current    Screening, Brief Intervention, and Referral to Treatment (SBIRT)    Screening  Typical number of drinks in a day: 0  Typical number of drinks in a week: 0  Interpretation: Low risk drinking behavior.    AUDIT-C Screenin) How often did you have a drink containing alcohol in the past year? never  2) How many drinks did you have on a typical day when you were drinking in the past year? 0  3) How often did you have 6 or more drinks on one occasion in the past year? never    AUDIT-C Score: 0  Interpretation: Score 0-2 (female): Negative screen for alcohol misuse    Single Item Drug Screening:  How often have you used an illegal drug (including marijuana) or a prescription medication for non-medical reasons in the past year? never    Single Item Drug Screen Score: 0  Interpretation: Negative screen for possible drug use disorder    Review of Current Opioid Use    Opioid Risk Tool  "(ORT) Interpretation: Complete Opioid Risk Tool (ORT)    Social Drivers of Health     Financial Resource Strain: Low Risk  (11/6/2023)    Overall Financial Resource Strain (CARDIA)     Difficulty of Paying Living Expenses: Not very hard   Food Insecurity: No Food Insecurity (11/11/2024)    Hunger Vital Sign     Worried About Running Out of Food in the Last Year: Never true     Ran Out of Food in the Last Year: Never true   Transportation Needs: No Transportation Needs (11/11/2024)    PRAPARE - Transportation     Lack of Transportation (Medical): No     Lack of Transportation (Non-Medical): No   Housing Stability: Low Risk  (11/11/2024)    Housing Stability Vital Sign     Unable to Pay for Housing in the Last Year: No     Number of Times Moved in the Last Year: 0     Homeless in the Last Year: No   Utilities: Not At Risk (11/11/2024)    [facility] Utilities     Threatened with loss of utilities: No     No results found.    Objective     /80   Pulse 62   Temp (!) 96.4 °F (35.8 °C)   Resp 17   Ht 5' 4\" (1.626 m)   Wt 104 kg (228 lb 8 oz)   LMP  (LMP Unknown)   SpO2 99%   BMI 39.22 kg/m²     Physical Exam  Vitals and nursing note reviewed.   Constitutional:       General: She is not in acute distress.     Appearance: Normal appearance. She is well-developed. She is not ill-appearing.   HENT:      Head: Normocephalic and atraumatic.   Eyes:      General:         Right eye: No discharge.         Left eye: No discharge.      Extraocular Movements: Extraocular movements intact.      Conjunctiva/sclera: Conjunctivae normal.      Pupils: Pupils are equal, round, and reactive to light.   Neck:      Vascular: No carotid bruit.   Cardiovascular:      Rate and Rhythm: Normal rate and regular rhythm.      Heart sounds: No murmur heard.  Pulmonary:      Effort: Pulmonary effort is normal. No respiratory distress.      Breath sounds: Normal breath sounds.   Musculoskeletal:      Right lower leg: No edema.      Left lower leg: " No edema.   Lymphadenopathy:      Cervical: No cervical adenopathy.   Skin:     General: Skin is warm and dry.      Capillary Refill: Capillary refill takes less than 2 seconds.   Neurological:      Mental Status: She is alert. Mental status is at baseline.   Psychiatric:         Mood and Affect: Mood normal.         Behavior: Behavior normal.         Thought Content: Thought content normal.         Judgment: Judgment normal.

## 2024-11-12 NOTE — ASSESSMENT & PLAN NOTE
Hypertension.  The patient's blood pressure is stable at this time and he will continue current regimen of medications

## 2024-11-13 ENCOUNTER — OFFICE VISIT (OUTPATIENT)
Dept: PHYSICAL THERAPY | Facility: REHABILITATION | Age: 74
End: 2024-11-13
Payer: COMMERCIAL

## 2024-11-13 DIAGNOSIS — R26.89 BALANCE DISORDER: ICD-10-CM

## 2024-11-13 DIAGNOSIS — M25.579 CHRONIC ANKLE PAIN, UNSPECIFIED LATERALITY: ICD-10-CM

## 2024-11-13 DIAGNOSIS — G89.29 CHRONIC ANKLE PAIN, UNSPECIFIED LATERALITY: ICD-10-CM

## 2024-11-13 DIAGNOSIS — R26.9 GAIT ABNORMALITY: Primary | ICD-10-CM

## 2024-11-13 PROCEDURE — 97110 THERAPEUTIC EXERCISES: CPT | Performed by: PHYSICAL THERAPIST

## 2024-11-13 PROCEDURE — 97112 NEUROMUSCULAR REEDUCATION: CPT | Performed by: PHYSICAL THERAPIST

## 2024-11-13 PROCEDURE — 97140 MANUAL THERAPY 1/> REGIONS: CPT | Performed by: PHYSICAL THERAPIST

## 2024-11-13 NOTE — ASSESSMENT & PLAN NOTE
History of left ankle fracture.  Patient continues with physical therapy to try and maximize her ambulating ability

## 2024-11-13 NOTE — ASSESSMENT & PLAN NOTE
Prediabetes.  Patient continues on current dose of metformin.  Latest A1c is stable at 5.7.  She continues to see endocrinology for monitoring of condition

## 2024-11-13 NOTE — PROGRESS NOTES
"Daily Note     Today's date: 2024  Patient name: Estephania Brown  : 1950  MRN: [id number]  Referring provider: Rosalino Purvis MD  Dx:   Encounter Diagnosis     ICD-10-CM    1. Gait abnormality  R26.9       2. Balance disorder  R26.89       3. Chronic ankle pain, unspecified laterality  M25.579     G89.29               Start Time: 1215          Subjective: Pt reports her ankle is stiff today with the weather change.      Objective: See treatment diary below      Assessment: Tolerated treatment well with subjective improvement in ankle stiffness post tx. Carryover is limited from session to session, but she is able to do more at home after each therapy session due to improvement in ankle jt mobility and she is able to ambulate without her cane. Pt will benefit from continued skilled outpatient PT to improve strength, to address therapy goals, to reduce pain, and improve function.        Plan: Continue per plan of care.      Precautions: HTN, OP, fall risk, CA hx, hx of L ankle surgery       POC exp =  25      Manuals 11/11 11/13 10/14 10/17 10/21 10/23 10/28 10/30 11/4 11/6   L ankle TC and ST mobs DS Gr II, IV DS Gr II, IV DS Gr II, IV SW Gr II, IV SW    DS Gr II, IV SW Gr II, IV DS Gr II, IV VR Gr II, IV VR Gr II, IV   DF MWM 3x10 on 8\" step 2x15 on 8\" step 3x10 on 8\" step np 3x10 on 8\" step  3x10 on 8\" step  3x10 on 8\" step SW 3x10 on 8\" step     STM plantar fascia DS DS DS SW SW DS AFB DS VR 15' VR 15'   reasses    SW         Neuro Re-Ed             Static balance on airex 30\"x2  tandem 30\"x2 tandem 30\"x2 tandem 30\"x2 each tandem  30\"x2 each tandem  30\"x2 ea 30\"x2 ea  tandem 30\"x2 ea  tandem 30\"x2 ea  tandem 30\"x2 ea  tandem   Sand dune march 2 min light UE prn 2 min light UE prn 2 min light UE prn 2 min light UE prn 2 min light UE prn 2 min light UE prn 2 min light UE prn 2 min light UE prn 2 min light UE prn 2 min light UE prn                                                       Ther Ex    " "         VG for ROM L7 7' alt s/l b/l  L7 7' alt s/l b/l L7 7' alt s/l b/l L7 8' alt s/l b/l L7 7' alt s/l b/l L7 7' alt s/l b/l L7 7' alt s/l b/l L7 7' alt s/l b/l L7 7' alt s/l b/l L7 7' alt s/l b/l                             Standing calf stretch 45\"x4 45\"x4 45\"x4 45\"x4 45\"x4 45\"x4 45\"x4 45\"x4 45\"x4 45\"x4                                                       Ther Activity             Sit to stands  Hi lo 20\" 5x5 Hi lo 20\" 5x5 Hi lo 20\" 4x5 Hi lo 20\" 4x5 Hi lo 20\" 4x5 Hi lo 20\" 4x5 Hi lo 20\" 4x5 Hi lo 20\" 4x5 Hi lo 20\" 5x5 Hi lo 20\" 5x5   TUG and 5x STS    SW         Gait Training                                       Modalities                                                        "

## 2024-11-18 ENCOUNTER — OFFICE VISIT (OUTPATIENT)
Dept: PHYSICAL THERAPY | Facility: REHABILITATION | Age: 74
End: 2024-11-18
Payer: COMMERCIAL

## 2024-11-18 DIAGNOSIS — M25.572 CHRONIC PAIN OF LEFT ANKLE: ICD-10-CM

## 2024-11-18 DIAGNOSIS — G89.29 CHRONIC PAIN OF LEFT ANKLE: ICD-10-CM

## 2024-11-18 DIAGNOSIS — R26.89 BALANCE DISORDER: ICD-10-CM

## 2024-11-18 DIAGNOSIS — M25.579 CHRONIC ANKLE PAIN, UNSPECIFIED LATERALITY: ICD-10-CM

## 2024-11-18 DIAGNOSIS — R26.9 GAIT ABNORMALITY: Primary | ICD-10-CM

## 2024-11-18 DIAGNOSIS — G89.29 CHRONIC ANKLE PAIN, UNSPECIFIED LATERALITY: ICD-10-CM

## 2024-11-18 PROCEDURE — 97112 NEUROMUSCULAR REEDUCATION: CPT | Performed by: PHYSICAL THERAPIST

## 2024-11-18 PROCEDURE — 97110 THERAPEUTIC EXERCISES: CPT | Performed by: PHYSICAL THERAPIST

## 2024-11-18 PROCEDURE — 97140 MANUAL THERAPY 1/> REGIONS: CPT | Performed by: PHYSICAL THERAPIST

## 2024-11-18 NOTE — PROGRESS NOTES
"Daily Note     Today's date: 2024  Patient name: Estephania Brown  : 1950  MRN: [id number]  Referring provider: Rosalino Purvis MD  Dx:   Encounter Diagnosis     ICD-10-CM    1. Gait abnormality  R26.9       2. Balance disorder  R26.89       3. Chronic ankle pain, unspecified laterality  M25.579     G89.29                 Start Time: 1213  Stop Time: 1255  Total time in clinic (min): 42 minutes    Subjective: Pt reports her foot is sore from standing the last 3 hours cooking in he kitchen.      Objective: See treatment diary below      Assessment: Tolerated treatment well. She noted less pain post tx and improved ankle mobility.  Pt will benefit from continued skilled outpatient PT to improve strength, to address therapy goals, to reduce pain, and improve function.        Plan: Continue per plan of care.      Precautions: HTN, OP, fall risk, CA hx, hx of L ankle surgery       POC exp =  25      Manuals 11/11 11/13 11/18 10/17 10/21 10/23 10/28 10/30 11/4 11/6   L ankle TC and ST mobs DS Gr II, IV DS Gr II, IV DS Gr II, IV SW Gr II, IV SW    DS Gr II, IV SW Gr II, IV DS Gr II, IV VR Gr II, IV VR Gr II, IV   DF MWM 3x10 on 8\" step 2x15 on 8\" step 3x10 on 8\" step np 3x10 on 8\" step  3x10 on 8\" step  3x10 on 8\" step SW 3x10 on 8\" step     STM plantar fascia DS DS DS SW SW DS AFB DS VR 15' VR 15'   reasses    SW         Neuro Re-Ed             Static balance on airex 30\"x2  tandem 30\"x2 tandem 30\"x2 tandem 30\"x2 each tandem  30\"x2 each tandem  30\"x2 ea 30\"x2 ea  tandem 30\"x2 ea  tandem 30\"x2 ea  tandem 30\"x2 ea  tandem   Sand dune march 2 min light UE prn 2 min light UE prn 2 min light UE prn 2 min light UE prn 2 min light UE prn 2 min light UE prn 2 min light UE prn 2 min light UE prn 2 min light UE prn 2 min light UE prn                                                       Ther Ex             VG for ROM L7 7' alt s/l b/l  L7 7' alt s/l b/l L7 7' alt s/l b/l L7 8' alt s/l b/l L7 7' alt s/l b/l L7 7' " "alt s/l b/l L7 7' alt s/l b/l L7 7' alt s/l b/l L7 7' alt s/l b/l L7 7' alt s/l b/l                             Standing calf stretch 45\"x4 45\"x4 45\"x4 45\"x4 45\"x4 45\"x4 45\"x4 45\"x4 45\"x4 45\"x4                                                       Ther Activity             Sit to stands  Hi lo 20\" 5x5 Hi lo 20\" 5x5 Hi lo 20\" 4x5 Hi lo 20\" 4x5 Hi lo 20\" 4x5 Hi lo 20\" 4x5 Hi lo 20\" 4x5 Hi lo 20\" 4x5 Hi lo 20\" 5x5 Hi lo 20\" 5x5   TUG and 5x STS    SW         Gait Training                                       Modalities                                                        "

## 2024-11-18 NOTE — TELEPHONE ENCOUNTER
Reason for call:   [x] Refill   [] Prior Auth  [] Other:     Office:   [x] PCP/Provider - Rosalino Purvis MD-[facility]   [] Specialty/Provider -     Medication: HYDROcodone-acetaminophen (Norco) 5-325 mg per tablet   1 tablet by mouth if needed for pain One po q day prn     [facility] - [place] - [address]    Does the patient have enough for 3 days?   [x] Yes   [] No - Send as HP to POD

## 2024-11-19 RX ORDER — HYDROCODONE BITARTRATE AND ACETAMINOPHEN 5; 325 MG/1; MG/1
1 TABLET ORAL AS NEEDED
Qty: 30 TABLET | Refills: 0 | Status: SHIPPED | OUTPATIENT
Start: 2024-11-19

## 2024-11-20 ENCOUNTER — OFFICE VISIT (OUTPATIENT)
Dept: PHYSICAL THERAPY | Facility: REHABILITATION | Age: 74
End: 2024-11-20
Payer: COMMERCIAL

## 2024-11-20 DIAGNOSIS — R26.89 BALANCE DISORDER: ICD-10-CM

## 2024-11-20 DIAGNOSIS — G89.29 CHRONIC ANKLE PAIN, UNSPECIFIED LATERALITY: ICD-10-CM

## 2024-11-20 DIAGNOSIS — M25.579 CHRONIC ANKLE PAIN, UNSPECIFIED LATERALITY: ICD-10-CM

## 2024-11-20 DIAGNOSIS — R26.9 GAIT ABNORMALITY: Primary | ICD-10-CM

## 2024-11-20 PROCEDURE — 97112 NEUROMUSCULAR REEDUCATION: CPT | Performed by: PHYSICAL THERAPIST

## 2024-11-20 PROCEDURE — 97140 MANUAL THERAPY 1/> REGIONS: CPT | Performed by: PHYSICAL THERAPIST

## 2024-11-20 PROCEDURE — 97110 THERAPEUTIC EXERCISES: CPT | Performed by: PHYSICAL THERAPIST

## 2024-11-20 NOTE — PROGRESS NOTES
"Daily Note     Today's date: 2024  Patient name: Estephania Brown  : 1950  MRN: [id number]  Referring provider: Rosalino Purvis MD  Dx:   Encounter Diagnosis     ICD-10-CM    1. Gait abnormality  R26.9       2. Chronic ankle pain, unspecified laterality  M25.579     G89.29       3. Balance disorder  R26.89               Start Time: 1215  Stop Time: 1255  Total time in clinic (min): 40 minutes    Subjective: Pt reports nothing new.      Objective: See treatment diary below      Assessment: Tolerated treatment well. She continues to respond favorably to with with temporary reduction in sx;s and improvement in ankle joint stiffness/mobility. Pt will benefit from continued skilled outpatient PT to improve strength, to address therapy goals, to reduce pain, and improve function.        Plan: Continue per plan of care.      Precautions: HTN, OP, fall risk, CA hx, hx of L ankle surgery       POC exp =  25      Manuals 11/11 11/13 11/18 11/20 10/21 10/23 10/28 10/30 11/4 11/6   L ankle TC and ST mobs DS Gr II, IV DS Gr II, IV DS Gr II, IV DS Gr II, IV SW    DS Gr II, IV SW Gr II, IV DS Gr II, IV VR Gr II, IV VR Gr II, IV   DF MWM 3x10 on 8\" step 2x15 on 8\" step 3x10 on 8\" step 3x10 on 8\" step 3x10 on 8\" step  3x10 on 8\" step  3x10 on 8\" step SW 3x10 on 8\" step     STM plantar fascia DS DS DS DS SW DS AFB DS VR 15' VR 15'   reasses             Neuro Re-Ed             Static balance on airex 30\"x2  tandem 30\"x2 tandem 30\"x2 tandem 30\"x2 each tandem  30\"x2 each tandem  30\"x2 ea 30\"x2 ea  tandem 30\"x2 ea  tandem 30\"x2 ea  tandem 30\"x2 ea  tandem   Sand dune march 2 min light UE prn 2 min light UE prn 2 min light UE prn 2 min light UE prn 2 min light UE prn 2 min light UE prn 2 min light UE prn 2 min light UE prn 2 min light UE prn 2 min light UE prn                                                       Ther Ex             VG for ROM L7 7' alt s/l b/l  L7 7' alt s/l b/l L7 7' alt s/l b/l L7 8' alt s/l b/l L7 " "7' alt s/l b/l L7 7' alt s/l b/l L7 7' alt s/l b/l L7 7' alt s/l b/l L7 7' alt s/l b/l L7 7' alt s/l b/l                             Standing calf stretch 45\"x4 45\"x4 45\"x4 45\"x4 45\"x4 45\"x4 45\"x4 45\"x4 45\"x4 45\"x4                                                       Ther Activity             Sit to stands  Hi lo 20\" 5x5 Hi lo 20\" 5x5 Hi lo 20\" 5x5 Hi lo 20\" 5x5 Hi lo 20\" 4x5 Hi lo 20\" 4x5 Hi lo 20\" 4x5 Hi lo 20\" 4x5 Hi lo 20\" 5x5 Hi lo 20\" 5x5   TUG and 5x STS             Gait Training                                       Modalities                                                        "

## 2024-11-25 ENCOUNTER — OFFICE VISIT (OUTPATIENT)
Dept: PHYSICAL THERAPY | Facility: REHABILITATION | Age: 74
End: 2024-11-25
Payer: COMMERCIAL

## 2024-11-25 DIAGNOSIS — R26.9 GAIT ABNORMALITY: Primary | ICD-10-CM

## 2024-11-25 DIAGNOSIS — R26.89 BALANCE DISORDER: ICD-10-CM

## 2024-11-25 DIAGNOSIS — G89.29 CHRONIC ANKLE PAIN, UNSPECIFIED LATERALITY: ICD-10-CM

## 2024-11-25 DIAGNOSIS — M25.579 CHRONIC ANKLE PAIN, UNSPECIFIED LATERALITY: ICD-10-CM

## 2024-11-25 PROCEDURE — 97112 NEUROMUSCULAR REEDUCATION: CPT | Performed by: PHYSICAL THERAPIST

## 2024-11-25 PROCEDURE — 97110 THERAPEUTIC EXERCISES: CPT | Performed by: PHYSICAL THERAPIST

## 2024-11-25 PROCEDURE — 97140 MANUAL THERAPY 1/> REGIONS: CPT | Performed by: PHYSICAL THERAPIST

## 2024-11-25 NOTE — PROGRESS NOTES
"Daily Note     Today's date: 2024  Patient name: Estephania Brown  : 1950  MRN: [id number]  Referring provider: Rosalino Purvis MD  Dx:   Encounter Diagnosis     ICD-10-CM    1. Gait abnormality  R26.9       2. Chronic ankle pain, unspecified laterality  M25.579     G89.29       3. Balance disorder  R26.89               Start Time: 1215  Stop Time: 1300  Total time in clinic (min): 45 minutes    Subjective: Pt reports her foot is sore, but not too bad today.      Objective: See treatment diary below      Assessment: Tolerated treatment well.  She had some TTP in the plantar fascia today. Pt will benefit from continued skilled outpatient PT to improve strength, to address therapy goals, to reduce pain, and improve function.        Plan: Continue per plan of care.      Precautions: HTN, OP, fall risk, CA hx, hx of L ankle surgery       POC exp =  25      Manuals 11/11 11/13 11/18 11/20 11/25 10/23 10/28 10/30 11/4 11/6   L ankle TC and ST mobs DS Gr II, IV DS Gr II, IV DS Gr II, IV DS Gr II, IV DS Gr II, IV    DS Gr II, IV SW Gr II, IV DS Gr II, IV VR Gr II, IV VR Gr II, IV   DF MWM 3x10 on 8\" step 2x15 on 8\" step 3x10 on 8\" step 3x10 on 8\" step 3x10 on 8\" step  3x10 on 8\" step  3x10 on 8\" step SW 3x10 on 8\" step     STM plantar fascia DS DS DS DS DS DS AFB DS VR 15' VR 15'   reasses             Neuro Re-Ed             Static balance on airex 30\"x2  tandem 30\"x2 tandem 30\"x2 tandem 30\"x2 each tandem  30\"x2 each tandem  30\"x2 ea 30\"x2 ea  tandem 30\"x2 ea  tandem 30\"x2 ea  tandem 30\"x2 ea  tandem   Sand dune march 2 min light UE prn 2 min light UE prn 2 min light UE prn 2 min light UE prn 2 min light UE prn 2 min light UE prn 2 min light UE prn 2 min light UE prn 2 min light UE prn 2 min light UE prn                                                       Ther Ex             VG for ROM L7 7' alt s/l b/l  L7 7' alt s/l b/l L7 7' alt s/l b/l L7 8' alt s/l b/l L7 7' alt s/l b/l L7 7' alt s/l b/l L7 7' alt " "s/l b/l L7 7' alt s/l b/l L7 7' alt s/l b/l L7 7' alt s/l b/l                             Standing calf stretch 45\"x4 45\"x4 45\"x4 45\"x4 45\"x4 45\"x4 45\"x4 45\"x4 45\"x4 45\"x4                                                       Ther Activity             Sit to stands  Hi lo 20\" 5x5 Hi lo 20\" 5x5 Hi lo 20\" 5x5 Hi lo 20\" 5x5 Hi lo 20\" 5x5 Hi lo 20\" 4x5 Hi lo 20\" 4x5 Hi lo 20\" 4x5 Hi lo 20\" 5x5 Hi lo 20\" 5x5   TUG and 5x STS             Gait Training                                       Modalities                                                        "

## 2024-11-27 ENCOUNTER — OFFICE VISIT (OUTPATIENT)
Dept: PHYSICAL THERAPY | Facility: REHABILITATION | Age: 74
End: 2024-11-27
Payer: COMMERCIAL

## 2024-11-27 DIAGNOSIS — R26.9 GAIT ABNORMALITY: Primary | ICD-10-CM

## 2024-11-27 DIAGNOSIS — M25.579 CHRONIC ANKLE PAIN, UNSPECIFIED LATERALITY: ICD-10-CM

## 2024-11-27 DIAGNOSIS — G89.29 CHRONIC ANKLE PAIN, UNSPECIFIED LATERALITY: ICD-10-CM

## 2024-11-27 PROCEDURE — 97112 NEUROMUSCULAR REEDUCATION: CPT | Performed by: PHYSICAL THERAPIST

## 2024-11-27 PROCEDURE — 97110 THERAPEUTIC EXERCISES: CPT | Performed by: PHYSICAL THERAPIST

## 2024-11-27 PROCEDURE — 97140 MANUAL THERAPY 1/> REGIONS: CPT | Performed by: PHYSICAL THERAPIST

## 2024-11-27 NOTE — PROGRESS NOTES
Daily Note/re-eval     Today's date: 2024  Patient name: Estephania Brown  : 1950  MRN: [id number]  Referring provider: Rosalino Purvis MD  Dx:   Encounter Diagnosis     ICD-10-CM    1. Gait abnormality  R26.9       2. Chronic ankle pain, unspecified laterality  M25.579     G89.29                 Start Time: 1213  Stop Time: 1257  Total time in clinic (min): 44 minutes    Subjective: Pt reports 55-60% improvement since starting therapy. States the foot/ankle is always the worst after sitting and then getting up and trying to walk     Pain  Current pain ratin  At best pain ratin at eval, 1 in the past week  At worst pain ratin at eval, 4 in the past week random  Location: arch of foot  Quality: sharp  Alleviating factors: stretching, PT in the past.  Aggravating factors: standing and walking        Objective: See treatment diary below     Joint Assessment:  Hypomobile throughout subtalar and talocrural      Lower Extremity Strength    MMT   AROM     Knee Left Right Left Right   Flex 5 5       Ext 4+ 4+                   Hip           flex 4+ 4+       ext NT NT       abd 3 3+                   Ankle           DF 5 5 lacking 18 at eval, lacking 8 today lacking 10   PF 3+ 3+ 59 at eval, 60 today  55   inversion 5 5       Eversion  5  5             Dynamic Balance Tests  5x sit to stand (sec)  >14 sec indicates high risk for falls 23 = 12.12, unable without UE  23 = 13.06,  unable without UE  23 = 11.70, unable without UE  10/23/23 = 11.74, unable without UE  24 = 10.63, unable without UE  24 = 9.78, unable without UE  24 = 10.96, unable without UE  10/17/24= 11.18 unable with UE  24 = 12.47, unable without UE   TUG (sec)  >14 sec indicates high risk for falls 23 = 16.95 with SPC  23 = 17.48 with SPC  23 = 12.50 with SPC  10/23/23 = 13.41 with SPC  24 = 12.47 with SPC  24 = 11.47 with SPC  24 = 13.13 with SPC  10/17/24=14.74 with  SPC  11/27/24 = 13.55 with SPC            Assessment: Pt presents for reassessment of her L ankle. Since last RE she has made further improvement in ankle ROM, pain and function. Her TUG test score improved from last RE, her 5 times sit to stand score remains under 14 sec reducing her fall risk, and she is making slow but steady functional strength improvements as seen by progression in sit to stand heigh. Patient  would benefit from continued PT to improve ankle ROM and strength, decrease pain, increase functional mobility, and decrease fall risk.      Goals  Short term goals: to be met in 6 weeks  Pt independent with initial HEP, rationale, technique and frequency, for ROM and pain control. MET  Pt able to perform 1 sit to stand without UE indicating and improvement in functional LE strength. UNMET  Improve hip abd MMT for improved lumbopelvic stability which is required for walking and balance MET  Pt will achieve an improvement in FOTO score indicating an improvement in pain and function. MET     Long term goals: to be met in 12 weeks  Pt will report a 75% or > reduction in subjective pain complaints/symptoms to better manage ADLs and functional mobility. UNMET  Pt able to perform 3 sit to stands without UE indicating and improvement in functional LE strength. UNMET  Further improve hip abd MMT for improved lumbopelvic stability which is required for walking and balance UNMET  Pt will improve FOTO score to = or better then expected outcome indicating an overall improvement in pain and function MET  Pt independent with rationale, technique and plan for performance of advanced HEP to maintain gains made in therapy. IN PROGRESS  Achieve pts goal: to get stronger, better balance, relieve ankle pain and foot pain IN PROGRESS        Plan: Continue per plan of care.      Precautions: HTN, OP, fall risk, CA hx, hx of L ankle surgery       POC exp =  1/9/25      Manuals 11/11 11/13 11/18 11/20 11/25 11/27 10/28 10/30  "11/4 11/6   L ankle TC and ST mobs DS Gr II, IV DS Gr II, IV DS Gr II, IV DS Gr II, IV DS Gr II, IV    DS Gr II, IV SW Gr II, IV DS Gr II, IV VR Gr II, IV VR Gr II, IV   DF MWM 3x10 on 8\" step 2x15 on 8\" step 3x10 on 8\" step 3x10 on 8\" step 3x10 on 8\" step  3x10 on 8\" step  3x10 on 8\" step SW 3x10 on 8\" step     STM plantar fascia DS DS DS DS DS DS AFB DS VR 15' VR 15'   reasses             Neuro Re-Ed             Static balance on airex 30\"x2  tandem 30\"x2 tandem 30\"x2 tandem 30\"x2 each tandem  30\"x2 each tandem  30\"x2 ea tandem 30\"x2 ea  tandem 30\"x2 ea  tandem 30\"x2 ea  tandem 30\"x2 ea  tandem   Sand dune march 2 min light UE prn 2 min light UE prn 2 min light UE prn 2 min light UE prn 2 min light UE prn 2 min light UE prn 2 min light UE prn 2 min light UE prn 2 min light UE prn 2 min light UE prn                                                       Ther Ex             VG for ROM L7 7' alt s/l b/l  L7 7' alt s/l b/l L7 7' alt s/l b/l L7 8' alt s/l b/l L7 7' alt s/l b/l L7 7' alt s/l b/l L7 7' alt s/l b/l L7 7' alt s/l b/l L7 7' alt s/l b/l L7 7' alt s/l b/l                             Standing calf stretch 45\"x4 45\"x4 45\"x4 45\"x4 45\"x4 45\"x4 45\"x4 45\"x4 45\"x4 45\"x4                                                       Ther Activity             Sit to stands  Hi lo 20\" 5x5 Hi lo 20\" 5x5 Hi lo 20\" 5x5 Hi lo 20\" 5x5 Hi lo 20\" 5x5  Hi lo 20\" 4x5 Hi lo 20\" 4x5 Hi lo 20\" 5x5 Hi lo 20\" 5x5   TUG and 5x STS      TUGx2  [id number]       Gait Training                                       Modalities                                                        "

## 2024-12-02 ENCOUNTER — OFFICE VISIT (OUTPATIENT)
Dept: PHYSICAL THERAPY | Facility: REHABILITATION | Age: 74
End: 2024-12-02
Payer: COMMERCIAL

## 2024-12-02 DIAGNOSIS — R26.89 BALANCE DISORDER: ICD-10-CM

## 2024-12-02 DIAGNOSIS — G89.29 CHRONIC ANKLE PAIN, UNSPECIFIED LATERALITY: ICD-10-CM

## 2024-12-02 DIAGNOSIS — R26.9 GAIT ABNORMALITY: Primary | ICD-10-CM

## 2024-12-02 DIAGNOSIS — M25.579 CHRONIC ANKLE PAIN, UNSPECIFIED LATERALITY: ICD-10-CM

## 2024-12-02 PROCEDURE — 97140 MANUAL THERAPY 1/> REGIONS: CPT | Performed by: PHYSICAL THERAPIST

## 2024-12-02 PROCEDURE — 97112 NEUROMUSCULAR REEDUCATION: CPT | Performed by: PHYSICAL THERAPIST

## 2024-12-02 PROCEDURE — 97110 THERAPEUTIC EXERCISES: CPT | Performed by: PHYSICAL THERAPIST

## 2024-12-02 NOTE — PROGRESS NOTES
"Daily Note/re-eval     Today's date: 2024  Patient name: Estephania Brown  : 1950  MRN: [id number]  Referring provider: Rosalino Purvis MD  Dx:   Encounter Diagnosis     ICD-10-CM    1. Gait abnormality  R26.9       2. Chronic ankle pain, unspecified laterality  M25.579     G89.29       3. Balance disorder  R26.89                 Start Time: 1215  Stop Time: 1258  Total time in clinic (min): 43 minutes    Subjective: Pt reports the cold weather has made her foot and ankle more sore and stiff        Objective: See treatment diary below           Assessment: Pt was able to perform sit to stands from a lower height today. She is making slow but steady progress.  Pt will benefit from continued skilled outpatient PT to improve ROM, jt mobility, balance, to address therapy goals, to reduce pain, and improve function.          Plan: Continue per plan of care.      Precautions: HTN, OP, fall risk, CA hx, hx of L ankle surgery       POC exp =  25      Manuals 11/11 11/13 11/18 11/20 11/25 11/27 12/2 10/30 11/4 11/6   L ankle TC and ST mobs DS Gr II, IV DS Gr II, IV DS Gr II, IV DS Gr II, IV DS Gr II, IV    DS Gr II, IV DS Gr II, IV DS Gr II, IV VR Gr II, IV VR Gr II, IV   DF MWM 3x10 on 8\" step 2x15 on 8\" step 3x10 on 8\" step 3x10 on 8\" step 3x10 on 8\" step  3x10 on 8\" step  3x10 on 8\" step SW 3x10 on 8\" step     STM plantar fascia DS DS DS DS DS DS DS DS VR 15' VR 15'   reasses             Neuro Re-Ed             Static balance on airex 30\"x2  tandem 30\"x2 tandem 30\"x2 tandem 30\"x2 each tandem  30\"x2 each tandem  30\"x2 ea tandem 30\"x2 ea  tandem 30\"x2 ea  tandem 30\"x2 ea  tandem 30\"x2 ea  tandem   Sand dune march 2 min light UE prn 2 min light UE prn 2 min light UE prn 2 min light UE prn 2 min light UE prn 2 min light UE prn 2 min light UE prn 2 min light UE prn 2 min light UE prn 2 min light UE prn                                                       Ther Ex             VG for ROM L7 7' alt s/l b/l  L7 " "7' alt s/l b/l L7 7' alt s/l b/l L7 8' alt s/l b/l L7 7' alt s/l b/l L7 7' alt s/l b/l L7 7' alt s/l b/l L7 7' alt s/l b/l L7 7' alt s/l b/l L7 7' alt s/l b/l                             Standing calf stretch 45\"x4 45\"x4 45\"x4 45\"x4 45\"x4 45\"x4 45\"x4 45\"x4 45\"x4 45\"x4                                                       Ther Activity             Sit to stands  Hi lo 20\" 5x5 Hi lo 20\" 5x5 Hi lo 20\" 5x5 Hi lo 20\" 5x5 Hi lo 20\" 5x5  Hi lo 19.5\" 5x5 Hi lo 20\" 4x5 Hi lo 20\" 5x5 Hi lo 20\" 5x5   TUG and 5x STS      TUGx2  [id number]       Gait Training                                       Modalities                                                        "

## 2024-12-04 ENCOUNTER — OFFICE VISIT (OUTPATIENT)
Dept: PHYSICAL THERAPY | Facility: REHABILITATION | Age: 74
End: 2024-12-04
Payer: COMMERCIAL

## 2024-12-04 DIAGNOSIS — R26.9 GAIT ABNORMALITY: Primary | ICD-10-CM

## 2024-12-04 DIAGNOSIS — G89.29 CHRONIC ANKLE PAIN, UNSPECIFIED LATERALITY: ICD-10-CM

## 2024-12-04 DIAGNOSIS — E78.5 HYPERLIPIDEMIA, UNSPECIFIED HYPERLIPIDEMIA TYPE: ICD-10-CM

## 2024-12-04 DIAGNOSIS — M25.579 CHRONIC ANKLE PAIN, UNSPECIFIED LATERALITY: ICD-10-CM

## 2024-12-04 DIAGNOSIS — R26.89 BALANCE DISORDER: ICD-10-CM

## 2024-12-04 PROCEDURE — 97110 THERAPEUTIC EXERCISES: CPT | Performed by: PHYSICAL THERAPIST

## 2024-12-04 PROCEDURE — 97140 MANUAL THERAPY 1/> REGIONS: CPT | Performed by: PHYSICAL THERAPIST

## 2024-12-04 PROCEDURE — 97112 NEUROMUSCULAR REEDUCATION: CPT | Performed by: PHYSICAL THERAPIST

## 2024-12-04 NOTE — PROGRESS NOTES
"Daily Note    Today's date: 2024  Patient name: Estephania Brown  : 1950  MRN: [id number]  Referring provider: Rosalino Purvis MD  Dx:   Encounter Diagnosis     ICD-10-CM    1. Gait abnormality  R26.9       2. Chronic ankle pain, unspecified laterality  M25.579     G89.29       3. Balance disorder  R26.89                   Start Time: 1222  Stop Time: 1308  Total time in clinic (min): 46 minutes    Subjective: Pt reports she was walking around her house without her cane most of the morning. States she did notice some muscle soreness after last visit from the progression in sit to stands.        Objective: See treatment diary below           Assessment: Pt responded well to tx and completed all exercises without issue. She noted some TTP in the plantar fascia. Pt will benefit from continued skilled outpatient PT to improve ROM, jt mobility, balance, to address therapy goals, to reduce pain, and improve function.          Plan: Continue per plan of care.      Precautions: HTN, OP, fall risk, CA hx, hx of L ankle surgery       POC exp =  25      Manuals    L ankle TC and ST mobs DS Gr II, IV DS Gr II, IV DS Gr II, IV DS Gr II, IV DS Gr II, IV    DS Gr II, IV DS Gr II, IV DS Gr II, IV VR Gr II, IV VR Gr II, IV   DF MWM 3x10 on 8\" step 2x15 on 8\" step 3x10 on 8\" step 3x10 on 8\" step 3x10 on 8\" step  3x10 on 8\" step  3x10 on 8\" step SW 3x10 on 8\" step     STM plantar fascia DS DS DS DS DS DS DS DS VR 15' VR 15'   reasses             Neuro Re-Ed             Static balance on airex 30\"x2  tandem 30\"x2 tandem 30\"x2 tandem 30\"x2 each tandem  30\"x2 each tandem  30\"x2 ea tandem 30\"x2 ea  tandem 30\"x2 ea  tandem 30\"x2 ea  tandem 30\"x2 ea  tandem   Sand dune march 2 min light UE prn 2 min light UE prn 2 min light UE prn 2 min light UE prn 2 min light UE prn 2 min light UE prn 2 min light UE prn 2 min light UE prn 2 min light UE prn 2 min light UE prn        " "                                               Ther Ex             VG for ROM L7 7' alt s/l b/l  L7 7' alt s/l b/l L7 7' alt s/l b/l L7 8' alt s/l b/l L7 7' alt s/l b/l L7 7' alt s/l b/l L7 7' alt s/l b/l L7 7' alt s/l b/l L7 7' alt s/l b/l L7 7' alt s/l b/l                             Standing calf stretch 45\"x4 45\"x4 45\"x4 45\"x4 45\"x4 45\"x4 45\"x4 45\"x4 45\"x4 45\"x4                                                       Ther Activity             Sit to stands  Hi lo 20\" 5x5 Hi lo 20\" 5x5 Hi lo 20\" 5x5 Hi lo 20\" 5x5 Hi lo 20\" 5x5  Hi lo 19.5\" 5x5 Hi lo 19.5 5x5 Hi lo 20\" 5x5 Hi lo 20\" 5x5   TUG and 5x STS      TUGx2  [id number]       Gait Training                                       Modalities                                                        "

## 2024-12-05 RX ORDER — ATORVASTATIN CALCIUM 10 MG/1
10 TABLET, FILM COATED ORAL DAILY
Qty: 30 TABLET | Refills: 0 | Status: SHIPPED | OUTPATIENT
Start: 2024-12-05

## 2024-12-06 ENCOUNTER — VBI (OUTPATIENT)
Dept: ADMINISTRATIVE | Facility: OTHER | Age: 74
End: 2024-12-06

## 2024-12-06 NOTE — TELEPHONE ENCOUNTER
12/06/24 11:37 AM     Chart reviewed for CRC: Colonoscopy was/were not submitted to the patient's insurance.     Harper Oneil MA   [facility]

## 2024-12-09 ENCOUNTER — OFFICE VISIT (OUTPATIENT)
Dept: PHYSICAL THERAPY | Facility: REHABILITATION | Age: 74
End: 2024-12-09
Payer: COMMERCIAL

## 2024-12-09 DIAGNOSIS — G89.29 CHRONIC ANKLE PAIN, UNSPECIFIED LATERALITY: ICD-10-CM

## 2024-12-09 DIAGNOSIS — M25.579 CHRONIC ANKLE PAIN, UNSPECIFIED LATERALITY: ICD-10-CM

## 2024-12-09 DIAGNOSIS — R26.9 GAIT ABNORMALITY: Primary | ICD-10-CM

## 2024-12-09 DIAGNOSIS — R26.89 BALANCE DISORDER: ICD-10-CM

## 2024-12-09 PROCEDURE — 97112 NEUROMUSCULAR REEDUCATION: CPT | Performed by: PHYSICAL THERAPIST

## 2024-12-09 PROCEDURE — 97110 THERAPEUTIC EXERCISES: CPT | Performed by: PHYSICAL THERAPIST

## 2024-12-09 PROCEDURE — 97140 MANUAL THERAPY 1/> REGIONS: CPT | Performed by: PHYSICAL THERAPIST

## 2024-12-09 NOTE — PROGRESS NOTES
"Daily Note    Today's date: 2024  Patient name: Estephania Brown  : 1950  MRN: [id number]  Referring provider: Rosalino Purvis MD  Dx:   Encounter Diagnosis     ICD-10-CM    1. Gait abnormality  R26.9       2. Chronic ankle pain, unspecified laterality  M25.579     G89.29       3. Balance disorder  R26.89                   Start Time: 1215  Stop Time: 1300  Total time in clinic (min): 45 minutes    Subjective: Pt reports she isnt too bad today. Doesn't have as much pain as other day.        Objective: See treatment diary below           Assessment: Pt responded well to tx. She reported fatigue at the end of the session but no pain. Pt will benefit from continued skilled outpatient PT to improve ROM, jt mobility, balance, to address therapy goals, to reduce pain, and improve function.          Plan: Continue per plan of care.      Precautions: HTN, OP, fall risk, CA hx, hx of L ankle surgery       POC exp =  25      Manuals    L ankle TC and ST mobs DS Gr II, IV DS Gr II, IV DS Gr II, IV DS Gr II, IV DS Gr II, IV    DS Gr II, IV DS Gr II, IV DS Gr II, IV DS Gr II, IV VR Gr II, IV   DF MWM 3x10 on 8\" step 2x15 on 8\" step 3x10 on 8\" step 3x10 on 8\" step 3x10 on 8\" step  3x10 on 8\" step  3x10 on 8\" step SW 3x10 on 8\" step 3x10 on 8\" step    STM plantar fascia DS DS DS DS DS DS DS DS DS VR 15'   reasses             Neuro Re-Ed             Static balance on airex 30\"x2  tandem 30\"x2 tandem 30\"x2 tandem 30\"x2 each tandem  30\"x2 each tandem  30\"x2 ea tandem 30\"x2 ea  tandem 30\"x2 ea  tandem 30\"x2 ea  tandem 30\"x2 ea  tandem   Sand dune march 2 min light UE prn 2 min light UE prn 2 min light UE prn 2 min light UE prn 2 min light UE prn 2 min light UE prn 2 min light UE prn 2 min light UE prn 2 min light UE prn 2 min light UE prn                                                       Ther Ex             VG for ROM L7 7' alt s/l b/l  L7 7' alt s/l b/l L7 " "7' alt s/l b/l L7 8' alt s/l b/l L7 7' alt s/l b/l L7 7' alt s/l b/l L7 7' alt s/l b/l L7 7' alt s/l b/l L7 7' alt s/l b/l L7 7' alt s/l b/l                             Standing calf stretch 45\"x4 45\"x4 45\"x4 45\"x4 45\"x4 45\"x4 45\"x4 45\"x4 45\"x4 45\"x4                                                       Ther Activity             Sit to stands  Hi lo 20\" 5x5 Hi lo 20\" 5x5 Hi lo 20\" 5x5 Hi lo 20\" 5x5 Hi lo 20\" 5x5  Hi lo 19.5\" 5x5 Hi lo 19.5 5x5 Hi lo 19.5\" 5x5 Hi lo 20\" 5x5   TUG and 5x STS      TUGx2  [id number]       Gait Training                                       Modalities                                                        "

## 2024-12-11 ENCOUNTER — OFFICE VISIT (OUTPATIENT)
Dept: PHYSICAL THERAPY | Facility: REHABILITATION | Age: 74
End: 2024-12-11
Payer: COMMERCIAL

## 2024-12-11 DIAGNOSIS — M25.579 CHRONIC ANKLE PAIN, UNSPECIFIED LATERALITY: ICD-10-CM

## 2024-12-11 DIAGNOSIS — R26.9 GAIT ABNORMALITY: Primary | ICD-10-CM

## 2024-12-11 DIAGNOSIS — R26.89 BALANCE DISORDER: ICD-10-CM

## 2024-12-11 DIAGNOSIS — G89.29 CHRONIC ANKLE PAIN, UNSPECIFIED LATERALITY: ICD-10-CM

## 2024-12-11 PROCEDURE — 97110 THERAPEUTIC EXERCISES: CPT | Performed by: PHYSICAL THERAPIST

## 2024-12-11 PROCEDURE — 97140 MANUAL THERAPY 1/> REGIONS: CPT | Performed by: PHYSICAL THERAPIST

## 2024-12-11 PROCEDURE — 97112 NEUROMUSCULAR REEDUCATION: CPT | Performed by: PHYSICAL THERAPIST

## 2024-12-11 NOTE — PROGRESS NOTES
"Daily Note    Today's date: 2024  Patient name: Estephania Brown  : 1950  MRN: [id number]  Referring provider: Rosalino Purvis MD  Dx:   Encounter Diagnosis     ICD-10-CM    1. Gait abnormality  R26.9       2. Chronic ankle pain, unspecified laterality  M25.579     G89.29       3. Balance disorder  R26.89               Start Time: 1213  Stop Time: 1257  Total time in clinic (min): 44 minutes    Subjective: Pt reports her plantar fascia is sore today with the damp weather        Objective: See treatment diary below           Assessment: Pt responded well to tx. She continues to be most challenged by sit to stands and would benefit from further practice. Pt will benefit from continued skilled outpatient PT to improve ROM, jt mobility, balance, to address therapy goals, to reduce pain, and improve function.          Plan: Continue per plan of care.      Precautions: HTN, OP, fall risk, CA hx, hx of L ankle surgery       POC exp =  25      Manuals    L ankle TC and ST mobs DS Gr II, IV DS Gr II, IV DS Gr II, IV DS Gr II, IV DS Gr II, IV    DS Gr II, IV DS Gr II, IV DS Gr II, IV DS Gr II, IV DS Gr II, IV   DF MWM 3x10 on 8\" step 2x15 on 8\" step 3x10 on 8\" step 3x10 on 8\" step 3x10 on 8\" step  3x10 on 8\" step  3x10 on 8\" step SW 3x10 on 8\" step 3x10 on 8\" step 3x10 on 8\" step   STM plantar fascia DS DS DS DS DS DS DS DS DS DS   reasses             Neuro Re-Ed             Static balance on airex 30\"x2  tandem 30\"x2 tandem 30\"x2 tandem 30\"x2 each tandem  30\"x2 each tandem  30\"x2 ea tandem 30\"x2 ea  tandem 30\"x2 ea  tandem 30\"x2 ea  tandem 30\"x2 ea  tandem   Sand dune march 2 min light UE prn 2 min light UE prn 2 min light UE prn 2 min light UE prn 2 min light UE prn 2 min light UE prn 2 min light UE prn 2 min light UE prn 2 min light UE prn 2 min light UE prn                                                       Ther Ex             VG for ROM L7 7' " "alt s/l b/l  L7 7' alt s/l b/l L7 7' alt s/l b/l L7 8' alt s/l b/l L7 7' alt s/l b/l L7 7' alt s/l b/l L7 7' alt s/l b/l L7 7' alt s/l b/l L7 7' alt s/l b/l L7 7' alt s/l b/l                             Standing calf stretch 45\"x4 45\"x4 45\"x4 45\"x4 45\"x4 45\"x4 45\"x4 45\"x4 45\"x4 45\"x4                                                       Ther Activity             Sit to stands  Hi lo 20\" 5x5 Hi lo 20\" 5x5 Hi lo 20\" 5x5 Hi lo 20\" 5x5 Hi lo 20\" 5x5  Hi lo 19.5\" 5x5 Hi lo 19.5 5x5 Hi lo 19.5\" 5x5 Hi lo  19.5\" 5x5   TUG and 5x STS      TUGx2  [id number]       Gait Training                                       Modalities                                                        "

## 2024-12-16 ENCOUNTER — OFFICE VISIT (OUTPATIENT)
Dept: PHYSICAL THERAPY | Facility: REHABILITATION | Age: 74
End: 2024-12-16
Payer: COMMERCIAL

## 2024-12-16 DIAGNOSIS — G89.29 CHRONIC ANKLE PAIN, UNSPECIFIED LATERALITY: ICD-10-CM

## 2024-12-16 DIAGNOSIS — R26.9 GAIT ABNORMALITY: Primary | ICD-10-CM

## 2024-12-16 DIAGNOSIS — M25.579 CHRONIC ANKLE PAIN, UNSPECIFIED LATERALITY: ICD-10-CM

## 2024-12-16 PROCEDURE — 97140 MANUAL THERAPY 1/> REGIONS: CPT | Performed by: PHYSICAL THERAPIST

## 2024-12-16 PROCEDURE — 97110 THERAPEUTIC EXERCISES: CPT | Performed by: PHYSICAL THERAPIST

## 2024-12-16 PROCEDURE — 97112 NEUROMUSCULAR REEDUCATION: CPT | Performed by: PHYSICAL THERAPIST

## 2024-12-16 NOTE — PROGRESS NOTES
"Daily Note    Today's date: 2024  Patient name: Estephania Brown  : 1950  MRN: [id number]  Referring provider: Rosalino Purvis MD  Dx:   Encounter Diagnosis     ICD-10-CM    1. Gait abnormality  R26.9       2. Chronic ankle pain, unspecified laterality  M25.579     G89.29                 Start Time: 1213  Stop Time: 1258  Total time in clinic (min): 45 minutes    Subjective: Pt reports she has having trouble walking this morning when she first got up but the ankle has since loosened up and is feeling better.        Objective: See treatment diary below           Assessment: Pt responded well to tx. She had some TTP over the PF today. Her ankle mobility improved with txPt will benefit from continued skilled outpatient PT to improve ROM, jt mobility, balance, to address therapy goals, to reduce pain, and improve function.          Plan: Continue per plan of care.      Precautions: HTN, OP, fall risk, CA hx, hx of L ankle surgery       POC exp =  25      Manuals    L ankle TC and ST mobs DS Gr II, IV DS Gr II, IV DS Gr II, IV DS Gr II, IV DS Gr II, IV    DS Gr II, IV DS Gr II, IV DS Gr II, IV DS Gr II, IV DS Gr II, IV   DF MWM 3x10 on 8\" step 2x15 on 8\" step 3x10 on 8\" step 3x10 on 8\" step 3x10 on 8\" step  3x10 on 8\" step  3x10 on 8\" step SW 3x10 on 8\" step 3x10 on 8\" step 3x10 on 8\" step   STM plantar fascia DS DS DS DS DS DS DS DS DS DS   reasses             Neuro Re-Ed             Static balance on airex 30\"x2  tandem 30\"x2 tandem 30\"x2 tandem 30\"x2 each tandem  30\"x2 each tandem  30\"x2 ea tandem 30\"x2 ea  tandem 30\"x2 ea  tandem 30\"x2 ea  tandem 30\"x2 ea  tandem   Sand dune march 2 min light UE prn 2 min light UE prn 2 min light UE prn 2 min light UE prn 2 min light UE prn 2 min light UE prn 2 min light UE prn 2 min light UE prn 2 min light UE prn 2 min light UE prn                                                       Ther Ex             VG " "for ROM L7 7' alt s/l b/l  L7 7' alt s/l b/l L7 7' alt s/l b/l L7 8' alt s/l b/l L7 7' alt s/l b/l L7 7' alt s/l b/l L7 7' alt s/l b/l L7 7' alt s/l b/l L7 7' alt s/l b/l L7 7' alt s/l b/l                             Standing calf stretch 45\"x4 45\"x4 45\"x4 45\"x4 45\"x4 45\"x4 45\"x4 45\"x4 45\"x4 45\"x4                                                       Ther Activity             Sit to stands  Hi lo 19.5\" 5x5 Hi lo 20\" 5x5 Hi lo 20\" 5x5 Hi lo 20\" 5x5 Hi lo 20\" 5x5  Hi lo 19.5\" 5x5 Hi lo 19.5 5x5 Hi lo 19.5\" 5x5 Hi lo  19.5\" 5x5   TUG and 5x STS      TUGx2  [id number]       Gait Training                                       Modalities                                                        "

## 2024-12-18 ENCOUNTER — APPOINTMENT (OUTPATIENT)
Dept: PHYSICAL THERAPY | Facility: REHABILITATION | Age: 74
End: 2024-12-18
Payer: COMMERCIAL

## 2024-12-23 ENCOUNTER — OFFICE VISIT (OUTPATIENT)
Dept: PHYSICAL THERAPY | Facility: REHABILITATION | Age: 74
End: 2024-12-23
Payer: COMMERCIAL

## 2024-12-23 DIAGNOSIS — R26.9 GAIT ABNORMALITY: Primary | ICD-10-CM

## 2024-12-23 DIAGNOSIS — G89.29 CHRONIC ANKLE PAIN, UNSPECIFIED LATERALITY: ICD-10-CM

## 2024-12-23 DIAGNOSIS — R26.89 BALANCE DISORDER: ICD-10-CM

## 2024-12-23 DIAGNOSIS — M25.579 CHRONIC ANKLE PAIN, UNSPECIFIED LATERALITY: ICD-10-CM

## 2024-12-23 PROCEDURE — 97140 MANUAL THERAPY 1/> REGIONS: CPT | Performed by: PHYSICAL THERAPIST

## 2024-12-23 PROCEDURE — 97110 THERAPEUTIC EXERCISES: CPT | Performed by: PHYSICAL THERAPIST

## 2024-12-23 PROCEDURE — 97112 NEUROMUSCULAR REEDUCATION: CPT | Performed by: PHYSICAL THERAPIST

## 2024-12-23 NOTE — PROGRESS NOTES
"Daily Note    Today's date: 2024  Patient name: Estephania Brown  : 1950  MRN: [id number]  Referring provider: Rosalino Purvis MD  Dx:   Encounter Diagnosis     ICD-10-CM    1. Gait abnormality  R26.9       2. Balance disorder  R26.89       3. Chronic ankle pain, unspecified laterality  M25.579     G89.29           Start Time: 1215  Stop Time: 1303  Total time in clinic (min): 48 minutes    Subjective: Pt reports her ankle isnt too bad today but she had a painful day over the weekend and had to walk with two canes most of the day.        Objective: See treatment diary below           Assessment: Pt responded well to tx. She is still most challenged by sit to stands. Pt will benefit from continued skilled outpatient PT to improve ROM, jt mobility, balance, to address therapy goals, to reduce pain, and improve function.          Plan: Continue per plan of care.      Precautions: HTN, OP, fall risk, CA hx, hx of L ankle surgery       POC exp =  25      Manuals    L ankle TC and ST mobs DS Gr II, IV DS Gr II, IV DS Gr II, IV DS Gr II, IV DS Gr II, IV    DS Gr II, IV DS Gr II, IV DS Gr II, IV DS Gr II, IV DS Gr II, IV   DF MWM 3x10 on 8\" step 3x10 on 8\" step 3x10 on 8\" step 3x10 on 8\" step 3x10 on 8\" step  3x10 on 8\" step  3x10 on 8\" step SW 3x10 on 8\" step 3x10 on 8\" step 3x10 on 8\" step   STM plantar fascia DS DS DS DS DS DS DS DS DS DS   reasses             Neuro Re-Ed             Static balance on airex 30\"x2  tandem 30\"x2 tandem 30\"x2 tandem 30\"x2 each tandem  30\"x2 each tandem  30\"x2 ea tandem 30\"x2 ea  tandem 30\"x2 ea  tandem 30\"x2 ea  tandem 30\"x2 ea  tandem   Sand dune march 2 min light UE prn 2 min light UE prn 2 min light UE prn 2 min light UE prn 2 min light UE prn 2 min light UE prn 2 min light UE prn 2 min light UE prn 2 min light UE prn 2 min light UE prn                                                       Ther Ex             VG " "for ROM L7 7' alt s/l b/l  L7 7' alt s/l b/l L7 7' alt s/l b/l L7 8' alt s/l b/l L7 7' alt s/l b/l L7 7' alt s/l b/l L7 7' alt s/l b/l L7 7' alt s/l b/l L7 7' alt s/l b/l L7 7' alt s/l b/l                             Standing calf stretch 45\"x4 45\"x4 45\"x4 45\"x4 45\"x4 45\"x4 45\"x4 45\"x4 45\"x4 45\"x4                                                       Ther Activity             Sit to stands  Hi lo 19.5\" 5x5 Hi lo 20\" 5x5 Hi lo 20\" 5x5 Hi lo 20\" 5x5 Hi lo 20\" 5x5  Hi lo 19.5\" 5x5 Hi lo 19.5 5x5 Hi lo 19.5\" 5x5 Hi lo  19.5\" 5x5   TUG and 5x STS      TUGx2  [id number]       Gait Training                                       Modalities                                                        "

## 2024-12-26 ENCOUNTER — OFFICE VISIT (OUTPATIENT)
Dept: PHYSICAL THERAPY | Facility: REHABILITATION | Age: 74
End: 2024-12-26
Payer: COMMERCIAL

## 2024-12-26 DIAGNOSIS — M25.579 CHRONIC ANKLE PAIN, UNSPECIFIED LATERALITY: ICD-10-CM

## 2024-12-26 DIAGNOSIS — R26.9 GAIT ABNORMALITY: Primary | ICD-10-CM

## 2024-12-26 DIAGNOSIS — R26.89 BALANCE DISORDER: ICD-10-CM

## 2024-12-26 DIAGNOSIS — G89.29 CHRONIC ANKLE PAIN, UNSPECIFIED LATERALITY: ICD-10-CM

## 2024-12-26 PROCEDURE — 97112 NEUROMUSCULAR REEDUCATION: CPT | Performed by: PHYSICAL THERAPIST

## 2024-12-26 PROCEDURE — 97110 THERAPEUTIC EXERCISES: CPT | Performed by: PHYSICAL THERAPIST

## 2024-12-26 PROCEDURE — 97140 MANUAL THERAPY 1/> REGIONS: CPT | Performed by: PHYSICAL THERAPIST

## 2024-12-26 NOTE — PROGRESS NOTES
"Daily Note    Today's date: 2024  Patient name: Estephania Brown  : 1950  MRN: [id number]  Referring provider: Rosalino Purvis MD  Dx:   Encounter Diagnosis     ICD-10-CM    1. Gait abnormality  R26.9       2. Balance disorder  R26.89       3. Chronic ankle pain, unspecified laterality  M25.579     G89.29           Start Time: 1215  Stop Time: 1300  Total time in clinic (min): 45 minutes    Subjective: Pt reports the bottom of her foot is sore today but she stretches her calf every morning and that helps with her walking.        Objective: See treatment diary below           Assessment: Pt responded well to tx with a reduction in ankle stiffness and pain.  Pt will benefit from continued skilled outpatient PT to improve ROM, jt mobility, balance, to address therapy goals, to reduce pain, and improve function.          Plan: Continue per plan of care.      Precautions: HTN, OP, fall risk, CA hx, hx of L ankle surgery       POC exp =  25      Manuals    L ankle TC and ST mobs DS Gr II, IV DS Gr II, IV DS Gr II, IV DS Gr II, IV DS Gr II, IV    DS Gr II, IV DS Gr II, IV DS Gr II, IV DS Gr II, IV DS Gr II, IV   DF MWM 3x10 on 8\" step 3x10 on 8\" step 3x10 on 8\" step 3x10 on 8\" step 3x10 on 8\" step  3x10 on 8\" step  3x10 on 8\" step SW 3x10 on 8\" step 3x10 on 8\" step 3x10 on 8\" step   STM plantar fascia DS DS DS DS DS DS DS DS DS DS   reasses             Neuro Re-Ed             Static balance on airex 30\"x2  tandem 30\"x2 tandem 30\"x2 tandem 30\"x2 each tandem  30\"x2 each tandem  30\"x2 ea tandem 30\"x2 ea  tandem 30\"x2 ea  tandem 30\"x2 ea  tandem 30\"x2 ea  tandem   Sand dune march 2 min light UE prn 2 min light UE prn 2 min light UE prn 2 min light UE prn 2 min light UE prn 2 min light UE prn 2 min light UE prn 2 min light UE prn 2 min light UE prn 2 min light UE prn                                                       Ther Ex             VG for ROM L7 " "7' alt s/l b/l  L7 7' alt s/l b/l L7 7' alt s/l b/l L7 8' alt s/l b/l L7 7' alt s/l b/l L7 7' alt s/l b/l L7 7' alt s/l b/l L7 7' alt s/l b/l L7 7' alt s/l b/l L7 7' alt s/l b/l                             Standing calf stretch 45\"x4 45\"x4 45\"x4 45\"x4 45\"x4 45\"x4 45\"x4 45\"x4 45\"x4 45\"x4                                                       Ther Activity             Sit to stands  Hi lo 19.5\" 5x5 Hi lo 19.5\" 5x5 Hi lo 19.5\" 5x5 Hi lo 20\" 5x5 Hi lo 20\" 5x5  Hi lo 19.5\" 5x5 Hi lo 19.5 5x5 Hi lo 19.5\" 5x5 Hi lo  19.5\" 5x5   TUG and 5x STS      TUGx2  [id number]       Gait Training                                       Modalities                                                        "

## 2024-12-30 ENCOUNTER — APPOINTMENT (OUTPATIENT)
Dept: PHYSICAL THERAPY | Facility: REHABILITATION | Age: 74
End: 2024-12-30
Payer: COMMERCIAL

## 2025-01-02 ENCOUNTER — APPOINTMENT (OUTPATIENT)
Dept: PHYSICAL THERAPY | Facility: REHABILITATION | Age: 75
End: 2025-01-02
Payer: COMMERCIAL

## 2025-01-02 NOTE — PROGRESS NOTES
"Daily Note    Today's date: 2025  Patient name: Estephania Brown  : 1950  MRN: [id number]  Referring provider: Rosalino Purvis MD  Dx:   No diagnosis found.                 Subjective: Pt reports the bottom of her foot is sore today but she stretches her calf every morning and that helps with her walking.        Objective: See treatment diary below           Assessment: Pt responded well to tx with a reduction in ankle stiffness and pain.  Pt will benefit from continued skilled outpatient PT to improve ROM, jt mobility, balance, to address therapy goals, to reduce pain, and improve function.          Plan: Continue per plan of care.      Precautions: HTN, OP, fall risk, CA hx, hx of L ankle surgery       POC exp =  25      Manuals    L ankle TC and ST mobs DS Gr II, IV DS Gr II, IV DS Gr II, IV DS Gr II, IV DS Gr II, IV    DS Gr II, IV DS Gr II, IV DS Gr II, IV DS Gr II, IV DS Gr II, IV   DF MWM 3x10 on 8\" step 3x10 on 8\" step 3x10 on 8\" step 3x10 on 8\" step 3x10 on 8\" step  3x10 on 8\" step  3x10 on 8\" step SW 3x10 on 8\" step 3x10 on 8\" step 3x10 on 8\" step   STM plantar fascia DS DS DS DS DS DS DS DS DS DS   reasses             Neuro Re-Ed             Static balance on airex 30\"x2  tandem 30\"x2 tandem 30\"x2 tandem 30\"x2 each tandem  30\"x2 each tandem  30\"x2 ea tandem 30\"x2 ea  tandem 30\"x2 ea  tandem 30\"x2 ea  tandem 30\"x2 ea  tandem   Sand dune march 2 min light UE prn 2 min light UE prn 2 min light UE prn 2 min light UE prn 2 min light UE prn 2 min light UE prn 2 min light UE prn 2 min light UE prn 2 min light UE prn 2 min light UE prn                                                       Ther Ex             VG for ROM L7 7' alt s/l b/l  L7 7' alt s/l b/l L7 7' alt s/l b/l L7 8' alt s/l b/l L7 7' alt s/l b/l L7 7' alt s/l b/l L7 7' alt s/l b/l L7 7' alt s/l b/l L7 7' alt s/l b/l L7 7' alt s/l b/l                             Standing calf " "stretch 45\"x4 45\"x4 45\"x4 45\"x4 45\"x4 45\"x4 45\"x4 45\"x4 45\"x4 45\"x4                                                       Ther Activity             Sit to stands  Hi lo 19.5\" 5x5 Hi lo 19.5\" 5x5 Hi lo 19.5\" 5x5 Hi lo 20\" 5x5 Hi lo 20\" 5x5  Hi lo 19.5\" 5x5 Hi lo 19.5 5x5 Hi lo 19.5\" 5x5 Hi lo  19.5\" 5x5   TUG and 5x STS      TUGx2  [id number]       Gait Training                                       Modalities                                                        "

## 2025-01-03 ENCOUNTER — OFFICE VISIT (OUTPATIENT)
Dept: FAMILY MEDICINE CLINIC | Facility: CLINIC | Age: 75
End: 2025-01-03
Payer: COMMERCIAL

## 2025-01-03 VITALS
WEIGHT: 221 LBS | RESPIRATION RATE: 16 BRPM | TEMPERATURE: 97.5 F | DIASTOLIC BLOOD PRESSURE: 90 MMHG | HEART RATE: 65 BPM | OXYGEN SATURATION: 99 % | BODY MASS INDEX: 37.73 KG/M2 | HEIGHT: 64 IN | SYSTOLIC BLOOD PRESSURE: 154 MMHG

## 2025-01-03 DIAGNOSIS — J32.9 SINUSITIS, UNSPECIFIED CHRONICITY, UNSPECIFIED LOCATION: Primary | ICD-10-CM

## 2025-01-03 DIAGNOSIS — N18.31 CHRONIC KIDNEY DISEASE, STAGE 3A (HCC): ICD-10-CM

## 2025-01-03 PROBLEM — I73.9 PERIPHERAL VASCULAR DISEASE (HCC): Status: RESOLVED | Noted: 2023-05-03 | Resolved: 2025-01-03

## 2025-01-03 PROBLEM — F11.20 OPIOID DEPENDENCE, UNCOMPLICATED (HCC): Status: ACTIVE | Noted: 2025-01-03

## 2025-01-03 PROCEDURE — 99213 OFFICE O/P EST LOW 20 MIN: CPT | Performed by: NURSE PRACTITIONER

## 2025-01-03 PROCEDURE — G2211 COMPLEX E/M VISIT ADD ON: HCPCS | Performed by: NURSE PRACTITIONER

## 2025-01-03 NOTE — PROGRESS NOTES
"Name: Estephania Brown      : 1950      MRN: [id number]  Encounter Provider: MEHNAZ Oglesby  Encounter Date: 1/3/2025   Encounter department: [facility]  :  Assessment & Plan  Sinusitis, unspecified chronicity, unspecified location  Stop Sudafed, Claritin D--medications containing pseudoephedrine--this is raising your blood pressure.   Start Augmentin, take with food, call me if you are not feeling better over the next 4-5 days.   Orders:  •  amoxicillin-clavulanate (AUGMENTIN) 875-125 mg per tablet; Take 1 tablet by mouth every 12 (twelve) hours for 7 days    Chronic kidney disease, stage 3a (HCC)  Lab Results   Component Value Date    EGFR 65 2024    EGFR 52 2023    EGFR 57 2023    CREATININE 0.88 2024    CREATININE 1.06 2023    CREATININE 0.98 2023     Creatinine 0.9-1.   EGFR 52-65.              Depression Screening and Follow-up Plan:   Patient's depression screening was negative with an Ripley  Depression Scale score of  .     History of Present Illness     Estephania Brown is a 74 year old female presenting today for sinus symptoms.     Started with symptoms 8 days ago.   Sinus pressure, sore throat, fever, cough.     Cough, fever, and sore throat resolved, but still has sinus congestion that is persistent and not improving.     Has been taking Sudafed or Claritin D for the last one week.     Hydrocodone 1/2 tablet as needed, not every day, for chronic ankle pain.      Review of Systems   Constitutional: Negative.    HENT:  Positive for congestion, postnasal drip, rhinorrhea and sinus pressure. Negative for ear pain and sore throat.    Respiratory:  Negative for cough.    Neurological:  Positive for dizziness and headaches. Numbness: off balance.      Objective   /90   Pulse 65   Temp 97.5 °F (36.4 °C) (Temporal)   Resp 16   Ht 5' 4\" (1.626 m)   Wt 100 kg (221 lb)   LMP  (LMP Unknown)   SpO2 99%   BMI 37.93 " kg/m²      Physical Exam  Vitals and nursing note reviewed.   Constitutional:       General: She is not in acute distress.     Appearance: Normal appearance. She is not ill-appearing.   HENT:      Head: Atraumatic.      Right Ear: Tympanic membrane normal.      Left Ear: Tympanic membrane normal.      Mouth/Throat:      Mouth: Mucous membranes are moist.      Pharynx: Oropharynx is clear.   Cardiovascular:      Rate and Rhythm: Normal rate and regular rhythm.      Heart sounds: No murmur heard.  Pulmonary:      Effort: Pulmonary effort is normal.      Breath sounds: Normal breath sounds.   Musculoskeletal:      Cervical back: Normal range of motion and neck supple.   Lymphadenopathy:      Cervical: No cervical adenopathy.   Neurological:      Mental Status: She is alert.   Psychiatric:         Mood and Affect: Mood normal.         Current Outpatient Medications:   •  amoxicillin-clavulanate (AUGMENTIN) 875-125 mg per tablet, Take 1 tablet by mouth every 12 (twelve) hours for 7 days, Disp: 14 tablet, Rfl: 0  •  atorvastatin (LIPITOR) 10 mg tablet, Take 1 tablet (10 mg total) by mouth daily, Disp: 30 tablet, Rfl: 0  •  Azelaic Acid 15 % cream, Apply topically daily at bedtime  , Disp: , Rfl:   •  Calcium Carb-Cholecalciferol 600-800 MG-UNIT TABS, Take 2 tablets by mouth daily, Disp: , Rfl:   •  Denosumab (PROLIA SC), Inject under the skin 2x year , Disp: , Rfl:   •  docusate sodium (COLACE) 100 mg capsule, , Disp: , Rfl:   •  doxycycline (PERIOSTAT) 20 MG tablet, Take 20 mg by mouth 2 (two) times a day, Disp: , Rfl:   •  gabapentin (NEURONTIN) 300 mg capsule, TAKE ONE CAPSULE BY MOUTH IN THE MORNING, ONE CAPSULE IN AFTERNOON., AND 2 CAPSULES AT BEDTIME, Disp: 360 capsule, Rfl: 1  •  Glucosamine-Chondroitin (COSAMIN DS PO), Take by mouth, Disp: , Rfl:   •  HYDROcodone-acetaminophen (Norco) 5-325 mg per tablet, Take 1 tablet by mouth if needed for pain One po q day prn, Disp: 30 tablet, Rfl: 0  •  levothyroxine 175 mcg  tablet, Take 1 tablet (175 mcg total) by mouth daily, Disp: 90 tablet, Rfl: 1  •  loratadine (CLARITIN) 10 mg tablet, Take 10 mg by mouth daily as needed , Disp: , Rfl:   •  metFORMIN (GLUCOPHAGE-XR) 500 mg 24 hr tablet, Take 1 tablet (500 mg total) by mouth daily with dinner, Disp: 90 tablet, Rfl: 3  •  methocarbamol (ROBAXIN) 500 mg tablet, TAKE 1 TABLET (500 MG TOTAL) BY MOUTH IN THE MORNING AND 1 TABLET (500 MG TOTAL) IN THE EVENING AND 1 TABLET (500 MG TOTAL) BEFORE BEDTIME., Disp: 30 tablet, Rfl: 3  •  metoprolol tartrate (LOPRESSOR) 50 mg tablet, Take 1 tablet (50 mg total) by mouth daily, Disp: 90 tablet, Rfl: 1  •  metroNIDAZOLE (METROGEL) 1 % gel, Apply 1 application topically daily , Disp: , Rfl:   •  Multiple Vitamins-Minerals (Centrum Silver 50+Women) TABS, , Disp: , Rfl:   •  naproxen (NAPROSYN) 500 mg tablet, Take 1 tablet (500 mg total) by mouth 2 (two) times a day with meals, Disp: 180 tablet, Rfl: 1  •  Probiotic Product (PROBIOTIC-10 PO), , Disp: , Rfl:   •  psyllium (METAMUCIL) 0.52 g capsule, , Disp: , Rfl:   •  Triamcinolone Acetonide (NASACORT ALLERGY 24HR NA), 2 sprays into each nostril as needed  , Disp: , Rfl:

## 2025-01-04 DIAGNOSIS — E78.5 HYPERLIPIDEMIA, UNSPECIFIED HYPERLIPIDEMIA TYPE: ICD-10-CM

## 2025-01-06 ENCOUNTER — OFFICE VISIT (OUTPATIENT)
Dept: PHYSICAL THERAPY | Facility: REHABILITATION | Age: 75
End: 2025-01-06
Payer: COMMERCIAL

## 2025-01-06 DIAGNOSIS — M25.579 CHRONIC ANKLE PAIN, UNSPECIFIED LATERALITY: ICD-10-CM

## 2025-01-06 DIAGNOSIS — R26.9 GAIT ABNORMALITY: Primary | ICD-10-CM

## 2025-01-06 DIAGNOSIS — G89.29 CHRONIC ANKLE PAIN, UNSPECIFIED LATERALITY: ICD-10-CM

## 2025-01-06 DIAGNOSIS — R26.89 BALANCE DISORDER: ICD-10-CM

## 2025-01-06 PROCEDURE — 97112 NEUROMUSCULAR REEDUCATION: CPT | Performed by: PHYSICAL THERAPIST

## 2025-01-06 PROCEDURE — 97140 MANUAL THERAPY 1/> REGIONS: CPT | Performed by: PHYSICAL THERAPIST

## 2025-01-06 PROCEDURE — 97110 THERAPEUTIC EXERCISES: CPT | Performed by: PHYSICAL THERAPIST

## 2025-01-06 NOTE — PROGRESS NOTES
"Daily Note    Today's date: 2025  Patient name: Estephania Brown  : 1950  MRN: [id number]  Referring provider: Rosalino Purvis MD  Dx:   Encounter Diagnosis     ICD-10-CM    1. Gait abnormality  R26.9       2. Balance disorder  R26.89       3. Chronic ankle pain, unspecified laterality  M25.579     G89.29             Start Time: 1210  Stop Time: 1253  Total time in clinic (min): 43 minutes    Subjective: Pt reports she isnt too bad considering she was sick for the past week. States her ankle is a little stiff but not too bad.        Objective: See treatment diary below           Assessment: Pt responded well to tx. She fatigue more with the VG and sit to stands today, likely b/c she is recovering from being ill. Pt will benefit from continued skilled outpatient PT to improve ROM, jt mobility, balance, to address therapy goals, to reduce pain, and improve function.          Plan: Continue per plan of care.      Precautions: HTN, OP, fall risk, CA hx, hx of L ankle surgery       POC exp =  25      Manuals    L ankle TC and ST mobs DS Gr II, IV DS Gr II, IV DS Gr II, IV DS Gr II, IV DS Gr II, IV    DS Gr II, IV DS Gr II, IV DS Gr II, IV DS Gr II, IV DS Gr II, IV   DF MWM 3x10 on 8\" step 3x10 on 8\" step 3x10 on 8\" step 3x10 on 8\" step 3x10 on 8\" step  3x10 on 8\" step  3x10 on 8\" step SW 3x10 on 8\" step 3x10 on 8\" step 3x10 on 8\" step   STM plantar fascia DS DS DS DS DS DS DS DS DS DS   reasses             Neuro Re-Ed             Static balance on airex 30\"x2  tandem 30\"x2 tandem 30\"x2 tandem 30\"x2 each tandem  30\"x2 each tandem  30\"x2 ea tandem 30\"x2 ea  tandem 30\"x2 ea  tandem 30\"x2 ea  tandem 30\"x2 ea  tandem   Sand dune march 2 min light UE prn 2 min light UE prn 2 min light UE prn 2 min light UE prn 2 min light UE prn 2 min light UE prn 2 min light UE prn 2 min light UE prn 2 min light UE prn 2 min light UE prn                                    " "                   Ther Ex             VG for ROM L7 7' alt s/l b/l  L7 7' alt s/l b/l L7 7' alt s/l b/l L7 7' alt s/l b/l L7 7' alt s/l b/l L7 7' alt s/l b/l L7 7' alt s/l b/l L7 7' alt s/l b/l L7 7' alt s/l b/l L7 7' alt s/l b/l                             Standing calf stretch 45\"x4 45\"x4 45\"x4 30\"x4 45\"x4 45\"x4 45\"x4 45\"x4 45\"x4 45\"x4                                                       Ther Activity             Sit to stands  Hi lo 19.5\" 5x5 Hi lo 19.5\" 5x5 Hi lo 19.5\" 5x5 Hi lo \" 5x5 Hi lo 20\" 5x5  Hi lo 19.5\" 5x5 Hi lo 19.5 5x5 Hi lo 19.5\" 5x5 Hi lo  19.5\" 5x5   TUG and 5x STS      TUGx2  [id number]       Gait Training                                       Modalities                                                        "

## 2025-01-07 RX ORDER — ATORVASTATIN CALCIUM 10 MG/1
10 TABLET, FILM COATED ORAL DAILY
Qty: 90 TABLET | Refills: 1 | Status: SHIPPED | OUTPATIENT
Start: 2025-01-07

## 2025-01-08 ENCOUNTER — OFFICE VISIT (OUTPATIENT)
Dept: PHYSICAL THERAPY | Facility: REHABILITATION | Age: 75
End: 2025-01-08
Payer: COMMERCIAL

## 2025-01-08 DIAGNOSIS — M25.579 CHRONIC ANKLE PAIN, UNSPECIFIED LATERALITY: ICD-10-CM

## 2025-01-08 DIAGNOSIS — R26.9 GAIT ABNORMALITY: Primary | ICD-10-CM

## 2025-01-08 DIAGNOSIS — R26.89 BALANCE DISORDER: ICD-10-CM

## 2025-01-08 DIAGNOSIS — G89.29 CHRONIC ANKLE PAIN, UNSPECIFIED LATERALITY: ICD-10-CM

## 2025-01-08 PROCEDURE — 97110 THERAPEUTIC EXERCISES: CPT | Performed by: PHYSICAL THERAPIST

## 2025-01-08 PROCEDURE — 97140 MANUAL THERAPY 1/> REGIONS: CPT | Performed by: PHYSICAL THERAPIST

## 2025-01-08 PROCEDURE — 97112 NEUROMUSCULAR REEDUCATION: CPT | Performed by: PHYSICAL THERAPIST

## 2025-01-08 NOTE — PROGRESS NOTES
"Daily Note    Today's date: 2025  Patient name: Estephania Brown  : 1950  MRN: [id number]  Referring provider: Rosalino Purvis MD  Dx:   Encounter Diagnosis     ICD-10-CM    1. Gait abnormality  R26.9       2. Balance disorder  R26.89       3. Chronic ankle pain, unspecified laterality  M25.579     G89.29             Start Time: 1213  Stop Time: 1258  Total time in clinic (min): 45 minutes    Subjective: Pt offers no new complaints today        Objective: See treatment diary below           Assessment: Pt responded well to tx. She had some TTP, but overall did well. Pt will benefit from continued skilled outpatient PT to improve ROM, jt mobility, balance, to address therapy goals, to reduce pain, and improve function.          Plan: Continue per plan of care.      Precautions: HTN, OP, fall risk, CA hx, hx of L ankle surgery       POC exp =  25      Manuals    L ankle TC and ST mobs DS Gr II, IV DS Gr II, IV DS Gr II, IV DS Gr II, IV DS Gr II, IV    DS Gr II, IV DS Gr II, IV DS Gr II, IV DS Gr II, IV DS Gr II, IV   DF MWM 3x10 on 8\" step 3x10 on 8\" step 3x10 on 8\" step 3x10 on 8\" step 3x10 on 8\" step  3x10 on 8\" step  3x10 on 8\" step SW 3x10 on 8\" step 3x10 on 8\" step 3x10 on 8\" step   STM plantar fascia DS DS DS DS DS DS DS DS DS DS   reasses             Neuro Re-Ed             Static balance on airex 30\"x2  tandem 30\"x2 tandem 30\"x2 tandem 30\"x2 each tandem  30\"x2 each tandem  30\"x2 ea tandem 30\"x2 ea  tandem 30\"x2 ea  tandem 30\"x2 ea  tandem 30\"x2 ea  tandem   Sand dune march 2 min light UE prn 2 min light UE prn 2 min light UE prn 2 min light UE prn 2 min light UE prn 2 min light UE prn 2 min light UE prn 2 min light UE prn 2 min light UE prn 2 min light UE prn                                                       Ther Ex             VG for ROM L7 7' alt s/l b/l  L7 7' alt s/l b/l L7 7' alt s/l b/l L7 7' alt s/l b/l L7 7' alt s/l b/l L7 7' alt " "s/l b/l L7 7' alt s/l b/l L7 7' alt s/l b/l L7 7' alt s/l b/l L7 7' alt s/l b/l                             Standing calf stretch 45\"x4 45\"x4 45\"x4 30\"x4 30\"x4 45\"x4 45\"x4 45\"x4 45\"x4 45\"x4                                                       Ther Activity             Sit to stands  Hi lo 19.5\" 5x5 Hi lo 19.5\" 5x5 Hi lo 19.5\" 5x5 Hi lo \" 5x5 Hi lo 20\" 5x5  Hi lo 19.5\" 5x5 Hi lo 19.5 5x5 Hi lo 19.5\" 5x5 Hi lo  19.5\" 5x5   TUG and 5x STS      TUGx2  [id number]       Gait Training                                       Modalities                                                        "

## 2025-01-09 ENCOUNTER — CLINICAL SUPPORT (OUTPATIENT)
Dept: FAMILY MEDICINE CLINIC | Facility: CLINIC | Age: 75
End: 2025-01-09
Payer: COMMERCIAL

## 2025-01-09 DIAGNOSIS — M81.0 OSTEOPOROSIS, UNSPECIFIED OSTEOPOROSIS TYPE, UNSPECIFIED PATHOLOGICAL FRACTURE PRESENCE: Primary | ICD-10-CM

## 2025-01-09 PROCEDURE — 96372 THER/PROPH/DIAG INJ SC/IM: CPT | Performed by: FAMILY MEDICINE

## 2025-01-13 ENCOUNTER — OFFICE VISIT (OUTPATIENT)
Dept: PHYSICAL THERAPY | Facility: REHABILITATION | Age: 75
End: 2025-01-13
Payer: COMMERCIAL

## 2025-01-13 DIAGNOSIS — R26.89 BALANCE DISORDER: ICD-10-CM

## 2025-01-13 DIAGNOSIS — G89.29 CHRONIC ANKLE PAIN, UNSPECIFIED LATERALITY: ICD-10-CM

## 2025-01-13 DIAGNOSIS — M25.579 CHRONIC ANKLE PAIN, UNSPECIFIED LATERALITY: ICD-10-CM

## 2025-01-13 DIAGNOSIS — R26.9 GAIT ABNORMALITY: Primary | ICD-10-CM

## 2025-01-13 PROCEDURE — 97110 THERAPEUTIC EXERCISES: CPT | Performed by: PHYSICAL THERAPIST

## 2025-01-13 PROCEDURE — 97112 NEUROMUSCULAR REEDUCATION: CPT | Performed by: PHYSICAL THERAPIST

## 2025-01-13 PROCEDURE — 97140 MANUAL THERAPY 1/> REGIONS: CPT | Performed by: PHYSICAL THERAPIST

## 2025-01-13 NOTE — PROGRESS NOTES
"Daily Note    Today's date: 2025  Patient name: Estephania Brown  : 1950  MRN: [id number]  Referring provider: Rosalino Purvis MD  Dx:   Encounter Diagnosis     ICD-10-CM    1. Gait abnormality  R26.9 PT plan of care cert/re-cert      2. Balance disorder  R26.89 PT plan of care cert/re-cert      3. Chronic ankle pain, unspecified laterality  M25.579 PT plan of care cert/re-cert    G89.29             Start Time: 1213  Stop Time: 1302  Total time in clinic (min): 49 minutes    Subjective: Pt reports her plantar fascia is sore today        Objective: See treatment diary below           Assessment: Pt responded well to tx. She her plantar fascia was tender today, but she responded favorable to manuals. Pt will benefit from continued skilled outpatient PT to improve ROM, jt mobility, balance, to address therapy goals, to reduce pain, and improve function.          Plan: Continue per plan of care.  Continue therapy 2x/week for 6-8 weeks.     Precautions: HTN, OP, fall risk, CA hx, hx of L ankle surgery       POC exp =  3/10/25      Manuals    L ankle TC and ST mobs DS Gr II, IV DS Gr II, IV DS Gr II, IV DS Gr II, IV DS Gr II, IV    DS Gr II, IV DS Gr II, IV DS Gr II, IV DS Gr II, IV DS Gr II, IV   DF MWM 3x10 on 8\" step 3x10 on 8\" step 3x10 on 8\" step 3x10 on 8\" step 3x10 on 8\" step  3x10 on 8\" step  3x10 on 8\" step SW 3x10 on 8\" step 3x10 on 8\" step 3x10 on 8\" step   STM plantar fascia DS DS DS DS DS DS DS DS DS DS   reasses             Neuro Re-Ed             Static balance on airex 30\"x2  tandem 30\"x2 tandem 30\"x2 tandem 30\"x2 each tandem  30\"x2 each tandem  30\"x2 ea tandem 30\"x2 ea  tandem 30\"x2 ea  tandem 30\"x2 ea  tandem 30\"x2 ea  tandem   Sand dune march 2 min light UE prn 2 min light UE prn 2 min light UE prn 2 min light UE prn 2 min light UE prn 2 min light UE prn 2 min light UE prn 2 min light UE prn 2 min light UE prn 2 min light UE prn           " "                                            Ther Ex             VG for ROM L7 7' alt s/l b/l  L7 7' alt s/l b/l L7 7' alt s/l b/l L7 7' alt s/l b/l L7 7' alt s/l b/l L7 5' alt s/l b/l L7 7' alt s/l b/l L7 7' alt s/l b/l L7 7' alt s/l b/l L7 7' alt s/l b/l                             Standing calf stretch 45\"x4 45\"x4 45\"x4 30\"x4 30\"x4 30\"x4 45\"x4 45\"x4 45\"x4 45\"x4                                                       Ther Activity             Sit to stands  Hi lo 19.5\" 5x5 Hi lo 19.5\" 5x5 Hi lo 19.5\" 5x5 Hi lo \" 5x5 Hi lo 20\" 5x5  Hi lo 19.5\" 5x5 Hi lo 19.5 5x5 Hi lo 19.5\" 5x5 Hi lo  19.5\" 5x5   TUG and 5x STS      TUGx2  [id number]       Gait Training                                       Modalities                                                        "

## 2025-01-15 ENCOUNTER — OFFICE VISIT (OUTPATIENT)
Dept: PHYSICAL THERAPY | Facility: REHABILITATION | Age: 75
End: 2025-01-15
Payer: COMMERCIAL

## 2025-01-15 DIAGNOSIS — R26.89 BALANCE DISORDER: ICD-10-CM

## 2025-01-15 DIAGNOSIS — R26.9 GAIT ABNORMALITY: Primary | ICD-10-CM

## 2025-01-15 DIAGNOSIS — M54.16 RADICULOPATHY, LUMBAR REGION: ICD-10-CM

## 2025-01-15 DIAGNOSIS — G89.29 CHRONIC ANKLE PAIN, UNSPECIFIED LATERALITY: ICD-10-CM

## 2025-01-15 DIAGNOSIS — M25.579 CHRONIC ANKLE PAIN, UNSPECIFIED LATERALITY: ICD-10-CM

## 2025-01-15 PROCEDURE — 97140 MANUAL THERAPY 1/> REGIONS: CPT | Performed by: PHYSICAL THERAPIST

## 2025-01-15 PROCEDURE — 97110 THERAPEUTIC EXERCISES: CPT | Performed by: PHYSICAL THERAPIST

## 2025-01-15 PROCEDURE — 97112 NEUROMUSCULAR REEDUCATION: CPT | Performed by: PHYSICAL THERAPIST

## 2025-01-15 RX ORDER — GABAPENTIN 300 MG/1
CAPSULE ORAL
Qty: 360 CAPSULE | Refills: 1 | Status: SHIPPED | OUTPATIENT
Start: 2025-01-15

## 2025-01-15 NOTE — PROGRESS NOTES
"Daily Note    Today's date: 1/15/2025  Patient name: Estephania Brown  : 1950  MRN: [id number]  Referring provider: Rosalino Purvis MD  Dx:   Encounter Diagnosis     ICD-10-CM    1. Gait abnormality  R26.9       2. Balance disorder  R26.89       3. Chronic ankle pain, unspecified laterality  M25.579     G89.29             Start Time: 1215  Stop Time: 1302  Total time in clinic (min): 47 minutes    Subjective: Pt reports her ankle joint is very painful today due to the cold     Objective: See treatment diary below           Assessment: Pt responded well to tx. Noted improvement in pain post tx. Pt will benefit from continued skilled outpatient PT to improve ROM, jt mobility, balance, to address therapy goals, to reduce pain, and improve function.          Plan: Continue per plan of care.       Precautions: HTN, OP, fall risk, CA hx, hx of L ankle surgery       POC exp =  3/10/25      Manuals 12/16 12/23 12/26 1/6 1/8 1/13 1/15 12/4 12/9 12/11   L ankle TC and ST mobs DS Gr II, IV DS Gr II, IV DS Gr II, IV DS Gr II, IV DS Gr II, IV    DS Gr II, IV DS Gr II, IV DS Gr II, IV DS Gr II, IV DS Gr II, IV   DF MWM 3x10 on 8\" step 3x10 on 8\" step 3x10 on 8\" step 3x10 on 8\" step 3x10 on 8\" step  3x10 on 8\" step  3x10 on 8\" step  3x10 on 8\" step 3x10 on 8\" step 3x10 on 8\" step   STM plantar fascia DS DS DS DS DS DS DS DS DS DS   reasses             Neuro Re-Ed             Static balance on airex 30\"x2  tandem 30\"x2 tandem 30\"x2 tandem 30\"x2 each tandem  30\"x2 each tandem  30\"x2 ea tandem 30\"x2 ea  tandem 30\"x2 ea  tandem 30\"x2 ea  tandem 30\"x2 ea  tandem   Sand dune march 2 min light UE prn 2 min light UE prn 2 min light UE prn 2 min light UE prn 2 min light UE prn 2 min light UE prn 2 min light UE prn 2 min light UE prn 2 min light UE prn 2 min light UE prn                                                       Ther Ex             VG for ROM L7 7' alt s/l b/l  L7 7' alt s/l b/l L7 7' alt s/l b/l L7 7' alt s/l b/l L7 " "7' alt s/l b/l L7 5' alt s/l b/l L7 6' alt s/l b/l L7 7' alt s/l b/l L7 7' alt s/l b/l L7 7' alt s/l b/l                             Standing calf stretch 45\"x4 45\"x4 45\"x4 30\"x4 30\"x4 30\"x4 30\"x4 45\"x4 45\"x4 45\"x4                                                       Ther Activity             Sit to stands  Hi lo 19.5\" 5x5 Hi lo 19.5\" 5x5 Hi lo 19.5\" 5x5 Hi lo \" 5x5 Hi lo 20\" 5x5  Hi lo 19.5\" 5x5 Hi lo 19.5 5x5 Hi lo 19.5\" 5x5 Hi lo  19.5\" 5x5   TUG and 5x STS      TUGx2  [id number]       Gait Training                                       Modalities                                                        "

## 2025-01-20 ENCOUNTER — APPOINTMENT (OUTPATIENT)
Dept: PHYSICAL THERAPY | Facility: REHABILITATION | Age: 75
End: 2025-01-20
Payer: COMMERCIAL

## 2025-01-22 ENCOUNTER — APPOINTMENT (OUTPATIENT)
Dept: PHYSICAL THERAPY | Facility: REHABILITATION | Age: 75
End: 2025-01-22
Payer: COMMERCIAL

## 2025-01-22 ENCOUNTER — OFFICE VISIT (OUTPATIENT)
Dept: PHYSICAL THERAPY | Facility: REHABILITATION | Age: 75
End: 2025-01-22
Payer: COMMERCIAL

## 2025-01-22 DIAGNOSIS — M25.579 CHRONIC ANKLE PAIN, UNSPECIFIED LATERALITY: ICD-10-CM

## 2025-01-22 DIAGNOSIS — R26.9 GAIT ABNORMALITY: Primary | ICD-10-CM

## 2025-01-22 DIAGNOSIS — G89.29 CHRONIC ANKLE PAIN, UNSPECIFIED LATERALITY: ICD-10-CM

## 2025-01-22 DIAGNOSIS — R26.89 BALANCE DISORDER: ICD-10-CM

## 2025-01-22 PROCEDURE — 97140 MANUAL THERAPY 1/> REGIONS: CPT

## 2025-01-22 PROCEDURE — 97110 THERAPEUTIC EXERCISES: CPT

## 2025-01-22 PROCEDURE — 97112 NEUROMUSCULAR REEDUCATION: CPT

## 2025-01-22 NOTE — PROGRESS NOTES
Daily Note/re-eval     Today's date: 2025  Patient name: Estephania Brown  : 1950  MRN: [id number]  Referring provider: Rosalino Purvis MD  Dx:   Encounter Diagnosis     ICD-10-CM    1. Gait abnormality  R26.9       2. Balance disorder  R26.89       3. Chronic ankle pain, unspecified laterality  M25.579     G89.29           Start Time: 1215  Stop Time: 1255  Total time in clinic (min): 40 minutes    Subjective: Pt reports 65-70% improvement since starting therapy. States the foot/ankle is always the worst after sitting and then getting up and trying to walk     Pain  Current pain ratin  At best pain ratin at eval, 5 in the past week  At worst pain ratin at eval,9 in the past week random  Location: arch of foot  Quality: sharp  Alleviating factors: stretching, PT in the past.  Aggravating factors: standing and walking        Objective: See treatment diary below     Joint Assessment:  Hypomobile throughout subtalar and talocrural      Lower Extremity Strength    MMT   AROM     Knee Left Right Left Right   Flex 5 5       Ext 4+ 4+                   Hip           flex 4+ 4+       ext NT NT       abd 3 3+                   Ankle           DF 5 5 lacking 18 at eval, lacking 5 today lacking 10   PF 3+ 3+ 59 at eval, 45 today  55   inversion 5 5       Eversion  5  5             Dynamic Balance Tests  5x sit to stand (sec)  >14 sec indicates high risk for falls 23 = 12.12, unable without UE  23 = 13.06,  unable without UE  23 = 11.70, unable without UE  10/23/23 = 11.74, unable without UE  24 = 10.63, unable without UE  24 = 9.78, unable without UE  24 = 10.96, unable without UE  10/17/24= 11.18 unable with UE  24 = 12.47, unable without UE  24 = 12.25, unable without UE   TUG (sec)  >14 sec indicates high risk for falls 23 = 16.95 with SPC  23 = 17.48 with SPC  23 = 12.50 with SPC  10/23/23 = 13.41 with SPC  24 = 12.47 with SPC  24 =  11.47 with SPC  8/28/24 = 13.13 with SPC  10/17/24=14.74 with SPC  11/27/24 = 13.55 with SPC  1/22/24 = 12.63 with SPC            Assessment: Pt presents for reassessment of her L ankle. Since last RE she has made further improvement in ankle ROM, pain and function. Her TUG and 5xSTS test score improved from last RE and both remain under 14 sec reducing her fall risk, and she is making slow but steady functional strength improvements as seen by progression in sit to stand heigh. Patient  would benefit from continued PT to improve ankle ROM and strength, decrease pain, increase functional mobility, and decrease fall risk.      Goals  Short term goals: to be met in 6 weeks  Pt independent with initial HEP, rationale, technique and frequency, for ROM and pain control. MET  Pt able to perform 1 sit to stand without UE indicating and improvement in functional LE strength. UNMET  Improve hip abd MMT for improved lumbopelvic stability which is required for walking and balance MET  Pt will achieve an improvement in FOTO score indicating an improvement in pain and function. MET     Long term goals: to be met in 12 weeks  Pt will report a 75% or > reduction in subjective pain complaints/symptoms to better manage ADLs and functional mobility. UNMET  Pt able to perform 3 sit to stands without UE indicating and improvement in functional LE strength. UNMET  Further improve hip abd MMT for improved lumbopelvic stability which is required for walking and balance UNMET  Pt will improve FOTO score to = or better then expected outcome indicating an overall improvement in pain and function MET  Pt independent with rationale, technique and plan for performance of advanced HEP to maintain gains made in therapy. IN PROGRESS  Achieve pts goal: to get stronger, better balance, relieve ankle pain and foot pain IN PROGRESS        Plan: Continue per plan of care.      Precautions: HTN, OP, fall risk, CA hx, hx of L ankle surgery       POC exp  "=  1/9/25    Manuals 12/16 12/23 12/26 1/6 1/8 1/13 1/15 1/22     L ankle TC and ST mobs DS Gr II, IV DS Gr II, IV DS Gr II, IV DS Gr II, IV DS Gr II, IV    DS Gr II, IV DS Gr II, IV DS Gr II, IV     DF MWM 3x10 on 8\" step 3x10 on 8\" step 3x10 on 8\" step 3x10 on 8\" step 3x10 on 8\" step  3x10 on 8\" step  3x10 on 8\" step  3x10 on 8\" step     STM plantar fascia DS DS DS DS DS DS DS DS     reasses        PRR     Neuro Re-Ed             Static balance on airex 30\"x2  tandem 30\"x2 tandem 30\"x2 tandem 30\"x2 each tandem  30\"x2 each tandem  30\"x2 ea tandem 30\"x2 ea  tandem 30\"x2 ea  tandem     Sand dune march 2 min light UE prn 2 min light UE prn 2 min light UE prn 2 min light UE prn 2 min light UE prn 2 min light UE prn 2 min light UE prn 2 min light UE prn                                                         Ther Ex             VG for ROM L7 7' alt s/l b/l  L7 7' alt s/l b/l L7 7' alt s/l b/l L7 7' alt s/l b/l L7 7' alt s/l b/l L7 5' alt s/l b/l L7 6' alt s/l b/l L7 6' alt s/l b/l                               Standing calf stretch 45\"x4 45\"x4 45\"x4 30\"x4 30\"x4 30\"x4 30\"x4 30\"x4                                                         Ther Activity             Sit to stands  Hi lo 19.5\" 5x5 Hi lo 19.5\" 5x5 Hi lo 19.5\" 5x5 Hi lo \" 5x5 Hi lo 20\" 5x5  Hi lo 19.5\" 5x5 Np resume     TUG and 5x STS      TUGx2  [id number]       Gait Training                                       Modalities                                                        "

## 2025-01-23 ENCOUNTER — APPOINTMENT (OUTPATIENT)
Dept: PHYSICAL THERAPY | Facility: REHABILITATION | Age: 75
End: 2025-01-23
Payer: COMMERCIAL

## 2025-01-27 ENCOUNTER — OFFICE VISIT (OUTPATIENT)
Dept: PHYSICAL THERAPY | Facility: REHABILITATION | Age: 75
End: 2025-01-27
Payer: COMMERCIAL

## 2025-01-27 DIAGNOSIS — M25.579 CHRONIC ANKLE PAIN, UNSPECIFIED LATERALITY: ICD-10-CM

## 2025-01-27 DIAGNOSIS — R26.9 GAIT ABNORMALITY: Primary | ICD-10-CM

## 2025-01-27 DIAGNOSIS — G89.29 CHRONIC ANKLE PAIN, UNSPECIFIED LATERALITY: ICD-10-CM

## 2025-01-27 DIAGNOSIS — R26.89 BALANCE DISORDER: ICD-10-CM

## 2025-01-27 PROCEDURE — 97140 MANUAL THERAPY 1/> REGIONS: CPT | Performed by: PHYSICAL THERAPIST

## 2025-01-27 PROCEDURE — 97112 NEUROMUSCULAR REEDUCATION: CPT | Performed by: PHYSICAL THERAPIST

## 2025-01-27 PROCEDURE — 97110 THERAPEUTIC EXERCISES: CPT | Performed by: PHYSICAL THERAPIST

## 2025-01-27 NOTE — PROGRESS NOTES
"Daily Note     Today's date: 2025  Patient name: Estephania Brown  : 1950  MRN: [id number]  Referring provider: Rosalino Purvis MD  Dx:   Encounter Diagnosis     ICD-10-CM    1. Gait abnormality  R26.9       2. Balance disorder  R26.89       3. Chronic ankle pain, unspecified laterality  M25.579     G89.29           Start Time: 1215  Stop Time: 1255  Total time in clinic (min): 40 minutes    Subjective: Pt reports her ankle joint was very painful Thurs to Saturday but eased up by  and even better today.      Objective: See treatment diary below      Assessment: Tolerated treatment well. She noted less ankle pain post tx.  Pt will benefit from continued skilled outpatient PT to improve ROM and jt mobility, to address therapy goals, to reduce pain, and improve function.        Plan: Continue per plan of care.  Progress treatment as tolerated.       Precautions: HTN, OP, fall risk, CA hx, hx of L ankle surgery       POC exp =  25    Manuals 12/16 12/23 12/26 1/6 1/8 1/13 1/15 1/22 1/27    L ankle TC and ST mobs DS Gr II, IV DS Gr II, IV DS Gr II, IV DS Gr II, IV DS Gr II, IV    DS Gr II, IV DS Gr II, IV DS Gr II, IV DS Gr II, IV    DF MWM 3x10 on 8\" step 3x10 on 8\" step 3x10 on 8\" step 3x10 on 8\" step 3x10 on 8\" step  3x10 on 8\" step  3x10 on 8\" step  3x10 on 8\" step 3x10 on 8\" step    STM plantar fascia DS DS DS DS DS DS DS DS DS    reasses        PRR     Neuro Re-Ed             Static balance on airex 30\"x2  tandem 30\"x2 tandem 30\"x2 tandem 30\"x2 each tandem  30\"x2 each tandem  30\"x2 ea tandem 30\"x2 ea  tandem 30\"x2 ea  tandem 30\"x2 ea  tandem    Sand dune march 2 min light UE prn 2 min light UE prn 2 min light UE prn 2 min light UE prn 2 min light UE prn 2 min light UE prn 2 min light UE prn 2 min light UE prn 2 min light UE prn                                                        Ther Ex             VG for ROM L7 7' alt s/l b/l  L7 7' alt s/l b/l L7 7' alt s/l b/l L7 7' alt s/l b/l L7 7' " "alt s/l b/l L7 5' alt s/l b/l L7 6' alt s/l b/l L7 6' alt s/l b/l L7 6' alt s/l b/l                              Standing calf stretch 45\"x4 45\"x4 45\"x4 30\"x4 30\"x4 30\"x4 30\"x4 30\"x4 30\"x4                                                        Ther Activity             Sit to stands  Hi lo 19.5\" 5x5 Hi lo 19.5\" 5x5 Hi lo 19.5\" 5x5 Hi lo \" 5x5 Hi lo 20\" 5x5  Hi lo 19.5\" 5x5 Np resume Hi lo 19.5\" 5x5    TUG and 5x STS      TUGx2  [id number]       Gait Training                                       Modalities                                                          "

## 2025-01-28 DIAGNOSIS — G89.29 CHRONIC PAIN OF LEFT ANKLE: ICD-10-CM

## 2025-01-28 DIAGNOSIS — M25.572 CHRONIC PAIN OF LEFT ANKLE: ICD-10-CM

## 2025-01-28 RX ORDER — HYDROCODONE BITARTRATE AND ACETAMINOPHEN 5; 325 MG/1; MG/1
1 TABLET ORAL AS NEEDED
Qty: 30 TABLET | Refills: 0 | Status: SHIPPED | OUTPATIENT
Start: 2025-01-28

## 2025-01-28 NOTE — TELEPHONE ENCOUNTER
Reason for call:   [x] Refill   [] Prior Auth  [] Other:     Office:   [x] PCP/Provider - Rosalino Purvis  [] Specialty/Provider -     Medication: Hydrocodone-Acetaminophen    Dose/Frequency: 5-325 mg Daily PRN    Quantity: 30    Pharmacy: [facility] [place] [facility]    Does the patient have enough for 3 days?   [x] Yes   [] No - Send as HP to POD

## 2025-01-29 ENCOUNTER — OFFICE VISIT (OUTPATIENT)
Dept: PHYSICAL THERAPY | Facility: REHABILITATION | Age: 75
End: 2025-01-29
Payer: COMMERCIAL

## 2025-01-29 DIAGNOSIS — G89.29 CHRONIC ANKLE PAIN, UNSPECIFIED LATERALITY: ICD-10-CM

## 2025-01-29 DIAGNOSIS — R26.89 BALANCE DISORDER: ICD-10-CM

## 2025-01-29 DIAGNOSIS — G89.29 CHRONIC PAIN OF LEFT ANKLE: ICD-10-CM

## 2025-01-29 DIAGNOSIS — M25.572 CHRONIC PAIN OF LEFT ANKLE: ICD-10-CM

## 2025-01-29 DIAGNOSIS — R26.9 GAIT ABNORMALITY: Primary | ICD-10-CM

## 2025-01-29 DIAGNOSIS — I10 ESSENTIAL HYPERTENSION: ICD-10-CM

## 2025-01-29 DIAGNOSIS — M25.579 CHRONIC ANKLE PAIN, UNSPECIFIED LATERALITY: ICD-10-CM

## 2025-01-29 PROCEDURE — 97112 NEUROMUSCULAR REEDUCATION: CPT | Performed by: PHYSICAL THERAPIST

## 2025-01-29 PROCEDURE — 97140 MANUAL THERAPY 1/> REGIONS: CPT | Performed by: PHYSICAL THERAPIST

## 2025-01-29 PROCEDURE — 97110 THERAPEUTIC EXERCISES: CPT | Performed by: PHYSICAL THERAPIST

## 2025-01-29 NOTE — PROGRESS NOTES
"Daily Note     Today's date: 2025  Patient name: Estephania Brown  : 1950  MRN: [id number]  Referring provider: Rosalino Purvis MD  Dx:   Encounter Diagnosis     ICD-10-CM    1. Gait abnormality  R26.9       2. Balance disorder  R26.89       3. Chronic ankle pain, unspecified laterality  M25.579     G89.29           Start Time: 1215  Stop Time: 1255  Total time in clinic (min): 40 minutes    Subjective: Pt reports nothing new today. Continued stiffness and soreness in the ankle and plantar fascia, some days worse than others.      Objective: See treatment diary below      Assessment: Tolerated treatment well.  Pt will benefit from continued skilled outpatient PT to improve ROM and jt mobility, to address therapy goals, to reduce pain, and improve function.        Plan: Continue per plan of care.  Progress treatment as tolerated.       Precautions: HTN, OP, fall risk, CA hx, hx of L ankle surgery       POC exp =  25    Manuals 12/16 12/23 12/26 1/6 1/8 1/13 1/15 1/22 1/27 1/29   L ankle TC and ST mobs DS Gr II, IV DS Gr II, IV DS Gr II, IV DS Gr II, IV DS Gr II, IV    DS Gr II, IV DS Gr II, IV DS Gr II, IV DS Gr II, IV DS Gr II, IV   DF MWM 3x10 on 8\" step 3x10 on 8\" step 3x10 on 8\" step 3x10 on 8\" step 3x10 on 8\" step  3x10 on 8\" step  3x10 on 8\" step  3x10 on 8\" step 3x10 on 8\" step 3x10 on 8\" step   STM plantar fascia DS DS DS DS DS DS DS DS DS DS   reasses        PRR     Neuro Re-Ed             Static balance on airex 30\"x2  tandem 30\"x2 tandem 30\"x2 tandem 30\"x2 each tandem  30\"x2 each tandem  30\"x2 ea tandem 30\"x2 ea  tandem 30\"x2 ea  tandem 30\"x2 ea  tandem 30\"x2 ea  tandem   Sand dune march 2 min light UE prn 2 min light UE prn 2 min light UE prn 2 min light UE prn 2 min light UE prn 2 min light UE prn 2 min light UE prn 2 min light UE prn 2 min light UE prn 2 min light UE prn                                                       Ther Ex             VG for ROM L7 7' alt s/l b/l  L7 7' alt " "s/l b/l L7 7' alt s/l b/l L7 7' alt s/l b/l L7 7' alt s/l b/l L7 5' alt s/l b/l L7 6' alt s/l b/l L7 6' alt s/l b/l L7 6' alt s/l b/l L7 6' alt s/l b/l                             Standing calf stretch 45\"x4 45\"x4 45\"x4 30\"x4 30\"x4 30\"x4 30\"x4 30\"x4 30\"x4 30\"x4                                                       Ther Activity             Sit to stands  Hi lo 19.5\" 5x5 Hi lo 19.5\" 5x5 Hi lo 19.5\" 5x5 Hi lo \" 5x5 Hi lo 20\" 5x5  Hi lo 19.5\" 5x5 Np resume Hi lo 19.5\" 5x5 Hi lo 19.5\" 5x5   TUG and 5x STS      TUGx2  [id number]       Gait Training                                       Modalities                                                          "

## 2025-01-30 RX ORDER — METOPROLOL TARTRATE 50 MG
50 TABLET ORAL DAILY
Qty: 90 TABLET | Refills: 1 | Status: SHIPPED | OUTPATIENT
Start: 2025-01-30

## 2025-01-30 RX ORDER — NAPROXEN 500 MG/1
500 TABLET ORAL 2 TIMES DAILY WITH MEALS
Qty: 180 TABLET | Refills: 1 | Status: SHIPPED | OUTPATIENT
Start: 2025-01-30

## 2025-02-03 ENCOUNTER — OFFICE VISIT (OUTPATIENT)
Dept: PHYSICAL THERAPY | Facility: REHABILITATION | Age: 75
End: 2025-02-03
Payer: COMMERCIAL

## 2025-02-03 DIAGNOSIS — M25.579 CHRONIC ANKLE PAIN, UNSPECIFIED LATERALITY: ICD-10-CM

## 2025-02-03 DIAGNOSIS — R26.9 GAIT ABNORMALITY: Primary | ICD-10-CM

## 2025-02-03 DIAGNOSIS — R26.89 BALANCE DISORDER: ICD-10-CM

## 2025-02-03 DIAGNOSIS — G89.29 CHRONIC ANKLE PAIN, UNSPECIFIED LATERALITY: ICD-10-CM

## 2025-02-03 PROCEDURE — 97112 NEUROMUSCULAR REEDUCATION: CPT | Performed by: PHYSICAL THERAPIST

## 2025-02-03 PROCEDURE — 97110 THERAPEUTIC EXERCISES: CPT | Performed by: PHYSICAL THERAPIST

## 2025-02-03 PROCEDURE — 97140 MANUAL THERAPY 1/> REGIONS: CPT | Performed by: PHYSICAL THERAPIST

## 2025-02-03 NOTE — PROGRESS NOTES
"Daily Note     Today's date: 2/3/2025  Patient name: Estephania Brown  : 1950  MRN: [id number]  Referring provider: Rosalino Purvis MD  Dx:   Encounter Diagnosis     ICD-10-CM    1. Gait abnormality  R26.9       2. Balance disorder  R26.89       3. Chronic ankle pain, unspecified laterality  M25.579     G89.29             Start Time: 1211  Stop Time: 1255  Total time in clinic (min): 44 minutes    Subjective: Pt reports her plantar fascia is a bit sore today      Objective: See treatment diary below      Assessment: Tolerated treatment well.  She continues to be most challenged by sit to stands due to lack of DF ROM as well as LE strength. Pt will benefit from continued skilled outpatient PT to improve ROM and jt mobility, to address therapy goals, to reduce pain, and improve function.        Plan: Continue per plan of care.  Progress treatment as tolerated.   2x/week for 8-12 weeks     Precautions: HTN, OP, fall risk, CA hx, hx of L ankle surgery       POC exp =  25    Manuals 2/3 12/23 12/26 1/6 1/8 1/13 1/15 1/22 1/27 1/29   L ankle TC and ST mobs DS Gr II, IV DS Gr II, IV DS Gr II, IV DS Gr II, IV DS Gr II, IV    DS Gr II, IV DS Gr II, IV DS Gr II, IV DS Gr II, IV DS Gr II, IV   DF MWM 3x10 on 8\" step 3x10 on 8\" step 3x10 on 8\" step 3x10 on 8\" step 3x10 on 8\" step  3x10 on 8\" step  3x10 on 8\" step  3x10 on 8\" step 3x10 on 8\" step 3x10 on 8\" step   STM plantar fascia DS DS DS DS DS DS DS DS DS DS   reasses        PRR     Neuro Re-Ed             Static balance on airex 30\"x2  tandem 30\"x2 tandem 30\"x2 tandem 30\"x2 each tandem  30\"x2 each tandem  30\"x2 ea tandem 30\"x2 ea  tandem 30\"x2 ea  tandem 30\"x2 ea  tandem 30\"x2 ea  tandem   Sand dune march 2 min light UE prn 2 min light UE prn 2 min light UE prn 2 min light UE prn 2 min light UE prn 2 min light UE prn 2 min light UE prn 2 min light UE prn 2 min light UE prn 2 min light UE prn                                                       Ther Ex        " "     VG for ROM L7 7' alt s/l b/l  L7 7' alt s/l b/l L7 7' alt s/l b/l L7 7' alt s/l b/l L7 7' alt s/l b/l L7 5' alt s/l b/l L7 6' alt s/l b/l L7 6' alt s/l b/l L7 6' alt s/l b/l L7 7' alt s/l b/l                             Standing calf stretch 45\"x4 45\"x4 45\"x4 30\"x4 30\"x4 30\"x4 30\"x4 30\"x4 30\"x4 30\"x4                                                       Ther Activity             Sit to stands  Hi lo 19.5\" 5x5 Hi lo 19.5\" 5x5 Hi lo 19.5\" 5x5 Hi lo \" 5x5 Hi lo 20\" 5x5  Hi lo 19.5\" 5x5 Np resume Hi lo 19.5\" 5x5 Hi lo 19.5\" 5x5   TUG and 5x STS      TUGx2  [id number]       Gait Training                                       Modalities                                                          "

## 2025-02-05 ENCOUNTER — OFFICE VISIT (OUTPATIENT)
Dept: PHYSICAL THERAPY | Facility: REHABILITATION | Age: 75
End: 2025-02-05
Payer: COMMERCIAL

## 2025-02-05 DIAGNOSIS — R26.89 BALANCE DISORDER: ICD-10-CM

## 2025-02-05 DIAGNOSIS — R26.9 GAIT ABNORMALITY: Primary | ICD-10-CM

## 2025-02-05 DIAGNOSIS — M25.579 CHRONIC ANKLE PAIN, UNSPECIFIED LATERALITY: ICD-10-CM

## 2025-02-05 DIAGNOSIS — G89.29 CHRONIC ANKLE PAIN, UNSPECIFIED LATERALITY: ICD-10-CM

## 2025-02-05 PROCEDURE — 97140 MANUAL THERAPY 1/> REGIONS: CPT | Performed by: PHYSICAL THERAPIST

## 2025-02-05 PROCEDURE — 97112 NEUROMUSCULAR REEDUCATION: CPT | Performed by: PHYSICAL THERAPIST

## 2025-02-05 PROCEDURE — 97110 THERAPEUTIC EXERCISES: CPT | Performed by: PHYSICAL THERAPIST

## 2025-02-05 NOTE — PROGRESS NOTES
"Daily Note     Today's date: 2025  Patient name: Estephania Brown  : 1950  MRN: [id number]  Referring provider: Rosalino Purvis MD  Dx:   Encounter Diagnosis     ICD-10-CM    1. Gait abnormality  R26.9       2. Balance disorder  R26.89       3. Chronic ankle pain, unspecified laterality  M25.579     G89.29             Start Time: 1212  Stop Time: 1256  Total time in clinic (min): 44 minutes    Subjective: Pt reports her ankle is aching a lot today.      Objective: See treatment diary below      Assessment: Tolerated treatment well.  She was able to complete sit to stands at 19 inch height, a progression from prior sessions. Pt will benefit from continued skilled outpatient PT to improve ROM and jt mobility, to address therapy goals, to reduce pain, and improve function.        Plan: Continue per plan of care.  Progress treatment as tolerated.        Precautions: HTN, OP, fall risk, CA hx, hx of L ankle surgery       POC exp =  25    Manuals 2/3 2/5 12/26 1/6 1/8 1/13 1/15 1/22 1/27 1/29   L ankle TC and ST mobs DS Gr II, IV DS Gr II, IV DS Gr II, IV DS Gr II, IV DS Gr II, IV    DS Gr II, IV DS Gr II, IV DS Gr II, IV DS Gr II, IV DS Gr II, IV   DF MWM 3x10 on 8\" step 3x10 on 8\" step 3x10 on 8\" step 3x10 on 8\" step 3x10 on 8\" step  3x10 on 8\" step  3x10 on 8\" step  3x10 on 8\" step 3x10 on 8\" step 3x10 on 8\" step   STM plantar fascia DS DS DS DS DS DS DS DS DS DS   reasses        PRR     Neuro Re-Ed             Static balance on airex 30\"x2  tandem 30\"x2 tandem 30\"x2 tandem 30\"x2 each tandem  30\"x2 each tandem  30\"x2 ea tandem 30\"x2 ea  tandem 30\"x2 ea  tandem 30\"x2 ea  tandem 30\"x2 ea  tandem   Sand dune march 2 min light UE prn 2 min light UE prn 2 min light UE prn 2 min light UE prn 2 min light UE prn 2 min light UE prn 2 min light UE prn 2 min light UE prn 2 min light UE prn 2 min light UE prn                                                       Ther Ex             VG for ROM L7 7' alt s/l b/l  " "L7 7' alt s/l b/l L7 7' alt s/l b/l L7 7' alt s/l b/l L7 7' alt s/l b/l L7 5' alt s/l b/l L7 6' alt s/l b/l L7 6' alt s/l b/l L7 6' alt s/l b/l L7 7' alt s/l b/l                             Standing calf stretch 45\"x4 45\"x4 45\"x4 30\"x4 30\"x4 30\"x4 30\"x4 30\"x4 30\"x4 30\"x4                                                       Ther Activity             Sit to stands  Hi lo 19.5\" 5x5 Hi lo 19\" 5x5 Hi lo 19.5\" 5x5 Hi lo \" 5x5 Hi lo 20\" 5x5  Hi lo 19.5\" 5x5 Np resume Hi lo 19.5\" 5x5 Hi lo 19.5\" 5x5   TUG and 5x STS      TUGx2  [id number]       Gait Training                                       Modalities                                                          "

## 2025-02-06 DIAGNOSIS — M54.50 ACUTE MIDLINE LOW BACK PAIN WITHOUT SCIATICA: ICD-10-CM

## 2025-02-06 NOTE — TELEPHONE ENCOUNTER
Reason for call:   [x] Refill   [] Prior Auth  [] Other:     Office:   [x] PCP/Provider -   [] Specialty/Provider -     Medication: methocarbamol (ROBAXIN) 500 mg tablet    Dose/Frequency: TAKE 1 TABLET (500 MG TOTAL) BY MOUTH IN THE MORNING AND 1 TABLET (500 MG TOTAL) IN THE EVENING     Quantity: 30 tablet    Pharmacy: [facility] - [place], [place]      Does the patient have enough for 3 days?   [x] Yes   [] No - Send as HP to POD

## 2025-02-07 RX ORDER — METHOCARBAMOL 500 MG/1
500 TABLET, FILM COATED ORAL 3 TIMES DAILY
Qty: 40 TABLET | Refills: 2 | Status: SHIPPED | OUTPATIENT
Start: 2025-02-07

## 2025-02-10 ENCOUNTER — OFFICE VISIT (OUTPATIENT)
Dept: PHYSICAL THERAPY | Facility: REHABILITATION | Age: 75
End: 2025-02-10
Payer: COMMERCIAL

## 2025-02-10 DIAGNOSIS — G89.29 CHRONIC ANKLE PAIN, UNSPECIFIED LATERALITY: ICD-10-CM

## 2025-02-10 DIAGNOSIS — R26.9 GAIT ABNORMALITY: Primary | ICD-10-CM

## 2025-02-10 DIAGNOSIS — R26.89 BALANCE DISORDER: ICD-10-CM

## 2025-02-10 DIAGNOSIS — M25.579 CHRONIC ANKLE PAIN, UNSPECIFIED LATERALITY: ICD-10-CM

## 2025-02-10 PROCEDURE — 97112 NEUROMUSCULAR REEDUCATION: CPT | Performed by: PHYSICAL THERAPIST

## 2025-02-10 PROCEDURE — 97110 THERAPEUTIC EXERCISES: CPT | Performed by: PHYSICAL THERAPIST

## 2025-02-10 PROCEDURE — 97140 MANUAL THERAPY 1/> REGIONS: CPT | Performed by: PHYSICAL THERAPIST

## 2025-02-10 NOTE — PROGRESS NOTES
"Daily Note     Today's date: 2/10/2025  Patient name: Estephania Brown  : 1950  MRN: [id number]  Referring provider: Rosalino Purvis MD  Dx:   Encounter Diagnosis     ICD-10-CM    1. Gait abnormality  R26.9       2. Chronic ankle pain, unspecified laterality  M25.579     G89.29       3. Balance disorder  R26.89               Start Time: 1217  Stop Time: 1301  Total time in clinic (min): 44 minutes    Subjective: Pt reports her arch is bothersome today.      Objective: See treatment diary below      Assessment: Tolerated treatment well.  Favorable response to tx with reduction in subjective sx's. Pt will benefit from continued skilled outpatient PT to improve ROM and jt mobility, to address therapy goals, to reduce pain, and improve function.        Plan: Continue per plan of care.  Progress treatment as tolerated.        Precautions: HTN, OP, fall risk, CA hx, hx of L ankle surgery       POC exp =  25    Manuals 2/3 2/5 2/10 1/6 1/8 1/13 1/15 1/22 1/27 1/29   L ankle TC and ST mobs DS Gr II, IV DS Gr II, IV DS Gr II, IV DS Gr II, IV DS Gr II, IV    DS Gr II, IV DS Gr II, IV DS Gr II, IV DS Gr II, IV DS Gr II, IV   DF MWM 3x10 on 8\" step 3x10 on 8\" step 3x10 on 8\" step 3x10 on 8\" step 3x10 on 8\" step  3x10 on 8\" step  3x10 on 8\" step  3x10 on 8\" step 3x10 on 8\" step 3x10 on 8\" step   STM plantar fascia DS DS DS DS DS DS DS DS DS DS   reasses        PRR     Neuro Re-Ed             Static balance on airex 30\"x2  tandem 30\"x2 tandem 30\"x2 tandem 30\"x2 each tandem  30\"x2 each tandem  30\"x2 ea tandem 30\"x2 ea  tandem 30\"x2 ea  tandem 30\"x2 ea  tandem 30\"x2 ea  tandem   Sand dune march 2 min light UE prn 2 min light UE prn 2 min light UE prn 2 min light UE prn 2 min light UE prn 2 min light UE prn 2 min light UE prn 2 min light UE prn 2 min light UE prn 2 min light UE prn                                                       Ther Ex             VG for ROM L7 7' alt s/l b/l  L7 7' alt s/l b/l L7 6' alt s/l " "b/l L7 7' alt s/l b/l L7 7' alt s/l b/l L7 5' alt s/l b/l L7 6' alt s/l b/l L7 6' alt s/l b/l L7 6' alt s/l b/l L7 7' alt s/l b/l                             Standing calf stretch 45\"x4 45\"x4 45\"x4 30\"x4 30\"x4 30\"x4 30\"x4 30\"x4 30\"x4 30\"x4                                                       Ther Activity             Sit to stands  Hi lo 19.5\" 5x5 Hi lo 19\" 5x5 Hi lo 19\" 5x5 Hi lo \" 5x5 Hi lo 20\" 5x5  Hi lo 19.5\" 5x5 Np resume Hi lo 19.5\" 5x5 Hi lo 19.5\" 5x5   TUG and 5x STS      TUGx2  [id number]       Gait Training                                       Modalities                                                          "

## 2025-02-12 ENCOUNTER — APPOINTMENT (OUTPATIENT)
Dept: PHYSICAL THERAPY | Facility: REHABILITATION | Age: 75
End: 2025-02-12
Payer: COMMERCIAL

## 2025-02-17 ENCOUNTER — OFFICE VISIT (OUTPATIENT)
Dept: PHYSICAL THERAPY | Facility: REHABILITATION | Age: 75
End: 2025-02-17
Payer: COMMERCIAL

## 2025-02-17 DIAGNOSIS — G89.29 CHRONIC ANKLE PAIN, UNSPECIFIED LATERALITY: ICD-10-CM

## 2025-02-17 DIAGNOSIS — R26.89 BALANCE DISORDER: ICD-10-CM

## 2025-02-17 DIAGNOSIS — M25.579 CHRONIC ANKLE PAIN, UNSPECIFIED LATERALITY: ICD-10-CM

## 2025-02-17 DIAGNOSIS — R26.9 GAIT ABNORMALITY: Primary | ICD-10-CM

## 2025-02-17 PROCEDURE — 97140 MANUAL THERAPY 1/> REGIONS: CPT | Performed by: PHYSICAL THERAPIST

## 2025-02-17 PROCEDURE — 97112 NEUROMUSCULAR REEDUCATION: CPT | Performed by: PHYSICAL THERAPIST

## 2025-02-17 PROCEDURE — 97110 THERAPEUTIC EXERCISES: CPT | Performed by: PHYSICAL THERAPIST

## 2025-02-17 NOTE — PROGRESS NOTES
"Daily Note     Today's date: 2025  Patient name: Estephania Brown  : 1950  MRN: [id number]  Referring provider: Rosalino Purvis MD  Dx:   Encounter Diagnosis     ICD-10-CM    1. Gait abnormality  R26.9       2. Chronic ankle pain, unspecified laterality  M25.579     G89.29       3. Balance disorder  R26.89               Start Time: 1217  Stop Time: 1257  Total time in clinic (min): 40 minutes    Subjective: Pt reports her ankle doesn't feels so bad today      Objective: See treatment diary below      Assessment: Tolerated treatment well.  Pt will benefit from continued skilled outpatient PT to improve ROM and jt mobility, to address therapy goals, to reduce pain, and improve function.        Plan: Continue per plan of care.  Progress treatment as tolerated.        Precautions: HTN, OP, fall risk, CA hx, hx of L ankle surgery       POC exp =  25    Manuals 2/3 2/5 2/10 1/6 1/8 1/13 1/15 1/22 1/27 1/29   L ankle TC and ST mobs DS Gr II, IV DS Gr II, IV DS Gr II, IV DS Gr II, IV DS Gr II, IV    DS Gr II, IV DS Gr II, IV DS Gr II, IV DS Gr II, IV DS Gr II, IV   DF MWM 3x10 on 8\" step 3x10 on 8\" step 3x10 on 8\" step 3x10 on 8\" step 3x10 on 8\" step  3x10 on 8\" step  3x10 on 8\" step  3x10 on 8\" step 3x10 on 8\" step 3x10 on 8\" step   STM plantar fascia DS DS DS DS DS DS DS DS DS DS   reasses        PRR     Neuro Re-Ed             Static balance on airex 30\"x2  tandem 30\"x2 tandem 30\"x2 tandem 30\"x2 each tandem  30\"x2 each tandem  30\"x2 ea tandem 30\"x2 ea  tandem 30\"x2 ea  tandem 30\"x2 ea  tandem 30\"x2 ea  tandem   Sand dune march 2 min light UE prn 2 min light UE prn 2 min light UE prn 2 min light UE prn 2 min light UE prn 2 min light UE prn 2 min light UE prn 2 min light UE prn 2 min light UE prn 2 min light UE prn                                                       Ther Ex             VG for ROM L7 7' alt s/l b/l  L7 7' alt s/l b/l L7 6' alt s/l b/l L7 7' alt s/l b/l L7 7' alt s/l b/l L7 5' alt s/l " "b/l L7 6' alt s/l b/l L7 6' alt s/l b/l L7 6' alt s/l b/l L7 7' alt s/l b/l                             Standing calf stretch 45\"x4 45\"x4 45\"x4 30\"x4 30\"x4 30\"x4 30\"x4 30\"x4 30\"x4 30\"x4                                                       Ther Activity             Sit to stands  Hi lo 19.5\" 5x5 Hi lo 19\" 5x5 Hi lo 19\" 5x5 Hi lo \" 5x5 Hi lo 20\" 5x5  Hi lo 19.5\" 5x5 Np resume Hi lo 19.5\" 5x5 Hi lo 19.5\" 5x5   TUG and 5x STS      TUGx2  [id number]       Gait Training                                                    Modalities                                                          "

## 2025-02-19 ENCOUNTER — OFFICE VISIT (OUTPATIENT)
Dept: PHYSICAL THERAPY | Facility: REHABILITATION | Age: 75
End: 2025-02-19
Payer: COMMERCIAL

## 2025-02-19 DIAGNOSIS — R26.89 BALANCE DISORDER: ICD-10-CM

## 2025-02-19 DIAGNOSIS — M25.579 CHRONIC ANKLE PAIN, UNSPECIFIED LATERALITY: ICD-10-CM

## 2025-02-19 DIAGNOSIS — G89.29 CHRONIC ANKLE PAIN, UNSPECIFIED LATERALITY: ICD-10-CM

## 2025-02-19 DIAGNOSIS — R26.9 GAIT ABNORMALITY: Primary | ICD-10-CM

## 2025-02-19 PROCEDURE — 97110 THERAPEUTIC EXERCISES: CPT | Performed by: PHYSICAL THERAPIST

## 2025-02-19 PROCEDURE — 97112 NEUROMUSCULAR REEDUCATION: CPT | Performed by: PHYSICAL THERAPIST

## 2025-02-19 PROCEDURE — 97140 MANUAL THERAPY 1/> REGIONS: CPT | Performed by: PHYSICAL THERAPIST

## 2025-02-19 NOTE — PROGRESS NOTES
"Daily Note     Today's date: 2025  Patient name: Estephania Brown  : 1950  MRN: [id number]  Referring provider: Rosalino Purvis MD  Dx:   Encounter Diagnosis     ICD-10-CM    1. Gait abnormality  R26.9       2. Chronic ankle pain, unspecified laterality  M25.579     G89.29       3. Balance disorder  R26.89             Start Time: 1212  Stop Time: 1255  Total time in clinic (min): 43 minutes    Subjective: Pt reports her ankle and foot are very painful today.      Objective: See treatment diary below      Assessment: Tolerated treatment well. Sx's reduced with tx Pt will benefit from continued skilled outpatient PT to improve ROM and jt mobility, to address therapy goals, to reduce pain, and improve function.        Plan: Continue per plan of care.  Progress treatment as tolerated.        Precautions: HTN, OP, fall risk, CA hx, hx of L ankle surgery       POC exp =  25    Manuals 2/3 2/5 2/10 2/19 1/8 1/13 1/15 1/22 1/27 1/29   L ankle TC and ST mobs DS Gr II, IV DS Gr II, IV DS Gr II, IV DS Gr II, IV DS Gr II, IV    DS Gr II, IV DS Gr II, IV DS Gr II, IV DS Gr II, IV DS Gr II, IV   DF MWM 3x10 on 8\" step 3x10 on 8\" step 3x10 on 8\" step 3x10 on 8\" step 3x10 on 8\" step  3x10 on 8\" step  3x10 on 8\" step  3x10 on 8\" step 3x10 on 8\" step 3x10 on 8\" step   STM plantar fascia DS DS DS DS DS DS DS DS DS DS   reasses        PRR     Neuro Re-Ed             Static balance on airex 30\"x2  tandem 30\"x2 tandem 30\"x2 tandem 30\"x2 each tandem  30\"x2 each tandem  30\"x2 ea tandem 30\"x2 ea  tandem 30\"x2 ea  tandem 30\"x2 ea  tandem 30\"x2 ea  tandem   Sand dune march 2 min light UE prn 2 min light UE prn 2 min light UE prn 2 min light UE prn 2 min light UE prn 2 min light UE prn 2 min light UE prn 2 min light UE prn 2 min light UE prn 2 min light UE prn                                                       Ther Ex             VG for ROM L7 7' alt s/l b/l  L7 7' alt s/l b/l L7 6' alt s/l b/l L7 7' alt s/l b/l L7 7' " "alt s/l b/l L7 5' alt s/l b/l L7 6' alt s/l b/l L7 6' alt s/l b/l L7 6' alt s/l b/l L7 7' alt s/l b/l                             Standing calf stretch 45\"x4 45\"x4 45\"x4 30\"x4 30\"x4 30\"x4 30\"x4 30\"x4 30\"x4 30\"x4                                                       Ther Activity             Sit to stands  Hi lo 19.5\" 5x5 Hi lo 19\" 5x5 Hi lo 19\" 5x5 Hi lo \" 19\" 5x5 Hi lo 20\" 5x5  Hi lo 19.5\" 5x5 Np resume Hi lo 19.5\" 5x5 Hi lo 19.5\" 5x5   TUG and 5x STS      TUGx2  [id number]       Gait Training                                                    Modalities                                                          "

## 2025-02-24 ENCOUNTER — APPOINTMENT (OUTPATIENT)
Dept: PHYSICAL THERAPY | Facility: REHABILITATION | Age: 75
End: 2025-02-24
Payer: COMMERCIAL

## 2025-02-26 ENCOUNTER — APPOINTMENT (OUTPATIENT)
Dept: LAB | Facility: CLINIC | Age: 75
End: 2025-02-26
Payer: COMMERCIAL

## 2025-02-26 ENCOUNTER — OFFICE VISIT (OUTPATIENT)
Dept: PHYSICAL THERAPY | Facility: REHABILITATION | Age: 75
End: 2025-02-26
Payer: COMMERCIAL

## 2025-02-26 DIAGNOSIS — M25.579 CHRONIC ANKLE PAIN, UNSPECIFIED LATERALITY: ICD-10-CM

## 2025-02-26 DIAGNOSIS — R73.03 PREDIABETES: ICD-10-CM

## 2025-02-26 DIAGNOSIS — R26.9 GAIT ABNORMALITY: Primary | ICD-10-CM

## 2025-02-26 DIAGNOSIS — G89.29 CHRONIC ANKLE PAIN, UNSPECIFIED LATERALITY: ICD-10-CM

## 2025-02-26 DIAGNOSIS — R26.89 BALANCE DISORDER: ICD-10-CM

## 2025-02-26 DIAGNOSIS — E03.9 HYPOTHYROIDISM, UNSPECIFIED TYPE: ICD-10-CM

## 2025-02-26 LAB
EST. AVERAGE GLUCOSE BLD GHB EST-MCNC: 134 MG/DL
HBA1C MFR BLD: 6.3 %
T4 FREE SERPL-MCNC: 1.32 NG/DL (ref 0.61–1.12)
TSH SERPL DL<=0.05 MIU/L-ACNC: 1.75 UIU/ML (ref 0.45–4.5)

## 2025-02-26 PROCEDURE — 36415 COLL VENOUS BLD VENIPUNCTURE: CPT

## 2025-02-26 PROCEDURE — 84439 ASSAY OF FREE THYROXINE: CPT

## 2025-02-26 PROCEDURE — 83036 HEMOGLOBIN GLYCOSYLATED A1C: CPT

## 2025-02-26 PROCEDURE — 84443 ASSAY THYROID STIM HORMONE: CPT

## 2025-02-26 PROCEDURE — 97140 MANUAL THERAPY 1/> REGIONS: CPT | Performed by: PHYSICAL THERAPIST

## 2025-02-26 PROCEDURE — 97110 THERAPEUTIC EXERCISES: CPT | Performed by: PHYSICAL THERAPIST

## 2025-02-26 PROCEDURE — 97112 NEUROMUSCULAR REEDUCATION: CPT | Performed by: PHYSICAL THERAPIST

## 2025-02-26 NOTE — PROGRESS NOTES
"Daily Note     Today's date: 2025  Patient name: Estephania Brown  : 1950  MRN: [id number]  Referring provider: Rosalino Purvis MD  Dx:   Encounter Diagnosis     ICD-10-CM    1. Gait abnormality  R26.9       2. Chronic ankle pain, unspecified laterality  M25.579     G89.29       3. Balance disorder  R26.89               Start Time: 1212  Stop Time: 1253  Total time in clinic (min): 41 minutes    Subjective: Pt reports she has been stretching her calf multiple times a day and that does help.      Objective: See treatment diary below      Assessment: Tolerated treatment well. Pt will benefit from continued skilled outpatient PT to improve ROM and jt mobility, to address therapy goals, to reduce pain, and improve function.        Plan: Continue per plan of care.  Progress treatment as tolerated.        Precautions: HTN, OP, fall risk, CA hx, hx of L ankle surgery       POC exp =  25    Manuals 2/3 2/5 2/10 2/19 2/26 1/13 1/15 1/22 1/27 1/29   L ankle TC and ST mobs DS Gr II, IV DS Gr II, IV DS Gr II, IV DS Gr II, IV DS Gr II, IV    DS Gr II, IV DS Gr II, IV DS Gr II, IV DS Gr II, IV DS Gr II, IV   DF MWM 3x10 on 8\" step 3x10 on 8\" step 3x10 on 8\" step 3x10 on 8\" step 3x10 on 8\" step  3x10 on 8\" step  3x10 on 8\" step  3x10 on 8\" step 3x10 on 8\" step 3x10 on 8\" step   STM plantar fascia DS DS DS DS DS DS DS DS DS DS   reasses        PRR     Neuro Re-Ed             Static balance on airex 30\"x2  tandem 30\"x2 tandem 30\"x2 tandem 30\"x2 each tandem  30\"x2 each tandem  30\"x2 ea tandem 30\"x2 ea  tandem 30\"x2 ea  tandem 30\"x2 ea  tandem 30\"x2 ea  tandem   Sand dune march 2 min light UE prn 2 min light UE prn 2 min light UE prn 2 min light UE prn 2 min light UE prn 2 min light UE prn 2 min light UE prn 2 min light UE prn 2 min light UE prn 2 min light UE prn                                                       Ther Ex             VG for ROM L7 7' alt s/l b/l  L7 7' alt s/l b/l L7 6' alt s/l b/l L7 7' alt s/l " "b/l L7 7' alt s/l b/l L7 5' alt s/l b/l L7 6' alt s/l b/l L7 6' alt s/l b/l L7 6' alt s/l b/l L7 7' alt s/l b/l                             Standing calf stretch 45\"x4 45\"x4 45\"x4 30\"x4 30\"x4 30\"x4 30\"x4 30\"x4 30\"x4 30\"x4                                                       Ther Activity             Sit to stands  Hi lo 19.5\" 5x5 Hi lo 19\" 5x5 Hi lo 19\" 5x5 Hi lo \" 19\" 5x5 Hi lo 19\" 5x5  Hi lo 19.5\" 5x5 Np resume Hi lo 19.5\" 5x5 Hi lo 19.5\" 5x5   TUG and 5x STS      TUGx2  [id number]       Gait Training                                                    Modalities                                                          "

## 2025-02-27 ENCOUNTER — RESULTS FOLLOW-UP (OUTPATIENT)
Dept: ENDOCRINOLOGY | Facility: CLINIC | Age: 75
End: 2025-02-27

## 2025-03-03 ENCOUNTER — OFFICE VISIT (OUTPATIENT)
Dept: PHYSICAL THERAPY | Facility: REHABILITATION | Age: 75
End: 2025-03-03
Payer: COMMERCIAL

## 2025-03-03 DIAGNOSIS — R26.89 BALANCE DISORDER: ICD-10-CM

## 2025-03-03 DIAGNOSIS — R26.9 GAIT ABNORMALITY: Primary | ICD-10-CM

## 2025-03-03 DIAGNOSIS — M25.579 CHRONIC ANKLE PAIN, UNSPECIFIED LATERALITY: ICD-10-CM

## 2025-03-03 DIAGNOSIS — G89.29 CHRONIC ANKLE PAIN, UNSPECIFIED LATERALITY: ICD-10-CM

## 2025-03-03 PROCEDURE — 97140 MANUAL THERAPY 1/> REGIONS: CPT | Performed by: PHYSICAL THERAPIST

## 2025-03-03 PROCEDURE — 97110 THERAPEUTIC EXERCISES: CPT | Performed by: PHYSICAL THERAPIST

## 2025-03-03 PROCEDURE — 97530 THERAPEUTIC ACTIVITIES: CPT | Performed by: PHYSICAL THERAPIST

## 2025-03-03 NOTE — PROGRESS NOTES
"Daily Note     Today's date: 3/3/2025  Patient name: Estephania Brown  : 1950  MRN: [id number]  Referring provider: Rosalino Purvis MD  Dx:   Encounter Diagnosis     ICD-10-CM    1. Gait abnormality  R26.9       2. Chronic ankle pain, unspecified laterality  M25.579     G89.29       3. Balance disorder  R26.89           Start Time: 1215  Stop Time: 1301  Total time in clinic (min): 46 minutes    Subjective: Pt stated \"My ankle continues to feel stiff, but physical therapy definitely helps.\"      Objective: See treatment diary below      Assessment: Tolerated treatment well by performing all therapeutic interventions. Pt continues to lack left ankle DF with functional movements and demonstrates difficulty maintaining heel contact on ground during therapeutic interventions requiring adequate ankle DF. Pt continues to be motivated to continue to gain active and passive ROM into ankle. Patient would benefit from continuing physical therapy to improve mobility of left ankle to promoting better functional performance with ADLs.       Plan: Continue per plan of care.      Precautions: HTN, OP, fall risk, CA hx, hx of L ankle surgery       POC exp =  25    Manuals 2/3 2/5 2/10 2/19 2/26 3/3 1/15 1/22 1/27 1/29   L ankle TC and ST mobs DS Gr II, IV DS Gr II, IV DS Gr II, IV DS Gr II, IV DS Gr II, IV    DV Gr II, IV DS Gr II, IV DS Gr II, IV DS Gr II, IV DS Gr II, IV   DF MWM 3x10 on 8\" step 3x10 on 8\" step 3x10 on 8\" step 3x10 on 8\" step 3x10 on 8\" step  3x10 on 8\" step 3x10 on 8\" step  3x10 on 8\" step 3x10 on 8\" step 3x10 on 8\" step   STM plantar fascia DS DS DS DS DS DV DS DS DS DS   reasses        PRR     Neuro Re-Ed             Static balance on airex 30\"x2  tandem 30\"x2 tandem 30\"x2 tandem 30\"x2 each tandem  30\"x2 each tandem  30\"x2 each tandem 30\"x2 ea  tandem 30\"x2 ea  tandem 30\"x2 ea  tandem 30\"x2 ea  tandem   Sand dune march 2 min light UE prn 2 min light UE prn 2 min light UE prn 2 min light UE prn 2 " "min light UE prn 2 min light UE prn 2 min light UE prn 2 min light UE prn 2 min light UE prn 2 min light UE prn                                                       Ther Ex             VG for ROM L7 7' alt s/l b/l  L7 7' alt s/l b/l L7 6' alt s/l b/l L7 7' alt s/l b/l L7 7' alt s/l b/l L7 7' alt s/l b/l L7 6' alt s/l b/l L7 6' alt s/l b/l L7 6' alt s/l b/l L7 7' alt s/l b/l                             Standing calf stretch 45\"x4 45\"x4 45\"x4 30\"x4 30\"x4 30\"x4 30\"x4 30\"x4 30\"x4 30\"x4                                                       Ther Activity             Sit to stands  Hi lo 19.5\" 5x5 Hi lo 19\" 5x5 Hi lo 19\" 5x5 Hi lo \" 19\" 5x5 Hi lo 19\" 5x5 Hi lo 19\" 5x5 Hi lo 19.5\" 5x5 Np resume Hi lo 19.5\" 5x5 Hi lo 19.5\" 5x5   TUG and 5x STS             Gait Training                                                    Modalities                                                            "

## 2025-03-04 ENCOUNTER — OFFICE VISIT (OUTPATIENT)
Dept: ENDOCRINOLOGY | Facility: CLINIC | Age: 75
End: 2025-03-04
Payer: COMMERCIAL

## 2025-03-04 VITALS
DIASTOLIC BLOOD PRESSURE: 88 MMHG | OXYGEN SATURATION: 99 % | SYSTOLIC BLOOD PRESSURE: 140 MMHG | WEIGHT: 226.6 LBS | HEIGHT: 64 IN | HEART RATE: 69 BPM | BODY MASS INDEX: 38.68 KG/M2

## 2025-03-04 DIAGNOSIS — R73.03 PREDIABETES: ICD-10-CM

## 2025-03-04 DIAGNOSIS — E03.9 HYPOTHYROIDISM, UNSPECIFIED TYPE: Primary | ICD-10-CM

## 2025-03-04 PROCEDURE — 99214 OFFICE O/P EST MOD 30 MIN: CPT | Performed by: INTERNAL MEDICINE

## 2025-03-04 RX ORDER — LEVOTHYROXINE SODIUM 175 UG/1
TABLET ORAL
Qty: 90 TABLET | Refills: 1 | Status: SHIPPED | OUTPATIENT
Start: 2025-03-04

## 2025-03-04 RX ORDER — METFORMIN HYDROCHLORIDE 500 MG/1
TABLET, EXTENDED RELEASE ORAL
Qty: 180 TABLET | Refills: 3 | Status: SHIPPED | OUTPATIENT
Start: 2025-03-04

## 2025-03-04 NOTE — ASSESSMENT & PLAN NOTE
TSH is normal. Will continue the levothyroxine 175mcg six days per week and check TSH once per year. Refills provided.

## 2025-03-04 NOTE — PROGRESS NOTES
"Chief Complaint   Patient presents with   • Hypothyroidism      Referring Provider  No referring provider defined for this encounter.     History of Present Illness:   Estephania Brown is a 75 y.o. female with hypothyroidism seen in f/u. She was last in the office in 8/2024 with Staci DARBY.  She reports feeling \"disgusted\" by her A1c being 6.3%. Denies increased thirst or urination, though does drink water regularly.     Levothyroxine 175mcg six days per week. The TSH is normal.     Overall she feels well with occasional heart \"flutter\" and some fatigue. She denies diarrhea or tremor.    She is now taking metformin ER 500mg at dinner. She has also been working on her diet by reducing her carbs and \"sweets\" and riding her stationary bike at home. She gained 3# since the last visit.     Eyes: has bilat cataract and has an upcoming appointment for that.     Patient Active Problem List   Diagnosis   • Pain, joint, ankle and foot, left   • DJD (degenerative joint disease), ankle and foot, left   • Allergic rhinitis   • Esophageal reflux   • Hyperlipidemia   • Hypertension   • Hypothyroidism   • Rosacea   • Pain of right lower extremity   • Female genital prolapse   • Fracture of ankle   • Osteopenia   • Varicose veins of lower extremity   • Pain of left heel   • Acute midline low back pain without sciatica   • Neuralgia   • Otitis externa of left ear   • Stage 3a chronic kidney disease (HCC)   • Plantar fasciitis   • Prediabetes   • Hypertensive kidney disease with chronic kidney disease      Past Medical History:   Diagnosis Date   • Allergic rhinitis    • Arthritis    • Body mass index (BMI) 40.0-44.9, adult 09/21/2020   • Cataract     brock   • Disease of thyroid gland     had total thyroidectomy   • Diverticulosis    • Hyperlipidemia    • Hypertension    • Mild acid reflux    • Osteoporosis    • Peripheral vascular disease (HCC) 05/03/2023   • Plantar fasciitis     b/l  l worse uses orthotics in shoes   • " Rosacea    • Squamous cell skin cancer    • Use of cane as ambulatory aid    • Uterovaginal prolapse       Past Surgical History:   Procedure Laterality Date   • ANKLE SURGERY Left 2011    Fracture / hardware removed   • COLONOSCOPY      fiberoptic   • COLPORRHAPHY N/A 06/17/2019    Procedure: POSTERIOR COLPORRHAPHY;  Surgeon: Kai Napoles MD;  Location: [facility];  Service: UroGynecology          • OTHER SURGICAL HISTORY      thyroid biopsy core needle    • WV COLPOPEXY VAGINAL EXTRAPERITONEAL APPROACH N/A 06/17/2019    Procedure: VE COLPOSUSPENSION (neugide);  Surgeon: Kai Napoles MD;  Location: [facility];  Service: UroGynecology          • WV PERINEOPLASTY RPR PERINEUM NONOBSTETRICAL SPX N/A 06/17/2019    Procedure: PERINEORROPHY;  Surgeon: Kai Napoles MD;  Location: [facility];  Service: UroGynecology          • W[place] REMOVAL IMPLANT DEEP Left 09/18/2017    Procedure: REMOVAL HARDWARE ANKLE (1 plate, 8 screws.);  Surgeon: Saúl Iniguez MD;  Location: [facility];  Service: Orthopedics   • SKIN BIOPSY  2021   • SKIN CANCER EXCISION Left    • THYROIDECTOMY  2012    Total    • TUBAL LIGATION        Family History   Problem Relation Age of Onset   • COPD Mother    • Diabetes Mother    • Heart disease Mother    • Hypertension Mother    • Diabetes type II Mother    • Thyroid disease unspecified Mother         Goiter removed by radioactive tx   • Bone cancer Father    • No Known Problems Daughter    • Diabetes type II Maternal Grandmother    • No Known Problems Maternal Grandfather    • No Known Problems Paternal Grandmother    • Diabetes type II Paternal Grandfather    • No Known Problems Maternal Aunt    • No Known Problems Maternal Aunt    • No Known Problems Paternal Aunt    • Diabetes type II Brother      Social History     Tobacco Use   • Smoking status: Never   • Smokeless tobacco: Never   Substance Use Topics   • Alcohol use: No     Comment: Never drank alcohol denied     No Known  Allergies      Current Outpatient Medications:   •  atorvastatin (LIPITOR) 10 mg tablet, Take 1 tablet (10 mg total) by mouth daily, Disp: 90 tablet, Rfl: 1  •  Azelaic Acid 15 % cream, Apply topically daily at bedtime  , Disp: , Rfl:   •  Calcium Carb-Cholecalciferol 600-800 MG-UNIT TABS, Take 2 tablets by mouth daily, Disp: , Rfl:   •  Denosumab (PROLIA SC), Inject under the skin 2x year , Disp: , Rfl:   •  docusate sodium (COLACE) 100 mg capsule, , Disp: , Rfl:   •  doxycycline (PERIOSTAT) 20 MG tablet, Take 20 mg by mouth 2 (two) times a day, Disp: , Rfl:   •  gabapentin (NEURONTIN) 300 mg capsule, TAKE ONE CAPSULE BY MOUTH IN THE MORNING, ONE CAPSULE IN AFTERNOON., AND 2 CAPSULES AT BEDTIME, Disp: 360 capsule, Rfl: 1  •  Glucosamine-Chondroitin (COSAMIN DS PO), Take by mouth, Disp: , Rfl:   •  HYDROcodone-acetaminophen (Norco) 5-325 mg per tablet, Take 1 tablet by mouth if needed for pain One po q day prn, Disp: 30 tablet, Rfl: 0  •  levothyroxine 175 mcg tablet, Take 1 tablet (175 mcg total) by mouth daily six days per week, Disp: 90 tablet, Rfl: 1  •  loratadine (CLARITIN) 10 mg tablet, Take 10 mg by mouth daily as needed , Disp: , Rfl:   •  metFORMIN (GLUCOPHAGE-XR) 500 mg 24 hr tablet, One pill by mouth twice daily, Disp: 180 tablet, Rfl: 3  •  methocarbamol (ROBAXIN) 500 mg tablet, Take 1 tablet (500 mg total) by mouth 3 (three) times a day, Disp: 40 tablet, Rfl: 2  •  metoprolol tartrate (LOPRESSOR) 50 mg tablet, Take 1 tablet by mouth daily, Disp: 90 tablet, Rfl: 1  •  metroNIDAZOLE (METROGEL) 1 % gel, Apply 1 application topically daily , Disp: , Rfl:   •  Multiple Vitamins-Minerals (Centrum Silver 50+Women) TABS, , Disp: , Rfl:   •  naproxen (NAPROSYN) 500 mg tablet, Take 1 tablet (500 mg total) by mouth 2 (two) times a day with meals, Disp: 180 tablet, Rfl: 1  •  Probiotic Product (PROBIOTIC-10 PO), , Disp: , Rfl:   •  psyllium (METAMUCIL) 0.52 g capsule, , Disp: , Rfl:   •  Triamcinolone Acetonide  "(NASACORT ALLERGY 24HR NA), 2 sprays into each nostril as needed  , Disp: , Rfl:   Review of Systems   Constitutional:  Negative for unexpected weight change.   HENT:  Negative for trouble swallowing and voice change.    Eyes:  Negative for visual disturbance.   Gastrointestinal:  Negative for diarrhea.   Musculoskeletal:  Positive for gait problem.   Neurological:  Negative for tremors.   Psychiatric/Behavioral:  The patient is not nervous/anxious.        Physical Exam:  Body mass index is 38.9 kg/m².  /88   Pulse 69   Ht 5' 4\" (1.626 m)   Wt 103 kg (226 lb 9.6 oz)   LMP  (LMP Unknown)   SpO2 99%   BMI 38.90 kg/m²    Wt Readings from Last 3 Encounters:   03/04/25 103 kg (226 lb 9.6 oz)   01/03/25 100 kg (221 lb)   11/12/24 104 kg (228 lb 8 oz)         Physical Exam   Gen: appears well-developed and well-nourished. No apparent distress.   Head: Normocephalic and atraumatic.   Eyes: no stare or proptosis, no periorbital edema  E/N/M nl facies, hearing grossly intact  Neck: range of motion nl.   Pulmonary/Chest: breathing  comfortably, no accessory muscle use, effort normal.   Musculoskeletal: moves all 4 extremities, ambulates with a cane  Neurological: alert and oriented to person, place, and time. No upper ext tremor appreciated  Skin: does not appear diaphoretic, no facial plethora  Psychiatric: normal mood and affect; behavior is normal; no gross lapses in memory, answer questions appropriately      DATA:  Labs:      Lab Results   Component Value Date    XIN2SDHXILGY 1.749 02/26/2025    TSH 1.82 10/02/2018       Lab Results   Component Value Date    HGBA1C 6.3 (H) 02/26/2025             Radiology    Impression/Plan:  Problem List Items Addressed This Visit     Hypothyroidism - Primary    TSH is normal. Will continue the levothyroxine 175mcg six days per week and check TSH once per year. Refills provided.         Relevant Medications    levothyroxine 175 mcg tablet    Prediabetes    She is displeased " with her higher A1c. Discussed monitoring with more lifestyle changes in 3 mos or increasing metformin. She would like to increase metformin ER to 500mg twice daily. Will repeat A1c and CMP in 3mos.          Relevant Medications    metFORMIN (GLUCOPHAGE-XR) 500 mg 24 hr tablet    Other Relevant Orders    Hemoglobin A1C    Comprehensive metabolic panel                  Plan rtc in 6mos    Discussed with the patient and all questioned fully answered. She will call me if any problems arise.        Becky Pierre MD

## 2025-03-04 NOTE — ASSESSMENT & PLAN NOTE
She is displeased with her higher A1c. Discussed monitoring with more lifestyle changes in 3 mos or increasing metformin. She would like to increase metformin ER to 500mg twice daily. Will repeat A1c and CMP in 3mos.

## 2025-03-05 ENCOUNTER — APPOINTMENT (OUTPATIENT)
Dept: PHYSICAL THERAPY | Facility: REHABILITATION | Age: 75
End: 2025-03-05
Payer: COMMERCIAL

## 2025-03-05 ENCOUNTER — OFFICE VISIT (OUTPATIENT)
Dept: PHYSICAL THERAPY | Facility: REHABILITATION | Age: 75
End: 2025-03-05
Payer: COMMERCIAL

## 2025-03-05 DIAGNOSIS — M25.579 CHRONIC ANKLE PAIN, UNSPECIFIED LATERALITY: ICD-10-CM

## 2025-03-05 DIAGNOSIS — G89.29 CHRONIC ANKLE PAIN, UNSPECIFIED LATERALITY: ICD-10-CM

## 2025-03-05 DIAGNOSIS — R26.89 BALANCE DISORDER: ICD-10-CM

## 2025-03-05 DIAGNOSIS — R26.9 GAIT ABNORMALITY: Primary | ICD-10-CM

## 2025-03-05 PROCEDURE — 97140 MANUAL THERAPY 1/> REGIONS: CPT | Performed by: PHYSICAL THERAPIST

## 2025-03-05 PROCEDURE — 97112 NEUROMUSCULAR REEDUCATION: CPT | Performed by: PHYSICAL THERAPIST

## 2025-03-05 PROCEDURE — 97110 THERAPEUTIC EXERCISES: CPT | Performed by: PHYSICAL THERAPIST

## 2025-03-05 NOTE — PROGRESS NOTES
"Daily Note     Today's date: 3/5/2025  Patient name: Estephania Brown  : 1950  MRN: [id number]  Referring provider: Rosalino Purvis MD  Dx:   Encounter Diagnosis     ICD-10-CM    1. Gait abnormality  R26.9       2. Chronic ankle pain, unspecified laterality  M25.579     G89.29       3. Balance disorder  R26.89           Start Time: 1218  Stop Time: 1305  Total time in clinic (min): 47 minutes    Subjective: Pt reported L ankle stiffness. States her plantar fascia is bothering her today.      Objective: See treatment diary below      Assessment: Pt tolerated treatment well with proper performance of all interventions. Patient demonstrated mild sway with balance exercises. Patient would benefit from continuing physical therapy to improve ankle mobility and improve patient's functional participation in daily activities.       Plan: Continue per plan of care.      Precautions: HTN, OP, fall risk, CA hx, hx of L ankle surgery       POC exp =  25    Manuals 2/3 2/5 2/10 2/19 2/26 3/3 3/5 1/22 1/27 1/29   L ankle TC and ST mobs DS Gr II, IV DS Gr II, IV DS Gr II, IV DS Gr II, IV DS Gr II, IV    DV Gr II, IV DV Gr II, IV DS Gr II, IV DS Gr II, IV DS Gr II, IV   DF MWM 3x10 on 8\" step 3x10 on 8\" step 3x10 on 8\" step 3x10 on 8\" step 3x10 on 8\" step  3x10 on 8\" step 3x10 on 8\" step  3x10 on 8\" step 3x10 on 8\" step 3x10 on 8\" step   STM plantar fascia DS DS DS DS DS DV DV DS DS DS   reasses        PRR     Neuro Re-Ed             Static balance on airex 30\"x2  tandem 30\"x2 tandem 30\"x2 tandem 30\"x2 each tandem  30\"x2 each tandem  30\"x2 each tandem 30\"x2 ea  Tandem EO 30\"x2 ea  tandem 30\"x2 ea  tandem 30\"x2 ea  tandem   Sand dune march 2 min light UE prn 2 min light UE prn 2 min light UE prn 2 min light UE prn 2 min light UE prn 2 min light UE prn 2 min light UE prn 2 min light UE prn 2 min light UE prn 2 min light UE prn                                                       Ther Ex             VG for ROM L7 7' alt " "s/l b/l  L7 7' alt s/l b/l L7 6' alt s/l b/l L7 7' alt s/l b/l L7 7' alt s/l b/l L7 7' alt s/l b/l L7 7' alt s/l b/l L7 6' alt s/l b/l L7 6' alt s/l b/l L7 7' alt s/l b/l                             Standing calf stretch 45\"x4 45\"x4 45\"x4 30\"x4 30\"x4 30\"x4 30\"x4 30\"x4 30\"x4 30\"x4                                                       Ther Activity             Sit to stands  Hi lo 19.5\" 5x5 Hi lo 19\" 5x5 Hi lo 19\" 5x5 Hi lo \" 19\" 5x5 Hi lo 19\" 5x5 Hi lo 19\" 5x5 Hi lo 19\" 5x5 Np resume Hi lo 19.5\" 5x5 Hi lo 19.5\" 5x5   TUG and 5x STS             Gait Training                                                    Modalities                                                              "

## 2025-03-06 ENCOUNTER — APPOINTMENT (OUTPATIENT)
Dept: PHYSICAL THERAPY | Facility: REHABILITATION | Age: 75
End: 2025-03-06
Payer: COMMERCIAL

## 2025-03-10 ENCOUNTER — OFFICE VISIT (OUTPATIENT)
Dept: PHYSICAL THERAPY | Facility: REHABILITATION | Age: 75
End: 2025-03-10
Payer: COMMERCIAL

## 2025-03-10 DIAGNOSIS — M25.579 CHRONIC ANKLE PAIN, UNSPECIFIED LATERALITY: ICD-10-CM

## 2025-03-10 DIAGNOSIS — G89.29 CHRONIC ANKLE PAIN, UNSPECIFIED LATERALITY: ICD-10-CM

## 2025-03-10 DIAGNOSIS — R26.9 GAIT ABNORMALITY: Primary | ICD-10-CM

## 2025-03-10 PROCEDURE — 97110 THERAPEUTIC EXERCISES: CPT | Performed by: PHYSICAL THERAPIST

## 2025-03-10 PROCEDURE — 97112 NEUROMUSCULAR REEDUCATION: CPT | Performed by: PHYSICAL THERAPIST

## 2025-03-10 PROCEDURE — 97140 MANUAL THERAPY 1/> REGIONS: CPT | Performed by: PHYSICAL THERAPIST

## 2025-03-10 NOTE — PROGRESS NOTES
"Daily Note     Today's date: 3/10/2025  Patient name: Estephania Brown  : 1950  MRN: [id number]  Referring provider: Rosalino Purvis MD  Dx:   Encounter Diagnosis     ICD-10-CM    1. Gait abnormality  R26.9       2. Chronic ankle pain, unspecified laterality  M25.579     G89.29           Start Time: 1217  Stop Time: 1300  Total time in clinic (min): 43 minutes    Subjective: Pt reports her foot is painful today.      Objective: See treatment diary below      Assessment: Pt tolerated treatment well. Noted improvement with tx. Planning for DC next visit.    Plan: Continue per plan of care.      Precautions: HTN, OP, fall risk, CA hx, hx of L ankle surgery       POC exp =  25    Manuals 2/3 2/5 2/10 2/19 2/26 3/3 3/5 3/10 1/27 1/29   L ankle TC and ST mobs DS Gr II, IV DS Gr II, IV DS Gr II, IV DS Gr II, IV DS Gr II, IV    DV Gr II, IV DV Gr II, IV DS Gr II, IV DS Gr II, IV DS Gr II, IV   DF MWM 3x10 on 8\" step 3x10 on 8\" step 3x10 on 8\" step 3x10 on 8\" step 3x10 on 8\" step  3x10 on 8\" step 3x10 on 8\" step  3x10 on 8\" step 3x10 on 8\" step 3x10 on 8\" step   STM plantar fascia DS DS DS DS DS DV DV DS DS DS   reasses             Neuro Re-Ed             Static balance on airex 30\"x2  tandem 30\"x2 tandem 30\"x2 tandem 30\"x2 each tandem  30\"x2 each tandem  30\"x2 each tandem 30\"x2 ea  Tandem EO 30\"x2 ea  tandem 30\"x2 ea  tandem 30\"x2 ea  tandem   Sand dune march 2 min light UE prn 2 min light UE prn 2 min light UE prn 2 min light UE prn 2 min light UE prn 2 min light UE prn 2 min light UE prn 2 min light UE prn 2 min light UE prn 2 min light UE prn                                                       Ther Ex             VG for ROM L7 7' alt s/l b/l  L7 7' alt s/l b/l L7 6' alt s/l b/l L7 7' alt s/l b/l L7 7' alt s/l b/l L7 7' alt s/l b/l L7 7' alt s/l b/l L7 6' alt s/l b/l L7 6' alt s/l b/l L7 7' alt s/l b/l                             Standing calf stretch 45\"x4 45\"x4 45\"x4 30\"x4 30\"x4 30\"x4 30\"x4 30\"x4 30\"x4 " "30\"x4                                                       Ther Activity             Sit to stands  Hi lo 19.5\" 5x5 Hi lo 19\" 5x5 Hi lo 19\" 5x5 Hi lo \" 19\" 5x5 Hi lo 19\" 5x5 Hi lo 19\" 5x5 Hi lo 19\" 5x5 19\" 5x5 Hi lo 19.5\" 5x5 Hi lo 19.5\" 5x5   TUG and 5x STS             Gait Training                                                    Modalities                                                              "

## 2025-03-12 ENCOUNTER — APPOINTMENT (OUTPATIENT)
Dept: PHYSICAL THERAPY | Facility: REHABILITATION | Age: 75
End: 2025-03-12
Payer: COMMERCIAL

## 2025-03-13 ENCOUNTER — OFFICE VISIT (OUTPATIENT)
Dept: PHYSICAL THERAPY | Facility: REHABILITATION | Age: 75
End: 2025-03-13
Payer: COMMERCIAL

## 2025-03-13 DIAGNOSIS — G89.29 CHRONIC ANKLE PAIN, UNSPECIFIED LATERALITY: ICD-10-CM

## 2025-03-13 DIAGNOSIS — R26.9 GAIT ABNORMALITY: Primary | ICD-10-CM

## 2025-03-13 DIAGNOSIS — M25.579 CHRONIC ANKLE PAIN, UNSPECIFIED LATERALITY: ICD-10-CM

## 2025-03-13 DIAGNOSIS — R26.89 BALANCE DISORDER: ICD-10-CM

## 2025-03-13 PROCEDURE — 97112 NEUROMUSCULAR REEDUCATION: CPT | Performed by: PHYSICAL THERAPIST

## 2025-03-13 PROCEDURE — 97110 THERAPEUTIC EXERCISES: CPT | Performed by: PHYSICAL THERAPIST

## 2025-03-13 PROCEDURE — 97140 MANUAL THERAPY 1/> REGIONS: CPT | Performed by: PHYSICAL THERAPIST

## 2025-03-13 NOTE — PROGRESS NOTES
Daily Note     Today's date: 3/13/2025  Patient name: Estephania Brown  : 1950  MRN: [id number]  Referring provider: Rosalino Purvis MD  Dx:   Encounter Diagnosis     ICD-10-CM    1. Gait abnormality  R26.9       2. Chronic ankle pain, unspecified laterality  M25.579     G89.29       3. Balance disorder  R26.89           Start Time: 1145  Stop Time: 1230  Total time in clinic (min): 45 minutes    Subjective: Pt reports her foot is painful today. Pt reports 70-75% improvement since starting therapy. States the foot/ankle is always the worst after sitting and then getting up and trying to walk  Pain  Current pain ratin  At best pain ratin at eval, 5 in the past week  At worst pain ratin at eval,9 in the past week random  Location: arch of foot  Quality: sharp  Alleviating factors: stretching, PT in the past.  Aggravating factors: standing and walking        Objective: See treatment diary below     Joint Assessment:  Hypomobile throughout subtalar and talocrural      Lower Extremity Strength    MMT   AROM     Knee Left Right Left Right   Flex 5 5       Ext 4+ 4+                   Hip           flex 4+ 4+       ext NT NT       abd 3 3+                   Ankle           DF 5 5 lacking 18 at eval, lacking 5 today lacking 10   PF 3+ 3+ 59 at eval, 45 today  55   inversion 5 5       Eversion  5  5             Dynamic Balance Tests  5x sit to stand (sec)  >14 sec indicates high risk for falls 23 = 12.12, unable without UE  23 = 13.06,  unable without UE  23 = 11.70, unable without UE  10/23/23 = 11.74, unable without UE  24 = 10.63, unable without UE  24 = 9.78, unable without UE  24 = 10.96, unable without UE  10/17/24= 11.18 unable with UE  24 = 12.47, unable without UE  25 = 12.25, unable without UE   TUG (sec)  >14 sec indicates high risk for falls 23 = 16.95 with SPC  23 = 17.48 with SPC  23 = 12.50 with SPC  10/23/23 = 13.41 with SPC  24 =  12.47 with SPC  7/17/24 = 11.47 with SPC  8/28/24 = 13.13 with SPC  10/17/24=14.74 with SPC  11/27/24 = 13.55 with SPC  1/22/25 = 12.63 with SPC            Assessment: Pt presents today for final therapy visit. At this time the pt has maximized benefit from this episode of care. She has reached a plateau in progress and is being transitioned to HEP. Overall she has made progress towards her goals with improvements in functional strength and ROM.    Goals  Short term goals: to be met in 6 weeks  Pt independent with initial HEP, rationale, technique and frequency, for ROM and pain control. MET  Pt able to perform 1 sit to stand without UE indicating and improvement in functional LE strength. UNMET  Improve hip abd MMT for improved lumbopelvic stability which is required for walking and balance MET  Pt will achieve an improvement in FOTO score indicating an improvement in pain and function. MET     Long term goals: to be met in 12 weeks  Pt will report a 75% or > reduction in subjective pain complaints/symptoms to better manage ADLs and functional mobility. MET  Pt able to perform 3 sit to stands without UE indicating and improvement in functional LE strength. UNMET  Further improve hip abd MMT for improved lumbopelvic stability which is required for walking and balance UNMET  Pt will improve FOTO score to = or better then expected outcome indicating an overall improvement in pain and function MET  Pt independent with rationale, technique and plan for performance of advanced HEP to maintain gains made in therapy. MET  Achieve pts goal: to get stronger, better balance, relieve ankle pain and foot pain MET    Plan:  DC to HEP     Precautions: HTN, OP, fall risk, CA hx, hx of L ankle surgery       Manuals 2/3 2/5 2/10 2/19 2/26 3/3 3/5 3/10 3/13 1/29   L ankle TC and ST mobs DS Gr II, IV DS Gr II, IV DS Gr II, IV DS Gr II, IV DS Gr II, IV    DV Gr II, IV DV Gr II, IV DS Gr II, IV DS Gr II, IV DS Gr II, IV   DF MWM 3x10 on  "8\" step 3x10 on 8\" step 3x10 on 8\" step 3x10 on 8\" step 3x10 on 8\" step  3x10 on 8\" step 3x10 on 8\" step  3x10 on 8\" step 3x10 on 8\" step 3x10 on 8\" step   STM plantar fascia DS DS DS DS DS DV DV DS DS DS   reasses             Neuro Re-Ed             Static balance on airex 30\"x2  tandem 30\"x2 tandem 30\"x2 tandem 30\"x2 each tandem  30\"x2 each tandem  30\"x2 each tandem 30\"x2 ea  Tandem EO 30\"x2 ea  tandem 30\"x2 ea  tandem 30\"x2 ea  tandem   Sand dune march 2 min light UE prn 2 min light UE prn 2 min light UE prn 2 min light UE prn 2 min light UE prn 2 min light UE prn 2 min light UE prn 2 min light UE prn 2 min light UE prn 2 min light UE prn                                                       Ther Ex             VG for ROM L7 7' alt s/l b/l  L7 7' alt s/l b/l L7 6' alt s/l b/l L7 7' alt s/l b/l L7 7' alt s/l b/l L7 7' alt s/l b/l L7 7' alt s/l b/l L7 6' alt s/l b/l L7 6' alt s/l b/l L7 7' alt s/l b/l                             Standing calf stretch 45\"x4 45\"x4 45\"x4 30\"x4 30\"x4 30\"x4 30\"x4 30\"x4 30\"x4 30\"x4                                                       Ther Activity             Sit to stands  Hi lo 19.5\" 5x5 Hi lo 19\" 5x5 Hi lo 19\" 5x5 Hi lo \" 19\" 5x5 Hi lo 19\" 5x5 Hi lo 19\" 5x5 Hi lo 19\" 5x5 19\" 5x5 Hi lo 19\" 5x5 Hi lo 19.5\" 5x5   TUG and 5x STS             Gait Training                                                    Modalities                                                              "

## 2025-03-17 ENCOUNTER — APPOINTMENT (OUTPATIENT)
Dept: PHYSICAL THERAPY | Facility: REHABILITATION | Age: 75
End: 2025-03-17
Payer: COMMERCIAL

## 2025-03-19 ENCOUNTER — APPOINTMENT (OUTPATIENT)
Dept: PHYSICAL THERAPY | Facility: REHABILITATION | Age: 75
End: 2025-03-19
Payer: COMMERCIAL

## 2025-03-21 ENCOUNTER — TELEPHONE (OUTPATIENT)
Age: 75
End: 2025-03-21

## 2025-03-21 ENCOUNTER — RESULTS FOLLOW-UP (OUTPATIENT)
Dept: FAMILY MEDICINE CLINIC | Facility: CLINIC | Age: 75
End: 2025-03-21

## 2025-03-21 ENCOUNTER — APPOINTMENT (OUTPATIENT)
Dept: RADIOLOGY | Facility: CLINIC | Age: 75
End: 2025-03-21
Payer: COMMERCIAL

## 2025-03-21 ENCOUNTER — OFFICE VISIT (OUTPATIENT)
Dept: FAMILY MEDICINE CLINIC | Facility: CLINIC | Age: 75
End: 2025-03-21
Payer: COMMERCIAL

## 2025-03-21 VITALS
BODY MASS INDEX: 38.29 KG/M2 | HEIGHT: 64 IN | HEART RATE: 80 BPM | SYSTOLIC BLOOD PRESSURE: 130 MMHG | DIASTOLIC BLOOD PRESSURE: 90 MMHG | TEMPERATURE: 98 F | RESPIRATION RATE: 17 BRPM | WEIGHT: 224.25 LBS | OXYGEN SATURATION: 98 %

## 2025-03-21 DIAGNOSIS — M79.601 PAIN OF RIGHT UPPER EXTREMITY: Primary | ICD-10-CM

## 2025-03-21 DIAGNOSIS — M79.601 PAIN OF RIGHT UPPER EXTREMITY: ICD-10-CM

## 2025-03-21 PROCEDURE — 99213 OFFICE O/P EST LOW 20 MIN: CPT | Performed by: FAMILY MEDICINE

## 2025-03-21 PROCEDURE — 73060 X-RAY EXAM OF HUMERUS: CPT

## 2025-03-21 PROCEDURE — G2211 COMPLEX E/M VISIT ADD ON: HCPCS | Performed by: FAMILY MEDICINE

## 2025-03-21 NOTE — PROGRESS NOTES
FAMILY PRACTICE OFFICE VISIT       NAME: Estephania Brown  AGE: 75 y.o. SEX: female       : 1950        MRN: [id number]    DATE: 3/21/2025  TIME: 2:51 PM    Assessment and Plan     Problem List Items Addressed This Visit       Pain of right upper extremity - Primary    Pain in her arm.  Patient will obtain x-ray of right humerus for further evaluation for possible fracture.  She may increase her naproxen to twice daily dosing.  Patient also uses hydrocodone 1/2 tablet twice daily for pain management of her chronic arthralgias.  We will make further recommendations pending results of test.         Relevant Orders    XR humerus right           Chief Complaint     Chief Complaint   Patient presents with    Arm Pain     Patient HAVE A FALL X 3 DAYS AGO        History of Present Illness     Patient in the office after sustaining a fall yesterday in her home.  Patient was walking out of a sliding glass door to a bedroom patio which is some cement but had a carpet over the cement.  Patient tripped over the opening and landed on her right arm area.  She required 2  to come to the house to help her stand up again.  Patient used her to walking canes and was able to ambulate without much difficulties.  She sustained some brush burns are in her knees however this morning she awakened to have significant discomfort of her right humerus area.  Patient has limited range of motion with elevation of her shoulder.  She noticed some ecchymosis along the inner aspect of her humerus area.    Arm Pain         Review of Systems   Review of Systems   Constitutional: Negative.    Respiratory: Negative.     Cardiovascular: Negative.    Gastrointestinal: Negative.    Musculoskeletal:  Positive for arthralgias.       Active Problem List     Patient Active Problem List   Diagnosis    Pain, joint, ankle and foot, left    DJD (degenerative joint disease), ankle and foot, left    Allergic rhinitis    Esophageal reflux     Hyperlipidemia    Hypertension    Hypothyroidism    Rosacea    Pain of right lower extremity    Female genital prolapse    Fracture of ankle    Osteopenia    Varicose veins of lower extremity    Pain of left heel    Acute midline low back pain without sciatica    Neuralgia    Otitis externa of left ear    Stage 3a chronic kidney disease (HCC)    Plantar fasciitis    Prediabetes    Hypertensive kidney disease with chronic kidney disease    Pain of right upper extremity       Past Medical History:  Past Medical History:   Diagnosis Date    Allergic rhinitis     Arthritis     Body mass index (BMI) 40.0-44.9, adult 09/21/2020    Cataract     brock    Disease of thyroid gland     had total thyroidectomy    Diverticulosis     Hyperlipidemia     Hypertension     Mild acid reflux     Osteoporosis     Peripheral vascular disease (HCC) 05/03/2023    Plantar fasciitis     b/l  l worse uses orthotics in shoes    Rosacea     Squamous cell skin cancer     Use of cane as ambulatory aid     Uterovaginal prolapse        Past Surgical History:  Past Surgical History:   Procedure Laterality Date    ANKLE SURGERY Left 2011    Fracture / hardware removed    COLONOSCOPY      fiberoptic    COLPORRHAPHY N/A 06/17/2019    Procedure: POSTERIOR COLPORRHAPHY;  Surgeon: Kai Napoles MD;  Location: [facility];  Service: UroGynecology           OTHER SURGICAL HISTORY      thyroid biopsy core needle     ME COLPOPEXY VAGINAL EXTRAPERITONEAL APPROACH N/A 06/17/2019    Procedure: VE COLPOSUSPENSION (neugide);  Surgeon: Kai Napoles MD;  Location: [facility];  Service: UroGynecology           ME PERINEOPLASTY RPR PERINEUM NONOBSTETRICAL SPX N/A 06/17/2019    Procedure: PERINEORROPHY;  Surgeon: Kai Napoles MD;  Location: [facility];  Service: UroGynecology           ME REMOVAL IMPLANT DEEP Left 09/18/2017    Procedure: REMOVAL HARDWARE ANKLE (1 plate, 8 screws.);  Surgeon: Saúl Iniguez MD;  Location: [facility];  Service: Orthopedics     SKIN BIOPSY  2021    SKIN CANCER EXCISION Left     THYROIDECTOMY  2012    Total     TUBAL LIGATION         Family History:  Family History   Problem Relation Age of Onset    COPD Mother     Diabetes Mother     Heart disease Mother     Hypertension Mother     Diabetes type II Mother     Thyroid disease unspecified Mother         Goiter removed by radioactive tx    Bone cancer Father     No Known Problems Daughter     Diabetes type II Maternal Grandmother     No Known Problems Maternal Grandfather     No Known Problems Paternal Grandmother     Diabetes type II Paternal Grandfather     No Known Problems Maternal Aunt     No Known Problems Maternal Aunt     No Known Problems Paternal Aunt     Diabetes type II Brother        Social History:  Social History     Socioeconomic History    Marital status: /Civil Union     Spouse name: Not on file    Number of children: Not on file    Years of education: Not on file    Highest education level: Not on file   Occupational History    Not on file   Tobacco Use    Smoking status: Never    Smokeless tobacco: Never   Vaping Use    Vaping status: Never Used   Substance and Sexual Activity    Alcohol use: No     Comment: Never drank alcohol denied    Drug use: No    Sexual activity: Not Currently     Partners: Male     Birth control/protection: None   Other Topics Concern    Not on file   Social History Narrative    Exercies moderately 3 or more times a week      Social Drivers of Health     Financial Resource Strain: Low Risk  (11/6/2023)    Overall Financial Resource Strain (CARDIA)     Difficulty of Paying Living Expenses: Not very hard   Food Insecurity: No Food Insecurity (11/11/2024)    Hunger Vital Sign     Worried About Running Out of Food in the Last Year: Never true     Ran Out of Food in the Last Year: Never true   Transportation Needs: No Transportation Needs (11/11/2024)    PRAPARE - Transportation     Lack of Transportation (Medical): No     Lack of Transportation  (Non-Medical): No   Physical Activity: Not on file   Stress: Not on file   Social Connections: Unknown (6/18/2024)    Received from [facility]     How often do you feel lonely or isolated from those around you? (Adult - for ages 18 years and over): Not on file   Intimate Partner Violence: Not on file   Housing Stability: Low Risk  (11/11/2024)    Housing Stability Vital Sign     Unable to Pay for Housing in the Last Year: No     Number of Times Moved in the Last Year: 0     Homeless in the Last Year: No       Objective     Vitals:    03/21/25 1303   BP: 130/90   Pulse: 80   Resp: 17   Temp: 98 °F (36.7 °C)   SpO2: 98%     Wt Readings from Last 3 Encounters:   03/21/25 102 kg (224 lb 4 oz)   03/04/25 103 kg (226 lb 9.6 oz)   01/03/25 100 kg (221 lb)       Physical Exam  Constitutional:       Appearance: Normal appearance.   Musculoskeletal:      Comments: Patient with some swelling and ecchymosis of inner right humerus area of her arm.  Her humerus area was tender to palpation.  No tenderness to shoulder or forearm.  Patient was having difficulties elevating right arm beyond 70 degrees of elevation.  Internal and external rotation was stable.   Skin:     Findings: Bruising present.      Comments: Patient with an abrasion of the bridge of her nose but no active bleeding.   Neurological:      Mental Status: She is alert.         Pertinent Laboratory/Diagnostic Studies:  Lab Results   Component Value Date    GLUCOSE 99 10/22/2014    BUN 19 01/02/2024    CREATININE 0.88 01/02/2024    CALCIUM 9.9 01/02/2024     12/20/2017    K 4.4 01/02/2024    CO2 27 01/02/2024     01/02/2024     Lab Results   Component Value Date    ALT 17 01/02/2024    AST 23 01/02/2024    ALKPHOS 58 01/02/2024    BILITOT 0.6 12/20/2017       Lab Results   Component Value Date    WBC 6.83 05/22/2023    HGB 12.2 05/22/2023    HCT 39.1 05/22/2023    MCV 99 (H) 05/22/2023     05/22/2023       Lab Results   Component  Value Date    TSH 1.82 10/02/2018       Lab Results   Component Value Date    CHOL 229 (H) 12/20/2017     Lab Results   Component Value Date    TRIG 72 01/16/2023     Lab Results   Component Value Date    HDL 86 01/16/2023     Lab Results   Component Value Date    LDLCALC 66 01/16/2023     Lab Results   Component Value Date    HGBA1C 6.3 (H) 02/26/2025       Results for orders placed or performed in visit on 02/26/25   Hemoglobin A1C   Result Value Ref Range    Hemoglobin A1C 6.3 (H) Normal 4.0-5.6%; PreDiabetic 5.7-6.4%; Diabetic >=6.5%; Glycemic control for adults with diabetes <7.0% %     mg/dl   TSH, 3rd generation   Result Value Ref Range    TSH 3RD GENERATON 1.749 0.450 - 4.500 uIU/mL   T4, free   Result Value Ref Range    Free T4 1.32 (H) 0.61 - 1.12 ng/dL       Orders Placed This Encounter   Procedures    XR humerus right       ALLERGIES:  No Known Allergies    Current Medications     Current Outpatient Medications   Medication Sig Dispense Refill    atorvastatin (LIPITOR) 10 mg tablet Take 1 tablet (10 mg total) by mouth daily 90 tablet 1    Azelaic Acid 15 % cream Apply topically daily at bedtime        Calcium Carb-Cholecalciferol 600-800 MG-UNIT TABS Take 2 tablets by mouth daily      Denosumab (PROLIA SC) Inject under the skin 2x year       docusate sodium (COLACE) 100 mg capsule       doxycycline (PERIOSTAT) 20 MG tablet Take 20 mg by mouth 2 (two) times a day      gabapentin (NEURONTIN) 300 mg capsule TAKE ONE CAPSULE BY MOUTH IN THE MORNING, ONE CAPSULE IN AFTERNOON., AND 2 CAPSULES AT BEDTIME 360 capsule 1    Glucosamine-Chondroitin (COSAMIN DS PO) Take by mouth      HYDROcodone-acetaminophen (Norco) 5-325 mg per tablet Take 1 tablet by mouth if needed for pain One po q day prn 30 tablet 0    levothyroxine 175 mcg tablet Take 1 tablet (175 mcg total) by mouth daily six days per week 90 tablet 1    loratadine (CLARITIN) 10 mg tablet Take 10 mg by mouth daily as needed       metFORMIN  (GLUCOPHAGE-XR) 500 mg 24 hr tablet One pill by mouth twice daily 180 tablet 3    methocarbamol (ROBAXIN) 500 mg tablet Take 1 tablet (500 mg total) by mouth 3 (three) times a day 40 tablet 2    metoprolol tartrate (LOPRESSOR) 50 mg tablet Take 1 tablet by mouth daily 90 tablet 1    metroNIDAZOLE (METROGEL) 1 % gel Apply 1 application topically daily       Multiple Vitamins-Minerals (Centrum Silver 50+Women) TABS       naproxen (NAPROSYN) 500 mg tablet Take 1 tablet (500 mg total) by mouth 2 (two) times a day with meals 180 tablet 1    Probiotic Product (PROBIOTIC-10 PO)       psyllium (METAMUCIL) 0.52 g capsule       Triamcinolone Acetonide (NASACORT ALLERGY 24HR NA) 2 sprays into each nostril as needed         No current facility-administered medications for this visit.         Health Maintenance     Health Maintenance   Topic Date Due    Zoster Vaccine (1 of 2) Never done    Colorectal Cancer Screening  08/13/2014    COVID-19 Vaccine (6 - 2024-25 season) 09/01/2024    RSV Vaccine for Pregnant Patients and Patients Age 60+ Years (1 - 1-dose 75+ series) Never done    PT PLAN OF CARE  02/21/2025    Breast Cancer Screening: Mammogram  07/12/2025    Fall Risk  11/12/2025    Depression Screening  11/12/2025    Urinary Incontinence Screening  11/12/2025    Medicare Annual Wellness Visit (AWV)  11/12/2025    Hepatitis C Screening  Completed    Osteoporosis Screening  Completed    Pneumococcal Vaccine: 65+ Years  Completed    Influenza Vaccine  Completed    Meningococcal B Vaccine  Aged Out    RSV Vaccine age 0-20 Months  Aged Out    HIB Vaccine  Aged Out    IPV Vaccine  Aged Out    Hepatitis A Vaccine  Aged Out    Meningococcal ACWY Vaccine  Aged Out    HPV Vaccine  Aged Out     Immunization History   Administered Date(s) Administered    COVID-19 PFIZER VACCINE 0.3 ML IM 02/24/2021, 03/15/2021, 10/09/2021    COVID-19 Pfizer Vac BIVALENT Ilir-sucrose 12 Yr+ IM 10/30/2022    COVID-19 Pfizer vac (Ilir-sucrose, gray cap) 12  yr+ IM 04/23/2022    Fluzone Split Quad 0.5 mL 09/26/2014    INFLUENZA 09/26/2014, 10/10/2015, 10/03/2016, 10/17/2017, 10/16/2022, 10/29/2022, 10/08/2023    Influenza Quadrivalent Preservative Free 3 years and older IM 09/26/2014    Influenza Split High Dose Preservative Free IM 10/10/2015, 10/03/2016, 10/17/2017, 10/14/2024    Influenza, high dose seasonal 0.7 mL 09/27/2018, 09/30/2019, 09/21/2020, 09/15/2021    Influenza, seasonal, injectable 10/11/2012, 10/17/2013    Pneumococcal Conjugate 13-Valent 12/05/2016    Pneumococcal Polysaccharide PPV23 09/27/2018       Rosalino Purvis MD

## 2025-03-21 NOTE — TELEPHONE ENCOUNTER
Need to be evaluated after a bad fall in her home. Experiencing much pain mostly on right side of body, including shoulder, ribs, arm (can’t lift it), knees, nose and fingers. Difficulty sitting down, lying in bed, getting up (particularly from bed), pain and limited right arm movement. Want X-ray for possible fractures and other injuries. .    Please advise .. Pt needs an xray

## 2025-03-26 ENCOUNTER — VBI (OUTPATIENT)
Dept: ADMINISTRATIVE | Facility: OTHER | Age: 75
End: 2025-03-26

## 2025-03-26 NOTE — TELEPHONE ENCOUNTER
03/26/25 8:16 AM     Chart reviewed for CRC: Colonoscopy was/were not submitted to the patient's insurance.     Harper Oneil MA   [facility]

## 2025-04-29 DIAGNOSIS — G89.29 CHRONIC PAIN OF LEFT ANKLE: ICD-10-CM

## 2025-04-29 DIAGNOSIS — M25.572 CHRONIC PAIN OF LEFT ANKLE: ICD-10-CM

## 2025-04-29 RX ORDER — HYDROCODONE BITARTRATE AND ACETAMINOPHEN 5; 325 MG/1; MG/1
1 TABLET ORAL AS NEEDED
Qty: 30 TABLET | Refills: 0 | Status: SHIPPED | OUTPATIENT
Start: 2025-04-29

## 2025-04-29 NOTE — TELEPHONE ENCOUNTER
Reason for call:   [x] Refill   [] Prior Auth  [] Other:     Office:   [x] PCP/Provider - Rosalino Purvis MD    [facility]   [] Specialty/Provider -     Medication: HYDROcodone-acetaminophen (Norco)     Dose/Frequency: 5-325mg  1 tablet by mouth if needed for pain One po q day prn     Quantity: 30    Pharmacy: [facility] [place] [facility]    Local Pharmacy   Does the patient have enough for 3 days?   [x] Yes   [] No - Send as HP to POD    Mail Away Pharmacy   Does the patient have enough for 10 days?   [] Yes   [] No - Send as HP to POD

## 2025-05-06 ENCOUNTER — RA CDI HCC (OUTPATIENT)
Dept: RADIOLOGY | Facility: HOSPITAL | Age: 75
End: 2025-05-06

## 2025-05-06 NOTE — PROGRESS NOTES
HCC coding opportunities       Chart reviewed, no opportunity found: CHART REVIEWED, NO OPPORTUNITY FOUND   [facility]     Patients Insurance     Medicare Insurance: [facility] [employer]           Pt ambulated  w/ assistance. SpO2 @ 96 w/ ambulation. Pt denies LINDA, dizziness or CP.

## 2025-05-10 ENCOUNTER — RA CDI HCC (OUTPATIENT)
Dept: OTHER | Facility: HOSPITAL | Age: 75
End: 2025-05-10

## 2025-05-10 NOTE — PROGRESS NOTES
HCC coding opportunities    E66.01  I12.9 for [place]  GR     Chart Reviewed number of suggestions sent to Provider: 2     Patients Insurance     Medicare Insurance: [facility] [employer]

## 2025-05-29 ENCOUNTER — NURSE TRIAGE (OUTPATIENT)
Age: 75
End: 2025-05-29

## 2025-05-29 NOTE — TELEPHONE ENCOUNTER
Patient may increase her gabapentin to take 2 tablets up to 3 times a day until her symptoms start to improve.  Patient may call if there is no improvement and pain severity within the next week

## 2025-05-29 NOTE — TELEPHONE ENCOUNTER
"REASON FOR CONVERSATION: Facial Pain    SYMPTOMS: Severe facial pain with hx of trigeminal neuralgia. Symptoms and location are the same as last flare. See triage below.    OTHER HEALTH INFORMATION: Pt currently takes gabapentin (NEURONTIN) 300 mg capsule , TAKE ONE CAPSULE BY MOUTH IN THE MORNING, ONE CAPSULE IN AFTERNOON., AND 2 CAPSULES AT BEDTIME . Pt took 1 extra dose this morning and pain was relieved from 9 am - 12:15 pm. Pt states Dr Purvis told her before to take an extra dose of this medication for the same.     PROTOCOL DISPOSITION: Discuss with Provider and Call Back Patient    CARE ADVICE PROVIDED: Await PCP advice before taking any additional dose of gabapentin.     PRACTICE FOLLOW-UP: No same day appointments. Pt would like to know if she should take another extra dose of her gabapentin? Please review with PCP and advise Pt further on medication for her symptoms. Pt states that she is not currently in pain.    Answer Assessment - Initial Assessment Questions  1. ONSET: \"When did the pain start?\" (e.g., minutes, hours, days)      This morning  2. ONSET: \"Does the pain come and go, or has it been constant since it started?\" (e.g., constant, intermittent, fleeting)      Pain improved with extra dose of gabapentin this morning. Helpful from 9 am to 12:15 pm. Pain returned again and resolved. Pt denies pain currently  3. SEVERITY: \"How bad is the pain?\" (Scale 1-10; mild, moderate or severe)      Severe   4. LOCATION: \"Where does it hurt?\"       Left side of face from corner of mouth straight up to eye area  5. RASH: \"Is there any redness, rash, or swelling of your face?\"      Does not report  6. FEVER: \"Do you have a fever?\" If Yes, ask: \"What is it, how was it measured, and when did it start?\"       Does not Report  7. OTHER SYMPTOMS: \"Do you have any other symptoms?\" (e.g., fever, toothache, nasal discharge, nasal congestion, clicking sensation in jaw joint)      Pt reports hx of trigeminal neuralgia. " Pain is exactly the same as previous flare up, a long time ago per Pt. Same pain and location.    Protocols used: Face Pain-Adult-[place]

## 2025-06-04 ENCOUNTER — APPOINTMENT (OUTPATIENT)
Dept: LAB | Facility: CLINIC | Age: 75
End: 2025-06-04
Payer: COMMERCIAL

## 2025-06-04 DIAGNOSIS — R73.03 PREDIABETES: ICD-10-CM

## 2025-06-04 LAB
ALBUMIN SERPL BCG-MCNC: 4.2 G/DL (ref 3.5–5)
ALP SERPL-CCNC: 52 U/L (ref 34–104)
ALT SERPL W P-5'-P-CCNC: 14 U/L (ref 7–52)
ANION GAP SERPL CALCULATED.3IONS-SCNC: 5 MMOL/L (ref 4–13)
AST SERPL W P-5'-P-CCNC: 18 U/L (ref 13–39)
BILIRUB SERPL-MCNC: 0.71 MG/DL (ref 0.2–1)
BUN SERPL-MCNC: 22 MG/DL (ref 5–25)
CALCIUM SERPL-MCNC: 9.6 MG/DL (ref 8.4–10.2)
CHLORIDE SERPL-SCNC: 103 MMOL/L (ref 96–108)
CO2 SERPL-SCNC: 30 MMOL/L (ref 21–32)
CREAT SERPL-MCNC: 0.82 MG/DL (ref 0.6–1.3)
EST. AVERAGE GLUCOSE BLD GHB EST-MCNC: 131 MG/DL
GFR SERPL CREATININE-BSD FRML MDRD: 70 ML/MIN/1.73SQ M
GLUCOSE P FAST SERPL-MCNC: 108 MG/DL (ref 65–99)
HBA1C MFR BLD: 6.2 %
POTASSIUM SERPL-SCNC: 4.6 MMOL/L (ref 3.5–5.3)
PROT SERPL-MCNC: 7 G/DL (ref 6.4–8.4)
SODIUM SERPL-SCNC: 138 MMOL/L (ref 135–147)

## 2025-06-04 PROCEDURE — 36415 COLL VENOUS BLD VENIPUNCTURE: CPT

## 2025-06-04 PROCEDURE — 83036 HEMOGLOBIN GLYCOSYLATED A1C: CPT

## 2025-06-04 PROCEDURE — 80053 COMPREHEN METABOLIC PANEL: CPT

## 2025-06-05 ENCOUNTER — RESULTS FOLLOW-UP (OUTPATIENT)
Dept: ENDOCRINOLOGY | Facility: CLINIC | Age: 75
End: 2025-06-05

## 2025-06-05 ENCOUNTER — TELEPHONE (OUTPATIENT)
Age: 75
End: 2025-06-05

## 2025-06-05 NOTE — TELEPHONE ENCOUNTER
Pt calling back in as she was accidentally disconnected from before. I was able to schedule her an appt tomorrow w/ Dr. Purvis regarding trigeminal neuralgia and she is asking what next steps can be done to help with it after she has already been taking the increased gabapentin.    Please advise and contact pt back with any suggestions.

## 2025-06-05 NOTE — TELEPHONE ENCOUNTER
Gayatri called to provide an update on her symptoms; advised the increase in gabapentin helped at first but advised she's back to experiencing left side mouth pain.    She's looking for further instructions from Dr. Purvis, please contact to advise.

## 2025-06-06 ENCOUNTER — OFFICE VISIT (OUTPATIENT)
Dept: FAMILY MEDICINE CLINIC | Facility: CLINIC | Age: 75
End: 2025-06-06
Payer: COMMERCIAL

## 2025-06-06 VITALS
WEIGHT: 218.38 LBS | BODY MASS INDEX: 37.28 KG/M2 | SYSTOLIC BLOOD PRESSURE: 154 MMHG | HEART RATE: 74 BPM | OXYGEN SATURATION: 98 % | HEIGHT: 64 IN | RESPIRATION RATE: 20 BRPM | DIASTOLIC BLOOD PRESSURE: 82 MMHG | TEMPERATURE: 98.4 F

## 2025-06-06 DIAGNOSIS — M79.2 NEURALGIA: Primary | ICD-10-CM

## 2025-06-06 DIAGNOSIS — E66.01 MORBID (SEVERE) OBESITY DUE TO EXCESS CALORIES (HCC): ICD-10-CM

## 2025-06-06 PROCEDURE — 99213 OFFICE O/P EST LOW 20 MIN: CPT | Performed by: FAMILY MEDICINE

## 2025-06-06 PROCEDURE — G2211 COMPLEX E/M VISIT ADD ON: HCPCS | Performed by: FAMILY MEDICINE

## 2025-06-06 RX ORDER — TOPIRAMATE 50 MG/1
50 TABLET, FILM COATED ORAL 2 TIMES DAILY
Qty: 60 TABLET | Refills: 3 | Status: SHIPPED | OUTPATIENT
Start: 2025-06-06

## 2025-06-06 NOTE — ASSESSMENT & PLAN NOTE
Neuralgia.  Patient given instructions to decrease her gabapentin to 300 mg 3 times daily.  She will initiate Topamax 50 mg twice daily.  She will call next week if symptoms persist and we will titrate medication accordingly

## 2025-06-06 NOTE — PROGRESS NOTES
FAMILY PRACTICE OFFICE VISIT       NAME: Estephania Brown  AGE: 75 y.o. SEX: female       : 1950        MRN: [id number]    DATE: 2025  TIME: 1:07 PM    Assessment and Plan     Problem List Items Addressed This Visit       Neuralgia - Primary    Neuralgia.  Patient given instructions to decrease her gabapentin to 300 mg 3 times daily.  She will initiate Topamax 50 mg twice daily.  She will call next week if symptoms persist and we will titrate medication accordingly         Relevant Medications    topiramate (Topamax) 50 MG tablet    Morbid (severe) obesity due to excess calories (HCC)           Chief Complaint     Chief Complaint   Patient presents with    Neuralgia       History of Present Illness     Patient the office to review chronic condition of exacerbation of her trigeminal neuralgia.  Patient has a history of trigeminal neuralgia after having a skin cancer removed from her left cheek several years ago.  She has stabbing sharp pains that are somewhat constant at this time and interfere with her ability to chew food, sleep etc.  She has been taking 600 mg of gabapentin 3 times daily for the past week without relief in symptoms.        Review of Systems   Review of Systems   Constitutional: Negative.    Neurological:         As per HPI   Psychiatric/Behavioral:          Patient in no acute distress but is emotionally challenged due to the chronicity of her pain.       Active Problem List   Problem List[1]    Past Medical History:  Past Medical History[2]    Past Surgical History:  Past Surgical History[3]    Family History:  Family History[4]    Social History:  Social History     Socioeconomic History    Marital status: /Civil Union     Spouse name: Not on file    Number of children: Not on file    Years of education: Not on file    Highest education level: Not on file   Occupational History    Not on file   Tobacco Use    Smoking status: Never    Smokeless tobacco: Never   Vaping Use     Vaping status: Never Used   Substance and Sexual Activity    Alcohol use: No     Comment: Never drank alcohol denied    Drug use: No    Sexual activity: Not Currently     Partners: Male     Birth control/protection: None   Other Topics Concern    Not on file   Social History Narrative    Exercies moderately 3 or more times a week      Social Drivers of Health     Financial Resource Strain: Low Risk  (11/6/2023)    Overall Financial Resource Strain (CARDIA)     Difficulty of Paying Living Expenses: Not very hard   Food Insecurity: No Food Insecurity (11/11/2024)    Nursing - Inadequate Food Risk Classification     Worried About Running Out of Food in the Last Year: Never true     Ran Out of Food in the Last Year: Never true     Ran Out of Food in the Last Year: Not on file   Transportation Needs: No Transportation Needs (11/11/2024)    PRAPARE - Transportation     Lack of Transportation (Medical): No     Lack of Transportation (Non-Medical): No   Physical Activity: Not on file   Stress: Not on file   Social Connections: Unknown (6/18/2024)    Received from [facility]    Social Connections     How often do you feel lonely or isolated from those around you? (Adult - for ages 18 years and over): Not on file   Intimate Partner Violence: Not on file   Housing Stability: Low Risk  (11/11/2024)    Housing Stability Vital Sign     Unable to Pay for Housing in the Last Year: No     Number of Times Moved in the Last Year: 0     Homeless in the Last Year: No       Objective     Vitals:    06/06/25 1152   BP: 154/82   Pulse: 74   Resp: 20   Temp: 98.4 °F (36.9 °C)   SpO2: 98%     Wt Readings from Last 3 Encounters:   06/06/25 99.1 kg (218 lb 6 oz)   03/21/25 102 kg (224 lb 4 oz)   03/04/25 103 kg (226 lb 9.6 oz)       Physical Exam  Constitutional:       General: She is not in acute distress.     Appearance: Normal appearance. She is not ill-appearing.     Neurological:      General: No focal deficit present.      Mental  Status: She is alert and oriented to person, place, and time. Mental status is at baseline.      Comments: Sensitivity to the touch of her left cheek area of her face       Pertinent Laboratory/Diagnostic Studies:  Lab Results   Component Value Date    GLUCOSE 99 10/22/2014    BUN 22 06/04/2025    CREATININE 0.82 06/04/2025    CALCIUM 9.6 06/04/2025     12/20/2017    K 4.6 06/04/2025    CO2 30 06/04/2025     06/04/2025     Lab Results   Component Value Date    ALT 14 06/04/2025    AST 18 06/04/2025    ALKPHOS 52 06/04/2025    BILITOT 0.6 12/20/2017       Lab Results   Component Value Date    WBC 6.83 05/22/2023    HGB 12.2 05/22/2023    HCT 39.1 05/22/2023    MCV 99 (H) 05/22/2023     05/22/2023       Lab Results   Component Value Date    TSH 1.82 10/02/2018       Lab Results   Component Value Date    CHOL 229 (H) 12/20/2017     Lab Results   Component Value Date    TRIG 72 01/16/2023     Lab Results   Component Value Date    HDL 86 01/16/2023     Lab Results   Component Value Date    LDLCALC 66 01/16/2023     Lab Results   Component Value Date    HGBA1C 6.2 (H) 06/04/2025       Results for orders placed or performed in visit on 06/04/25   Hemoglobin A1C   Result Value Ref Range    Hemoglobin A1C 6.2 (H) Normal 4.0-5.6%; PreDiabetic 5.7-6.4%; Diabetic >=6.5%; Glycemic control for adults with diabetes <7.0% %     mg/dl   Comprehensive metabolic panel   Result Value Ref Range    Sodium 138 135 - 147 mmol/L    Potassium 4.6 3.5 - 5.3 mmol/L    Chloride 103 96 - 108 mmol/L    CO2 30 21 - 32 mmol/L    ANION GAP 5 4 - 13 mmol/L    BUN 22 5 - 25 mg/dL    Creatinine 0.82 0.60 - 1.30 mg/dL    Glucose, Fasting 108 (H) 65 - 99 mg/dL    Calcium 9.6 8.4 - 10.2 mg/dL    AST 18 13 - 39 U/L    ALT 14 7 - 52 U/L    Alkaline Phosphatase 52 34 - 104 U/L    Total Protein 7.0 6.4 - 8.4 g/dL    Albumin 4.2 3.5 - 5.0 g/dL    Total Bilirubin 0.71 0.20 - 1.00 mg/dL    eGFR 70 ml/min/1.73sq m       No orders of the  defined types were placed in this encounter.      ALLERGIES:  Allergies[5]    Current Medications   Current Medications[6]      Health Maintenance     Health Maintenance   Topic Date Due    Zoster Vaccine (1 of 2) Never done    Colorectal Cancer Screening  08/13/2014    COVID-19 Vaccine (6 - 2024-25 season) 09/01/2024    RSV Vaccine for Pregnant Patients and Patients Age 60+ Years (1 - 1-dose 75+ series) Never done    PT PLAN OF CARE  02/21/2025    Breast Cancer Screening: Mammogram  07/12/2025    Fall Risk  11/12/2025    Urinary Incontinence Screening  11/12/2025    Medicare Annual Wellness Visit (AWV)  11/12/2025    Depression Screening  06/06/2026    Hepatitis C Screening  Completed    Osteoporosis Screening  Completed    Pneumococcal Vaccine: 65+ Years  Completed    Influenza Vaccine  Completed    Meningococcal B Vaccine  Aged Out    RSV Vaccine age 0-20 Months  Aged Out    HIB Vaccine  Aged Out    IPV Vaccine  Aged Out    Hepatitis A Vaccine  Aged Out    Meningococcal ACWY Vaccine  Aged Out    HPV Vaccine  Aged Out     Immunization History   Administered Date(s) Administered    COVID-19 PFIZER VACCINE 0.3 ML IM 02/24/2021, 03/15/2021, 10/09/2021    COVID-19 Pfizer Vac BIVALENT Ilir-sucrose 12 Yr+ IM 10/30/2022    COVID-19 Pfizer vac (Ilir-sucrose, gray cap) 12 yr+ IM 04/23/2022    Fluzone Split Quad 0.5 mL 09/26/2014    INFLUENZA 09/26/2014, 10/10/2015, 10/03/2016, 10/17/2017, 10/16/2022, 10/29/2022, 10/08/2023    Influenza Quadrivalent Preservative Free 3 years and older IM 09/26/2014    Influenza Split High Dose Preservative Free IM 10/10/2015, 10/03/2016, 10/17/2017, 10/14/2024    Influenza, high dose seasonal 0.7 mL 09/27/2018, 09/30/2019, 09/21/2020, 09/15/2021    Influenza, seasonal, injectable 10/11/2012, 10/17/2013    Pneumococcal Conjugate 13-Valent 12/05/2016    Pneumococcal Polysaccharide PPV23 09/27/2018       Rosalino Purvis MD             [1]   Patient Active Problem List  Diagnosis    Pain, joint,  ankle and foot, left    DJD (degenerative joint disease), ankle and foot, left    Allergic rhinitis    Esophageal reflux    Hyperlipidemia    Hypertension    Hypothyroidism    Rosacea    Pain of right lower extremity    Female genital prolapse    Fracture of ankle    Osteopenia    Varicose veins of lower extremity    Pain of left heel    Acute midline low back pain without sciatica    Neuralgia    Otitis externa of left ear    Stage 3a chronic kidney disease (HCC)    Plantar fasciitis    Prediabetes    Hypertensive kidney disease with chronic kidney disease    Pain of right upper extremity    Morbid (severe) obesity due to excess calories (HCC)   [2]   Past Medical History:  Diagnosis Date    Allergic rhinitis     Arthritis     Body mass index (BMI) 40.0-44.9, adult 09/21/2020    Cataract     brock    Disease of thyroid gland     had total thyroidectomy    Diverticulosis     Hyperlipidemia     Hypertension     Mild acid reflux     Osteoporosis     Peripheral vascular disease (HCC) 05/03/2023    Plantar fasciitis     b/l  l worse uses orthotics in shoes    Rosacea     Squamous cell skin cancer     Use of cane as ambulatory aid     Uterovaginal prolapse    [3]   Past Surgical History:  Procedure Laterality Date    ANKLE SURGERY Left 2011    Fracture / hardware removed    COLONOSCOPY      fiberoptic    COLPORRHAPHY N/A 06/17/2019    Procedure: POSTERIOR COLPORRHAPHY;  Surgeon: Kai Napoles MD;  Location: [facility];  Service: UroGynecology           OTHER SURGICAL HISTORY      thyroid biopsy core needle     MN COLPOPEXY VAGINAL EXTRAPERITONEAL APPROACH N/A 06/17/2019    Procedure: VE COLPOSUSPENSION (neugide);  Surgeon: Kai Napoles MD;  Location: [facility];  Service: UroGynecology           MN PERINEOPLASTY RPR PERINEUM NONOBSTETRICAL SPX N/A 06/17/2019    Procedure: PERINEORROPHY;  Surgeon: Kai Napoles MD;  Location: [facility];  Service: UroGynecology           MN REMOVAL IMPLANT DEEP Left 09/18/2017     Procedure: REMOVAL HARDWARE ANKLE (1 plate, 8 screws.);  Surgeon: Saúl Iniguez MD;  Location: [facility];  Service: Orthopedics    SKIN BIOPSY  2021    SKIN CANCER EXCISION Left     THYROIDECTOMY  2012    Total     TUBAL LIGATION     [4]   Family History  Problem Relation Name Age of Onset    COPD Mother Jaqueline celestino     Diabetes Mother Jaqueline celestino     Heart disease Mother Jaqueline celestino     Hypertension Mother Jaqueline celestino     Diabetes type II Mother Jaqueline celestino     Thyroid disease unspecified Mother Jaqueline laird         Goiter removed by radioactive tx    Bone cancer Father      No Known Problems Daughter      Diabetes type II Maternal Grandmother Ainsley Madrigal     No Known Problems Maternal Grandfather      No Known Problems Paternal Grandmother      Diabetes type II Paternal Grandfather Kwadwo Laird     No Known Problems Maternal Aunt      No Known Problems Maternal Aunt      No Known Problems Paternal Aunt      Diabetes type II Brother Sarabjit Laird    [5] No Known Allergies  [6]   Current Outpatient Medications   Medication Sig Dispense Refill    atorvastatin (LIPITOR) 10 mg tablet Take 1 tablet (10 mg total) by mouth daily 90 tablet 1    Azelaic Acid 15 % cream Apply topically daily at bedtime      Calcium Carb-Cholecalciferol 600-800 MG-UNIT TABS Take 2 tablets by mouth in the morning.      Denosumab (PROLIA SC) Inject under the skin 2x year      docusate sodium (COLACE) 100 mg capsule       doxycycline (PERIOSTAT) 20 MG tablet Take 20 mg by mouth in the morning and 20 mg in the evening.      gabapentin (NEURONTIN) 300 mg capsule TAKE ONE CAPSULE BY MOUTH IN THE MORNING, ONE CAPSULE IN AFTERNOON., AND 2 CAPSULES AT BEDTIME 360 capsule 1    Glucosamine-Chondroitin (COSAMIN DS PO) Take by mouth      HYDROcodone-acetaminophen (Norco) 5-325 mg per tablet Take 1 tablet by mouth if needed for pain One po q day prn 30 tablet 0    levothyroxine 175 mcg tablet Take 1 tablet (175 mcg total) by mouth daily six days per week 90  tablet 1    loratadine (CLARITIN) 10 mg tablet Take 10 mg by mouth daily as needed      metFORMIN (GLUCOPHAGE-XR) 500 mg 24 hr tablet One pill by mouth twice daily 180 tablet 3    metoprolol tartrate (LOPRESSOR) 50 mg tablet Take 1 tablet by mouth daily 90 tablet 1    metroNIDAZOLE (METROGEL) 1 % gel Apply 1 application. topically in the morning.      Multiple Vitamins-Minerals (Centrum Silver 50+Women) TABS       naproxen (NAPROSYN) 500 mg tablet Take 1 tablet (500 mg total) by mouth 2 (two) times a day with meals 180 tablet 1    Probiotic Product (PROBIOTIC-10 PO)       psyllium (METAMUCIL) 0.52 g capsule       topiramate (Topamax) 50 MG tablet Take 1 tablet (50 mg total) by mouth 2 (two) times a day 60 tablet 3    Triamcinolone Acetonide (NASACORT ALLERGY 24HR NA) 2 sprays into each nostril as needed      methocarbamol (ROBAXIN) 500 mg tablet Take 1 tablet (500 mg total) by mouth 3 (three) times a day 40 tablet 2     No current facility-administered medications for this visit.

## 2025-06-19 ENCOUNTER — TELEPHONE (OUTPATIENT)
Dept: FAMILY MEDICINE CLINIC | Facility: CLINIC | Age: 75
End: 2025-06-19

## 2025-06-19 DIAGNOSIS — M79.2 NEURALGIA: ICD-10-CM

## 2025-06-19 RX ORDER — TOPIRAMATE 50 MG/1
TABLET, FILM COATED ORAL
Qty: 90 TABLET | Refills: 3 | Status: SHIPPED | OUTPATIENT
Start: 2025-06-19

## 2025-06-19 NOTE — TELEPHONE ENCOUNTER
Pt states that [facility] told her she needs a new script for the   topiramate (Topamax) 50 MG tablet to reflect the new change to the script   Changes in her taking it she states she takes 3 pills daily now per the doctor, please [facility] [place]

## 2025-06-19 NOTE — TELEPHONE ENCOUNTER
Pt states that [facility] her she needs a new script for the   topiramate (Topamax) 50 MG tablet to reflect the new change to the script

## 2025-07-03 DIAGNOSIS — M54.16 RADICULOPATHY, LUMBAR REGION: ICD-10-CM

## 2025-07-03 DIAGNOSIS — E78.5 HYPERLIPIDEMIA, UNSPECIFIED HYPERLIPIDEMIA TYPE: ICD-10-CM

## 2025-07-06 RX ORDER — GABAPENTIN 300 MG/1
CAPSULE ORAL
Qty: 360 CAPSULE | Refills: 1 | Status: SHIPPED | OUTPATIENT
Start: 2025-07-06

## 2025-07-06 RX ORDER — ATORVASTATIN CALCIUM 10 MG/1
10 TABLET, FILM COATED ORAL DAILY
Qty: 90 TABLET | Refills: 0 | Status: SHIPPED | OUTPATIENT
Start: 2025-07-06

## 2025-07-08 ENCOUNTER — OFFICE VISIT (OUTPATIENT)
Dept: FAMILY MEDICINE CLINIC | Facility: CLINIC | Age: 75
End: 2025-07-08
Payer: COMMERCIAL

## 2025-07-08 VITALS
SYSTOLIC BLOOD PRESSURE: 140 MMHG | HEART RATE: 72 BPM | OXYGEN SATURATION: 99 % | BODY MASS INDEX: 36.84 KG/M2 | HEIGHT: 64 IN | TEMPERATURE: 97.5 F | DIASTOLIC BLOOD PRESSURE: 80 MMHG | RESPIRATION RATE: 20 BRPM | WEIGHT: 215.8 LBS

## 2025-07-08 DIAGNOSIS — F41.9 ANXIETY: ICD-10-CM

## 2025-07-08 DIAGNOSIS — G89.29 CHRONIC RIGHT SHOULDER PAIN: Primary | ICD-10-CM

## 2025-07-08 DIAGNOSIS — M25.511 CHRONIC RIGHT SHOULDER PAIN: Primary | ICD-10-CM

## 2025-07-08 PROCEDURE — G2211 COMPLEX E/M VISIT ADD ON: HCPCS | Performed by: FAMILY MEDICINE

## 2025-07-08 PROCEDURE — 99213 OFFICE O/P EST LOW 20 MIN: CPT | Performed by: FAMILY MEDICINE

## 2025-07-08 RX ORDER — LORAZEPAM 1 MG/1
TABLET ORAL
Qty: 1 TABLET | Refills: 0 | Status: SHIPPED | OUTPATIENT
Start: 2025-07-08

## 2025-07-08 NOTE — PROGRESS NOTES
FAMILY PRACTICE OFFICE VISIT       NAME: Estephania Brown  AGE: 75 y.o. SEX: female       : 1950        MRN: [id number]    DATE: 2025  TIME: 11:48 AM    Assessment and Plan     Problem List Items Addressed This Visit    None  Visit Diagnoses         Chronic right shoulder pain    -  Primary    Relevant Orders    MRI shoulder right wo contrast                Chief Complaint     Chief Complaint   Patient presents with    Arm Pain     Right upper arm pain from the previous fall       History of Present Illness     Patient in the office with continued right shoulder discomfort after sustaining a fall 3-1/2 months ago in her house where she fell on her shoulder on a carpeted floor.  X-rays did not show any bony abnormalities at after her fall.  She has tried physical therapy but continues to experience pain when having to elevate her right shoulder.    Arm Pain         Review of Systems   Review of Systems   Constitutional: Negative.    Musculoskeletal:  Positive for arthralgias.       Active Problem List   Problem List[1]    Past Medical History:  Past Medical History[2]    Past Surgical History:  Past Surgical History[3]    Family History:  Family History[4]    Social History:  Social History     Socioeconomic History    Marital status: /Civil Union     Spouse name: Not on file    Number of children: Not on file    Years of education: Not on file    Highest education level: Not on file   Occupational History    Not on file   Tobacco Use    Smoking status: Never    Smokeless tobacco: Never   Vaping Use    Vaping status: Never Used   Substance and Sexual Activity    Alcohol use: No     Comment: Never drank alcohol denied    Drug use: No    Sexual activity: Not Currently     Partners: Male     Birth control/protection: None   Other Topics Concern    Not on file   Social History Narrative    Exercies moderately 3 or more times a week      Social Drivers of Health     Financial Resource Strain: Low  Risk  (11/6/2023)    Overall Financial Resource Strain (CARDIA)     Difficulty of Paying Living Expenses: Not very hard   Food Insecurity: No Food Insecurity (11/11/2024)    Nursing - Inadequate Food Risk Classification     Worried About Running Out of Food in the Last Year: Never true     Ran Out of Food in the Last Year: Never true     Ran Out of Food in the Last Year: Not on file   Transportation Needs: No Transportation Needs (11/11/2024)    PRAPARE - Transportation     Lack of Transportation (Medical): No     Lack of Transportation (Non-Medical): No   Physical Activity: Not on file   Stress: Not on file   Social Connections: Unknown (6/18/2024)    Received from [employer]     How often do you feel lonely or isolated from those around you? (Adult - for ages 18 years and over): Not on file   Intimate Partner Violence: Not on file   Housing Stability: Low Risk  (11/11/2024)    Housing Stability Vital Sign     Unable to Pay for Housing in the Last Year: No     Number of Times Moved in the Last Year: 0     Homeless in the Last Year: No       Objective     Vitals:    07/08/25 0944   BP: 140/80   Pulse: 72   Resp: 20   Temp: 97.5 °F (36.4 °C)   SpO2: 99%     Wt Readings from Last 3 Encounters:   07/08/25 97.9 kg (215 lb 12.8 oz)   06/06/25 99.1 kg (218 lb 6 oz)   03/21/25 102 kg (224 lb 4 oz)       Physical Exam  Constitutional:       Appearance: Normal appearance.     Musculoskeletal:         General: Tenderness present.      Comments: Patient with mild tenderness to palpation along anterior right shoulder.  She has pain with elevating her arm above 90 degrees.  Muscle strength +5/5.     Neurological:      Mental Status: She is alert.         Pertinent Laboratory/Diagnostic Studies:  Lab Results   Component Value Date    GLUCOSE 99 10/22/2014    BUN 22 06/04/2025    CREATININE 0.82 06/04/2025    CALCIUM 9.6 06/04/2025     12/20/2017    K 4.6 06/04/2025    CO2 30 06/04/2025      06/04/2025     Lab Results   Component Value Date    ALT 14 06/04/2025    AST 18 06/04/2025    ALKPHOS 52 06/04/2025    BILITOT 0.6 12/20/2017       Lab Results   Component Value Date    WBC 6.83 05/22/2023    HGB 12.2 05/22/2023    HCT 39.1 05/22/2023    MCV 99 (H) 05/22/2023     05/22/2023       Lab Results   Component Value Date    TSH 1.82 10/02/2018       Lab Results   Component Value Date    CHOL 229 (H) 12/20/2017     Lab Results   Component Value Date    TRIG 72 01/16/2023     Lab Results   Component Value Date    HDL 86 01/16/2023     Lab Results   Component Value Date    LDLCALC 66 01/16/2023     Lab Results   Component Value Date    HGBA1C 6.2 (H) 06/04/2025       Results for orders placed or performed in visit on 06/04/25   Hemoglobin A1C   Result Value Ref Range    Hemoglobin A1C 6.2 (H) Normal 4.0-5.6%; PreDiabetic 5.7-6.4%; Diabetic >=6.5%; Glycemic control for adults with diabetes <7.0% %     mg/dl   Comprehensive metabolic panel   Result Value Ref Range    Sodium 138 135 - 147 mmol/L    Potassium 4.6 3.5 - 5.3 mmol/L    Chloride 103 96 - 108 mmol/L    CO2 30 21 - 32 mmol/L    ANION GAP 5 4 - 13 mmol/L    BUN 22 5 - 25 mg/dL    Creatinine 0.82 0.60 - 1.30 mg/dL    Glucose, Fasting 108 (H) 65 - 99 mg/dL    Calcium 9.6 8.4 - 10.2 mg/dL    AST 18 13 - 39 U/L    ALT 14 7 - 52 U/L    Alkaline Phosphatase 52 34 - 104 U/L    Total Protein 7.0 6.4 - 8.4 g/dL    Albumin 4.2 3.5 - 5.0 g/dL    Total Bilirubin 0.71 0.20 - 1.00 mg/dL    eGFR 70 ml/min/1.73sq m     *Note: Due to a large number of results and/or encounters for the requested time period, some results have not been displayed. A complete set of results can be found in Results Review.       Orders Placed This Encounter   Procedures    MRI shoulder right wo contrast       ALLERGIES:  Allergies[5]    Current Medications   Current Medications[6]      Health Maintenance     Health Maintenance   Topic Date Due    Zoster Vaccine (1 of 2) Never  done    Colorectal Cancer Screening  08/13/2014    COVID-19 Vaccine (6 - 2024-25 season) 09/01/2024    RSV Vaccine for Pregnant Patients and Patients Age 60+ Years (1 - 1-dose 75+ series) Never done    PT PLAN OF CARE  02/21/2025    Breast Cancer Screening: Mammogram  07/12/2025    Influenza Vaccine (1) 09/01/2025    Fall Risk  11/12/2025    Urinary Incontinence Screening  11/12/2025    Medicare Annual Wellness Visit (AWV)  11/12/2025    Depression Screening  07/08/2026    Hepatitis C Screening  Completed    Osteoporosis Screening  Completed    Pneumococcal Vaccine: 50+ Years  Completed    Meningococcal B Vaccine  Aged Out    RSV Vaccine age 0-20 Months  Aged Out    HIB Vaccine  Aged Out    IPV Vaccine  Aged Out    Hepatitis A Vaccine  Aged Out    Meningococcal ACWY Vaccine  Aged Out    HPV Vaccine  Aged Out     Immunization History   Administered Date(s) Administered    COVID-19 PFIZER VACCINE 0.3 ML IM 02/24/2021, 03/15/2021, 10/09/2021    COVID-19 Pfizer Vac BIVALENT Ilir-sucrose 12 Yr+ IM 10/30/2022    COVID-19 Pfizer vac (Ilir-sucrose, gray cap) 12 yr+ IM 04/23/2022    Fluzone Split Quad 0.5 mL 09/26/2014    INFLUENZA 09/26/2014, 10/10/2015, 10/03/2016, 10/17/2017, 10/16/2022, 10/29/2022, 10/08/2023    Influenza Quadrivalent Preservative Free 3 years and older IM 09/26/2014    Influenza Split High Dose Preservative Free IM 10/10/2015, 10/03/2016, 10/17/2017, 10/14/2024    Influenza, high dose seasonal 0.7 mL 09/27/2018, 09/30/2019, 09/21/2020, 09/15/2021    Influenza, seasonal, injectable 10/11/2012, 10/17/2013    Pneumococcal Conjugate 13-Valent 12/05/2016    Pneumococcal Polysaccharide PPV23 09/27/2018       Rosalino Purvis MD             [1]   Patient Active Problem List  Diagnosis    Pain, joint, ankle and foot, left    DJD (degenerative joint disease), ankle and foot, left    Allergic rhinitis    Esophageal reflux    Hyperlipidemia    Hypertension    Hypothyroidism    Rosacea    Pain of right lower extremity     Female genital prolapse    Fracture of ankle    Osteopenia    Varicose veins of lower extremity    Pain of left heel    Acute midline low back pain without sciatica    Neuralgia    Otitis externa of left ear    Stage 3a chronic kidney disease (HCC)    Plantar fasciitis    Prediabetes    Hypertensive kidney disease with chronic kidney disease    Pain of right upper extremity    Morbid (severe) obesity due to excess calories (HCC)   [2]   Past Medical History:  Diagnosis Date    Allergic rhinitis     Arthritis     Body mass index (BMI) 40.0-44.9, adult 09/21/2020    Cataract     brock    Disease of thyroid gland     had total thyroidectomy    Diverticulosis     Hyperlipidemia     Hypertension     Mild acid reflux     Osteoporosis     Peripheral vascular disease (HCC) 05/03/2023    Plantar fasciitis     b/l  l worse uses orthotics in shoes    Rosacea     Squamous cell skin cancer     Use of cane as ambulatory aid     Uterovaginal prolapse    [3]   Past Surgical History:  Procedure Laterality Date    ANKLE SURGERY Left 2011    Fracture / hardware removed    COLONOSCOPY      fiberoptic    COLPORRHAPHY N/A 06/17/2019    Procedure: POSTERIOR COLPORRHAPHY;  Surgeon: Kai Napoles MD;  Location: [facility];  Service: UroGynecology           OTHER SURGICAL HISTORY      thyroid biopsy core needle     MO COLPOPEXY VAGINAL EXTRAPERITONEAL APPROACH N/A 06/17/2019    Procedure: VE COLPOSUSPENSION (neugide);  Surgeon: Kai Napoles MD;  Location: [facility];  Service: UroGynecology           MO PERINEOPLASTY RPR PERINEUM NONOBSTETRICAL SPX N/A 06/17/2019    Procedure: PERINEORROPHY;  Surgeon: Kai Npaoles MD;  Location: [facility];  Service: UroGynecology           MO REMOVAL IMPLANT DEEP Left 09/18/2017    Procedure: REMOVAL HARDWARE ANKLE (1 plate, 8 screws.);  Surgeon: Saúl Iniguez MD;  Location: [facility];  Service: Orthopedics    SKIN BIOPSY  2021    SKIN CANCER EXCISION Left     THYROIDECTOMY  2012    Total      TUBAL LIGATION     [4]   Family History  Problem Relation Name Age of Onset    COPD Mother Jaqueline laird     Diabetes Mother Jaqueline laird     Heart disease Mother Jaqueline laird     Hypertension Mother Jaqueline laird     Diabetes type II Mother Jaqueline laird     Thyroid disease unspecified Mother Jaqueline laird         Goiter removed by radioactive tx    Bone cancer Father      No Known Problems Daughter      Diabetes type II Maternal Grandmother Ainsley Madrigal     No Known Problems Maternal Grandfather      No Known Problems Paternal Grandmother      Diabetes type II Paternal Grandfather Kwadwo Laird     No Known Problems Maternal Aunt      No Known Problems Maternal Aunt      No Known Problems Paternal Aunt      Diabetes type II Brother Sarabjit Laird    [5] No Known Allergies  [6]   Current Outpatient Medications   Medication Sig Dispense Refill    atorvastatin (LIPITOR) 10 mg tablet Take 1 tablet (10 mg total) by mouth daily 90 tablet 0    Azelaic Acid 15 % cream Apply topically daily at bedtime      Calcium Carb-Cholecalciferol 600-800 MG-UNIT TABS Take 2 tablets by mouth in the morning.      Denosumab (PROLIA SC) Inject under the skin 2x year      docusate sodium (COLACE) 100 mg capsule       doxycycline (PERIOSTAT) 20 MG tablet Take 20 mg by mouth in the morning and 20 mg in the evening.      gabapentin (NEURONTIN) 300 mg capsule TAKE ONE CAPSULE BY MOUTH IN THE MORNING, ONE CAPSULE IN AFTERNOON., AND 2 CAPSULES AT BEDTIME 360 capsule 1    Glucosamine-Chondroitin (COSAMIN DS PO) Take by mouth      HYDROcodone-acetaminophen (Norco) 5-325 mg per tablet Take 1 tablet by mouth if needed for pain One po q day prn 30 tablet 0    levothyroxine 175 mcg tablet Take 1 tablet (175 mcg total) by mouth daily six days per week 90 tablet 1    loratadine (CLARITIN) 10 mg tablet Take 10 mg by mouth daily as needed      metFORMIN (GLUCOPHAGE-XR) 500 mg 24 hr tablet One pill by mouth twice daily 180 tablet 3    methocarbamol (ROBAXIN) 500 mg tablet  Take 1 tablet (500 mg total) by mouth 3 (three) times a day 40 tablet 2    metoprolol tartrate (LOPRESSOR) 50 mg tablet Take 1 tablet by mouth daily 90 tablet 1    metroNIDAZOLE (METROGEL) 1 % gel Apply 1 application. topically in the morning.      Multiple Vitamins-Minerals (Centrum Silver 50+Women) TABS       naproxen (NAPROSYN) 500 mg tablet Take 1 tablet (500 mg total) by mouth 2 (two) times a day with meals 180 tablet 1    Probiotic Product (PROBIOTIC-10 PO)       psyllium (METAMUCIL) 0.52 g capsule       topiramate (Topamax) 50 MG tablet Use 3 tabs daily as directed 90 tablet 3    Triamcinolone Acetonide (NASACORT ALLERGY 24HR NA) 2 sprays into each nostril as needed       No current facility-administered medications for this visit.

## 2025-07-08 NOTE — ASSESSMENT & PLAN NOTE
Shoulder pain.  Patient will obtain MRI of right shoulder for further evaluation.  She will take Ativan 1 mg 1 hour prior to her MRI due to her claustrophobia.  We will make further recommendations pending results of test.

## 2025-07-10 ENCOUNTER — VBI (OUTPATIENT)
Dept: ADMINISTRATIVE | Facility: OTHER | Age: 75
End: 2025-07-10

## 2025-07-10 NOTE — TELEPHONE ENCOUNTER
07/10/25 9:06 AM     Chart reviewed for Diabetic Eye Exam was/were not submitted to the patient's insurance.     Harper Oneil MA   [facility]

## 2025-07-11 ENCOUNTER — TELEPHONE (OUTPATIENT)
Dept: FAMILY MEDICINE CLINIC | Facility: CLINIC | Age: 75
End: 2025-07-11

## 2025-07-14 ENCOUNTER — CLINICAL SUPPORT (OUTPATIENT)
Dept: FAMILY MEDICINE CLINIC | Facility: CLINIC | Age: 75
End: 2025-07-14
Payer: COMMERCIAL

## 2025-07-14 DIAGNOSIS — M81.0 OSTEOPOROSIS, UNSPECIFIED OSTEOPOROSIS TYPE, UNSPECIFIED PATHOLOGICAL FRACTURE PRESENCE: Primary | ICD-10-CM

## 2025-07-14 PROCEDURE — 96372 THER/PROPH/DIAG INJ SC/IM: CPT

## 2025-07-18 DIAGNOSIS — E03.9 HYPOTHYROIDISM, UNSPECIFIED TYPE: ICD-10-CM

## 2025-07-18 RX ORDER — LEVOTHYROXINE SODIUM 175 UG/1
TABLET ORAL
Qty: 90 TABLET | Refills: 1 | Status: SHIPPED | OUTPATIENT
Start: 2025-07-18

## 2025-07-23 ENCOUNTER — HOSPITAL ENCOUNTER (OUTPATIENT)
Dept: RADIOLOGY | Age: 75
Discharge: HOME | End: 2025-07-23
Payer: COMMERCIAL

## 2025-07-23 VITALS — HEIGHT: 63 IN | WEIGHT: 214 LBS | BODY MASS INDEX: 37.92 KG/M2

## 2025-07-23 VITALS — BODY MASS INDEX: 36.54 KG/M2 | WEIGHT: 214 LBS | HEIGHT: 64 IN

## 2025-07-23 DIAGNOSIS — Z12.31 VISIT FOR SCREENING MAMMOGRAM: ICD-10-CM

## 2025-07-23 DIAGNOSIS — M81.0 OSTEOPOROSIS, UNSPECIFIED OSTEOPOROSIS TYPE, UNSPECIFIED PATHOLOGICAL FRACTURE PRESENCE: ICD-10-CM

## 2025-07-23 PROCEDURE — 77063 BREAST TOMOSYNTHESIS BI: CPT

## 2025-07-23 PROCEDURE — 77067 SCR MAMMO BI INCL CAD: CPT

## 2025-07-23 PROCEDURE — 77080 DXA BONE DENSITY AXIAL: CPT

## 2025-07-27 ENCOUNTER — HOSPITAL ENCOUNTER (OUTPATIENT)
Dept: MRI IMAGING | Facility: HOSPITAL | Age: 75
Discharge: HOME | End: 2025-07-27
Attending: FAMILY MEDICINE
Payer: COMMERCIAL

## 2025-07-27 DIAGNOSIS — M25.511 CHRONIC RIGHT SHOULDER PAIN: ICD-10-CM

## 2025-07-27 DIAGNOSIS — G89.29 CHRONIC RIGHT SHOULDER PAIN: ICD-10-CM

## 2025-07-27 PROCEDURE — 73221 MRI JOINT UPR EXTREM W/O DYE: CPT

## 2025-07-30 DIAGNOSIS — I10 ESSENTIAL HYPERTENSION: ICD-10-CM

## 2025-07-31 DIAGNOSIS — M25.511 CHRONIC RIGHT SHOULDER PAIN: Primary | ICD-10-CM

## 2025-07-31 DIAGNOSIS — G89.29 CHRONIC RIGHT SHOULDER PAIN: Primary | ICD-10-CM

## 2025-07-31 RX ORDER — METOPROLOL TARTRATE 50 MG
50 TABLET ORAL DAILY
Qty: 90 TABLET | Refills: 1 | Status: SHIPPED | OUTPATIENT
Start: 2025-07-31

## 2025-08-08 ENCOUNTER — OFFICE VISIT (OUTPATIENT)
Dept: PHYSICAL THERAPY | Facility: REHABILITATION | Age: 75
End: 2025-08-08
Payer: COMMERCIAL

## 2025-08-08 DIAGNOSIS — R26.9 GAIT ABNORMALITY: ICD-10-CM

## 2025-08-08 DIAGNOSIS — M25.571 CHRONIC PAIN OF BOTH ANKLES: Primary | ICD-10-CM

## 2025-08-08 DIAGNOSIS — M25.572 CHRONIC PAIN OF BOTH ANKLES: Primary | ICD-10-CM

## 2025-08-08 DIAGNOSIS — G89.29 CHRONIC PAIN OF BOTH ANKLES: Primary | ICD-10-CM

## 2025-08-08 PROCEDURE — 97140 MANUAL THERAPY 1/> REGIONS: CPT | Performed by: PHYSICAL THERAPIST

## 2025-08-08 PROCEDURE — 97162 PT EVAL MOD COMPLEX 30 MIN: CPT | Performed by: PHYSICAL THERAPIST

## 2025-08-11 ENCOUNTER — RA CDI HCC (OUTPATIENT)
Dept: OTHER | Facility: HOSPITAL | Age: 75
End: 2025-08-11

## 2025-08-12 ENCOUNTER — OFFICE VISIT (OUTPATIENT)
Dept: PHYSICAL THERAPY | Facility: REHABILITATION | Age: 75
End: 2025-08-12
Payer: COMMERCIAL

## 2025-08-14 ENCOUNTER — OFFICE VISIT (OUTPATIENT)
Dept: OBGYN CLINIC | Facility: OTHER | Age: 75
End: 2025-08-14
Payer: COMMERCIAL

## 2025-08-14 PROBLEM — S46.811A: Status: ACTIVE | Noted: 2025-08-14

## 2025-08-18 ENCOUNTER — OFFICE VISIT (OUTPATIENT)
Dept: PHYSICAL THERAPY | Facility: REHABILITATION | Age: 75
End: 2025-08-18
Payer: COMMERCIAL

## 2025-08-18 ENCOUNTER — TELEPHONE (OUTPATIENT)
Age: 75
End: 2025-08-18

## 2025-08-18 DIAGNOSIS — R26.9 GAIT ABNORMALITY: ICD-10-CM

## 2025-08-18 DIAGNOSIS — G89.29 CHRONIC PAIN OF BOTH ANKLES: Primary | ICD-10-CM

## 2025-08-18 DIAGNOSIS — M25.572 CHRONIC PAIN OF BOTH ANKLES: Primary | ICD-10-CM

## 2025-08-18 DIAGNOSIS — M25.571 CHRONIC PAIN OF BOTH ANKLES: Primary | ICD-10-CM

## 2025-08-18 PROCEDURE — 97140 MANUAL THERAPY 1/> REGIONS: CPT | Performed by: PHYSICAL THERAPIST

## 2025-08-18 PROCEDURE — 97110 THERAPEUTIC EXERCISES: CPT | Performed by: PHYSICAL THERAPIST

## 2025-08-19 ENCOUNTER — CONSULT (OUTPATIENT)
Dept: FAMILY MEDICINE CLINIC | Facility: CLINIC | Age: 75
End: 2025-08-19

## 2025-08-19 ENCOUNTER — TELEPHONE (OUTPATIENT)
Age: 75
End: 2025-08-19

## 2025-08-19 VITALS
OXYGEN SATURATION: 99 % | RESPIRATION RATE: 20 BRPM | DIASTOLIC BLOOD PRESSURE: 80 MMHG | HEART RATE: 84 BPM | SYSTOLIC BLOOD PRESSURE: 120 MMHG | WEIGHT: 213.2 LBS | HEIGHT: 64 IN | BODY MASS INDEX: 36.4 KG/M2 | TEMPERATURE: 98 F

## 2025-08-19 DIAGNOSIS — I48.91 ATRIAL FIBRILLATION, UNSPECIFIED TYPE (HCC): Primary | ICD-10-CM

## 2025-08-19 DIAGNOSIS — Z01.818 PRE-OP EVALUATION: Primary | ICD-10-CM

## 2025-08-19 DIAGNOSIS — I48.91 ATRIAL FIBRILLATION, UNSPECIFIED TYPE (HCC): ICD-10-CM

## 2025-08-19 PROBLEM — F11.20 OPIOID DEPENDENCE, UNCOMPLICATED (HCC): Status: ACTIVE | Noted: 2025-08-19

## 2025-08-20 PROBLEM — I48.91 ATRIAL FIBRILLATION (HCC): Status: ACTIVE | Noted: 2025-08-20

## 2025-08-20 PROBLEM — Z01.818 PRE-OP EVALUATION: Status: ACTIVE | Noted: 2025-08-20

## 2025-08-21 ENCOUNTER — OFFICE VISIT (OUTPATIENT)
Dept: PHYSICAL THERAPY | Facility: REHABILITATION | Age: 75
End: 2025-08-21
Payer: COMMERCIAL

## 2025-08-21 DIAGNOSIS — M25.572 CHRONIC PAIN OF BOTH ANKLES: Primary | ICD-10-CM

## 2025-08-21 DIAGNOSIS — G89.29 CHRONIC PAIN OF BOTH ANKLES: Primary | ICD-10-CM

## 2025-08-21 DIAGNOSIS — M25.571 CHRONIC PAIN OF BOTH ANKLES: Primary | ICD-10-CM

## 2025-08-21 DIAGNOSIS — R26.9 GAIT ABNORMALITY: ICD-10-CM

## 2025-08-21 PROCEDURE — 97140 MANUAL THERAPY 1/> REGIONS: CPT | Performed by: PHYSICAL THERAPIST

## 2025-08-21 PROCEDURE — 97110 THERAPEUTIC EXERCISES: CPT | Performed by: PHYSICAL THERAPIST

## 2025-08-22 ENCOUNTER — TELEPHONE (OUTPATIENT)
Age: 75
End: 2025-08-22

## 2025-08-22 DIAGNOSIS — S46.811A TRAUMATIC RUPTURE OF SUPRASPINATUS TENDON OF RIGHT SHOULDER, INITIAL ENCOUNTER: Primary | ICD-10-CM

## (undated) DEVICE — SUT ETHILON 2-0 FSLX 30 IN 1674H

## (undated) DEVICE — GLOVE PI ULTRA TOUCH SZ.7.5

## (undated) DEVICE — GLOVE INDICATOR PI UNDERGLOVE SZ 7.5 BLUE

## (undated) DEVICE — ACE WRAP 6 IN UNSTERILE

## (undated) DEVICE — SILVER-COATED ANTIMICROBIAL BARRIER DRESSING: Brand: ACTICOAT   4" X 8"

## (undated) DEVICE — MEDI-VAC YANKAUER SUCTION HANDLE W/BULBOUS AND CONTROL VENT: Brand: CARDINAL HEALTH

## (undated) DEVICE — INTENT TO BE USED WITH SUTURE MATERIAL FOR TISSUE CLOSURE: Brand: RICHARD-ALLAN® NEEDLE 1/2 CIRCLE TAPER

## (undated) DEVICE — GLOVE PI ULTRA TOUCH SZ.6.5

## (undated) DEVICE — ALLENTOWN DR  LUCENTE S LAP PK: Brand: CARDINAL HEALTH

## (undated) DEVICE — UNIVERSAL MAJOR EXTREMITY,KIT: Brand: CARDINAL HEALTH

## (undated) DEVICE — STOCKINETTE REGULAR

## (undated) DEVICE — DRAPE C-ARM X-RAY

## (undated) DEVICE — TUBING SUCTION 5MM X 12 FT

## (undated) DEVICE — SUT VICRYL 2-0 SH 27 IN UNDYED J417H

## (undated) DEVICE — INTENDED FOR TISSUE SEPARATION, AND OTHER PROCEDURES THAT REQUIRE A SHARP SURGICAL BLADE TO PUNCTURE OR CUT.: Brand: BARD-PARKER SAFETY BLADES SIZE 10, STERILE

## (undated) DEVICE — 2000CC GUARDIAN II: Brand: GUARDIAN

## (undated) DEVICE — SUT VICRYL 0 CT-1 36 IN J946H

## (undated) DEVICE — ELECTROSURGICAL DEVICE HOLSTER;FOR USE WITH MAXIMUM PEAK VOLTAGE OF 4000 V: Brand: FORCE TRIVERSE

## (undated) DEVICE — IV FLUSH NSS 10ML POSIFLUSH

## (undated) DEVICE — CHLORAPREP HI-LITE 26ML ORANGE

## (undated) DEVICE — GLOVE SRG BIOGEL 8

## (undated) DEVICE — SPONGE PVP SCRUB WING STERILE

## (undated) DEVICE — ABDOMINAL PAD: Brand: DERMACEA

## (undated) DEVICE — GLOVE INDICATOR PI UNDERGLOVE SZ 8.5 BLUE

## (undated) DEVICE — CAUTERY TIP POLISHER: Brand: DEVON

## (undated) DEVICE — BULB SYRINGE,IRRIGATION WITH PROTECTIVE CAP: Brand: DOVER

## (undated) DEVICE — SUT VICRYL PLUS 1 CTB-1 36 IN VCPB947H

## (undated) DEVICE — PREMIUM DRY TRAY LF: Brand: MEDLINE INDUSTRIES, INC.

## (undated) DEVICE — PLUMEPEN PRO 10FT

## (undated) DEVICE — EXIDINE 4 PCT

## (undated) DEVICE — SCD SEQUENTIAL COMPRESSION COMFORT SLEEVE MEDIUM KNEE LENGTH: Brand: KENDALL SCD

## (undated) DEVICE — NEEDLE HYPO 22G X 1-1/2 IN

## (undated) DEVICE — GLOVE INDICATOR PI UNDERGLOVE SZ 6.5 BLUE

## (undated) DEVICE — KERLIX BANDAGE ROLL: Brand: KERLIX

## (undated) DEVICE — PADDING CAST 4 IN  COTTON STRL

## (undated) DEVICE — UNDER BUTTOCKS DRAPE W/FLUID CONTROL POUCH: Brand: CONVERTORS

## (undated) DEVICE — CATH FOLEY 18FR 5ML 2 WAY UNCOATED SILICONE

## (undated) DEVICE — GAUZE SPONGES,16 PLY: Brand: CURITY

## (undated) DEVICE — GLOVE PI ULTRA TOUCH SZ.8.0